# Patient Record
Sex: FEMALE | Race: WHITE | NOT HISPANIC OR LATINO | Employment: OTHER | URBAN - METROPOLITAN AREA
[De-identification: names, ages, dates, MRNs, and addresses within clinical notes are randomized per-mention and may not be internally consistent; named-entity substitution may affect disease eponyms.]

---

## 2017-07-11 ENCOUNTER — OFFICE VISIT (OUTPATIENT)
Dept: HEMATOLOGY/ONCOLOGY | Facility: CLINIC | Age: 67
End: 2017-07-11
Payer: MEDICARE

## 2017-07-11 VITALS
TEMPERATURE: 98 F | SYSTOLIC BLOOD PRESSURE: 126 MMHG | WEIGHT: 162.63 LBS | RESPIRATION RATE: 18 BRPM | HEIGHT: 59 IN | HEART RATE: 73 BPM | BODY MASS INDEX: 32.79 KG/M2 | DIASTOLIC BLOOD PRESSURE: 81 MMHG

## 2017-07-11 DIAGNOSIS — Z17.0 MALIGNANT NEOPLASM OF CENTRAL PORTION OF RIGHT BREAST IN FEMALE, ESTROGEN RECEPTOR POSITIVE: ICD-10-CM

## 2017-07-11 DIAGNOSIS — M81.8 OSTEOPOROSIS DUE TO AROMATASE INHIBITOR: ICD-10-CM

## 2017-07-11 DIAGNOSIS — C50.111 MALIGNANT NEOPLASM OF CENTRAL PORTION OF RIGHT BREAST IN FEMALE, ESTROGEN RECEPTOR POSITIVE: ICD-10-CM

## 2017-07-11 DIAGNOSIS — T38.6X5A OSTEOPOROSIS DUE TO AROMATASE INHIBITOR: ICD-10-CM

## 2017-07-11 PROCEDURE — 99214 OFFICE O/P EST MOD 30 MIN: CPT | Mod: ,,, | Performed by: INTERNAL MEDICINE

## 2017-07-11 PROCEDURE — 1126F AMNT PAIN NOTED NONE PRSNT: CPT | Mod: ,,, | Performed by: INTERNAL MEDICINE

## 2017-07-11 PROCEDURE — 1159F MED LIST DOCD IN RCRD: CPT | Mod: ,,, | Performed by: INTERNAL MEDICINE

## 2017-07-11 RX ORDER — HYDROXYZINE HYDROCHLORIDE 10 MG/1
TABLET, FILM COATED ORAL
COMMUNITY
End: 2018-06-05

## 2017-07-11 RX ORDER — ALENDRONATE SODIUM 70 MG/1
70 TABLET ORAL
Qty: 4 TABLET | Refills: 11 | Status: SHIPPED | OUTPATIENT
Start: 2017-07-11 | End: 2018-06-05

## 2017-07-11 RX ORDER — DOXYCYCLINE 100 MG/1
CAPSULE ORAL
Refills: 0 | COMMUNITY
Start: 2017-04-04 | End: 2018-06-05

## 2017-07-11 RX ORDER — LIDOCAINE HYDROCHLORIDE 20 MG/ML
SOLUTION ORAL; TOPICAL
Refills: 0 | COMMUNITY
Start: 2017-05-25 | End: 2018-06-05

## 2017-07-11 RX ORDER — AMOXICILLIN 500 MG/1
CAPSULE ORAL
Refills: 0 | COMMUNITY
Start: 2017-05-25 | End: 2018-06-05

## 2017-07-11 RX ORDER — HYDROCODONE POLISTIREX AND CHLORPHENIRAMINE POLISTIREX 10; 8 MG/5ML; MG/5ML
SUSPENSION, EXTENDED RELEASE ORAL
Refills: 0 | COMMUNITY
Start: 2017-04-04 | End: 2018-06-05

## 2017-07-11 RX ORDER — ASPIRIN 325 MG
TABLET, DELAYED RELEASE (ENTERIC COATED) ORAL
COMMUNITY
End: 2018-06-05

## 2017-07-11 RX ORDER — LETROZOLE 2.5 MG/1
TABLET, FILM COATED ORAL
COMMUNITY
Start: 2017-07-10 | End: 2018-02-20 | Stop reason: SDUPTHER

## 2017-07-11 NOTE — ASSESSMENT & PLAN NOTE
New problem. Discussed in detail with patient.  Will begin Fosamax weekly and continue with Oscal treatment.  Check labs with next visit.

## 2017-07-11 NOTE — ASSESSMENT & PLAN NOTE
Right mastectomy 5/24/2007  [id number], 2cm tumor  Lobular carcinoma  Completed AC 10/2007  Completed XRT for close margins  Began AI 9/2007    Patient is doing quite well, remains on letrozole and is tolerating it well but now with osteoporosis.  Will begin fosamax and continue letrozole for now at patients request.  Discussed fracture risk.  Will watch closely.

## 2017-07-11 NOTE — PROGRESS NOTES
PROGRESS NOTE    Subjective:       Patient ID: Camila Au is a 67 y.o. female.    Chief Complaint:  Follow-up (6 mth f/u) and Cancer  Right mastectomy 5/24/2007  [id number], 2cm tumor  Lobular carcinoma  Completed AC 10/2007  Completed XRT for close margins  Began AI 9/2007      History of Present Illness:   Camila Au is a 67 y.o. female who presents with a history of breast cancer here for follow up.  Patient is doing well. No new complaints.        Family and Social history reviewed and is unchanged from 11/25/2014      ROS:  Review of Systems   Constitutional: Negative for fever.   Respiratory: Negative for shortness of breath.    Cardiovascular: Negative for chest pain and leg swelling.   Gastrointestinal: Negative for abdominal pain and blood in stool.   Genitourinary: Negative for hematuria.   Skin: Negative for rash.          Current Outpatient Prescriptions:     anastrozole (ARIMIDEX) 1 mg tablet, Take 1 mg by mouth once daily. , Disp: , Rfl:     calcium-vitamin D (OSCAL) 250 (625)-125 mg-unit per tablet, Take 1 tablet by mouth 2 (two) times daily.  , Disp: , Rfl:     ibuprofen (ADVIL,MOTRIN) 200 MG tablet, Take 200 mg by mouth every 6 (six) hours as needed. 2 TABS , Disp: , Rfl:     letrozole (FEMARA) 2.5 mg Tab, , Disp: , Rfl:     LIDOCAINE VISCOUS 2 % solution, RINSE WITH 1 LEIGHTON FOR 50 SECONDS THEN SPIT OUT REPEAT Q 4 TO 6 H PRN P, Disp: , Rfl: 0    multivitamin (THERAGRAN) per tablet, Take 1 tablet by mouth once daily. 1 Tablet Oral Every day, Disp: , Rfl:     alendronate (FOSAMAX) 70 MG tablet, Take 1 tablet (70 mg total) by mouth every 7 days., Disp: 4 tablet, Rfl: 11    amoxicillin (AMOXIL) 500 MG capsule, TK ONE C PO TID TAT, Disp: , Rfl: 0    aspirin (ECOTRIN) 325 MG EC tablet, Take by mouth., Disp: , Rfl:     doxycycline (MONODOX) 100 MG capsule, TK 1 C PO Q 12 H FOR 10 DAYS, Disp: , Rfl: 0    hydrocodone-chlorpheniramine  "(TUSSIONEX) 10-8 mg/5 mL suspension, TAKE 5 ML PO Q 12 HOURS PRN FOR 5 DAYS, Disp: , Rfl: 0    hydrOXYzine HCl (ATARAX) 10 MG Tab, Take by mouth., Disp: , Rfl:     phenazopyridine (PYRIDIUM) 200 MG tablet, Take 1 tablet (200 mg total) by mouth every 4 to 6 hours as needed., Disp: 30 tablet, Rfl: 3        Objective:       Physical Examination:     /81   Pulse 73   Temp 98 °F (36.7 °C)   Resp 18   Ht 4' 11" (1.499 m)   Wt 73.8 kg (162 lb 9.6 oz)   BMI 32.84 kg/m²     Physical Exam   Constitutional: She appears well-developed and well-nourished.   HENT:   Head: Normocephalic and atraumatic.   Right Ear: External ear normal.   Left Ear: External ear normal.   Mouth/Throat: Oropharynx is clear and moist.   Eyes: Conjunctivae are normal. Pupils are equal, round, and reactive to light.   Neck: No tracheal deviation present. No thyromegaly present.   Cardiovascular: Normal rate, regular rhythm and normal heart sounds.    Pulmonary/Chest: Effort normal and breath sounds normal.       Abdominal: Soft. Bowel sounds are normal. She exhibits no distension and no mass. There is no tenderness.   Musculoskeletal: She exhibits no edema.   Neurological:   Neuro intact througout   Skin: No rash noted.   Psychiatric: She has a normal mood and affect. Her behavior is normal. Judgment and thought content normal.       Labs:   No results found for this or any previous visit (from the past 336 hour(s)).  CMP  Sodium   Date Value Ref Range Status   08/14/2012 136 136 - 145 mmol/L Final     Potassium   Date Value Ref Range Status   08/14/2012 4.1 3.5 - 5.1 mmol/L Final     Chloride   Date Value Ref Range Status   08/14/2012 100 95 - 110 mmol/L Final     CO2   Date Value Ref Range Status   08/14/2012 25 23.0 - 29.0 mmol/L Final     Glucose   Date Value Ref Range Status   08/14/2012 144 (H) 70 - 110 mg/dl Final     BUN, Bld   Date Value Ref Range Status   08/14/2012 12 8 - 23 mg/dl Final     Creatinine   Date Value Ref Range " Status   08/14/2012 0.6 0.5 - 1.4 mg/dl Final   08/08/2012 0.6 0.2 - 1.4 mg/dl Final     Calcium   Date Value Ref Range Status   08/14/2012 8.7 8.7 - 10.5 mg/dl Final   08/08/2012 9.5 8.6 - 10.2 mg/dl Final     Total Protein   Date Value Ref Range Status   08/14/2012 6.9 6.0 - 8.4 g/dL Final     Albumin   Date Value Ref Range Status   08/14/2012 3.8 3.5 - 5.2 g/dl Final     Total Bilirubin   Date Value Ref Range Status   08/14/2012 0.7 0.1 - 1.0 mg/dl Final     Comment:     For infants and newborns, interpretation of results should be based  on gestational age, weight and in agreement with clinical  observations.  .  Premature Infant recommended reference ranges:  Up to 24 hours.............<8.0 mg/dl  Up to 48 hours............<12.0 mg/dl  3-5 days..................<15.0 mg/dl  6-29 days.................<15.0 mg/dl     Alkaline Phosphatase   Date Value Ref Range Status   08/14/2012 105 55 - 135 U/L Final   08/08/2012 100 23 - 119 UNIT/L Final     AST   Date Value Ref Range Status   08/14/2012 55 (H) 10 - 40 U/L Final   08/08/2012 35 (H) 10 - 30 UNIT/L Final     ALT   Date Value Ref Range Status   08/14/2012 47 (H) 10 - 44 U/L Final     Anion Gap   Date Value Ref Range Status   08/14/2012 11 8 - 16 mmol/L Final   08/08/2012 9 5 - 15 meq/L Final     eGFR if    Date Value Ref Range Status   08/14/2012 >60 >60 mL/min Final     Comment:     Estimated glomerular filtration rate (eGFR) is normalized to an  average body surface area of 1.73 square meters.  The calculation  used to obtain the eGFR is the adjusted MDRD equation, which factors  patient sex, age, race, and creatinine result.  Since race is unknown  in our information system, the eGFR values for -American  and Non--American patients are given for each creatinine  result.     eGFR if non    Date Value Ref Range Status   08/14/2012 >60 >60 mL/min Final     No results found for: CEA  No results found for:  PSA        Assessment/Plan:     Problem List Items Addressed This Visit     Osteoporosis due to aromatase inhibitor     New problem. Discussed in detail with patient.  Will begin Fosamax weekly and continue with Oscal treatment.  Check labs with next visit.           Relevant Medications    alendronate (FOSAMAX) 70 MG tablet    Malignant neoplasm of central portion of right breast in female, estrogen receptor positive     Right mastectomy 5/24/2007  [id number], 2cm tumor  Lobular carcinoma  Completed AC 10/2007  Completed XRT for close margins  Began AI 9/2007    Patient is doing quite well, remains on letrozole and is tolerating it well but now with osteoporosis.  Will begin fosamax and continue letrozole for now at patients request.  Discussed fracture risk.  Will watch closely.              Other Visit Diagnoses    None.         Discussion:     No Follow-up on file.      Electronically signed by Jeffrey Ramos

## 2017-07-11 NOTE — LETTER
July 11, 2017      Kasie Arthur MD  [address]  [address]  [place] [address]           [facility]  [address]  [address]  [place] [address]  Phone: [phone]  Fax: [phone]          Patient: Camila Au   MR Number: [id number]   YOB: 1950   Date of Visit: 7/11/2017       Dear Dr. Kasie Arthur:    Thank you for referring Camila Au to me for evaluation. Attached you will find relevant portions of my assessment and plan of care.    If you have questions, please do not hesitate to call me. I look forward to following Camila Au along with you.    Sincerely,    Jeffrey Valentin MD    Enclosure  CC:  No Recipients    If you would like to receive this communication electronically, please contact [email or web address] or [phone] to request more information on [employer] Link access.    For providers and/or their staff who would like to refer a patient to [facility], please contact us through our one-stop-shop provider referral line, [facility], at [phone].    If you feel you have received this communication in error or would no longer like to receive these types of communications, please e-mail [email or web address]

## 2017-07-13 ENCOUNTER — TELEPHONE (OUTPATIENT)
Dept: HEMATOLOGY/ONCOLOGY | Facility: CLINIC | Age: 67
End: 2017-07-13

## 2018-01-08 ENCOUNTER — HISTORICAL (OUTPATIENT)
Dept: ADMINISTRATIVE | Facility: HOSPITAL | Age: 68
End: 2018-01-08

## 2018-01-08 LAB
ALBUMIN SERPL-MCNC: 4.2 G/DL (ref 3.1–4.7)
ALP SERPL-CCNC: 100 IU/L (ref 40–104)
ALT (SGPT): 84 IU/L (ref 3–33)
AST SERPL-CCNC: 132 IU/L (ref 10–40)
BASOPHILS NFR BLD: 0.1 K/UL (ref 0–0.2)
BASOPHILS NFR BLD: 1.2 %
BILIRUB SERPL-MCNC: 0.8 MG/DL (ref 0.3–1)
BUN SERPL-MCNC: 10 MG/DL (ref 8–20)
CALCIUM SERPL-MCNC: 9.2 MG/DL (ref 7.7–10.4)
CHLORIDE: 101 MMOL/L (ref 98–110)
CO2 SERPL-SCNC: 29.4 MMOL/L (ref 22.8–31.6)
CREATININE: 0.44 MG/DL (ref 0.6–1.4)
EOSINOPHIL NFR BLD: 0.1 K/UL (ref 0–0.7)
EOSINOPHIL NFR BLD: 1.6 %
ERYTHROCYTE [DISTWIDTH] IN BLOOD BY AUTOMATED COUNT: 12.5 % (ref 12.5–14.5)
GLUCOSE: 100 MG/DL (ref 70–99)
GRAN #: 4.2 K/UL (ref 1.4–6.5)
GRAN%: 54.9 %
HCT VFR BLD AUTO: 45.4 % (ref 36–48)
HGB BLD-MCNC: 15.9 G/DL (ref 12–15)
IMMATURE GRANS (ABS): 0 K/UL (ref 0–1)
IMMATURE GRANULOCYTES: 0.4 %
LYMPH #: 2.6 K/UL (ref 1.2–3.4)
LYMPH%: 33.2 %
MCH RBC QN AUTO: 32.9 PG (ref 25–35)
MCHC RBC AUTO-ENTMCNC: 35 G/DL (ref 31–36)
MCV RBC AUTO: 94 FL (ref 79–98)
MONO #: 0.7 K/UL (ref 0.1–0.6)
MONO%: 8.7 %
NUCLEATED RBCS: 0 %
NUCLEATED RED BLOOD CELLS: 0 /100 WBC
PERFORMED BY:: ABNORMAL
PLATELET # BLD AUTO: 243 K/UL (ref 140–440)
PMV BLD AUTO: 10.8 FL (ref 8.8–12.7)
POTASSIUM SERPL-SCNC: 3.2 MMOL/L (ref 3.5–5)
PROT SERPL-MCNC: 7.9 G/DL (ref 6–8.2)
RBC # BLD AUTO: 4.83 M/UL (ref 3.5–5.5)
SODIUM: 138 MMOL/L (ref 134–144)
WBC # BLD: 7.7 K/UL (ref 5–10)

## 2018-01-09 ENCOUNTER — OFFICE VISIT (OUTPATIENT)
Dept: HEMATOLOGY/ONCOLOGY | Facility: CLINIC | Age: 68
End: 2018-01-09
Payer: MEDICARE

## 2018-01-09 VITALS
DIASTOLIC BLOOD PRESSURE: 74 MMHG | WEIGHT: 162.69 LBS | TEMPERATURE: 98 F | HEART RATE: 98 BPM | BODY MASS INDEX: 34.15 KG/M2 | HEIGHT: 58 IN | RESPIRATION RATE: 18 BRPM | SYSTOLIC BLOOD PRESSURE: 117 MMHG

## 2018-01-09 DIAGNOSIS — C50.111 MALIGNANT NEOPLASM OF CENTRAL PORTION OF RIGHT BREAST IN FEMALE, ESTROGEN RECEPTOR POSITIVE: ICD-10-CM

## 2018-01-09 DIAGNOSIS — Z17.0 MALIGNANT NEOPLASM OF CENTRAL PORTION OF RIGHT BREAST IN FEMALE, ESTROGEN RECEPTOR POSITIVE: ICD-10-CM

## 2018-01-09 PROCEDURE — 99213 OFFICE O/P EST LOW 20 MIN: CPT | Mod: ,,, | Performed by: INTERNAL MEDICINE

## 2018-01-09 NOTE — ASSESSMENT & PLAN NOTE
Patient is doing well.  Exam is negative and patient is TARAS.  Will continue to follow every six months.

## 2018-01-09 NOTE — PROGRESS NOTES
PROGRESS NOTE    Subjective:       Patient ID: Camila Au is a 67 y.o. female.    Chief Complaint:  Follow-up and Results (labs in epic)  Right mastectomy 5/24/2007  [id number], 2cm tumor  Lobular carcinoma  Completed AC 10/2007  Completed XRT for close margins  Began AI 9/2007      History of Present Illness:   Camila Au is a 67 y.o. female who presents with a history of breast cancer here for follow up.  No new complaints this visit.        Family and Social history reviewed and is unchanged from 11/25/2014      ROS:  Review of Systems   Constitutional: Negative for fever.   Respiratory: Negative for shortness of breath.    Cardiovascular: Negative for chest pain and leg swelling.   Gastrointestinal: Negative for abdominal pain and blood in stool.   Genitourinary: Negative for hematuria.   Skin: Negative for rash.          Current Outpatient Prescriptions:     alendronate (FOSAMAX) 70 MG tablet, Take 1 tablet (70 mg total) by mouth every 7 days., Disp: 4 tablet, Rfl: 11    aspirin (ECOTRIN) 325 MG EC tablet, Take by mouth., Disp: , Rfl:     calcium-vitamin D (OSCAL) 250 (625)-125 mg-unit per tablet, Take 1 tablet by mouth 2 (two) times daily.  , Disp: , Rfl:     doxycycline (MONODOX) 100 MG capsule, TK 1 C PO Q 12 H FOR 10 DAYS, Disp: , Rfl: 0    hydrocodone-chlorpheniramine (TUSSIONEX) 10-8 mg/5 mL suspension, TAKE 5 ML PO Q 12 HOURS PRN FOR 5 DAYS, Disp: , Rfl: 0    hydrOXYzine HCl (ATARAX) 10 MG Tab, Take by mouth., Disp: , Rfl:     ibuprofen (ADVIL,MOTRIN) 200 MG tablet, Take 200 mg by mouth every 6 (six) hours as needed. 2 TABS , Disp: , Rfl:     letrozole (FEMARA) 2.5 mg Tab, , Disp: , Rfl:     LIDOCAINE VISCOUS 2 % solution, RINSE WITH 1 LEIGHTON FOR 50 SECONDS THEN SPIT OUT REPEAT Q 4 TO 6 H PRN P, Disp: , Rfl: 0    multivitamin (THERAGRAN) per tablet, Take 1 tablet by mouth once daily. 1 Tablet Oral Every day, Disp: , Rfl:      "phenazopyridine (PYRIDIUM) 200 MG tablet, Take 1 tablet (200 mg total) by mouth every 4 to 6 hours as needed., Disp: 30 tablet, Rfl: 3    amoxicillin (AMOXIL) 500 MG capsule, TK ONE C PO TID TAT, Disp: , Rfl: 0        Objective:       Physical Examination:     /74   Pulse 98   Temp 98.2 °F (36.8 °C)   Resp 18   Ht 4' 10" (1.473 m)   Wt 73.8 kg (162 lb 11.2 oz)   BMI 34.00 kg/m²     Physical Exam   Constitutional: She appears well-developed and well-nourished.   HENT:   Head: Normocephalic and atraumatic.   Right Ear: External ear normal.   Left Ear: External ear normal.   Mouth/Throat: Oropharynx is clear and moist.   Eyes: Conjunctivae are normal. Pupils are equal, round, and reactive to light.   Neck: No tracheal deviation present. No thyromegaly present.   Cardiovascular: Normal rate, regular rhythm and normal heart sounds.    Pulmonary/Chest: Effort normal and breath sounds normal.       Abdominal: Soft. Bowel sounds are normal. She exhibits no distension and no mass. There is no tenderness.   Musculoskeletal: She exhibits no edema.   Neurological:   Neuro intact througout   Skin: No rash noted.   Psychiatric: She has a normal mood and affect. Her behavior is normal. Judgment and thought content normal.       Labs:   Recent Results (from the past 336 hour(s))   CBC auto differential    Collection Time: 01/08/18 12:37 PM   Result Value Ref Range    WBC 7.7 5.0 - 10.0 K/ul    Hemoglobin 15.9 (H) 12.0 - 15.0 g/dl    Hematocrit 45.4 36.0 - 48.0 %    Platelets 243 140 - 440 K/ul     CMP  Sodium   Date Value Ref Range Status   01/08/2018 138 134 - 144 mmol/L      Potassium   Date Value Ref Range Status   01/08/2018 3.2 (L) 3.5 - 5.0 mmol/L      Chloride   Date Value Ref Range Status   01/08/2018 101 98 - 110 mmol/L      CO2   Date Value Ref Range Status   01/08/2018 29.4 22.8 - 31.6 mmol/L      Glucose   Date Value Ref Range Status   01/08/2018 100 (H) 70 - 99 mg/dL      BUN, Bld   Date Value Ref Range " Status   01/08/2018 10 8 - 20 mg/dL      Creatinine   Date Value Ref Range Status   01/08/2018 0.44 (L) 0.60 - 1.40 mg/dL    08/08/2012 0.6 0.2 - 1.4 mg/dl Final     Calcium   Date Value Ref Range Status   01/08/2018 9.2 7.7 - 10.4 mg/dL    08/08/2012 9.5 8.6 - 10.2 mg/dl Final     Total Protein   Date Value Ref Range Status   01/08/2018 7.9 6.0 - 8.2 g/dL      Albumin   Date Value Ref Range Status   01/08/2018 4.2 3.1 - 4.7 g/dL      Total Bilirubin   Date Value Ref Range Status   01/08/2018 0.8 0.3 - 1.0 mg/dL      Alkaline Phosphatase   Date Value Ref Range Status   01/08/2018 100 40 - 104 IU/L    08/08/2012 100 23 - 119 UNIT/L Final     AST   Date Value Ref Range Status   01/08/2018 132 (H) 10 - 40 IU/L    08/08/2012 35 (H) 10 - 30 UNIT/L Final     ALT   Date Value Ref Range Status   08/14/2012 47 (H) 10 - 44 U/L Final     Anion Gap   Date Value Ref Range Status   08/14/2012 11 8 - 16 mmol/L Final   08/08/2012 9 5 - 15 meq/L Final     eGFR if    Date Value Ref Range Status   08/14/2012 >60 >60 mL/min Final     Comment:     Estimated glomerular filtration rate (eGFR) is normalized to an  average body surface area of 1.73 square meters.  The calculation  used to obtain the eGFR is the adjusted MDRD equation, which factors  patient sex, age, race, and creatinine result.  Since race is unknown  in our information system, the eGFR values for -American  and Non--American patients are given for each creatinine  result.     eGFR if non    Date Value Ref Range Status   08/14/2012 >60 >60 mL/min Final     No results found for: CEA  No results found for: PSA        Assessment/Plan:     Problem List Items Addressed This Visit     Malignant neoplasm of central portion of right breast in female, estrogen receptor positive     Patient is doing well.  Exam is negative and patient is TARAS.  Will continue to follow every six months.           Relevant Orders    CBC auto differential     Comprehensive metabolic panel          Discussion:     Return in about 6 months (around 7/9/2018).      Electronically signed by Jeffrey Ramos

## 2018-01-09 NOTE — LETTER
January 9, 2018      Marlo Kennedy MD  [address]  [place] [address]           [facility]  [address]  [address]  [place] [address]  Phone: [phone]  Fax: [phone]          Patient: Camila Au   MR Number: [id number]   YOB: 1950   Date of Visit: 1/9/2018       Dear Dr. Marlo Kennedy:    Thank you for referring Camila Au to me for evaluation. Attached you will find relevant portions of my assessment and plan of care.    If you have questions, please do not hesitate to call me. I look forward to following Camila Au along with you.    Sincerely,    Jeffrey Valentin MD    Enclosure  CC:  No Recipients    If you would like to receive this communication electronically, please contact [email or web address] or [phone] to request more information on [employer] Link access.    For providers and/or their staff who would like to refer a patient to [facility], please contact us through our one-stop-shop provider referral line, [facility], at [phone].    If you feel you have received this communication in error or would no longer like to receive these types of communications, please e-mail [email or web address]

## 2018-02-20 DIAGNOSIS — Z85.3 HISTORY OF BREAST CANCER: Primary | ICD-10-CM

## 2018-02-20 RX ORDER — LETROZOLE 2.5 MG/1
TABLET, FILM COATED ORAL
Qty: 90 TABLET | Refills: 1 | Status: SHIPPED | OUTPATIENT
Start: 2018-02-20 | End: 2019-02-28 | Stop reason: CLARIF

## 2018-05-29 ENCOUNTER — TELEPHONE (OUTPATIENT)
Dept: ORTHOPEDICS | Facility: CLINIC | Age: 68
End: 2018-05-29

## 2018-05-29 NOTE — TELEPHONE ENCOUNTER
----- Message from Deepak Dominguez sent at 5/29/2018 12:21 PM CDT -----  Patient called stating that she's having pains in her back, please call patient [phone]

## 2018-05-29 NOTE — TELEPHONE ENCOUNTER
Spoke with pt, she is having back pains. She has seen Dr. Perkins in the past, she will make an appt with him

## 2018-06-05 ENCOUNTER — OFFICE VISIT (OUTPATIENT)
Dept: ORTHOPEDICS | Facility: CLINIC | Age: 68
End: 2018-06-05
Payer: MEDICARE

## 2018-06-05 VITALS
SYSTOLIC BLOOD PRESSURE: 120 MMHG | BODY MASS INDEX: 34 KG/M2 | DIASTOLIC BLOOD PRESSURE: 80 MMHG | HEIGHT: 58 IN | WEIGHT: 162 LBS | HEART RATE: 77 BPM

## 2018-06-05 DIAGNOSIS — M17.11 PRIMARY OSTEOARTHRITIS OF RIGHT KNEE: Primary | ICD-10-CM

## 2018-06-05 PROCEDURE — 20610 DRAIN/INJ JOINT/BURSA W/O US: CPT | Mod: RT,,, | Performed by: ORTHOPAEDIC SURGERY

## 2018-06-05 PROCEDURE — 99213 OFFICE O/P EST LOW 20 MIN: CPT | Mod: 25,,, | Performed by: ORTHOPAEDIC SURGERY

## 2018-06-05 PROCEDURE — 73562 X-RAY EXAM OF KNEE 3: CPT | Mod: RT,,, | Performed by: ORTHOPAEDIC SURGERY

## 2018-06-05 RX ORDER — METHYLPREDNISOLONE ACETATE 40 MG/ML
40 INJECTION, SUSPENSION INTRA-ARTICULAR; INTRALESIONAL; INTRAMUSCULAR; SOFT TISSUE
Status: DISCONTINUED | OUTPATIENT
Start: 2018-06-05 | End: 2018-06-05 | Stop reason: HOSPADM

## 2018-06-05 RX ORDER — MELOXICAM 7.5 MG/1
TABLET ORAL
Refills: 3 | COMMUNITY
Start: 2018-04-27 | End: 2018-07-30

## 2018-06-05 RX ADMIN — METHYLPREDNISOLONE ACETATE 40 MG: 40 INJECTION, SUSPENSION INTRA-ARTICULAR; INTRALESIONAL; INTRAMUSCULAR; SOFT TISSUE at 11:06

## 2018-06-05 NOTE — PROCEDURES
Large Joint Aspiration/Injection  Date/Time: 6/5/2018 11:58 AM  Performed by: SUSANNA CHERRY  Authorized by: SUSANNA CHERRY     Consent Done?:  Yes (Verbal)  Indications:  Pain  Procedure site marked: Yes    Timeout: Prior to procedure the correct patient, procedure, and site was verified      Location:  Knee  Site:  R knee  Prep: Patient was prepped and draped in usual sterile fashion    Needle size:  25 G  Medications:  40 mg methylPREDNISolone acetate 40 mg/mL; 40 mg methylPREDNISolone acetate 40 mg/mL  Patient tolerance:  Patient tolerated the procedure well with no immediate complications

## 2018-06-05 NOTE — PROGRESS NOTES
[facility]    Subjective:     Chief Complaint:   Chief Complaint   Patient presents with    Right Knee - Pain     Right knee pain x 2 months.        Past Medical History:   Diagnosis Date    Arthritis     Cancer     breast, remission    Wears glasses     WEARS CONTACS       Past Surgical History:   Procedure Laterality Date    CARPAL TUNNEL RELEASE       SECTION      FOOT SURGERY      KNEE ARTHROSCOPY W/ LASER      MASTECTOMY Right 2007    TUBAL LIGATION         Current Outpatient Prescriptions   Medication Sig    letrozole (FEMARA) 2.5 mg Tab TAKE 1 TABLET BY MOUTH ONE TIME DAILY.    meloxicam (MOBIC) 7.5 MG tablet TK 1 T PO QD     No current facility-administered medications for this visit.        Review of patient's allergies indicates:   Allergen Reactions    No known drug allergies        Family History   Problem Relation Age of Onset    Arthritis Mother     Hyperlipidemia Mother     Stroke Mother     Dementia Mother        Social History     Social History    Marital status:      Spouse name: N/A    Number of children: N/A    Years of education: N/A     Occupational History    Not on file.     Social History Main Topics    Smoking status: Former Smoker     Types: Cigarettes    Smokeless tobacco: Never Used      Comment: social smoker - on weekends only - quit 20 yrs ago    Alcohol use 0.0 oz/week      Comment: OCCASIONALLY    Drug use: No    Sexual activity: Yes     Partners: Male     Other Topics Concern    Not on file     Social History Narrative    No narrative on file       History of present illness: Patient comes in today with a chief complaint of right knee pain. She has severe osteoarthritis of the right knee. The knee is really acting up. She's having difficulty ambulating      Review of Systems:    Constitution: Negative for chills, fever, and sweats.  Negative for unexplained weight loss.    HENT:  Negative for headaches and blurry  vision.    Cardiovascular:Negative for chest pain or irregular heart beat. Negative for hypertension.    Respiratory:  Negative for cough and shortness of breath.    Gastrointestinal: Negative for abdominal pain, heartburn, melena, nausea, and vomitting.    Genitourinary:  Negative bladder incontinence and dysuria.    Musculoskeletal:  See HPI for details.     Neurological: Negative for numbness.    Psychiatric/Behavioral: Negative for depression.  The patient is not nervous/anxious.      Endocrine: Negative for polyuria    Hematologic/Lymphatic: Negative for bleeding problem.  Does not bruise/bleed easily.    Skin: Negative for poor would healing and rash    Objective:      Physical Examination:    Vital Signs:    Vitals:    06/05/18 1021   BP: 120/80   Pulse: 77       Body mass index is 33.86 kg/m².    This a well-developed, well nourished patient in no acute distress.  They are alert and oriented and cooperative to examination.        Patient has significant crepitus in the right side. She has a 1+ effusion. She has a varus deformity. She has crepitus in the left side. She is a varus deformity of the left knee. Straight leg raises are negative  Pertinent New Results:    XRAY Report / Interpretation:   AP lateral and sunrise views of the knees demonstrate bone-on-bone osteoarthrosis of the bilateral knees with complete loss of both medial compartments in 3 compartments spurring    Assessment/Plan:      Osteoarthritis right knee. I injected the right knee with Depo-Medrol and lidocaine. We spoke extensively about total knee arthroplasty. She will follow-up when necessary      This note was created using Dragon voice recognition software that occasionally misinterpreted phrases or words.

## 2018-06-18 ENCOUNTER — OFFICE VISIT (OUTPATIENT)
Dept: ORTHOPEDICS | Facility: CLINIC | Age: 68
End: 2018-06-18
Payer: MEDICARE

## 2018-06-18 VITALS
SYSTOLIC BLOOD PRESSURE: 122 MMHG | HEIGHT: 58 IN | WEIGHT: 162 LBS | DIASTOLIC BLOOD PRESSURE: 84 MMHG | BODY MASS INDEX: 34 KG/M2 | HEART RATE: 88 BPM

## 2018-06-18 DIAGNOSIS — M47.26 OTHER SPONDYLOSIS WITH RADICULOPATHY, LUMBAR REGION: Primary | ICD-10-CM

## 2018-06-18 DIAGNOSIS — M54.50 LOW BACK PAIN, NON-SPECIFIC: ICD-10-CM

## 2018-06-18 DIAGNOSIS — M51.36 DISC DEGENERATION, LUMBAR: ICD-10-CM

## 2018-06-18 PROCEDURE — 99213 OFFICE O/P EST LOW 20 MIN: CPT | Mod: ,,, | Performed by: ORTHOPAEDIC SURGERY

## 2018-06-18 PROCEDURE — 72170 X-RAY EXAM OF PELVIS: CPT | Mod: ,,, | Performed by: ORTHOPAEDIC SURGERY

## 2018-06-18 PROCEDURE — 72100 X-RAY EXAM L-S SPINE 2/3 VWS: CPT | Mod: ,,, | Performed by: ORTHOPAEDIC SURGERY

## 2018-06-18 RX ORDER — METHOCARBAMOL 500 MG/1
500 TABLET, FILM COATED ORAL 2 TIMES DAILY
Qty: 60 TABLET | Refills: 1 | Status: SHIPPED | OUTPATIENT
Start: 2018-06-18 | End: 2018-07-30

## 2018-06-18 NOTE — PROGRESS NOTES
Subjective:       Patient ID: Camila Au is a 68 y.o. female.    Chief Complaint: Pain of the Lumbar Spine (Lumbar pain x 30 years but not this bad. Pain radiates down left leg to foot with numbness. No bowel or bladder problems. Limited walking.A little better since she made this appointment.)      History of Present Illness  History of chronic back pain for 30 years of periodic exacerbations to S1 over the last month more severe than usual with pain and numbness in the left leg she is better head this point in time but is concerned    Current Medications  Current Outpatient Prescriptions   Medication Sig Dispense Refill    letrozole (FEMARA) 2.5 mg Tab TAKE 1 TABLET BY MOUTH ONE TIME DAILY. 90 tablet 1    meloxicam (MOBIC) 7.5 MG tablet TK 1 T PO QD  3     No current facility-administered medications for this visit.        Allergies  Review of patient's allergies indicates:   Allergen Reactions    No known drug allergies        Past Medical History  Past Medical History:   Diagnosis Date    Arthritis     Cancer     breast, remission    Wears glasses     WEARS CONTACS       Surgical History  Past Surgical History:   Procedure Laterality Date    CARPAL TUNNEL RELEASE       SECTION      FOOT SURGERY      KNEE ARTHROSCOPY W/ LASER      MASTECTOMY Right 2007    TUBAL LIGATION         Family History:   Family History   Problem Relation Age of Onset    Arthritis Mother     Hyperlipidemia Mother     Stroke Mother     Dementia Mother        Social History:   Social History     Social History    Marital status:      Spouse name: N/A    Number of children: N/A    Years of education: N/A     Occupational History    Not on file.     Social History Main Topics    Smoking status: Former Smoker     Types: Cigarettes    Smokeless tobacco: Never Used      Comment: social smoker - on weekends only - quit 20 yrs ago    Alcohol use 0.0 oz/week      Comment: OCCASIONALLY    Drug use: No     Sexual activity: Yes     Partners: Male     Other Topics Concern    Not on file     Social History Narrative    No narrative on file       Hospitalization/Major Diagnostic Procedure:     Review of Systems     General/Constitutional:  Chills denies. Fatigue denies. Fever denies. Weight gain denies. Weight loss denies.    Respiratory:  Shortness of breath denies.    Cardiovascular:  Chest pain denies.    Gastrointestinal:  Constipation denies. Diarrhea denies. Nausea denies. Vomiting denies.     Hematology:  Easy bruising denies. Prolonged bleeding denies.     Genitourinary:  Frequent urination denies. Pain in lower back denies. Painful urination denies.     Musculoskeletal:  See HPI for details    Skin:  Rash denies.    Neurologic:  Dizziness denies. Gait abnormalities denies. Seizures denies. Tingling/Numbess denies.    Psychiatric:  Anxiety denies. Depressed mood denies.     Objective:   Vital Signs:   Vitals:    06/18/18 1007   BP: 122/84   Pulse: 88        Physical Exam      General Examination:     Constitutional: The patient is alert and oriented to lace person and time. Mood is pleasant.     Head/Face: Normal facial features normal eyebrows    Eyes: Normal extraocular motion bilaterally    Lungs: Respirations are equal and unlabored    Gait is coordinated.    Cardiovascular: There are no swelling or varicosities present.    Lymphatic: Negative for adenopathy    Skin: Normal    Neurological: Level of consciousness normal. Oriented to place person and time and situation    Psychiatric: Oriented to time place person and situation    Physical examination lumbar spine shows well-healed no incisions moderate tenderness without spasm in the muscles as well as posterior iliac spine areas range of motion moderately restricted straight leg raising causes back pain bilaterally patella and Achilles reflexes symmetrical but hypoactive hip and knee range of motion normal with exception of back pain no edema or adenopathy  noted    XRAY Report/ Interpretation : AP lateral x-rays of the lumbar spinal performed today indications low back pain findings of lumbar x-rays show marked facet joint arthritis and sclerosis L4-5 L5-S1 levels moderate disc space narrowing at L4-5 and L5-S1 findings consistent with long-standing lumbar spondylosis  AP pelvis x-ray taken today. Indications low back pain. Findings AP pelvis x-rays show normal hip joints.      Assessment:       1. Other spondylosis with radiculopathy, lumbar region    2. Low back pain, non-specific    3. Disc degeneration, lumbar        Plan:       Camila was seen today for pain.    Diagnoses and all orders for this visit:    Other spondylosis with radiculopathy, lumbar region    Low back pain, non-specific  -     X-Ray Lumbar Spine Ap And Lateral  -     X-Ray Pelvis Routine AP    Disc degeneration, lumbar         No Follow-up on file.    Treatment options were discussed regards to the nature of the spinal condition conservative pain interventional and surgical options were discussed in detail and the probability of success of the separate treatment options was discussed in detail the patient expressed a clear understanding of the treatment options would regards to surgery the procedure risks benefits complications and outcomes were discussed.  No guarantees were given regards to surgical outcome.  Lumbar MRI is ordered for further evaluation of present complaints and chronic condition    This note was created using Dragon voice recognition software that occasionally misinterpreted phrases or words.

## 2018-07-17 ENCOUNTER — TELEPHONE (OUTPATIENT)
Dept: ORTHOPEDICS | Facility: CLINIC | Age: 68
End: 2018-07-17

## 2018-07-30 ENCOUNTER — OFFICE VISIT (OUTPATIENT)
Dept: ORTHOPEDICS | Facility: CLINIC | Age: 68
End: 2018-07-30
Payer: MEDICARE

## 2018-07-30 VITALS
WEIGHT: 159 LBS | HEIGHT: 58 IN | BODY MASS INDEX: 33.37 KG/M2 | SYSTOLIC BLOOD PRESSURE: 140 MMHG | DIASTOLIC BLOOD PRESSURE: 80 MMHG

## 2018-07-30 DIAGNOSIS — M48.061 FORAMINAL STENOSIS OF LUMBAR REGION: ICD-10-CM

## 2018-07-30 DIAGNOSIS — M47.26 OTHER SPONDYLOSIS WITH RADICULOPATHY, LUMBAR REGION: Primary | ICD-10-CM

## 2018-07-30 DIAGNOSIS — M51.36 DISC DEGENERATION, LUMBAR: ICD-10-CM

## 2018-07-30 PROCEDURE — 99213 OFFICE O/P EST LOW 20 MIN: CPT | Mod: ,,, | Performed by: ORTHOPAEDIC SURGERY

## 2018-07-30 NOTE — PROGRESS NOTES
Subjective:       Patient ID: Camila Au is a 68 y.o. female.    Chief Complaint: Pain of the Lumbar Spine (MRI results)      History of Present Illness  The patient feels much better her symptoms all but resolved she no longer has any leg complaints but some occasional back pain    Current Medications  Current Outpatient Prescriptions   Medication Sig Dispense Refill    letrozole (FEMARA) 2.5 mg Tab TAKE 1 TABLET BY MOUTH ONE TIME DAILY. 90 tablet 1     No current facility-administered medications for this visit.        Allergies  Review of patient's allergies indicates:   Allergen Reactions    No known drug allergies        Past Medical History  Past Medical History:   Diagnosis Date    Arthritis     Cancer     breast, remission    Wears glasses     WEARS CONTACS       Surgical History  Past Surgical History:   Procedure Laterality Date    CARPAL TUNNEL RELEASE       SECTION      FOOT SURGERY      KNEE ARTHROSCOPY W/ LASER      MASTECTOMY Right 2007    TUBAL LIGATION         Family History:   Family History   Problem Relation Age of Onset    Arthritis Mother     Hyperlipidemia Mother     Stroke Mother     Dementia Mother        Social History:   Social History     Social History    Marital status:      Spouse name: N/A    Number of children: N/A    Years of education: N/A     Occupational History    Not on file.     Social History Main Topics    Smoking status: Former Smoker     Types: Cigarettes    Smokeless tobacco: Never Used      Comment: social smoker - on weekends only - quit 20 yrs ago    Alcohol use 0.0 oz/week      Comment: OCCASIONALLY    Drug use: No    Sexual activity: Yes     Partners: Male     Other Topics Concern    Not on file     Social History Narrative    No narrative on file       Hospitalization/Major Diagnostic Procedure:     Review of Systems     General/Constitutional:  Chills denies. Fatigue denies. Fever denies. Weight gain denies. Weight loss  denies.    Respiratory:  Shortness of breath denies.    Cardiovascular:  Chest pain denies.    Gastrointestinal:  Constipation denies. Diarrhea denies. Nausea denies. Vomiting denies.     Hematology:  Easy bruising denies. Prolonged bleeding denies.     Genitourinary:  Frequent urination denies. Pain in lower back denies. Painful urination denies.     Musculoskeletal:  See HPI for details    Skin:  Rash denies.    Neurologic:  Dizziness denies. Gait abnormalities denies. Seizures denies. Tingling/Numbess denies.    Psychiatric:  Anxiety denies. Depressed mood denies.     Objective:   Vital Signs:   Vitals:    07/30/18 1059   BP: (!) 140/80        Physical Exam      General Examination:     Constitutional: The patient is alert and oriented to lace person and time. Mood is pleasant.     Head/Face: Normal facial features normal eyebrows    Eyes: Normal extraocular motion bilaterally    Lungs: Respirations are equal and unlabored    Gait is coordinated.    Cardiovascular: There are no swelling or varicosities present.    Lymphatic: Negative for adenopathy    Skin: Normal    Neurological: Level of consciousness normal. Oriented to place person and time and situation    Psychiatric: Oriented to time place person and situation    Straight leg raising is negative. Lumbar MRI was personally reviewed showing a 3 mm spondylolisthesis at L 44 and some disc bulges and mild foraminal stenosis L4-5 and L5-S1. There is some facet joint arthropathy at multiple levels    XRAY Report/ Interpretation:      Assessment:       1. Other spondylosis with radiculopathy, lumbar region    2. Disc degeneration, lumbar    3. Foraminal stenosis of lumbar region        Plan:       Camila was seen today for pain.    Diagnoses and all orders for this visit:    Other spondylosis with radiculopathy, lumbar region    Disc degeneration, lumbar    Foraminal stenosis of lumbar region         No Follow-up on file.    Treatment options was discussed the  nature of the condition was discussed she does not feel symptoms are severe enough to be referred to pain management injections at this time. Patient advised to contact me if there is any worsening of symptoms   This note was created using Dragon voice recognition software that occasionally misinterpreted phrases or words.

## 2018-09-25 ENCOUNTER — OFFICE VISIT (OUTPATIENT)
Dept: ORTHOPEDICS | Facility: CLINIC | Age: 68
End: 2018-09-25
Payer: MEDICARE

## 2018-09-25 VITALS
DIASTOLIC BLOOD PRESSURE: 77 MMHG | BODY MASS INDEX: 34.3 KG/M2 | HEART RATE: 79 BPM | HEIGHT: 57 IN | SYSTOLIC BLOOD PRESSURE: 123 MMHG | WEIGHT: 159 LBS

## 2018-09-25 DIAGNOSIS — M19.042 PRIMARY OSTEOARTHRITIS OF LEFT HAND: ICD-10-CM

## 2018-09-25 DIAGNOSIS — M79.662 PAIN AND SWELLING OF LEFT LOWER LEG: ICD-10-CM

## 2018-09-25 DIAGNOSIS — M79.89 PAIN AND SWELLING OF LEFT LOWER LEG: ICD-10-CM

## 2018-09-25 DIAGNOSIS — M75.41 IMPINGEMENT SYNDROME OF RIGHT SHOULDER: Primary | ICD-10-CM

## 2018-09-25 PROCEDURE — 99214 OFFICE O/P EST MOD 30 MIN: CPT | Mod: 25,,, | Performed by: ORTHOPAEDIC SURGERY

## 2018-09-25 PROCEDURE — 20610 DRAIN/INJ JOINT/BURSA W/O US: CPT | Mod: RT,,, | Performed by: ORTHOPAEDIC SURGERY

## 2018-09-25 PROCEDURE — 73590 X-RAY EXAM OF LOWER LEG: CPT | Mod: LT,,, | Performed by: ORTHOPAEDIC SURGERY

## 2018-09-25 PROCEDURE — 73030 X-RAY EXAM OF SHOULDER: CPT | Mod: RT,,, | Performed by: ORTHOPAEDIC SURGERY

## 2018-09-25 PROCEDURE — 73140 X-RAY EXAM OF FINGER(S): CPT | Mod: LT,,, | Performed by: ORTHOPAEDIC SURGERY

## 2018-09-25 RX ORDER — METHYLPREDNISOLONE ACETATE 40 MG/ML
40 INJECTION, SUSPENSION INTRA-ARTICULAR; INTRALESIONAL; INTRAMUSCULAR; SOFT TISSUE
Status: DISCONTINUED | OUTPATIENT
Start: 2018-09-25 | End: 2018-09-25 | Stop reason: HOSPADM

## 2018-09-25 RX ADMIN — METHYLPREDNISOLONE ACETATE 40 MG: 40 INJECTION, SUSPENSION INTRA-ARTICULAR; INTRALESIONAL; INTRAMUSCULAR; SOFT TISSUE at 12:09

## 2018-09-25 NOTE — PROCEDURES
Large Joint Aspiration/Injection: R subacromial bursa  Date/Time: 9/25/2018 12:14 PM  Performed by: Jony Marmolejo MD  Authorized by: Jony Marmolejo MD     Consent Done?:  Yes (Verbal)  Procedure site marked: Yes    Timeout: Prior to procedure the correct patient, procedure, and site was verified      Location:  Shoulder  Site:  R subacromial bursa  Prep: Patient was prepped and draped in usual sterile fashion    Needle size:  25 G  Medications:  40 mg methylPREDNISolone acetate 40 mg/mL; 40 mg methylPREDNISolone acetate 40 mg/mL  Patient tolerance:  Patient tolerated the procedure well with no immediate complications

## 2018-09-25 NOTE — PROGRESS NOTES
[facility]    Subjective:     Chief Complaint:   Chief Complaint   Patient presents with    Right Shoulder - Pain     Right shoulder pain x couple months and states that it is getting worse. States that it is hard for her to lift her hand above her head.     Left Knee - Pain     Left knee pain x 3 weeks. State that the pain in her knee started out of no where and is off and on. States that walking up stairs and walking in general bothers her the most.     Left Hand - Pain     Left thumb pain x 2-3 months. States that the pain is getting worse and that its hard to  things with the thumb.        Past Medical History:   Diagnosis Date    Arthritis     Breast cancer 2007    right    Cancer     breast, remission    Wears glasses     WEARS CONTACS       Past Surgical History:   Procedure Laterality Date    BREAST RECONSTRUCTION      CARPAL TUNNEL RELEASE       SECTION      CYSTOSCOPY WITH HYDRODISTENSION N/A 2012    [facility]    FOOT SURGERY      KNEE ARTHROSCOPY W/ LASER      MASTECTOMY Right 2007    TOTAL REDUCTION MAMMOPLASTY      TUBAL LIGATION         Current Outpatient Medications   Medication Sig    calcium carbonate (CALCIUM 600 ORAL) Take by mouth.    letrozole (FEMARA) 2.5 mg Tab TAKE 1 TABLET BY MOUTH ONE TIME DAILY.     No current facility-administered medications for this visit.        Review of patient's allergies indicates:   Allergen Reactions    No known drug allergies        Family History   Problem Relation Age of Onset    Arthritis Mother     Hyperlipidemia Mother     Stroke Mother     Dementia Mother        Social History     Socioeconomic History    Marital status:      Spouse name: Not on file    Number of children: Not on file    Years of education: Not on file    Highest education level: Not on file   Social Needs    Financial resource strain: Not on file    Food insecurity - worry: Not on file     Food insecurity - inability: Not on file    Transportation needs - medical: Not on file    Transportation needs - non-medical: Not on file   Occupational History    Not on file   Tobacco Use    Smoking status: Former Smoker     Types: Cigarettes    Smokeless tobacco: Never Used    Tobacco comment: social smoker - on weekends only - quit 20 yrs ago   Substance and Sexual Activity    Alcohol use: Yes     Alcohol/week: 0.0 oz     Comment: OCCASIONALLY    Drug use: No    Sexual activity: Yes     Partners: Male   Other Topics Concern    Not on file   Social History Narrative    Not on file       History of present illness: Patient comes in today with multiple issues. Her right shoulders bothering her. It's difficult for her to sleep at night. She's having difficulty with overhead activity. This is been long-standing. She also has a contusion of the left tibia which she sustained on vacation several months ago. That is still bothering her and she wants that looked at. Additionally she's having a lot of pain in the left thumb over the interphalangeal joint. She has had an injection for that. She has continued discomfort      Review of Systems:    Constitution: Negative for chills, fever, and sweats.  Negative for unexplained weight loss.    HENT:  Negative for headaches and blurry vision.    Cardiovascular:Negative for chest pain or irregular heart beat. Negative for hypertension.    Respiratory:  Negative for cough and shortness of breath.    Gastrointestinal: Negative for abdominal pain, heartburn, melena, nausea, and vomitting.    Genitourinary:  Negative bladder incontinence and dysuria.    Musculoskeletal:  See HPI for details.     Neurological: Negative for numbness.    Psychiatric/Behavioral: Negative for depression.  The patient is not nervous/anxious.      Endocrine: Negative for polyuria    Hematologic/Lymphatic: Negative for bleeding problem.  Does not bruise/bleed easily.    Skin: Negative for poor  would healing and rash    Objective:      Physical Examination:    Vital Signs:    Vitals:    09/25/18 1048   BP: 123/77   Pulse: 79       Body mass index is 34.41 kg/m².    This a well-developed, well nourished patient in no acute distress.  They are alert and oriented and cooperative to examination.        Patient has good range of motion of the left thumb. She's very tender with motion of the left interphalangeal joint. She has no pain with CMC grind's. Negative Tinel's. No triggering. Full range of motion of the right shoulder. Positive impingement. Positive crossover. Her rotator cuff is weaker than the left side to resisted testing. Spurling sign is negative. She has a small bump over the anterior tibia on the left side. She has no skin bilateral shin at this time. She does have significant amount of spider veins in the region.  Pertinent New Results:    XRAY Report / Interpretation:   AP and lateral of the left tibia demonstrates no fractures or subluxations.  AP and lateral of the right shoulder demonstrates a type II acromion. No fractures or subluxations. 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 She's noted to be osteoporotic. No fractures or subluxations  Electronically Signed By Jony Marmolejo MD    Assessment/Plan:      Contusion hematoma to the left tibia. No intervention. Anticipate this will resolve over several months.  Right shoulder subacromial impingement and probable rotator cuff tear. I injected the right shoulder with Depo-Medrol and lidocaine. She had significant relief of her pain but remained profoundly weak. I've ordered an MRI scan.  Left thumb interphalangeal joints arthrosis. I have referred her on to hand surgery for a possible interphalangeal fusion. She will follow-up when necessary      This note was created using Dragon voice recognition software that occasionally misinterpreted phrases or  words.

## 2018-10-09 ENCOUNTER — TELEPHONE (OUTPATIENT)
Dept: ORTHOPEDICS | Facility: CLINIC | Age: 68
End: 2018-10-09

## 2018-10-09 ENCOUNTER — OFFICE VISIT (OUTPATIENT)
Dept: ORTHOPEDICS | Facility: CLINIC | Age: 68
End: 2018-10-09
Payer: MEDICARE

## 2018-10-09 VITALS
HEIGHT: 57 IN | BODY MASS INDEX: 34.3 KG/M2 | DIASTOLIC BLOOD PRESSURE: 80 MMHG | SYSTOLIC BLOOD PRESSURE: 128 MMHG | WEIGHT: 159 LBS | HEART RATE: 78 BPM

## 2018-10-09 DIAGNOSIS — M19.011 ARTHROPATHY OF RIGHT SHOULDER: Primary | ICD-10-CM

## 2018-10-09 PROCEDURE — 99213 OFFICE O/P EST LOW 20 MIN: CPT | Mod: ,,, | Performed by: ORTHOPAEDIC SURGERY

## 2018-10-09 NOTE — PROGRESS NOTES
[facility]    Subjective:     Chief Complaint:   Chief Complaint   Patient presents with    Right Shoulder - Pain     Right shoulder pain/ MRI results 10/4/18. States that her shoulder pain depends on the day as to how bad it is. States that today is about a 3-4         Past Medical History:   Diagnosis Date    Arthritis     Breast cancer 2007    right    Cancer     breast, remission    Wears glasses     WEARS CONTACS       Past Surgical History:   Procedure Laterality Date    BREAST RECONSTRUCTION      CARPAL TUNNEL RELEASE       SECTION      CYSTOSCOPY WITH HYDRODISTENSION N/A 2012    Performed by [facility] at [facility] OR    FOOT SURGERY      KNEE ARTHROSCOPY W/ LASER      MASTECTOMY Right 2007    TOTAL REDUCTION MAMMOPLASTY      TUBAL LIGATION         Current Outpatient Medications   Medication Sig    calcium carbonate (CALCIUM 600 ORAL) Take by mouth.    letrozole (FEMARA) 2.5 mg Tab TAKE 1 TABLET BY MOUTH ONE TIME DAILY.     No current facility-administered medications for this visit.        Review of patient's allergies indicates:   Allergen Reactions    No known drug allergies        Family History   Problem Relation Age of Onset    Arthritis Mother     Hyperlipidemia Mother     Stroke Mother     Dementia Mother        Social History     Socioeconomic History    Marital status:      Spouse name: Not on file    Number of children: Not on file    Years of education: Not on file    Highest education level: Not on file   Social Needs    Financial resource strain: Not on file    Food insecurity - worry: Not on file    Food insecurity - inability: Not on file    Transportation needs - medical: Not on file    Transportation needs - non-medical: Not on file   Occupational History    Not on file   Tobacco Use    Smoking status: Former Smoker     Types: Cigarettes    Smokeless tobacco: Never Used    Tobacco comment: social smoker - on weekends  only - quit 20 yrs ago   Substance and Sexual Activity    Alcohol use: Yes     Alcohol/week: 0.0 oz     Comment: OCCASIONALLY    Drug use: No    Sexual activity: Yes     Partners: Male   Other Topics Concern    Not on file   Social History Narrative    Not on file       History of present illness: Patient comes in today for the right shoulder. She continues complain of right shoulder pain. She is slightly better on the Depo-Medrol injection. Still complaining of weakness.      Review of Systems:    Constitution: Negative for chills, fever, and sweats.  Negative for unexplained weight loss.    HENT:  Negative for headaches and blurry vision.    Cardiovascular:Negative for chest pain or irregular heart beat. Negative for hypertension.    Respiratory:  Negative for cough and shortness of breath.    Gastrointestinal: Negative for abdominal pain, heartburn, melena, nausea, and vomitting.    Genitourinary:  Negative bladder incontinence and dysuria.    Musculoskeletal:  See HPI for details.     Neurological: Negative for numbness.    Psychiatric/Behavioral: Negative for depression.  The patient is not nervous/anxious.      Endocrine: Negative for polyuria    Hematologic/Lymphatic: Negative for bleeding problem.  Does not bruise/bleed easily.    Skin: Negative for poor would healing and rash    Objective:      Physical Examination:    Vital Signs:    Vitals:    10/09/18 0947   BP: 128/80   Pulse: 78       Body mass index is 34.41 kg/m².    This a well-developed, well nourished patient in no acute distress.  They are alert and oriented and cooperative to examination.        Patient has full range of motion of the right shoulder. Her rotator cuff is profoundly weak but she can get her hand up over her head today. Spurling sign is negative    Pertinent New Results:  MRI of the right shoulder demonstrates a significant rupture of the supraspinatus and infraspinatus tendons with mild fatty atrophy. Additionally the notes  to be severe degenerative changes of the glenohumeral joint    XRAY Report / Interpretation:   No new XRAYS Today.    Assessment/Plan:      Shoulder arthropathy. We will treat this symptomatically for now. Ultimately she'll need a reverse total shoulder. She will follow-up when necessary      This note was created using Dragon voice recognition software that occasionally misinterpreted phrases or words.

## 2018-10-09 NOTE — TELEPHONE ENCOUNTER
----- Message from Julita Austin sent at 10/9/2018 12:24 PM CDT -----  Patient was here today.......  She has a bad tear on her shoulder..... Should she stop P.T.  Please call her at [phone]

## 2018-10-16 ENCOUNTER — TELEPHONE (OUTPATIENT)
Dept: HEMATOLOGY/ONCOLOGY | Facility: CLINIC | Age: 68
End: 2018-10-16

## 2018-10-16 NOTE — TELEPHONE ENCOUNTER
[facility] called me today stating that the patient just called them requesting a refill on femera 2.5mg. They have not filled this for patient since. Feb. 2018. I gave her permission to refill one month and then she is telling the patient that she needs to call here to make a follow up appt. Camila has cancelled her appts. Since last visit in Feb. And at this time she does not have a follow up scheduled.

## 2018-10-17 RX ORDER — SILVER SULFADIAZINE 10 G/1000G
CREAM TOPICAL DAILY
Qty: 400 G | Refills: 1 | Status: SHIPPED | OUTPATIENT
Start: 2018-10-17 | End: 2018-12-12 | Stop reason: CLARIF
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
## 2018-10-24 ENCOUNTER — HOSPITAL ENCOUNTER (OUTPATIENT)
Dept: RADIOLOGY | Facility: CLINIC | Age: 68
Discharge: HOME | End: 2018-10-24
Attending: PHYSICAL MEDICINE & REHABILITATION
Payer: MEDICARE

## 2018-10-24 ENCOUNTER — INITIAL CONSULT (OUTPATIENT)
Dept: PHYSICAL MEDICINE AND REHAB | Facility: CLINIC | Age: 68
End: 2018-10-24
Payer: MEDICARE

## 2018-10-24 VITALS
WEIGHT: 159 LBS | HEIGHT: 57 IN | BODY MASS INDEX: 34.3 KG/M2 | HEART RATE: 68 BPM | DIASTOLIC BLOOD PRESSURE: 74 MMHG | SYSTOLIC BLOOD PRESSURE: 127 MMHG

## 2018-10-24 DIAGNOSIS — M54.2 CERVICALGIA: ICD-10-CM

## 2018-10-24 DIAGNOSIS — M54.2 CERVICALGIA: Primary | ICD-10-CM

## 2018-10-24 PROCEDURE — 20553 NJX 1/MLT TRIGGER POINTS 3/>: CPT | Mod: PBBFAC,PN | Performed by: PHYSICAL MEDICINE & REHABILITATION

## 2018-10-24 PROCEDURE — 99204 OFFICE O/P NEW MOD 45 MIN: CPT | Mod: 25,S$PBB,, | Performed by: PHYSICAL MEDICINE & REHABILITATION

## 2018-10-24 PROCEDURE — 72050 X-RAY EXAM NECK SPINE 4/5VWS: CPT | Mod: 26,,, | Performed by: RADIOLOGY

## 2018-10-24 PROCEDURE — 20553 NJX 1/MLT TRIGGER POINTS 3/>: CPT | Mod: S$PBB,,, | Performed by: PHYSICAL MEDICINE & REHABILITATION

## 2018-10-24 PROCEDURE — 72050 X-RAY EXAM NECK SPINE 4/5VWS: CPT | Mod: TC,FY,PO

## 2018-10-24 PROCEDURE — 99999 PR PBB SHADOW E&M-EST. PATIENT-LVL III: CPT | Mod: PBBFAC,,, | Performed by: PHYSICAL MEDICINE & REHABILITATION

## 2018-10-24 PROCEDURE — 99213 OFFICE O/P EST LOW 20 MIN: CPT | Mod: PBBFAC,PN | Performed by: PHYSICAL MEDICINE & REHABILITATION

## 2018-10-24 RX ORDER — LIDOCAINE HYDROCHLORIDE 10 MG/ML
3 INJECTION INFILTRATION; PERINEURAL ONCE
Status: COMPLETED | OUTPATIENT
Start: 2018-10-24 | End: 2018-10-24

## 2018-10-24 RX ADMIN — LIDOCAINE HYDROCHLORIDE 3 ML: 10 INJECTION INFILTRATION; PERINEURAL at 10:10

## 2018-10-24 NOTE — PROGRESS NOTES
HPI:  Patient is a 68 y.o. year old female w. Right shoulder and neck pain. She is s/p rtc repair of her right shoulder 5 yrs ago. Her orthopedic surgeon states she needs surgery for full tear of rtc again. She is requesting a second opinion. She is also having a hard time sleeping at night b/c of neck pain. She has difficulty turning her head.    Imaging  FINDINGS:  Full thickness supraspinatus tear demonstrates medial retraction of torn free  tendon edge 3 cm from the footprint. Full-thickness tear extends into anterior  infraspinatus tendon, also with medial retraction. Posterior most infraspinatus  tendon fibers do remain intact. The teres minor and subscapularis tendons are  intact, with mild subscapularis tendinosis.    Minor supraspinatus and infraspinatus muscle fatty atrophy is evident.    Mild tendinosis affects intra-articular long head of biceps tendon. Biceps  labral complex is intact. No displaced labral tear. Advanced full-thickness  cartilage loss appears diffuse right humeral head with mild diffuse cartilage  thinning on glenoid. Tiny amount of fluid in right glenohumeral joint freely  communicates with tiny amount of fluid in the subacromial subdeltoid bursa.  Degenerative subcortical bone marrow signal alteration affects posterior  humeral head.    Mild right AC joint osteoarthrosis demonstrates slight mass effect on the  supraspinatus tendon. Type II acromion demonstrates mild lateral downsloping.  Inferior glenohumeral ligaments are unremarkable.    IMPRESSION:    1. Full-thickness medially retracted right supraspinatus tendon tear.  2. Full-thickness tears of medial retraction affecting anterior infraspinatus  tendon.  3. Mild tendinosis of subscapularis and long head of biceps tendons.  4. Mild right AC joint osteoarthrosis    Labs  Elevated lfts 84/132    Past Medical History:   Diagnosis Date    Arthritis     Breast cancer 2007    right    Cancer     breast, remission    Wears glasses      WEARS CONTACS     Past Surgical History:   Procedure Laterality Date    BREAST RECONSTRUCTION      CARPAL TUNNEL RELEASE       SECTION      CYSTOSCOPY WITH HYDRODISTENSION N/A 2012    [facility]    FOOT SURGERY      KNEE ARTHROSCOPY W/ LASER      MASTECTOMY Right 2007    TOTAL REDUCTION MAMMOPLASTY      TUBAL LIGATION       Family History   Problem Relation Age of Onset    Arthritis Mother     Hyperlipidemia Mother     Stroke Mother     Dementia Mother      Social History     Socioeconomic History    Marital status:      Spouse name: Not on file    Number of children: Not on file    Years of education: Not on file    Highest education level: Not on file   Social Needs    Financial resource strain: Not on file    Food insecurity - worry: Not on file    Food insecurity - inability: Not on file    Transportation needs - medical: Not on file    Transportation needs - non-medical: Not on file   Occupational History    Not on file   Tobacco Use    Smoking status: Former Smoker     Types: Cigarettes    Smokeless tobacco: Never Used    Tobacco comment: social smoker - on weekends only - quit 20 yrs ago   Substance and Sexual Activity    Alcohol use: Yes     Alcohol/week: 0.0 oz     Comment: OCCASIONALLY    Drug use: No    Sexual activity: Yes     Partners: Male   Other Topics Concern    Not on file   Social History Narrative    Not on file       Review of patient's allergies indicates:   Allergen Reactions    No known drug allergies        Current Outpatient Medications:     calcium carbonate (CALCIUM 600 ORAL), Take by mouth., Disp: , Rfl:     letrozole (FEMARA) 2.5 mg Tab, TAKE 1 TABLET BY MOUTH ONE TIME DAILY., Disp: 90 tablet, Rfl: 1    silver sulfADIAZINE 1% (SILVADENE) 1 % cream, Apply topically once daily., Disp: 400 g, Rfl: 1        Review of Systems  No nausea, vomiting, fevers, chills , contipation, diarrhea or sweats,no  "weight change, occ neck stiffness, no chest pain, no sob, no change of bowel or bladder habits,no coordination issues      Physical Exam:      Vitals:    10/24/18 1020   BP: 127/74   Pulse: 68     alert and oriented ×4 follows commands answers all questions appropriately,affect wnl  Manual muscle test 5 out of 5 sensation to light touch grossly intact  Dec rom left shoulder all directions  +impingement 90deg in abduction and fwd flexion  +hawkin's +neers  Nl gait  No C/C/E  -spurling's  + tenderness cervical paraspinals   Nl gait  -spurling's  -katie's  Full cervical ROM  babinsky down  No clonus  DTR's symmetric 2+  No C/C/E      Assessment:  rtc tear  Cervical myofascial pain w. Likely underlying spondylosis  elev lft's  Plan:  tpi's to cervical paraspinals today  Avoid muscle relaxer d/t elev lft's    PROCEDURE NOTE:Risk and benefit of trigger point injections given to pt. Injections performed w. A" 25G needle after sterile prep w. chlorohexedine, verbal consent obtained . NO complications. b/l trapezius, splenius cap and lev scapulae were injected with a total of 3ML of lidocaine 1% to each side      "

## 2018-11-15 ENCOUNTER — OFFICE VISIT (OUTPATIENT)
Dept: HEMATOLOGY/ONCOLOGY | Facility: CLINIC | Age: 68
End: 2018-11-15
Payer: MEDICARE

## 2018-11-15 VITALS
HEART RATE: 107 BPM | TEMPERATURE: 99 F | BODY MASS INDEX: 35.23 KG/M2 | SYSTOLIC BLOOD PRESSURE: 104 MMHG | RESPIRATION RATE: 20 BRPM | WEIGHT: 162.81 LBS | DIASTOLIC BLOOD PRESSURE: 70 MMHG

## 2018-11-15 DIAGNOSIS — C50.111 MALIGNANT NEOPLASM OF CENTRAL PORTION OF RIGHT BREAST IN FEMALE, ESTROGEN RECEPTOR POSITIVE: ICD-10-CM

## 2018-11-15 DIAGNOSIS — Z17.0 MALIGNANT NEOPLASM OF CENTRAL PORTION OF RIGHT BREAST IN FEMALE, ESTROGEN RECEPTOR POSITIVE: ICD-10-CM

## 2018-11-15 PROCEDURE — 99213 OFFICE O/P EST LOW 20 MIN: CPT | Mod: ,,, | Performed by: INTERNAL MEDICINE

## 2018-11-15 NOTE — PROGRESS NOTES
PROGRESS NOTE    Subjective:       Patient ID: Camila Au is a 68 y.o. female.    Chief Complaint:  Follow-up and Results  Right mastectomy 5/24/2007  [id number], 2cm tumor  Lobular carcinoma  Completed AC 10/2007  Completed XRT for close margins  Began AI 9/2007-11/2018      History of Present Illness:   Camila Au is a 68 y.o. female who presents with a history of breast cancer here for follow up.  No new complaints this visit. She is feeling well.         Family and Social history reviewed and is unchanged from 11/25/2014      ROS:  Review of Systems   Constitutional: Negative for fever.   Respiratory: Negative for shortness of breath.    Cardiovascular: Negative for chest pain and leg swelling.   Gastrointestinal: Negative for abdominal pain and blood in stool.   Genitourinary: Negative for hematuria.   Skin: Negative for rash.          Current Outpatient Medications:     calcium carbonate (CALCIUM 600 ORAL), Take by mouth., Disp: , Rfl:     letrozole (FEMARA) 2.5 mg Tab, TAKE 1 TABLET BY MOUTH ONE TIME DAILY., Disp: 90 tablet, Rfl: 1    multivitamin capsule, Take 1 capsule by mouth once daily., Disp: , Rfl:     silver sulfADIAZINE 1% (SILVADENE) 1 % cream, Apply topically once daily., Disp: 400 g, Rfl: 1        Objective:       Physical Examination:     /70   Pulse 107   Temp 99 °F (37.2 °C)   Resp 20   Wt 73.8 kg (162 lb 12.8 oz)   BMI 35.23 kg/m²     Physical Exam   Constitutional: She appears well-developed and well-nourished.   HENT:   Head: Normocephalic and atraumatic.   Right Ear: External ear normal.   Left Ear: External ear normal.   Mouth/Throat: Oropharynx is clear and moist.   Eyes: Conjunctivae are normal. Pupils are equal, round, and reactive to light.   Neck: No tracheal deviation present. No thyromegaly present.   Cardiovascular: Normal rate, regular rhythm and normal heart sounds.   Pulmonary/Chest: Effort normal  and breath sounds normal.       Abdominal: Soft. Bowel sounds are normal. She exhibits no distension and no mass. There is no tenderness.   Musculoskeletal: She exhibits no edema.   Neurological:   Neuro intact througout   Skin: No rash noted.   Psychiatric: She has a normal mood and affect. Her behavior is normal. Judgment and thought content normal.       Labs:   No results found for this or any previous visit (from the past 336 hour(s)).  CMP  Sodium   Date Value Ref Range Status   01/08/2018 138 134 - 144 mmol/L      Potassium   Date Value Ref Range Status   01/08/2018 3.2 (L) 3.5 - 5.0 mmol/L      Chloride   Date Value Ref Range Status   01/08/2018 101 98 - 110 mmol/L      CO2   Date Value Ref Range Status   01/08/2018 29.4 22.8 - 31.6 mmol/L      Glucose   Date Value Ref Range Status   01/08/2018 100 (H) 70 - 99 mg/dL      BUN, Bld   Date Value Ref Range Status   01/08/2018 10 8 - 20 mg/dL      Creatinine   Date Value Ref Range Status   01/08/2018 0.44 (L) 0.60 - 1.40 mg/dL    08/08/2012 0.6 0.2 - 1.4 mg/dl Final     Calcium   Date Value Ref Range Status   01/08/2018 9.2 7.7 - 10.4 mg/dL    08/08/2012 9.5 8.6 - 10.2 mg/dl Final     Total Protein   Date Value Ref Range Status   01/08/2018 7.9 6.0 - 8.2 g/dL      Albumin   Date Value Ref Range Status   01/08/2018 4.2 3.1 - 4.7 g/dL      Total Bilirubin   Date Value Ref Range Status   01/08/2018 0.8 0.3 - 1.0 mg/dL      Alkaline Phosphatase   Date Value Ref Range Status   01/08/2018 100 40 - 104 IU/L    08/08/2012 100 23 - 119 UNIT/L Final     AST   Date Value Ref Range Status   01/08/2018 132 (H) 10 - 40 IU/L    08/08/2012 35 (H) 10 - 30 UNIT/L Final     ALT   Date Value Ref Range Status   08/14/2012 47 (H) 10 - 44 U/L Final     Anion Gap   Date Value Ref Range Status   08/14/2012 11 8 - 16 mmol/L Final   08/08/2012 9 5 - 15 meq/L Final     eGFR if    Date Value Ref Range Status   08/14/2012 >60 >60 mL/min Final     Comment:     Estimated  glomerular filtration rate (eGFR) is normalized to an  average body surface area of 1.73 square meters.  The calculation  used to obtain the eGFR is the adjusted MDRD equation, which factors  patient sex, age, race, and creatinine result.  Since race is unknown  in our information system, the eGFR values for -American  and Non--American patients are given for each creatinine  result.     eGFR if non    Date Value Ref Range Status   08/14/2012 >60 >60 mL/min Final     No results found for: CEA  No results found for: PSA        Assessment/Plan:     Problem List Items Addressed This Visit     Malignant neoplasm of central portion of right breast in female, estrogen receptor positive     Patient is doing well and is on yearly follow up visits.  Her mammogram was negative in July of 2018 and exam is negative currently.  She has been on letrozole 10 years and can now discontinue.   Will continue on yearly follow and discussed this today.                 Discussion:     Follow-up in about 1 year (around 11/15/2019).      Electronically signed by Jeffrey Ramos

## 2018-11-15 NOTE — ASSESSMENT & PLAN NOTE
Patient is doing well and is on yearly follow up visits.  Her mammogram was negative in July of 2018 and exam is negative currently.  She has been on letrozole 10 years and can now discontinue.   Will continue on yearly follow and discussed this today.

## 2018-12-12 ENCOUNTER — HOSPITAL ENCOUNTER (OUTPATIENT)
Dept: PREADMISSION TESTING | Facility: OTHER | Age: 68
Discharge: HOME | End: 2018-12-12
Attending: ORTHOPAEDIC SURGERY
Payer: MEDICARE

## 2018-12-12 ENCOUNTER — ANESTHESIA EVENT (OUTPATIENT)
Dept: SURGERY | Facility: OTHER | Age: 68
End: 2018-12-12
Payer: MEDICARE

## 2018-12-12 VITALS
BODY MASS INDEX: 33.37 KG/M2 | HEIGHT: 58 IN | WEIGHT: 159 LBS | TEMPERATURE: 99 F | HEART RATE: 77 BPM | SYSTOLIC BLOOD PRESSURE: 123 MMHG | OXYGEN SATURATION: 96 % | DIASTOLIC BLOOD PRESSURE: 66 MMHG

## 2018-12-12 RX ORDER — SODIUM CHLORIDE, SODIUM LACTATE, POTASSIUM CHLORIDE, CALCIUM CHLORIDE 600; 310; 30; 20 MG/100ML; MG/100ML; MG/100ML; MG/100ML
INJECTION, SOLUTION INTRAVENOUS CONTINUOUS
Status: CANCELLED | OUTPATIENT
Start: 2018-12-12

## 2018-12-12 NOTE — ANESTHESIA PREPROCEDURE EVALUATION
12/12/2018  Camila Au is a 68 y.o., female.    Anesthesia Evaluation    I have reviewed the Patient Summary Reports.    I have reviewed the Nursing Notes.   I have reviewed the Medications.     Review of Systems  Anesthesia Hx:  No problems with previous Anesthesia  Denies Family Hx of Anesthesia complications.   Denies Personal Hx of Anesthesia complications.   Social:  Non-Smoker    Hematology/Oncology:  Hematology Normal       -- Cancer in past history:    EENT/Dental:EENT/Dental Normal   Cardiovascular:  Cardiovascular Normal Exercise tolerance: good     Pulmonary:   Denies Asthma.    Renal/:  Renal/ Normal     Musculoskeletal:  Musculoskeletal Normal    Neurological:  Neurology Normal    Endocrine:  Endocrine Normal    Dermatological:  Skin Normal    Psych:  Psychiatric Normal           Physical Exam  General:  Well nourished    Airway/Jaw/Neck:  Airway Findings: Mouth Opening: Normal Tongue: Normal  General Airway Assessment: Adult  Mallampati: I  TM Distance: Normal, at least 6 cm  Jaw/Neck Findings:  Neck ROM: Normal ROM      Dental:  Dental Findings: In tact             Anesthesia Plan  Type of Anesthesia, risks & benefits discussed:  Anesthesia Type:  general  Patient's Preference:   Intra-op Monitoring Plan:   Intra-op Monitoring Plan Comments:   Post Op Pain Control Plan:   Post Op Pain Control Plan Comments:   Induction:   IV  Beta Blocker:         Informed Consent: Patient understands risks and agrees with Anesthesia plan.  Questions answered. Anesthesia consent signed with patient.  ASA Score: 2     Day of Surgery Review of History & Physical:    H&P update referred to the surgeon.         Ready For Surgery From Anesthesia Perspective.

## 2018-12-12 NOTE — DISCHARGE INSTRUCTIONS
PRE-ADMIT TESTING -  [phone]    [address]  [place] [facility]          Your surgery has been scheduled at [facility]. We are pleased to have the opportunity to serve you. For Further Information please call [phone].    On the day of surgery please report to the Information Desk on the 1st floor.    · CONTACT YOUR PHYSICIAN'S OFFICE THE DAY PRIOR TO YOUR SURGERY TO OBTAIN YOUR ARRIVAL TIME.     · The evening before surgery do not eat anything after 9 p.m. ( this includes hard candy, chewing gum and mints).  You may only have GATORADE, POWERADE AND WATER  from 9 p.m. until you leave your home.   DO NOT DRINK ANY LIQUIDS ON THE WAY TO THE HOSPITAL.      SPECIAL MEDICATION INSTRUCTIONS: TAKE medications checked off by the Anesthesiologist on your Medication List.    Angiogram Patients: Take medications as instructed by your physician, including aspirin.     Surgery Patients:    If you take ASPIRIN - Your PHYSICIAN/SURGEON will need to inform you IF/OR when you need to stop taking aspirin prior to your surgery.     Do Not take any medications containing IBUPROFEN.  Do Not Wear any make-up or dark nail polish   (especially eye make-up) to surgery. If you come to surgery with makeup on you will be required to remove the makeup or nail polish.    Do not shave your surgical area at least 5 days prior to your surgery. The surgical prep will be performed at the hospital according to Infection Control regulations.    Leave all valuables at home.   Do Not wear any jewelry or watches, including any metal in body piercings.  Contact Lens must be removed before surgery. Either do not wear the contact lens or bring a case and solution for storage.  Please bring a container for eyeglasses or dentures as required.  Bring any paperwork your physician has provided, such as consent forms,  history and physicals, doctor's orders, etc.   Bring comfortable clothes that are loose fitting to wear upon  discharge. Take into consideration the type of surgery being performed.  Maintain your diet as advised per your physician the day prior to surgery.      Adequate rest the night before surgery is advised.   Park in the Parking lot behind the hospital or in the [facility] across the street from the parking lot. Parking is complimentary.  If you will be discharged the same day as your procedure, please arrange for a responsible adult to drive you home or to accompany you if traveling by taxi.   YOU WILL NOT BE PERMITTED TO DRIVE OR TO LEAVE THE HOSPITAL ALONE AFTER SURGERY.   It is strongly recommended that you arrange for someone to remain with you for the first 24 hrs following your surgery.       Thank you for your cooperation.  The Staff of [facility].        Bathing Instructions                                                                 Please shower the evening before and morning of your procedure with    ANTIBACTERIAL SOAP. ( DIAL, etc )  Concentrate on the surgical area   for at least 3 minutes and rinse completely. Dry off as usual.   Do not use any deodorant, powder, body lotions, perfume, after shave or    cologne.

## 2018-12-19 ENCOUNTER — ANESTHESIA (OUTPATIENT)
Dept: SURGERY | Facility: OTHER | Age: 68
End: 2018-12-19
Payer: MEDICARE

## 2018-12-19 ENCOUNTER — HOSPITAL ENCOUNTER (OUTPATIENT)
Facility: OTHER | Age: 68
Discharge: HOME | End: 2018-12-19
Attending: ORTHOPAEDIC SURGERY | Admitting: ORTHOPAEDIC SURGERY
Payer: MEDICARE

## 2018-12-19 VITALS
HEIGHT: 58 IN | TEMPERATURE: 98 F | OXYGEN SATURATION: 100 % | WEIGHT: 159 LBS | RESPIRATION RATE: 16 BRPM | HEART RATE: 100 BPM | BODY MASS INDEX: 33.37 KG/M2 | DIASTOLIC BLOOD PRESSURE: 62 MMHG | SYSTOLIC BLOOD PRESSURE: 133 MMHG

## 2018-12-19 DIAGNOSIS — M19.042: Primary | ICD-10-CM

## 2018-12-19 PROCEDURE — 27201423 OPTIME MED/SURG SUP & DEVICES STERILE SUPPLY: Performed by: ORTHOPAEDIC SURGERY

## 2018-12-19 PROCEDURE — 63600175 PHARM REV CODE 636 W HCPCS: Performed by: ORTHOPAEDIC SURGERY

## 2018-12-19 PROCEDURE — 36000708 HC OR TIME LEV III 1ST 15 MIN: Performed by: ORTHOPAEDIC SURGERY

## 2018-12-19 PROCEDURE — 63600175 PHARM REV CODE 636 W HCPCS: Performed by: ANESTHESIOLOGY

## 2018-12-19 PROCEDURE — 63600175 PHARM REV CODE 636 W HCPCS: Performed by: NURSE ANESTHETIST, CERTIFIED REGISTERED

## 2018-12-19 PROCEDURE — 71000039 HC RECOVERY, EACH ADD'L HOUR: Performed by: ORTHOPAEDIC SURGERY

## 2018-12-19 PROCEDURE — 25000003 PHARM REV CODE 250: Performed by: ANESTHESIOLOGY

## 2018-12-19 PROCEDURE — C1713 ANCHOR/SCREW BN/BN,TIS/BN: HCPCS | Performed by: ORTHOPAEDIC SURGERY

## 2018-12-19 PROCEDURE — 25000003 PHARM REV CODE 250: Performed by: NURSE ANESTHETIST, CERTIFIED REGISTERED

## 2018-12-19 PROCEDURE — 71000016 HC POSTOP RECOV ADDL HR: Performed by: ORTHOPAEDIC SURGERY

## 2018-12-19 PROCEDURE — 25000003 PHARM REV CODE 250: Performed by: ORTHOPAEDIC SURGERY

## 2018-12-19 PROCEDURE — C1769 GUIDE WIRE: HCPCS | Performed by: ORTHOPAEDIC SURGERY

## 2018-12-19 PROCEDURE — 37000009 HC ANESTHESIA EA ADD 15 MINS: Performed by: ORTHOPAEDIC SURGERY

## 2018-12-19 PROCEDURE — 90471 IMMUNIZATION ADMIN: CPT | Performed by: ORTHOPAEDIC SURGERY

## 2018-12-19 PROCEDURE — 71000033 HC RECOVERY, INTIAL HOUR: Performed by: ORTHOPAEDIC SURGERY

## 2018-12-19 PROCEDURE — 71000015 HC POSTOP RECOV 1ST HR: Performed by: ORTHOPAEDIC SURGERY

## 2018-12-19 PROCEDURE — 36000709 HC OR TIME LEV III EA ADD 15 MIN: Performed by: ORTHOPAEDIC SURGERY

## 2018-12-19 PROCEDURE — G0008 ADMIN INFLUENZA VIRUS VAC: HCPCS | Performed by: ORTHOPAEDIC SURGERY

## 2018-12-19 PROCEDURE — 37000008 HC ANESTHESIA 1ST 15 MINUTES: Performed by: ORTHOPAEDIC SURGERY

## 2018-12-19 PROCEDURE — 90662 IIV NO PRSV INCREASED AG IM: CPT | Performed by: ORTHOPAEDIC SURGERY

## 2018-12-19 DEVICE — GUIDEWIRE STAINLES .059 X 5: Type: IMPLANTABLE DEVICE | Site: THUMB | Status: FUNCTIONAL

## 2018-12-19 DEVICE — IMPLANTABLE DEVICE: Type: IMPLANTABLE DEVICE | Site: THUMB | Status: FUNCTIONAL

## 2018-12-19 RX ORDER — ONDANSETRON 8 MG/1
8 TABLET, ORALLY DISINTEGRATING ORAL ONCE
Status: COMPLETED | OUTPATIENT
Start: 2018-12-19 | End: 2018-12-19

## 2018-12-19 RX ORDER — SODIUM CHLORIDE 0.9 % (FLUSH) 0.9 %
3 SYRINGE (ML) INJECTION
Status: DISCONTINUED | OUTPATIENT
Start: 2018-12-19 | End: 2018-12-19 | Stop reason: HOSPADM

## 2018-12-19 RX ORDER — FENTANYL CITRATE 50 UG/ML
INJECTION, SOLUTION INTRAMUSCULAR; INTRAVENOUS
Status: DISCONTINUED | OUTPATIENT
Start: 2018-12-19 | End: 2018-12-19

## 2018-12-19 RX ORDER — CEFAZOLIN SODIUM 1 G/3ML
2 INJECTION, POWDER, FOR SOLUTION INTRAMUSCULAR; INTRAVENOUS
Status: COMPLETED | OUTPATIENT
Start: 2018-12-19 | End: 2018-12-19

## 2018-12-19 RX ORDER — ONDANSETRON 2 MG/ML
4 INJECTION INTRAMUSCULAR; INTRAVENOUS DAILY PRN
Status: DISCONTINUED | OUTPATIENT
Start: 2018-12-19 | End: 2018-12-19 | Stop reason: HOSPADM

## 2018-12-19 RX ORDER — LIDOCAINE HCL/PF 100 MG/5ML
SYRINGE (ML) INTRAVENOUS
Status: DISCONTINUED | OUTPATIENT
Start: 2018-12-19 | End: 2018-12-19

## 2018-12-19 RX ORDER — HYDROCODONE BITARTRATE AND ACETAMINOPHEN 5; 325 MG/1; MG/1
1 TABLET ORAL EVERY 4 HOURS PRN
Qty: 30 TABLET | Refills: 0 | Status: SHIPPED | OUTPATIENT
Start: 2018-12-19 | End: 2019-04-03

## 2018-12-19 RX ORDER — ACETAMINOPHEN 10 MG/ML
INJECTION, SOLUTION INTRAVENOUS
Status: DISCONTINUED | OUTPATIENT
Start: 2018-12-19 | End: 2018-12-19

## 2018-12-19 RX ORDER — OXYCODONE HYDROCHLORIDE 5 MG/1
5 TABLET ORAL
Status: DISCONTINUED | OUTPATIENT
Start: 2018-12-19 | End: 2018-12-19 | Stop reason: HOSPADM

## 2018-12-19 RX ORDER — PROPOFOL 10 MG/ML
VIAL (ML) INTRAVENOUS
Status: DISCONTINUED | OUTPATIENT
Start: 2018-12-19 | End: 2018-12-19

## 2018-12-19 RX ORDER — SODIUM CHLORIDE, SODIUM LACTATE, POTASSIUM CHLORIDE, CALCIUM CHLORIDE 600; 310; 30; 20 MG/100ML; MG/100ML; MG/100ML; MG/100ML
INJECTION, SOLUTION INTRAVENOUS CONTINUOUS
Status: DISCONTINUED | OUTPATIENT
Start: 2018-12-19 | End: 2018-12-19 | Stop reason: HOSPADM

## 2018-12-19 RX ORDER — MEPERIDINE HYDROCHLORIDE 50 MG/ML
12.5 INJECTION INTRAMUSCULAR; INTRAVENOUS; SUBCUTANEOUS ONCE AS NEEDED
Status: DISCONTINUED | OUTPATIENT
Start: 2018-12-19 | End: 2018-12-19 | Stop reason: HOSPADM

## 2018-12-19 RX ORDER — BUPIVACAINE HYDROCHLORIDE 2.5 MG/ML
INJECTION, SOLUTION EPIDURAL; INFILTRATION; INTRACAUDAL
Status: DISCONTINUED | OUTPATIENT
Start: 2018-12-19 | End: 2018-12-19 | Stop reason: HOSPADM

## 2018-12-19 RX ORDER — ONDANSETRON 2 MG/ML
INJECTION INTRAMUSCULAR; INTRAVENOUS
Status: DISCONTINUED | OUTPATIENT
Start: 2018-12-19 | End: 2018-12-19

## 2018-12-19 RX ORDER — HYDROMORPHONE HYDROCHLORIDE 2 MG/ML
0.4 INJECTION, SOLUTION INTRAMUSCULAR; INTRAVENOUS; SUBCUTANEOUS EVERY 5 MIN PRN
Status: DISCONTINUED | OUTPATIENT
Start: 2018-12-19 | End: 2018-12-19 | Stop reason: HOSPADM

## 2018-12-19 RX ORDER — HYDROCODONE BITARTRATE AND ACETAMINOPHEN 5; 325 MG/1; MG/1
1 TABLET ORAL EVERY 4 HOURS PRN
Status: DISCONTINUED | OUTPATIENT
Start: 2018-12-19 | End: 2018-12-19 | Stop reason: HOSPADM

## 2018-12-19 RX ORDER — SODIUM CHLORIDE, SODIUM LACTATE, POTASSIUM CHLORIDE, CALCIUM CHLORIDE 600; 310; 30; 20 MG/100ML; MG/100ML; MG/100ML; MG/100ML
INJECTION, SOLUTION INTRAVENOUS CONTINUOUS PRN
Status: DISCONTINUED | OUTPATIENT
Start: 2018-12-19 | End: 2018-12-19

## 2018-12-19 RX ORDER — FENTANYL CITRATE 50 UG/ML
25 INJECTION, SOLUTION INTRAMUSCULAR; INTRAVENOUS EVERY 5 MIN PRN
Status: DISCONTINUED | OUTPATIENT
Start: 2018-12-19 | End: 2018-12-19 | Stop reason: HOSPADM

## 2018-12-19 RX ADMIN — FENTANYL CITRATE 50 MCG: 50 INJECTION, SOLUTION INTRAMUSCULAR; INTRAVENOUS at 03:12

## 2018-12-19 RX ADMIN — HYDROMORPHONE HYDROCHLORIDE 0.4 MG: 2 INJECTION INTRAMUSCULAR; INTRAVENOUS; SUBCUTANEOUS at 04:12

## 2018-12-19 RX ADMIN — FENTANYL CITRATE 50 MCG: 50 INJECTION, SOLUTION INTRAMUSCULAR; INTRAVENOUS at 02:12

## 2018-12-19 RX ADMIN — PROPOFOL 150 MG: 10 INJECTION, EMULSION INTRAVENOUS at 02:12

## 2018-12-19 RX ADMIN — OXYCODONE HYDROCHLORIDE 5 MG: 5 TABLET ORAL at 04:12

## 2018-12-19 RX ADMIN — ACETAMINOPHEN 1000 MG: 10 INJECTION, SOLUTION INTRAVENOUS at 03:12

## 2018-12-19 RX ADMIN — SODIUM CHLORIDE, SODIUM LACTATE, POTASSIUM CHLORIDE, AND CALCIUM CHLORIDE: 600; 310; 30; 20 INJECTION, SOLUTION INTRAVENOUS at 02:12

## 2018-12-19 RX ADMIN — FENTANYL CITRATE 100 MCG: 50 INJECTION, SOLUTION INTRAMUSCULAR; INTRAVENOUS at 02:12

## 2018-12-19 RX ADMIN — ONDANSETRON 4 MG: 2 INJECTION INTRAMUSCULAR; INTRAVENOUS at 03:12

## 2018-12-19 RX ADMIN — INFLUENZA A VIRUS A/MICHIGAN/45/2015 X-275 (H1N1) ANTIGEN (FORMALDEHYDE INACTIVATED), INFLUENZA A VIRUS A/SINGAPORE/INFIMH-16-0019/2016 IVR-186 (H3N2) ANTIGEN (FORMALDEHYDE INACTIVATED), AND INFLUENZA B VIRUS B/MARYLAND/15/2016 BX-69A (A B/COLORADO/6/2017-LIKE VIRUS) ANTIGEN (FORMALDEHYDE INACTIVATED) 0.5 ML: 60; 60; 60 INJECTION, SUSPENSION INTRAMUSCULAR at 06:12

## 2018-12-19 RX ADMIN — ONDANSETRON 8 MG: 8 TABLET, ORALLY DISINTEGRATING ORAL at 07:12

## 2018-12-19 RX ADMIN — LIDOCAINE HYDROCHLORIDE 50 MG: 20 INJECTION, SOLUTION INTRAVENOUS at 02:12

## 2018-12-19 RX ADMIN — CEFAZOLIN 2 G: 330 INJECTION, POWDER, FOR SOLUTION INTRAMUSCULAR; INTRAVENOUS at 02:12

## 2018-12-19 NOTE — DISCHARGE INSTRUCTIONS
Anesthesia: After Your Surgery  Youve just had surgery. During surgery, you received medication called anesthesia to keep you comfortable and pain-free. After surgery, you may experience some pain or nausea. This is common. Here are some tips for feeling better and recovering after surgery.    Going home  Your doctor or nurse will show you how to take care of yourself when you go home. He or she will also answer your questions. Have an adult family member or friend drive you home. For the first 24 hours after your surgery:  · Do not drive or use heavy equipment.  · Do not make important decisions or sign legal documents.  · Avoid alcohol.  · Have someone stay with you, if needed. He or she can watch for problems and help keep you safe.  Be sure to keep all follow-up appointments with your doctor. And rest after your procedure for as long as your doctor tells you to.    Coping with pain  If you have pain after surgery, pain medication will help you feel better. Take it as directed, before pain becomes severe. Also, ask your doctor or pharmacist about other ways to control pain, such as with heat, ice, and relaxation. And follow any other instructions your surgeon or nurse gives you.    URINARY RETENTION  Should you experience a decrease in your urine output or are unable to urinate following surgery, this can be due to the medications given during surgery.  We recommend you going to the nearest Emergency Department.    Tips for taking pain medication  To get the best relief possible, remember these points:  · Pain medications can upset your stomach. Taking them with a little food may help.  · Most pain relievers taken by mouth need at least 20 to 30 minutes to take effect.  · Taking medication on a schedule can help you remember to take it. Try to time your medication so that you can take it before beginning an activity, such as dressing, walking, or sitting down for dinner.  · Constipation is a common side effect  of pain medications. Contact your doctor before taking any medications like laxatives or stool softeners to help relieve constipation. Also ask about any dietary restrictions, because drinking lots of fluids and eating foods like fruits and vegetables that are high in fiber can also help. Remember, dont take laxatives unless your surgeon has prescribed them.  · Mixing alcohol and pain medication can cause dizziness and slow your breathing. It can even be fatal. Dont drink alcohol while taking pain medication.  · Pain medication can slow your reflexes. Dont drive or operate machinery while taking pain medication.  If your health care provider tells you to take acetaminophen to help relieve your pain, ask him or her how much you are supposed to take each day. (Acetaminophen is the generic name for Tylenol and other brand-name pain relievers.) Acetaminophen or other pain relievers may interact with your prescription medicines or other over-the-counter (OTC) drugs. Some prescription medications contain acetaminophen along with other active ingredients. Using both prescription and OTC acetaminophen for pain can cause you to overdose. The FDA recommends that you read the labels on your OTC medications carefully. This will help you to clearly understand the list of active ingredients, dosing instructions, and any warnings. It may also help you avoid taking too much acetaminophen. If you have questions or don't understand the information, ask your pharmacist or health care provider to explain it to you before you take the OTC medication.    Managing nausea  Some people have an upset stomach after surgery. This is often due to anesthesia, pain, pain medications, or the stress of surgery. The following tips will help you manage nausea and get good nutrition as you recover. If you were on a special diet before surgery, ask your doctor if you should follow it during recovery. These tips may help:  · Dont push yourself to  eat. Your body will tell you when to eat and how much.  · Start off with clear liquids and soup. They are easier to digest.  · Progress to semi-solid foods (mashed potatoes, applesauce, and gelatin) as you feel ready.  · Slowly move to solid foods. Dont eat fatty, rich, or spicy foods at first.  · Dont force yourself to have three large meals a day. Instead, eat smaller amounts more often.  · Take pain medications with a small amount of solid food, such as crackers or toast to avoid nausea.      Call your surgeon if    · You feel too sleepy, dizzy, or groggy (medication may be too strong).  · You have side effects like nausea, vomiting, or skin changes (rash, itching, or hives).   © 2658-0941 [employer]. [address] [place] [address], [place] [address]. All rights reserved. This information is not intended as a substitute for professional medical care. Always follow your healthcare professional's instructions.

## 2018-12-19 NOTE — TRANSFER OF CARE
"Anesthesia Transfer of Care Note    Patient: Camila Au    Procedure(s) Performed: Procedure(s) (LRB):  FUSION, JOINT, THUMB (Left)    Patient location: PACU    Anesthesia Type: general    Transport from OR: Transported from OR on 2-3 L/min O2 by NC with adequate spontaneous ventilation    Post pain: adequate analgesia    Post assessment: no apparent anesthetic complications    Post vital signs: stable    Level of consciousness: awake, alert and oriented    Nausea/Vomiting: no nausea/vomiting    Complications: none          Last vitals:   Visit Vitals  /73 (BP Location: Left arm, Patient Position: Lying)   Pulse 73   Temp 37.1 °C (98.7 °F) (Oral)   Resp 18   Ht 4' 10" (1.473 m)   Wt 72.1 kg (159 lb)   SpO2 95%   Breastfeeding? No   BMI 33.23 kg/m²     "

## 2018-12-19 NOTE — INTERVAL H&P NOTE
The patient has been examined and the H&P has been reviewed:    I concur with the findings and no changes have occurred since H&P was written.    Anesthesia/Surgery risks, benefits and alternative options discussed and understood by patient/family.          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

## 2018-12-19 NOTE — ANESTHESIA POSTPROCEDURE EVALUATION
"Anesthesia Post Evaluation    Patient: Camila Au    Procedure(s) Performed: Procedure(s) (LRB):  FUSION, JOINT, THUMB (Left)    Final Anesthesia Type: general  Patient location during evaluation: PACU  Patient participation: Yes- Able to Participate  Level of consciousness: awake and alert  Post-procedure vital signs: reviewed and stable  Pain management: adequate  Airway patency: patent  PONV status at discharge: No PONV  Anesthetic complications: no      Cardiovascular status: blood pressure returned to baseline  Respiratory status: unassisted, spontaneous ventilation and room air  Hydration status: euvolemic  Follow-up not needed.        Visit Vitals  /60   Pulse 104   Temp 36.7 °C (98.1 °F)   Resp 16   Ht 4' 10" (1.473 m)   Wt 72.1 kg (159 lb)   SpO2 (!) 92%   Breastfeeding? No   BMI 33.23 kg/m²       Pain/Savana Score: Pain Rating Prior to Med Admin: 10 (12/19/2018  4:25 PM)  Pain Rating Post Med Admin: 4 (12/19/2018  5:05 PM)  Savana Score: 9 (12/19/2018  5:05 PM)        "

## 2018-12-19 NOTE — BRIEF OP NOTE
[facility]  Brief Operative Note     SUMMARY     Surgery Date: 12/19/2018     Surgeon(s) and Role:     * Claude S. Williams IV, MD - Primary    Assisting Surgeon: None    Pre-op Diagnosis:  Osteoarthritis, hand, primary localized, left [M19.042]    Post-op Diagnosis:  Post-Op Diagnosis Codes:     * Osteoarthritis, hand, primary localized, left [M19.042]    Procedure(s) (LRB):  FUSION, JOINT, THUMB (Left)    Anesthesia: Local MAC    Description of the findings of the procedure: Left thumb Interphalangeal joint osteoarthritis    Findings/Key Components: as above    Estimated Blood Loss: * No values recorded between 12/19/2018  3:09 PM and 12/19/2018  3:59 PM *         Specimens:   Specimen (12h ago, onward)    None          Discharge Note    SUMMARY     Admit Date: 12/19/2018    Discharge Date and Time:  12/19/2018 4:08 PM    Hospital Course (synopsis of major diagnoses, care, treatment, and services provided during the course of the hospital stay): uenventful     Final Diagnosis: Post-Op Diagnosis Codes:     * Osteoarthritis, hand, primary localized, left [M19.042]    Disposition: Home or Self Care    Follow Up/Patient Instructions:     Medications:  Reconciled Home Medications:      Medication List      START taking these medications    HYDROcodone-acetaminophen 5-325 mg per tablet  Commonly known as:  NORCO  Take 1 tablet by mouth every 4 (four) hours as needed for Pain.        CONTINUE taking these medications    CALCIUM 600 ORAL  Take by mouth.     letrozole 2.5 mg Tab  Commonly known as:  FEMARA  TAKE 1 TABLET BY MOUTH ONE TIME DAILY.     multivitamin capsule  Take 1 capsule by mouth once daily.          Discharge Procedure Orders   Diet general     Call MD for:  temperature >100.4     Call MD for:  persistent nausea and vomiting     Call MD for:  severe uncontrolled pain     Call MD for:  difficulty breathing, headache or visual disturbances     Call MD for:  redness, tenderness, or signs of  infection (pain, swelling, redness, odor or green/yellow discharge around incision site)     Call MD for:  hives     Call MD for:  persistent dizziness or light-headedness     Call MD for:  extreme fatigue     Leave dressing on - Keep it clean, dry, and intact until clinic visit     Keep surgical extremity elevated     Lifting restrictions     No driving, operating heavy equipment or signing legal documents while taking pain medication     Follow-up Information     Claude S. Williams Iv, MD In 1 week.    Specialty:  Orthopedic Surgery  Why:  For wound re-check  Contact information:  [address]  [facility] [id number] [phone]

## 2018-12-20 NOTE — OP NOTE
DATE OF PROCEDURE:  12/19/2018.    PREOPERATIVE DIAGNOSIS:  Left thumb interphalangeal joint osteoarthritis.    POSTOPERATIVE DIAGNOSIS:  Left thumb interphalangeal joint osteoarthritis.    PROCEDURE PERFORMED:  Left thumb interphalangeal joint arthrodesis.    INDICATIONS:  Ms. Au is a 68-year-old woman who has had left thumb pain   and deformity, which has been progressive and unrelieved with conservative   measures.  She has crepitation and significant pain with any active or passive   motion of the interphalangeal joint of the thumb as well as deviation.    Radiographs demonstrate cartilage space narrowing and osteophyte formation   consistent with osteoarthritis.  After the potential benefits as well as risks   and complications of options were reviewed, the patient has elected to undergo   the above procedure.    PROCEDURE IN DETAIL:  After proper informed consent was obtained, the patient   was transported to the Operating Suite, placed supine on the operating table.    General endotracheal anesthesia was administered and the left hand was   thoroughly prepped with alcohol, ChloraPrep and draped in the usual sterile   fashion.  Routine preoperative antibiotics were administered, routine   preoperative timeout was taken and the operative site was positively identified   by the operative team.  After Esmarch exsanguination, a well-padded upper arm   tourniquet was elevated to 250 mmHg.  An incision was then made over the dorsal   aspect of the thumb interphalangeal joint and careful dissection was carried   down through the subdermal tissue using bipolar cautery for hemostasis.  The   extensor was divided at the level of the proximal phalanx and retracted   distally.  The joint was exposed and found to have significant synovitis and   osteophyte formation, which was excised.  The IP joint was flexed to expose the   articular surfaces, which were completely eburnated.  A cup and cone reamer was   then used  from the Acutrak set with a centering guide pin position confirmed   using the image intensifier.  Once the proximal and distal services were well   reamed and decorticated, the IP joint was aligned in appropriate position and a   retrograde K-wire was placed from the distal aspect of the distal phalanx across   the interphalangeal joint.  Position of this was then confirmed again in the AP   and lateral planes with the image intensifier.  This was measured in a size 30   Acutrak headless compression screw was selected and after retrograde hand   drilling was placed retrograde with excellent purchase and compression at the   interphalangeal joint.  This screw was very tight and the IP joint appeared to   be well aligned and very stable.  The tourniquet was then deflated and   hemostasis was confirmed.  The remaining osteophytes were confirmed to be   removed about the joint.  The extensor tendon was repaired with figure-of-eight   interrupted suture.  The skin was then approximated with 4-0 nylon interrupted   suture and a sterile soft dressing was applied as well as a thumb spica splint.    There was excellent vascular inflow to the thumb after deflation of the   tourniquet.  Lap, instrument and needle counts were correct at the end of   procedure.    BLOOD LOSS:  Minimal.    COMPLICATIONS:  None.      GUILLE  dd: 12/20/2018 08:27:44 (CST)  td: 12/20/2018 10:06:33 (CST)  Doc ID   #[id number]  Job ID #[id number]    CC:

## 2018-12-20 NOTE — PLAN OF CARE
Camila Au has met all discharge criteria from Phase II. Vital Signs are stable, ambulating  without difficulty.Pain is now under control and tolerable for the pt. Pain score 1 at this time.  Discharge instructions given, patient verbalized understanding. Discharged from facility via wheelchair in stable condition.     Patient states nausea subsided.

## 2018-12-27 ENCOUNTER — OFFICE VISIT (OUTPATIENT)
Dept: ORTHOPEDICS | Facility: CLINIC | Age: 68
End: 2018-12-27
Payer: MEDICARE

## 2018-12-27 VITALS
HEART RATE: 78 BPM | SYSTOLIC BLOOD PRESSURE: 130 MMHG | BODY MASS INDEX: 32.05 KG/M2 | DIASTOLIC BLOOD PRESSURE: 90 MMHG | HEIGHT: 59 IN | WEIGHT: 159 LBS

## 2018-12-27 DIAGNOSIS — M17.11 PRIMARY OSTEOARTHRITIS OF RIGHT KNEE: ICD-10-CM

## 2018-12-27 DIAGNOSIS — M19.012 PRIMARY OSTEOARTHRITIS OF LEFT SHOULDER: Primary | ICD-10-CM

## 2018-12-27 PROCEDURE — 73562 X-RAY EXAM OF KNEE 3: CPT | Mod: RT,,, | Performed by: ORTHOPAEDIC SURGERY

## 2018-12-27 PROCEDURE — 73030 X-RAY EXAM OF SHOULDER: CPT | Mod: LT,,, | Performed by: ORTHOPAEDIC SURGERY

## 2018-12-27 PROCEDURE — 20610 DRAIN/INJ JOINT/BURSA W/O US: CPT | Mod: RT,,, | Performed by: ORTHOPAEDIC SURGERY

## 2018-12-27 PROCEDURE — 99214 OFFICE O/P EST MOD 30 MIN: CPT | Mod: 25,,, | Performed by: ORTHOPAEDIC SURGERY

## 2018-12-27 PROCEDURE — 20610 DRAIN/INJ JOINT/BURSA W/O US: CPT | Mod: 51,LT,, | Performed by: ORTHOPAEDIC SURGERY

## 2018-12-27 RX ORDER — METHYLPREDNISOLONE ACETATE 40 MG/ML
40 INJECTION, SUSPENSION INTRA-ARTICULAR; INTRALESIONAL; INTRAMUSCULAR; SOFT TISSUE
Status: DISCONTINUED | OUTPATIENT
Start: 2018-12-27 | End: 2018-12-27 | Stop reason: HOSPADM

## 2018-12-27 RX ADMIN — METHYLPREDNISOLONE ACETATE 40 MG: 40 INJECTION, SUSPENSION INTRA-ARTICULAR; INTRALESIONAL; INTRAMUSCULAR; SOFT TISSUE at 02:12

## 2018-12-27 NOTE — PROGRESS NOTES
[facility]    Subjective:     Chief Complaint:   Chief Complaint   Patient presents with    Left Shoulder - Pain     Left shoulder pain x 2 months. No injury    Right Knee - Pain     Right knee pain x 2 months. No injury       Past Medical History:   Diagnosis Date    Arthritis     Breast cancer     right    Cancer     breast, remission    Wears glasses     WEARS CONTACS       Past Surgical History:   Procedure Laterality Date    BREAST RECONSTRUCTION      CARPAL TUNNEL RELEASE       SECTION      CYSTOSCOPY WITH HYDRODISTENSION N/A 2012    Performed by [facility] OR    FOOT SURGERY      FUSION, JOINT, THUMB Left 2018    Performed by Claude S. [facility] IV, [facility] at [facility] OR    KNEE ARTHROSCOPY W/ LASER      MASTECTOMY Right 2007    TOTAL REDUCTION MAMMOPLASTY      TUBAL LIGATION         Current Outpatient Medications   Medication Sig    calcium carbonate (CALCIUM 600 ORAL) Take by mouth.    HYDROcodone-acetaminophen (NORCO) 5-325 mg per tablet Take 1 tablet by mouth every 4 (four) hours as needed for Pain.    letrozole (FEMARA) 2.5 mg Tab TAKE 1 TABLET BY MOUTH ONE TIME DAILY.    multivitamin capsule Take 1 capsule by mouth once daily.     No current facility-administered medications for this visit.        Review of patient's allergies indicates:   Allergen Reactions    No known drug allergies        Family History   Problem Relation Age of Onset    Arthritis Mother     Hyperlipidemia Mother     Stroke Mother     Dementia Mother        Social History     Socioeconomic History    Marital status:      Spouse name: Not on file    Number of children: Not on file    Years of education: Not on file    Highest education level: Not on file   Social Needs    Financial resource strain: Not on file    Food insecurity - worry: Not on file    Food insecurity - inability: Not on file    Transportation needs - medical: Not on file     Transportation needs - non-medical: Not on file   Occupational History    Not on file   Tobacco Use    Smoking status: Former Smoker     Types: Cigarettes    Smokeless tobacco: Never Used    Tobacco comment: social smoker - on weekends only - quit 20 yrs ago   Substance and Sexual Activity    Alcohol use: Yes     Alcohol/week: 0.0 oz     Comment: OCCASIONALLY    Drug use: No    Sexual activity: Yes     Partners: Male   Other Topics Concern    Not on file   Social History Narrative    Not on file       History of present illness: Patient comes in today for her left shoulder and right knee. Both are bothering her. She's had long-standing problems with both but recently they've been worsening typical.      Review of Systems:    Constitution: Negative for chills, fever, and sweats.  Negative for unexplained weight loss.    HENT:  Negative for headaches and blurry vision.    Cardiovascular:Negative for chest pain or irregular heart beat. Negative for hypertension.    Respiratory:  Negative for cough and shortness of breath.    Gastrointestinal: Negative for abdominal pain, heartburn, melena, nausea, and vomitting.    Genitourinary:  Negative bladder incontinence and dysuria.    Musculoskeletal:  See HPI for details.     Neurological: Negative for numbness.    Psychiatric/Behavioral: Negative for depression.  The patient is not nervous/anxious.      Endocrine: Negative for polyuria    Hematologic/Lymphatic: Negative for bleeding problem.  Does not bruise/bleed easily.    Skin: Negative for poor would healing and rash    Objective:      Physical Examination:    Vital Signs:    Vitals:    12/27/18 1346   BP: (!) 130/90   Pulse: 78       Body mass index is 32.11 kg/m².    This a well-developed, well nourished patient in no acute distress.  They are alert and oriented and cooperative to examination.        In terms of the left shoulder she has flexion to 95°. External rotation is 45°. Internal rotation is to the  buttock. She has crepitus. Full range of motion of the right shoulder. She has bilateral range of motion of her knee 0-120 degrees. She has varus deformities bilaterally.  Pertinent New Results:    XRAY Report / Interpretation:   AP lateral sunrise views of her right knee demonstrate bone-on-bone osteophyte is of the right knee with complete loss of the medial compartment.    AP and lateral of the left shoulder demonstrates retained prior hardware. She has severe glenohumeral arthrosis.    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 I injected both the left shoulder and right knee with Depo-Medrol. She will follow-up when necessary      This note was created using Dragon voice recognition software that occasionally misinterpreted phrases or words.

## 2018-12-27 NOTE — PROCEDURES
Large Joint Aspiration/Injection: R knee  Date/Time: 12/27/2018 2:21 PM  Performed by: Jony Marmolejo MD  Authorized by: Jony Marmolejo MD     Consent Done?:  Yes (Verbal)  Indications:  Pain  Procedure site marked: Yes    Timeout: Prior to procedure the correct patient, procedure, and site was verified      Location:  Knee  Site:  R knee  Prep: Patient was prepped and draped in usual sterile fashion    Needle size:  25 G  Medications:  40 mg methylPREDNISolone acetate 40 mg/mL; 40 mg methylPREDNISolone acetate 40 mg/mL  Patient tolerance:  Patient tolerated the procedure well with no immediate complications  Large Joint Aspiration/Injection: L subacromial bursa  Date/Time: 12/27/2018 2:22 PM  Performed by: Jony Marmolejo MD  Authorized by: Jony Marmolejo MD     Consent Done?:  Yes (Verbal)  Indications:  Pain  Procedure site marked: Yes    Timeout: Prior to procedure the correct patient, procedure, and site was verified      Location:  Shoulder  Site:  L subacromial bursa  Prep: Patient was prepped and draped in usual sterile fashion    Needle size:  25 G  Medications:  40 mg methylPREDNISolone acetate 40 mg/mL; 40 mg methylPREDNISolone acetate 40 mg/mL  Patient tolerance:  Patient tolerated the procedure well with no immediate complications

## 2019-01-03 DIAGNOSIS — M25.561 CHRONIC PAIN OF BOTH KNEES: Primary | ICD-10-CM

## 2019-01-03 DIAGNOSIS — G89.29 CHRONIC PAIN OF BOTH KNEES: Primary | ICD-10-CM

## 2019-01-03 DIAGNOSIS — M25.562 CHRONIC PAIN OF BOTH KNEES: Primary | ICD-10-CM

## 2019-01-03 RX ORDER — HYDROCODONE BITARTRATE AND ACETAMINOPHEN 5; 325 MG/1; MG/1
1 TABLET ORAL EVERY 6 HOURS PRN
Qty: 28 TABLET | Refills: 0 | Status: SHIPPED | OUTPATIENT
Start: 2019-01-03 | End: 2019-01-10

## 2019-02-28 ENCOUNTER — ANESTHESIA EVENT (OUTPATIENT)
Dept: SURGERY | Facility: OTHER | Age: 69
End: 2019-02-28
Payer: MEDICARE

## 2019-03-01 ENCOUNTER — HOSPITAL ENCOUNTER (OUTPATIENT)
Facility: OTHER | Age: 69
Discharge: HOME | End: 2019-03-01
Attending: ORTHOPAEDIC SURGERY | Admitting: ORTHOPAEDIC SURGERY
Payer: MEDICARE

## 2019-03-01 ENCOUNTER — ANESTHESIA (OUTPATIENT)
Dept: SURGERY | Facility: OTHER | Age: 69
End: 2019-03-01
Payer: MEDICARE

## 2019-03-01 VITALS
DIASTOLIC BLOOD PRESSURE: 77 MMHG | HEART RATE: 98 BPM | OXYGEN SATURATION: 96 % | HEIGHT: 58 IN | RESPIRATION RATE: 16 BRPM | SYSTOLIC BLOOD PRESSURE: 118 MMHG | TEMPERATURE: 98 F | BODY MASS INDEX: 33.36 KG/M2 | WEIGHT: 158.94 LBS

## 2019-03-01 DIAGNOSIS — M19.042 ARTHRITIS OF LEFT HAND: Primary | ICD-10-CM

## 2019-03-01 PROCEDURE — 37000009 HC ANESTHESIA EA ADD 15 MINS: Performed by: ORTHOPAEDIC SURGERY

## 2019-03-01 PROCEDURE — C1769 GUIDE WIRE: HCPCS | Performed by: ORTHOPAEDIC SURGERY

## 2019-03-01 PROCEDURE — 71000033 HC RECOVERY, INTIAL HOUR: Performed by: ORTHOPAEDIC SURGERY

## 2019-03-01 PROCEDURE — 63600175 PHARM REV CODE 636 W HCPCS: Performed by: NURSE ANESTHETIST, CERTIFIED REGISTERED

## 2019-03-01 PROCEDURE — 25000003 PHARM REV CODE 250: Performed by: ANESTHESIOLOGY

## 2019-03-01 PROCEDURE — 87102 FUNGUS ISOLATION CULTURE: CPT

## 2019-03-01 PROCEDURE — 25000003 PHARM REV CODE 250: Performed by: NURSE ANESTHETIST, CERTIFIED REGISTERED

## 2019-03-01 PROCEDURE — 25000003 PHARM REV CODE 250: Performed by: SPECIALIST

## 2019-03-01 PROCEDURE — 63600175 PHARM REV CODE 636 W HCPCS: Performed by: SPECIALIST

## 2019-03-01 PROCEDURE — 36000709 HC OR TIME LEV III EA ADD 15 MIN: Performed by: ORTHOPAEDIC SURGERY

## 2019-03-01 PROCEDURE — 71000016 HC POSTOP RECOV ADDL HR: Performed by: ORTHOPAEDIC SURGERY

## 2019-03-01 PROCEDURE — 25000003 PHARM REV CODE 250: Performed by: ORTHOPAEDIC SURGERY

## 2019-03-01 PROCEDURE — 71000015 HC POSTOP RECOV 1ST HR: Performed by: ORTHOPAEDIC SURGERY

## 2019-03-01 PROCEDURE — 37000008 HC ANESTHESIA 1ST 15 MINUTES: Performed by: ORTHOPAEDIC SURGERY

## 2019-03-01 PROCEDURE — 87070 CULTURE OTHR SPECIMN AEROBIC: CPT

## 2019-03-01 PROCEDURE — 63600175 PHARM REV CODE 636 W HCPCS: Performed by: ORTHOPAEDIC SURGERY

## 2019-03-01 PROCEDURE — 87015 SPECIMEN INFECT AGNT CONCNTJ: CPT

## 2019-03-01 PROCEDURE — C1713 ANCHOR/SCREW BN/BN,TIS/BN: HCPCS | Performed by: ORTHOPAEDIC SURGERY

## 2019-03-01 PROCEDURE — 71000039 HC RECOVERY, EACH ADD'L HOUR: Performed by: ORTHOPAEDIC SURGERY

## 2019-03-01 PROCEDURE — 87075 CULTR BACTERIA EXCEPT BLOOD: CPT

## 2019-03-01 PROCEDURE — 87116 MYCOBACTERIA CULTURE: CPT

## 2019-03-01 PROCEDURE — 36000708 HC OR TIME LEV III 1ST 15 MIN: Performed by: ORTHOPAEDIC SURGERY

## 2019-03-01 PROCEDURE — 87206 SMEAR FLUORESCENT/ACID STAI: CPT

## 2019-03-01 DEVICE — IMPLANTABLE DEVICE: Type: IMPLANTABLE DEVICE | Site: HIP | Status: FUNCTIONAL

## 2019-03-01 RX ORDER — ACETAMINOPHEN 10 MG/ML
1000 INJECTION, SOLUTION INTRAVENOUS ONCE
Status: COMPLETED | OUTPATIENT
Start: 2019-03-01 | End: 2019-03-01

## 2019-03-01 RX ORDER — FLUMAZENIL 0.1 MG/ML
INJECTION INTRAVENOUS
Status: DISCONTINUED | OUTPATIENT
Start: 2019-03-01 | End: 2019-03-01

## 2019-03-01 RX ORDER — HYDROMORPHONE HYDROCHLORIDE 2 MG/ML
0.4 INJECTION, SOLUTION INTRAMUSCULAR; INTRAVENOUS; SUBCUTANEOUS EVERY 5 MIN PRN
Status: DISCONTINUED | OUTPATIENT
Start: 2019-03-01 | End: 2019-03-01 | Stop reason: HOSPADM

## 2019-03-01 RX ORDER — PROPOFOL 10 MG/ML
VIAL (ML) INTRAVENOUS
Status: DISCONTINUED | OUTPATIENT
Start: 2019-03-01 | End: 2019-03-01

## 2019-03-01 RX ORDER — ONDANSETRON 2 MG/ML
4 INJECTION INTRAMUSCULAR; INTRAVENOUS DAILY PRN
Status: DISCONTINUED | OUTPATIENT
Start: 2019-03-01 | End: 2019-03-01 | Stop reason: HOSPADM

## 2019-03-01 RX ORDER — HYDROCODONE BITARTRATE AND ACETAMINOPHEN 5; 325 MG/1; MG/1
1 TABLET ORAL EVERY 4 HOURS PRN
Qty: 30 TABLET | Refills: 0 | Status: SHIPPED | OUTPATIENT
Start: 2019-03-01 | End: 2019-03-20 | Stop reason: SDUPTHER

## 2019-03-01 RX ORDER — SODIUM CHLORIDE, SODIUM LACTATE, POTASSIUM CHLORIDE, CALCIUM CHLORIDE 600; 310; 30; 20 MG/100ML; MG/100ML; MG/100ML; MG/100ML
INJECTION, SOLUTION INTRAVENOUS CONTINUOUS
Status: DISCONTINUED | OUTPATIENT
Start: 2019-03-01 | End: 2019-03-01 | Stop reason: HOSPADM

## 2019-03-01 RX ORDER — CEFAZOLIN SODIUM 1 G/3ML
2 INJECTION, POWDER, FOR SOLUTION INTRAMUSCULAR; INTRAVENOUS
Status: COMPLETED | OUTPATIENT
Start: 2019-03-01 | End: 2019-03-01

## 2019-03-01 RX ORDER — LIDOCAINE HYDROCHLORIDE 10 MG/ML
0.5 INJECTION, SOLUTION EPIDURAL; INFILTRATION; INTRACAUDAL; PERINEURAL ONCE
Status: DISCONTINUED | OUTPATIENT
Start: 2019-03-01 | End: 2019-03-01 | Stop reason: HOSPADM

## 2019-03-01 RX ORDER — MEPERIDINE HYDROCHLORIDE 25 MG/ML
12.5 INJECTION INTRAMUSCULAR; INTRAVENOUS; SUBCUTANEOUS ONCE AS NEEDED
Status: DISCONTINUED | OUTPATIENT
Start: 2019-03-01 | End: 2019-03-01 | Stop reason: HOSPADM

## 2019-03-01 RX ORDER — LIDOCAINE HCL/PF 100 MG/5ML
SYRINGE (ML) INTRAVENOUS
Status: DISCONTINUED | OUTPATIENT
Start: 2019-03-01 | End: 2019-03-01

## 2019-03-01 RX ORDER — HYDROCODONE BITARTRATE AND ACETAMINOPHEN 5; 325 MG/1; MG/1
1 TABLET ORAL EVERY 4 HOURS PRN
Status: DISCONTINUED | OUTPATIENT
Start: 2019-03-01 | End: 2019-03-01 | Stop reason: HOSPADM

## 2019-03-01 RX ORDER — MIDAZOLAM HYDROCHLORIDE 1 MG/ML
INJECTION INTRAMUSCULAR; INTRAVENOUS
Status: DISCONTINUED | OUTPATIENT
Start: 2019-03-01 | End: 2019-03-01

## 2019-03-01 RX ORDER — OXYCODONE HYDROCHLORIDE 5 MG/1
5 TABLET ORAL ONCE
Status: COMPLETED | OUTPATIENT
Start: 2019-03-01 | End: 2019-03-01

## 2019-03-01 RX ORDER — PHENYLEPHRINE HYDROCHLORIDE 10 MG/ML
INJECTION INTRAVENOUS
Status: DISCONTINUED | OUTPATIENT
Start: 2019-03-01 | End: 2019-03-01

## 2019-03-01 RX ORDER — DIPHENHYDRAMINE HYDROCHLORIDE 50 MG/ML
25 INJECTION INTRAMUSCULAR; INTRAVENOUS EVERY 6 HOURS PRN
Status: DISCONTINUED | OUTPATIENT
Start: 2019-03-01 | End: 2019-03-01 | Stop reason: HOSPADM

## 2019-03-01 RX ORDER — OXYCODONE HYDROCHLORIDE 5 MG/1
5 TABLET ORAL
Status: DISCONTINUED | OUTPATIENT
Start: 2019-03-01 | End: 2019-03-01 | Stop reason: HOSPADM

## 2019-03-01 RX ORDER — FENTANYL CITRATE 50 UG/ML
25 INJECTION, SOLUTION INTRAMUSCULAR; INTRAVENOUS EVERY 5 MIN PRN
Status: COMPLETED | OUTPATIENT
Start: 2019-03-01 | End: 2019-03-01

## 2019-03-01 RX ORDER — FENTANYL CITRATE 50 UG/ML
INJECTION, SOLUTION INTRAMUSCULAR; INTRAVENOUS
Status: DISCONTINUED | OUTPATIENT
Start: 2019-03-01 | End: 2019-03-01

## 2019-03-01 RX ORDER — BUPIVACAINE HYDROCHLORIDE 2.5 MG/ML
INJECTION, SOLUTION EPIDURAL; INFILTRATION; INTRACAUDAL
Status: DISCONTINUED | OUTPATIENT
Start: 2019-03-01 | End: 2019-03-01 | Stop reason: HOSPADM

## 2019-03-01 RX ORDER — SODIUM CHLORIDE 0.9 % (FLUSH) 0.9 %
3 SYRINGE (ML) INJECTION
Status: DISCONTINUED | OUTPATIENT
Start: 2019-03-01 | End: 2019-03-01 | Stop reason: HOSPADM

## 2019-03-01 RX ORDER — BUPIVACAINE HYDROCHLORIDE AND EPINEPHRINE 5; 5 MG/ML; UG/ML
INJECTION, SOLUTION EPIDURAL; INTRACAUDAL; PERINEURAL
Status: DISCONTINUED | OUTPATIENT
Start: 2019-03-01 | End: 2019-03-01 | Stop reason: HOSPADM

## 2019-03-01 RX ORDER — CEPHALEXIN 500 MG/1
500 CAPSULE ORAL 4 TIMES DAILY
Qty: 20 CAPSULE | Refills: 0 | Status: SHIPPED | OUTPATIENT
Start: 2019-03-01 | End: 2019-03-06

## 2019-03-01 RX ADMIN — HYDROMORPHONE HYDROCHLORIDE 0.4 MG: 2 INJECTION INTRAMUSCULAR; INTRAVENOUS; SUBCUTANEOUS at 02:03

## 2019-03-01 RX ADMIN — LIDOCAINE HYDROCHLORIDE 25 MG: 20 INJECTION, SOLUTION INTRAVENOUS at 01:03

## 2019-03-01 RX ADMIN — ACETAMINOPHEN 1000 MG: 10 INJECTION, SOLUTION INTRAVENOUS at 02:03

## 2019-03-01 RX ADMIN — SODIUM CHLORIDE, SODIUM LACTATE, POTASSIUM CHLORIDE, AND CALCIUM CHLORIDE: 600; 310; 30; 20 INJECTION, SOLUTION INTRAVENOUS at 01:03

## 2019-03-01 RX ADMIN — FENTANYL CITRATE 25 MCG: 50 INJECTION, SOLUTION INTRAMUSCULAR; INTRAVENOUS at 01:03

## 2019-03-01 RX ADMIN — MIDAZOLAM HYDROCHLORIDE 2 MG: 1 INJECTION, SOLUTION INTRAMUSCULAR; INTRAVENOUS at 11:03

## 2019-03-01 RX ADMIN — FENTANYL CITRATE 50 MCG: 50 INJECTION, SOLUTION INTRAMUSCULAR; INTRAVENOUS at 01:03

## 2019-03-01 RX ADMIN — OXYCODONE HYDROCHLORIDE 5 MG: 5 TABLET ORAL at 01:03

## 2019-03-01 RX ADMIN — FLUMAZENIL 0.2 MG: 0.1 INJECTION, SOLUTION INTRAVENOUS at 01:03

## 2019-03-01 RX ADMIN — PHENYLEPHRINE HYDROCHLORIDE 200 MCG: 10 INJECTION INTRAVENOUS at 11:03

## 2019-03-01 RX ADMIN — FENTANYL CITRATE 100 MCG: 50 INJECTION, SOLUTION INTRAMUSCULAR; INTRAVENOUS at 11:03

## 2019-03-01 RX ADMIN — PROPOFOL 200 MG: 10 INJECTION, EMULSION INTRAVENOUS at 11:03

## 2019-03-01 RX ADMIN — OXYCODONE HYDROCHLORIDE 5 MG: 5 TABLET ORAL at 02:03

## 2019-03-01 RX ADMIN — FENTANYL CITRATE 50 MCG: 50 INJECTION, SOLUTION INTRAMUSCULAR; INTRAVENOUS at 12:03

## 2019-03-01 RX ADMIN — CARBOXYMETHYLCELLULOSE SODIUM 2 DROP: 2.5 SOLUTION/ DROPS OPHTHALMIC at 11:03

## 2019-03-01 RX ADMIN — SODIUM CHLORIDE, SODIUM LACTATE, POTASSIUM CHLORIDE, AND CALCIUM CHLORIDE: 600; 310; 30; 20 INJECTION, SOLUTION INTRAVENOUS at 09:03

## 2019-03-01 RX ADMIN — CEFAZOLIN 2 G: 330 INJECTION, POWDER, FOR SOLUTION INTRAMUSCULAR; INTRAVENOUS at 11:03

## 2019-03-01 RX ADMIN — LIDOCAINE HYDROCHLORIDE 75 MG: 20 INJECTION, SOLUTION INTRAVENOUS at 11:03

## 2019-03-01 RX ADMIN — ONDANSETRON 4 MG: 2 INJECTION INTRAMUSCULAR; INTRAVENOUS at 04:03

## 2019-03-01 NOTE — OP NOTE
[facility]   Operative Note     SUMMARY     Surgery Date: 3/1/2019     Surgeon(s) and Role:     * Claude S. Williams IV, MD - Primary    Assisting Surgeon: None    Pre-op Diagnosis:  Arthritis of left hand [M19.042]    Post-op Diagnosis:  Post-Op Diagnosis Codes:     * Arthritis of left hand [M19.042]    Procedure(s) (LRB):  FUSION, JOINT, FINGER - REVISION OF LEFT THUMB IP JOINT FUSION WITH REMOVAL OF PREVIOUS SCREW AND ( ICBG) ILIAC CREST BONE GRAFT (Left)    Anesthesia: General    Description of the findings of the procedure:   Indications:  Ms. Au is a 68-year-old woman who has undergone arthrodesis of the left thumb interphalangeal joint. She has had evidence of pseudoarthrosis and loosening of the screw radiographically with some continued pain.  After the potential benefits as well as risks and complications of options were reviewed she has elected undergo the above procedure.  Procedure in detail:  After proper informed consent was obtained the patient was transported to the operative suite and placed supine on the operative table general endotracheal anesthesia was administered per the anesthesiologist without difficulty.  The left hand was sterilely prepped with alcohol ChloraPrep and draped in the usual sterile fashion as well as left hip iliac crest area.  Routine preoperative time-out was taken and the operative site was positively identified by the operative team.  Preoperative antibiotics were administered. An incision was then made over the left iliac crest and careful dissection was carried down through the subdermal tissue using Bovie cautery for hemostasis. Periosteal elevation of the iliac crest allowed access with the Arthrex bone harvesting Reamer size 6 mm where a small amount of cancellous bone was removed.  The wound was irrigated, Callous graft was used to back fill the defect and the wound was closed in layers with Vicryl and sterile skin staples.  A sterile soft  dressing was applied.  Attention was then turned to the left thumb.  After Esmarch exsanguination a well-padded upper arm tourniquet was elevated to 250 mm of mercury.  An incision was made over the dorsal aspect of the left thumb in line with the previous surgical scar and careful dissection was carried down through the subdermal tissue using bipolar cautery for hemostasis.  The extensor tendon was divided and reflected to allow visualization of the joint. There was some fibrous tissue within the joint.  There was definite micro motion and loosening of the screw in the proximal phalanx.  The screw was cut at this level and the proximal portion was removed without any difficulty.  The distal portion was removed without any difficulty backing out the threads.  The fibrous tissue was curetted from the joint as well as the proximal and distal phalanges were curetted.  The proximal distal phalanx was then packed with cancellous bone graft as well as the interphalangeal joint. Definitive fixation was then carried out with a tension band construct advancing 2.045 mm K-wires in parallel from the dorsal cortex of the proximal phalanx across the interphalangeal joint and into the distal phalanx.  A 22 mm wire in a figure-eight was then passed transversely across the base of the distal phalanx and around the K-wires proximally. This was tensioned providing a very stable construct.  The proximal portion of the K-wires were cut and bent.  The wound was irrigated and the image intensifier confirmed appropriate position and alignment of the joint and hardware.  The tourniquet was deflated and hemostasis was confirmed.  The EPL tendon was approximated with 3 0 Vicryl interrupted suture.  The skin was then approximated with 4 nylon interrupted suture. A sterile soft dressing was applied.  There was excellent circulation throughout the thumb after deflation of the tourniquet and application of the splint.  A thumb spica well-padded  splint was applied the patient was awakened in the operative suite and taken to recovery in stable condition she tolerated the procedure very well after inserting a needle counts were correct .  Blood loss less than 50 cc.  Complications none.  Findings/Key Components:  As above    Estimated Blood Loss: * 50 cc *         Specimens:   Specimen (12h ago, onward)    None          Discharge Note    SUMMARY     Admit Date: 3/1/2019    Discharge Date and Time:  03/01/2019 1:17 PM    Hospital Course (synopsis of major diagnoses, care, treatment, and services provided during the course of the hospital stay):  Uneventful     Final Diagnosis: Post-Op Diagnosis Codes:     * Arthritis of left hand [M19.042]    Disposition: Home or Self Care    Follow Up/Patient Instructions:     Medications:  Reconciled Home Medications:      Medication List      START taking these medications    cephALEXin 500 MG capsule  Commonly known as:  KEFLEX  Take 1 capsule (500 mg total) by mouth 4 (four) times daily. for 5 days        CHANGE how you take these medications    * HYDROcodone-acetaminophen 5-325 mg per tablet  Commonly known as:  NORCO  Take 1 tablet by mouth every 4 (four) hours as needed for Pain.  What changed:  Another medication with the same name was added. Make sure you understand how and when to take each.     * HYDROcodone-acetaminophen 5-325 mg per tablet  Commonly known as:  NORCO  Take 1 tablet by mouth every 4 (four) hours as needed for Pain.  What changed:  You were already taking a medication with the same name, and this prescription was added. Make sure you understand how and when to take each.         * This list has 2 medication(s) that are the same as other medications prescribed for you. Read the directions carefully, and ask your doctor or other care provider to review them with you.            CONTINUE taking these medications    CALCIUM 600 ORAL  Take by mouth 2 (two) times daily.     multivitamin capsule  Take 1  capsule by mouth once daily.          Discharge Procedure Orders   Diet general     Call MD for:  temperature >100.4     Call MD for:  persistent nausea and vomiting     Call MD for:  severe uncontrolled pain     Call MD for:  difficulty breathing, headache or visual disturbances     Call MD for:  redness, tenderness, or signs of infection (pain, swelling, redness, odor or green/yellow discharge around incision site)     Call MD for:  hives     Call MD for:  persistent dizziness or light-headedness     Call MD for:  extreme fatigue     Leave dressing on - Keep it clean, dry, and intact until clinic visit     Keep surgical extremity elevated     Lifting restrictions     No driving, operating heavy equipment or signing legal documents while taking pain medication     Follow-up Information     Claude S. Williams Iv, MD In 1 week.    Specialty:  Orthopedic Surgery  Why:  For wound re-check  Contact information:  [address]  [facility] [id number] [phone]

## 2019-03-01 NOTE — TRANSFER OF CARE
"Anesthesia Transfer of Care Note    Patient: Camila Au    Procedure(s) Performed: Procedure(s) (LRB):  FUSION, JOINT, FINGER - REVISION OF LEFT THUMB IP JOINT FUSION WITH REMOVAL OF PREVIOUS SCREW AND ( ICBG) ILIAC CREST BONE GRAFT (Left)    Patient location: PACU    Anesthesia Type: general    Transport from OR: Transported from OR on 2-3 L/min O2 by NC with adequate spontaneous ventilation    Post pain: adequate analgesia    Post assessment: no apparent anesthetic complications    Post vital signs: stable    Level of consciousness: awake and alert    Nausea/Vomiting: no nausea/vomiting    Complications: none    Transfer of care protocol was followed      Last vitals:   Visit Vitals  BP (!) 145/86 (BP Location: Right arm, Patient Position: Lying)   Pulse 93   Temp 36.4 °C (97.5 °F) (Oral)   Resp 16   Ht 4' 10" (1.473 m)   Wt 72.1 kg (158 lb 15.2 oz)   SpO2 96%   Breastfeeding? No   BMI 33.22 kg/m²     "

## 2019-03-01 NOTE — INTERVAL H&P NOTE
The patient has been examined and the H&P has been reviewed:    I concur with the findings and no changes have occurred since H&P was written.    Anesthesia/Surgery risks, benefits and alternative options discussed and understood by patient/family.          Active Hospital Problems    Diagnosis  POA    Arthritis of left hand [M19.042]  Yes      Resolved Hospital Problems   No resolved problems to display.

## 2019-03-01 NOTE — ANESTHESIA PREPROCEDURE EVALUATION
03/01/2019  Camila Au is a 68 y.o., female.    Pre-op Assessment    I have reviewed the Patient Summary Reports.     I have reviewed the Nursing Notes.   I have reviewed the Medications.     Review of Systems  Anesthesia Hx:  No problems with previous Anesthesia  Denies Family Hx of Anesthesia complications.   Denies Personal Hx of Anesthesia complications.   Social:  Non-Smoker    Hematology/Oncology:  Hematology Normal       -- Cancer in past history:  Breast right no axillary node dissection no lymphedema surgery    EENT/Dental:EENT/Dental Normal   Cardiovascular:  Cardiovascular Normal Exercise tolerance: good     Pulmonary:   Denies Asthma.    Renal/:  Renal/ Normal     Musculoskeletal:   Arthritis     Neurological:  Neurology Normal    Endocrine:  Endocrine Normal    Dermatological:  Skin Normal    Psych:  Psychiatric Normal           Physical Exam  General:  Well nourished, Obesity    Airway/Jaw/Neck:  Airway Findings: Mouth Opening: Normal Tongue: Normal  General Airway Assessment: Adult  Mallampati: I  TM Distance: Normal, at least 6 cm  Jaw/Neck Findings:  Neck ROM: Normal ROM      Dental:  Dental Findings: In tact             Anesthesia Plan  Type of Anesthesia, risks & benefits discussed:  Anesthesia Type:  general  Patient's Preference:   Intra-op Monitoring Plan: standard ASA monitors  Intra-op Monitoring Plan Comments:   Post Op Pain Control Plan: multimodal analgesia  Post Op Pain Control Plan Comments:   Induction:   IV  Beta Blocker:         Informed Consent: Patient understands risks and agrees with Anesthesia plan.  Questions answered. Anesthesia consent signed with patient.  ASA Score: 2     Day of Surgery Review of History & Physical:    H&P update referred to the surgeon.         Ready For Surgery From Anesthesia Perspective.

## 2019-03-01 NOTE — PLAN OF CARE
Camila Au has met all discharge criteria from Phase II. Vital Signs are stable, ambulating  without difficulty. Nausea has now subsided. Discharge instructions given, patient verbalized understanding. Discharged from facility via wheelchair in stable condition.

## 2019-03-01 NOTE — OR NURSING
While transporting pt to car, pt was eating a packet of saltines.  Once we got to the car, she began vomiting up the crackers.  I offered to bring pt back up stair, she declined.  Pt instructed on how to proceed should she continue to have issues at home.

## 2019-03-01 NOTE — DISCHARGE INSTRUCTIONS
Anesthesia: After Your Surgery  Youve just had surgery. During surgery, you received medication called anesthesia to keep you comfortable and pain-free. After surgery, you may experience some pain or nausea. This is common. Here are some tips for feeling better and recovering after surgery.    Going home  Your doctor or nurse will show you how to take care of yourself when you go home. He or she will also answer your questions. Have an adult family member or friend drive you home. For the first 24 hours after your surgery:  · Do not drive or use heavy equipment.  · Do not make important decisions or sign legal documents.  · Avoid alcohol.  · Have someone stay with you, if needed. He or she can watch for problems and help keep you safe.  Be sure to keep all follow-up appointments with your doctor. And rest after your procedure for as long as your doctor tells you to.    Coping with pain  If you have pain after surgery, pain medication will help you feel better. Take it as directed, before pain becomes severe. Also, ask your doctor or pharmacist about other ways to control pain, such as with heat, ice, and relaxation. And follow any other instructions your surgeon or nurse gives you.    URINARY RETENTION  Should you experience a decrease in your urine output or are unable to urinate following surgery, this can be due to the medications given during surgery.  We recommend you going to the nearest Emergency Department.    Tips for taking pain medication  To get the best relief possible, remember these points:  · Pain medications can upset your stomach. Taking them with a little food may help.  · Most pain relievers taken by mouth need at least 20 to 30 minutes to take effect.  · Taking medication on a schedule can help you remember to take it. Try to time your medication so that you can take it before beginning an activity, such as dressing, walking, or sitting down for dinner.  · Constipation is a common side effect  of pain medications. Contact your doctor before taking any medications like laxatives or stool softeners to help relieve constipation. Also ask about any dietary restrictions, because drinking lots of fluids and eating foods like fruits and vegetables that are high in fiber can also help. Remember, dont take laxatives unless your surgeon has prescribed them.  · Mixing alcohol and pain medication can cause dizziness and slow your breathing. It can even be fatal. Dont drink alcohol while taking pain medication.  · Pain medication can slow your reflexes. Dont drive or operate machinery while taking pain medication.  If your health care provider tells you to take acetaminophen to help relieve your pain, ask him or her how much you are supposed to take each day. (Acetaminophen is the generic name for Tylenol and other brand-name pain relievers.) Acetaminophen or other pain relievers may interact with your prescription medicines or other over-the-counter (OTC) drugs. Some prescription medications contain acetaminophen along with other active ingredients. Using both prescription and OTC acetaminophen for pain can cause you to overdose. The FDA recommends that you read the labels on your OTC medications carefully. This will help you to clearly understand the list of active ingredients, dosing instructions, and any warnings. It may also help you avoid taking too much acetaminophen. If you have questions or don't understand the information, ask your pharmacist or health care provider to explain it to you before you take the OTC medication.    Managing nausea  Some people have an upset stomach after surgery. This is often due to anesthesia, pain, pain medications, or the stress of surgery. The following tips will help you manage nausea and get good nutrition as you recover. If you were on a special diet before surgery, ask your doctor if you should follow it during recovery. These tips may help:  · Dont push yourself to  eat. Your body will tell you when to eat and how much.  · Start off with clear liquids and soup. They are easier to digest.  · Progress to semi-solid foods (mashed potatoes, applesauce, and gelatin) as you feel ready.  · Slowly move to solid foods. Dont eat fatty, rich, or spicy foods at first.  · Dont force yourself to have three large meals a day. Instead, eat smaller amounts more often.  · Take pain medications with a small amount of solid food, such as crackers or toast to avoid nausea.      Call your surgeon if    · You feel too sleepy, dizzy, or groggy (medication may be too strong).  · You have side effects like nausea, vomiting, or skin changes (rash, itching, or hives).   © 6994-2620 [employer]. [address] [place] [address]. All rights reserved. This information is not intended as a substitute for professional medical care. Always follow your healthcare professional's instructions.            After Hand Surgery  After surgery, the better you take care of yourself--especially your hand--the sooner it will heal. Follow your surgeons instructions. Try not to bump your hand, and dont move or lift anything while youre still wearing bandages, a splint, or a cast.  Care for your hand    · Keep your hand elevated above heart level as much as possible for the first several days after surgery. This helps reduce swelling and pain.  · To help prevent infection and speed healing, take care not to get your cast or bandages wet.  Relieve pain as directed  Your surgeon may prescribe pain medicine or suggest you take an anti-inflammatory medicine. You might also be instructed to apply ice (or another cold source) to your hand. If you use ice cubes, put them in a plastic bag and rest it on top of your bandages. Leave the cold source on your hand for as long as its comfortable. Do this several times a day for the first few days after surgery. It may take several minutes before you can feel  the cold through the cast or bandages.  Follow up with your surgeon  During a follow-up visit after surgery, your surgeon will check your progress. The stitches, bandages, splint, or cast may be removed. A new cast or splint may be placed. If your hand has healed enough, your surgeon may prescribe exercises.  Do prescribed hand exercises  Your surgeon may recommend that you do exercises. These may be done under the guidance of a physical or occupational therapist. The exercises strengthen your hand, help you regain flexibility, and restore proper function. Do the exercises as advised.  Call your surgeon if you have...  · A fever higher than 100.4°F (38.0°C) taken by mouth  · Side effects from your medicine, such as prolonged nausea  · A wet or loose dressing, or a dressing that is too tight  · Excessive bleeding  · Increased, ongoing pain or numbness  · Signs of infection (such as drainage, warmth, or redness) at the incision site   Date Last Reviewed: 11/11/2015  © 5784-8805 [employer]. [address] [place] [address], [place] [address]. All rights reserved. This information is not intended as a substitute for professional medical care. Always follow your healthcare professional's instructions.

## 2019-03-01 NOTE — ANESTHESIA POSTPROCEDURE EVALUATION
"Anesthesia Post Evaluation    Patient: Camila Au    Procedure(s) Performed: Procedure(s) (LRB):  FUSION, JOINT, FINGER - REVISION OF LEFT THUMB IP JOINT FUSION WITH REMOVAL OF PREVIOUS SCREW AND ( ICBG) ILIAC CREST BONE GRAFT (Left)    Final Anesthesia Type: general  Patient location during evaluation: PACU  Patient participation: Yes- Able to Participate  Level of consciousness: awake and alert and oriented  Post-procedure vital signs: reviewed and stable  Pain management: adequate  Airway patency: patent  PONV status at discharge: No PONV  Anesthetic complications: no      Cardiovascular status: blood pressure returned to baseline and hemodynamically stable  Respiratory status: unassisted, spontaneous ventilation and room air  Hydration status: euvolemic  Follow-up not needed.        Visit Vitals  /63   Pulse 94   Temp 36.4 °C (97.5 °F) (Oral)   Resp 18   Ht 4' 10" (1.473 m)   Wt 72.1 kg (158 lb 15.2 oz)   SpO2 95%   Breastfeeding? No   BMI 33.22 kg/m²       Pain/Savana Score: Pain Rating Prior to Med Admin: 7 (3/1/2019  2:30 PM)  Savana Score: 9 (3/1/2019  1:55 PM)        "

## 2019-03-04 LAB — BACTERIA SPEC AEROBE CULT: NO GROWTH

## 2019-03-08 LAB — BACTERIA SPEC ANAEROBE CULT: NORMAL

## 2019-03-19 RX ORDER — WITCH HAZEL 50 %
2000 PADS, MEDICATED (EA) TOPICAL DAILY
COMMUNITY
End: 2019-03-20

## 2019-03-19 RX ORDER — LETROZOLE 2.5 MG/1
2.5 TABLET, FILM COATED ORAL DAILY
COMMUNITY
End: 2019-03-20

## 2019-03-20 ENCOUNTER — OFFICE VISIT (OUTPATIENT)
Dept: SURGERY | Facility: CLINIC | Age: 69
End: 2019-03-20
Payer: MEDICARE

## 2019-03-20 VITALS
WEIGHT: 159 LBS | BODY MASS INDEX: 32.05 KG/M2 | DIASTOLIC BLOOD PRESSURE: 80 MMHG | TEMPERATURE: 98 F | HEIGHT: 59 IN | SYSTOLIC BLOOD PRESSURE: 139 MMHG

## 2019-03-20 DIAGNOSIS — K81.1 CHRONIC CHOLECYSTITIS: Primary | ICD-10-CM

## 2019-03-20 PROCEDURE — 99203 PR OFFICE/OUTPT VISIT, NEW, LEVL III, 30-44 MIN: ICD-10-PCS | Mod: ,,, | Performed by: SURGERY

## 2019-03-20 PROCEDURE — 99203 OFFICE O/P NEW LOW 30 MIN: CPT | Mod: ,,, | Performed by: SURGERY

## 2019-03-20 NOTE — LETTER
March 20, 2019      Kasie Arthur MD  [address]  [place] [address]           [facility] - General Surgery  [address]  [place] [address]  Phone: [phone]  Fax: [phone]          Patient: Camila Au   MR Number: [id number]   YOB: 1950   Date of Visit: 3/20/2019       Dear Dr. Kasie Arthur:    Thank you for referring Camila Au to me for evaluation. Attached you will find relevant portions of my assessment and plan of care.    If you have questions, please do not hesitate to call me. I look forward to following Camila Au along with you.    Sincerely,    Amando Diaz III, MD    Enclosure  CC:  No Recipients    If you would like to receive this communication electronically, please contact [email or web address] or [phone] to request more information on [employer] access.    For providers and/or their staff who would like to refer a patient to [facility], please contact us through our one-stop-shop provider referral line, [facility], at [phone].    If you feel you have received this communication in error or would no longer like to receive these types of communications, please e-mail [email or web address]

## 2019-03-20 NOTE — PROGRESS NOTES
Subjective:       Patient ID: Camila Au is a 68 y.o. female.    Chief Complaint: Other (Referred by [facility] ER to eval gallbladder)      HPI:  Patient is 68-year-old female referred to the office in consultation with gallbladder disease. Patient had episode of severe right upper quadrant abdominal pain after eating fatty meal several days ago. She presented to an urgent care. Ultrasound was performed and revealed a contracted gallbladder diffuse concentric wall thickening. No stones were appreciated. There is no pericholecystic edema. It also revealed evidence of fatty liver. She was discharged from urgent care and referred here to the office. She states that since her visit she has had daily right upper quadrant discomfort worse after meals. She has no fevers or chills. Pain as a crampy pain. She has history of  ×3.    Past Medical History:   Diagnosis Date    Arthritis     Breast cancer     right    Wears glasses     WEARS CONTACS     Past Surgical History:   Procedure Laterality Date    BREAST RECONSTRUCTION      CARPAL TUNNEL RELEASE       SECTION  1970, ,     COLONOSCOPY  2017    CYSTOSCOPY WITH HYDRODISTENSION N/A 2012    Performed by [facility] Fields [facility] at [facility] OR    FUSION, JOINT, FINGER - REVISION OF LEFT THUMB IP JOINT FUSION WITH REMOVAL OF PREVIOUS SCREW AND ( ICBG) ILIAC CREST BONE GRAFT Left 3/1/2019    Per[facility] by [facility], [facility] at [facility] OR    FUSION, JOINT, THUMB Left 2018    [facility] at [facility] OR    KNEE ARTHROSCOPY Bilateral 2008    MASTECTOMY Right 2007    PORTACATH PLACEMENT  2007    PORTACATH REMOVAL      SURGICAL REMOVAL OF BONE SPUR Bilateral     TUBAL LIGATION       Review of patient's allergies indicates:  No Known Allergies  Medication List with Changes/Refills   Current Medications    CALCIUM CARBONATE (CALCIUM 600 ORAL)    Take by mouth 2 (two) times daily.      HYDROCODONE-ACETAMINOPHEN (NORCO) 5-325 MG PER TABLET    Take 1 tablet by mouth every 4 (four) hours as needed for Pain.    MULTIVITAMIN CAPSULE    Take 1 capsule by mouth once daily.   Discontinued Medications    CYANOCOBALAMIN 2000 MCG TABLET    Take 2,000 mcg by mouth once daily.    HYDROCODONE-ACETAMINOPHEN (NORCO) 5-325 MG PER TABLET    Take 1 tablet by mouth every 4 (four) hours as needed for Pain.    LETROZOLE (FEMARA) 2.5 MG TAB    Take 2.5 mg by mouth once daily.     Family History   Problem Relation Age of Onset    Arthritis Mother     Hyperlipidemia Mother     Stroke Mother     Dementia Mother      Social History     Socioeconomic History    Marital status:      Spouse name: None    Number of children: None    Years of education: None    Highest education level: None   Social Needs    Financial resource strain: None    Food insecurity - worry: None    Food insecurity - inability: None    Transportation needs - medical: None    Transportation needs - non-medical: None   Occupational History    None   Tobacco Use    Smoking status: Former Smoker     Types: Cigarettes    Smokeless tobacco: Never Used    Tobacco comment: social smoker - on weekends only - quit 20 yrs ago   Substance and Sexual Activity    Alcohol use: No     Alcohol/week: 0.0 oz     Frequency: Never    Drug use: No    Sexual activity: Yes     Partners: Male   Other Topics Concern    None   Social History Narrative    None         Review of Systems   Constitutional: Negative for appetite change, chills, fever and unexpected weight change.   HENT: Negative for hearing loss, rhinorrhea, sore throat and voice change.    Eyes: Negative for photophobia and visual disturbance.   Respiratory: Negative for cough, choking and shortness of breath.    Cardiovascular: Negative for chest pain, palpitations and leg swelling.   Gastrointestinal: Positive for abdominal pain and nausea. Negative for blood in stool, constipation,  diarrhea and vomiting.   Endocrine: Negative for cold intolerance, heat intolerance, polydipsia and polyuria.   Musculoskeletal: Negative for arthralgias, back pain, joint swelling and neck stiffness.   Skin: Negative for color change, pallor and rash.   Neurological: Negative for dizziness, seizures, syncope and headaches.   Hematological: Negative for adenopathy. Does not bruise/bleed easily.   Psychiatric/Behavioral: Negative for agitation, behavioral problems and confusion.       Objective:      Physical Exam   Constitutional: She is oriented to person, place, and time. She appears well-developed and well-nourished.  Non-toxic appearance. No distress.   HENT:   Head: Normocephalic and atraumatic. Head is without abrasion and without laceration.   Right Ear: External ear normal.   Left Ear: External ear normal.   Nose: Nose normal.   Mouth/Throat: Oropharynx is clear and moist.   Eyes: EOM are normal. Pupils are equal, round, and reactive to light.   Neck: Trachea normal. Neck supple. No tracheal deviation and normal range of motion present.   Cardiovascular: Normal rate and regular rhythm.   Pulmonary/Chest: Effort normal. No accessory muscle usage. No tachypnea. No respiratory distress.   Abdominal: Soft. Normal appearance and bowel sounds are normal. She exhibits no distension and no mass. There is no hepatosplenomegaly. There is no tenderness. There is no rigidity, no guarding and negative Auguste's sign. No hernia.   Lymphadenopathy:        Right: No inguinal adenopathy present.        Left: No inguinal adenopathy present.   Neurological: She is alert and oriented to person, place, and time. Coordination and gait normal.   Skin: Skin is warm and intact.   Psychiatric: She has a normal mood and affect. Her speech is normal and behavior is normal.       Assessment/Plan:   Camila was seen today for other.    Diagnoses and all orders for this visit:    Chronic cholecystitis  -     Ambulatory Referral to External  Surgery  -     CBC auto differential; Future  -     Comprehensive metabolic panel; Future  -     X-Ray Chest PA And Lateral; Future  -     SCHEDULED EKG 12-LEAD (to Muse); Future  -     CBC auto differential  -     Comprehensive metabolic panel      She'll be scheduled for cholecystectomy April 17. All risk and benefits of the surgery were discussed with the patient. Also discussed low-fat diet until that time to try to have some symptom relief.     Planned procedure: Laparoscopic cholecystectomy    Mefoxin 2 gm IV on call to OR    NPO past midnight    Abdulaziz cloth scrub per protocol    SCDs Bilateral Lower Extremities    I discussed the proposed procedures the the patient including risks, benefits, indications, alternatives and special concerns.  The patient appears to understand and agrees to go ahead with surgery.  I have made no promises, warranties or verbal agreements beyond what was discussed above.

## 2019-03-26 LAB
ALBUMIN SERPL-MCNC: 4 G/DL (ref 3.1–4.7)
ALP SERPL-CCNC: 111 IU/L (ref 40–104)
ALT (SGPT): 58 IU/L (ref 3–33)
AST SERPL-CCNC: 86 IU/L (ref 10–40)
BASOPHILS NFR BLD: 0.1 K/UL (ref 0–0.2)
BASOPHILS NFR BLD: 1.1 %
BILIRUB SERPL-MCNC: 0.9 MG/DL (ref 0.3–1)
BUN SERPL-MCNC: 11 MG/DL (ref 8–20)
CALCIUM SERPL-MCNC: 9.3 MG/DL (ref 7.7–10.4)
CHLORIDE: 101 MMOL/L (ref 98–110)
CO2 SERPL-SCNC: 27.4 MMOL/L (ref 22.8–31.6)
CREATININE: 0.5 MG/DL (ref 0.6–1.4)
EOSINOPHIL NFR BLD: 0.2 K/UL (ref 0–0.7)
EOSINOPHIL NFR BLD: 2.7 %
ERYTHROCYTE [DISTWIDTH] IN BLOOD BY AUTOMATED COUNT: 12.9 % (ref 11.7–14.9)
GLUCOSE: 160 MG/DL (ref 70–99)
GRAN #: 3.7 K/UL (ref 1.4–6.5)
GRAN%: 45.3 %
HCT VFR BLD AUTO: 44.5 % (ref 36–48)
HGB BLD-MCNC: 15.3 G/DL (ref 12–15)
IMMATURE GRANS (ABS): 0 K/UL (ref 0–1)
IMMATURE GRANULOCYTES: 0.4 %
LYMPH #: 3.4 K/UL (ref 1.2–3.4)
LYMPH%: 42.2 %
MCH RBC QN AUTO: 32.5 PG (ref 25–35)
MCHC RBC AUTO-ENTMCNC: 34.4 G/DL (ref 31–36)
MCV RBC AUTO: 94.5 FL (ref 79–98)
MONO #: 0.7 K/UL (ref 0.1–0.6)
MONO%: 8.3 %
NUCLEATED RBCS: 0 %
PLATELET # BLD AUTO: 230 K/UL (ref 140–440)
PMV BLD AUTO: 11.5 FL (ref 8.8–12.7)
POTASSIUM SERPL-SCNC: 3.6 MMOL/L (ref 3.5–5)
PROT SERPL-MCNC: 7.6 G/DL (ref 6–8.2)
RBC # BLD AUTO: 4.71 M/UL (ref 3.5–5.5)
SODIUM: 137 MMOL/L (ref 134–144)
WBC # BLD AUTO: 8.2 K/UL (ref 5–10)

## 2019-04-02 LAB — FUNGUS SPEC CULT: NORMAL

## 2019-04-03 ENCOUNTER — OFFICE VISIT (OUTPATIENT)
Dept: FAMILY MEDICINE | Facility: CLINIC | Age: 69
End: 2019-04-03
Payer: MEDICARE

## 2019-04-03 VITALS
SYSTOLIC BLOOD PRESSURE: 120 MMHG | HEIGHT: 59 IN | TEMPERATURE: 98 F | WEIGHT: 163 LBS | HEART RATE: 74 BPM | RESPIRATION RATE: 12 BRPM | DIASTOLIC BLOOD PRESSURE: 78 MMHG | OXYGEN SATURATION: 96 % | BODY MASS INDEX: 32.86 KG/M2

## 2019-04-03 DIAGNOSIS — Z12.39 BREAST CANCER SCREENING: ICD-10-CM

## 2019-04-03 DIAGNOSIS — R73.01 IFG (IMPAIRED FASTING GLUCOSE): ICD-10-CM

## 2019-04-03 DIAGNOSIS — E78.00 PURE HYPERCHOLESTEROLEMIA: ICD-10-CM

## 2019-04-03 DIAGNOSIS — R10.2 VAGINAL PAIN: Primary | ICD-10-CM

## 2019-04-03 DIAGNOSIS — M89.9 BONE DISEASE: ICD-10-CM

## 2019-04-03 DIAGNOSIS — R74.01 ELEVATED ALT MEASUREMENT: ICD-10-CM

## 2019-04-03 PROBLEM — I48.0 PAROXYSMAL ATRIAL FIBRILLATION: Status: ACTIVE | Noted: 2019-04-03

## 2019-04-03 LAB — HBA1C MFR BLD: 5.6 %

## 2019-04-03 PROCEDURE — 99214 PR OFFICE/OUTPT VISIT, EST, LEVL IV, 30-39 MIN: ICD-10-PCS | Mod: ,,, | Performed by: INTERNAL MEDICINE

## 2019-04-03 PROCEDURE — 83036 POCT HEMOGLOBIN A1C: ICD-10-PCS | Mod: QW,,, | Performed by: INTERNAL MEDICINE

## 2019-04-03 PROCEDURE — 83036 HEMOGLOBIN GLYCOSYLATED A1C: CPT | Mod: QW,,, | Performed by: INTERNAL MEDICINE

## 2019-04-03 PROCEDURE — 99214 OFFICE O/P EST MOD 30 MIN: CPT | Mod: ,,, | Performed by: INTERNAL MEDICINE

## 2019-04-03 NOTE — PROGRESS NOTES
SUBJECTIVE:    Patient ID: Camila Au is a 69 y.o. female.    Chief Complaint: Results and Follow-up    HPI     Patient returns for recheck. She has not been seen by me in quite some time. She had been followed by Dr. Jeffrey Ruiz until last year when she was finally discharged for yearly follow-up following diagnosis of lobular carcinoma in the central aspect of the right breast, status post mastectomy radiation and chemotherapy. She has had some recent laboratory study was hemoglobin 15 3 hematocrit 44.5 WBC of 8200 glucose of 160 BUN 11 creatinine 0.5 SGPT of 58 and AST of 86 with alkaline phosphatase of 111-today's A1C is 5.6    Pt has had two thumb surgeries since January    Pt is having pressure inside vagina that is very short-lived-bladder is ? Full when this occurs-she has not noticed-    Pt states she went to Urgent Care for itching of the feet and hands-intense burning and itching-? Etiology-chronic GB disease found on CT-she will have it out on the 17 of this month-she started having pain in the RUQ...    Office Visit on 04/03/2019   Component Date Value Ref Range Status    Hemoglobin A1C 04/03/2019 5.6  % Final   Office Visit on 03/20/2019   Component Date Value Ref Range Status    WBC 03/26/2019 8.2  5.0 - 10.0 K/uL Final    RBC 03/26/2019 4.71  3.50 - 5.50 M/uL Final    Hemoglobin 03/26/2019 15.3* 12.0 - 15.0 g/dL Final    Hematocrit 03/26/2019 44.5  36.0 - 48.0 % Final    MCV 03/26/2019 94.5  79.0 - 98.0 fL Final    MCH 03/26/2019 32.5  25.0 - 35.0 pg Final    MCHC 03/26/2019 34.4  31.0 - 36.0 g/dL Final    RDW 03/26/2019 12.9  11.7 - 14.9 % Final    Platelets 03/26/2019 230  140 - 440 K/uL Final    MPV 03/26/2019 11.5  8.8 - 12.7 fL Final    Gran% 03/26/2019 45.3  % Final    Lymph% 03/26/2019 42.2  % Final    Mono% 03/26/2019 8.3  % Final    Eosinophil% 03/26/2019 2.7  % Final    Basophil% 03/26/2019 1.1  % Final    Gran # (ANC) 03/26/2019 3.7  1.4 - 6.5 K/uL  Final    Lymph # 2019 3.4  1.2 - 3.4 K/uL Final    Mono # 2019 0.7* 0.1 - 0.6 K/uL Final    Eos # 2019 0.2  0.0 - 0.7 K/uL Final    Baso # 2019 0.1  0.0 - 0.2 K/uL Final    Immature Grans (Abs) 2019 0.0  0.0 - 1.0 K/uL Final    Immature Granulocytes 2019 0.4  % Final    nRBC% 2019 0  % Final    Glucose 2019 160* 70 - 99 mg/dL Final    BUN, Bld 2019 11  8 - 20 mg/dL Final    Creatinine 2019 0.50* 0.60 - 1.40 mg/dL Final    Calcium 2019 9.3  7.7 - 10.4 mg/dL Final    Sodium 2019 137  134 - 144 mmol/L Final    Potassium 2019 3.6  3.5 - 5.0 mmol/L Final    Chloride 2019 101  98 - 110 mmol/L Final    CO2 2019 27.4  22.8 - 31.6 mmol/L Final    Albumin 2019 4.0  3.1 - 4.7 g/dL Final    Total Bilirubin 2019 0.9  0.3 - 1.0 mg/dL Final    Alkaline Phosphatase 2019 111* 40 - 104 IU/L Final    Total Protein 2019 7.6  6.0 - 8.2 g/dL Final    ALT (SGPT) 2019 58* 3 - 33 IU/L Final    AST 2019 86* 10 - 40 IU/L Final   Admission on 2019, Discharged on 2019   Component Date Value Ref Range Status    Aerobic Bacterial Culture 2019 No growth   Final    Anaerobic Culture 2019 No anaerobes isolated   Final    AFB Culture & Smear 2019 Culture in progress   Preliminary    AFB CULTURE STAIN 2019 No acid fast bacilli seen.   Final    Fungus (Mycology) Culture 2019 No fungus isolated after 4 weeks   Final       Past Medical History:   Diagnosis Date    Arthritis     Breast cancer     right    Wears glasses     WEARS CONTACS     Past Surgical History:   Procedure Laterality Date    BREAST RECONSTRUCTION      CARPAL TUNNEL RELEASE       SECTION  , ,     COLONOSCOPY  2017    CYSTOSCOPY WITH HYDRODISTENSION N/A 2012    Performed by [facility] at [facility] OR    FUSION, JOINT, FINGER - REVISION OF LEFT THUMB  IP JOINT FUSION WITH REMOVAL OF PREVIOUS SCREW AND ( ICBG) ILIAC CREST BONE GRAFT Left 3/1/2019    Per[facility] by [facility] S. [facility], [facility] at [facility] OR    FUSION, JOINT, THUMB Left 12/19/2018    Performed by [facility] at [facility] OR    KNEE ARTHROSCOPY Bilateral 2008    MASTECTOMY Right 2007    PORTACATH PLACEMENT  2007    PORTACATH REMOVAL      SURGICAL REMOVAL OF BONE SPUR Bilateral 2002    TUBAL LIGATION  1976     Family History   Problem Relation Age of Onset    Arthritis Mother     Hyperlipidemia Mother     Stroke Mother     Dementia Mother        Marital Status:   Alcohol History:  reports that she does not drink alcohol.  Tobacco History:  reports that she has quit smoking. Her smoking use included cigarettes. She has never used smokeless tobacco.  Drug History:  reports that she does not use drugs.    Review of patient's allergies indicates:  No Known Allergies    Current Outpatient Medications:     calcium carbonate (CALCIUM 600 ORAL), Take by mouth 2 (two) times daily. , Disp: , Rfl:     multivitamin capsule, Take 1 capsule by mouth once daily., Disp: , Rfl:     Review of Systems   Constitutional: Negative for chills, fatigue (chronic), fever and unexpected weight change.   HENT: Negative for congestion, ear pain, hearing loss, sinus pain and sore throat.    Eyes: Negative for pain and visual disturbance.   Respiratory: Negative for cough, shortness of breath (walking up steps) and wheezing.    Cardiovascular: Negative for chest pain, palpitations and leg swelling.   Gastrointestinal: Negative for abdominal pain, blood in stool, constipation, diarrhea, nausea and vomiting.        HPI   Endocrine: Negative for cold intolerance and heat intolerance.   Genitourinary: Positive for vaginal pain (HPI). Negative for difficulty urinating, dysuria (at times), frequency, pelvic pain and urgency.        HPI   Musculoskeletal: Positive for arthralgias (knees-had surgery left shoulder).  "Negative for back pain, joint swelling and neck pain.   Skin: Negative for pallor and rash.   Neurological: Negative for dizziness, tremors, weakness, numbness and headaches.   Hematological: Does not bruise/bleed easily.   Psychiatric/Behavioral: Negative for agitation, sleep disturbance (poor-awakens at night and hard to get to sleep) and suicidal ideas.          Objective:      Vitals:    04/03/19 0947   BP: 120/78   Pulse: 74   Resp: 12   Temp: 97.7 °F (36.5 °C)   SpO2: 96%   Weight: 73.9 kg (163 lb)   Height: 4' 11" (1.499 m)     Physical Exam   Constitutional: She is oriented to person, place, and time. She appears well-developed and well-nourished. She is cooperative. No distress.   Increased BMI   HENT:   Head: Normocephalic and atraumatic.   Right Ear: Tympanic membrane normal.   Left Ear: Tympanic membrane normal.   Mouth/Throat: Uvula is midline and mucous membranes are normal.   Eyes: Pupils are equal, round, and reactive to light. Conjunctivae and EOM are normal. Right eye exhibits no discharge. Left eye exhibits no discharge. No scleral icterus. Right pupil is round and reactive. Left pupil is round and reactive.   Neck: Trachea normal and normal range of motion. Neck supple. No JVD present. Carotid bruit is not present. No thyromegaly present.   Cardiovascular: Normal rate, regular rhythm and intact distal pulses. Exam reveals no gallop and no friction rub.   No murmur heard.  Pulmonary/Chest: Effort normal and breath sounds normal. No respiratory distress. She has no wheezes. She has no rales.   Abdominal: Soft. Bowel sounds are normal. She exhibits no distension and no mass. There is no tenderness. There is no guarding. No hernia.   Musculoskeletal: She exhibits no edema.   Neurological: She is alert and oriented to person, place, and time. She has normal strength.   Skin: Skin is warm and dry. Capillary refill takes less than 2 seconds. No lesion and no rash noted. No cyanosis. Nails show no " clubbing.   Psychiatric: She has a normal mood and affect. Her speech is normal and behavior is normal. Judgment and thought content normal.   Nursing note and vitals reviewed.        Assessment:       1. Vaginal pain    2. Elevated ALT measurement    3. IFG (impaired fasting glucose)    4. Pure hypercholesterolemia    5. Bone disease    6. Breast cancer screening    7. BMI 32.0-32.9,adult         Plan:       Vaginal pain  -     Ambulatory referral to Obstetrics / Gynecology    Elevated ALT measurement  -     Hepatic function panel; Future; Expected date: 05/02/2019    IFG (impaired fasting glucose)  -     Hemoglobin A1C, POCT    Pure hypercholesterolemia  -     Lipid panel; Future; Expected date: 04/03/2019    Bone disease  -     DXA Bone Density Spine And Hip    Breast cancer screening  -     Mammo Digital Screening Bilat without CA; Future; Expected date: 08/02/2019    BMI 32.0-32.9,adult      No follow-ups on file.        4/3/2019 Kasie Arthur M.D.

## 2019-04-10 ENCOUNTER — TELEPHONE (OUTPATIENT)
Dept: FAMILY MEDICINE | Facility: CLINIC | Age: 69
End: 2019-04-10

## 2019-04-12 ENCOUNTER — TELEPHONE (OUTPATIENT)
Dept: FAMILY MEDICINE | Facility: CLINIC | Age: 69
End: 2019-04-12

## 2019-04-22 ENCOUNTER — TELEPHONE (OUTPATIENT)
Dept: SURGERY | Facility: CLINIC | Age: 69
End: 2019-04-22

## 2019-05-01 ENCOUNTER — OFFICE VISIT (OUTPATIENT)
Dept: SURGERY | Facility: CLINIC | Age: 69
End: 2019-05-01
Payer: MEDICARE

## 2019-05-01 VITALS
HEART RATE: 83 BPM | HEIGHT: 59 IN | BODY MASS INDEX: 32.86 KG/M2 | WEIGHT: 163 LBS | DIASTOLIC BLOOD PRESSURE: 83 MMHG | TEMPERATURE: 99 F | SYSTOLIC BLOOD PRESSURE: 126 MMHG

## 2019-05-01 DIAGNOSIS — K81.1 CHRONIC CHOLECYSTITIS: Primary | ICD-10-CM

## 2019-05-01 PROCEDURE — 99024 POSTOP FOLLOW-UP VISIT: CPT | Mod: POP,,, | Performed by: SURGERY

## 2019-05-01 PROCEDURE — 99024 PR POST-OP FOLLOW-UP VISIT: ICD-10-PCS | Mod: POP,,, | Performed by: SURGERY

## 2019-05-01 NOTE — LETTER
May 6, 2019      Kasie Arthur MD  [address]  [place] [address]           [facility] - General Surgery  [address]  [place] [address]  Phone: [phone]  Fax: [phone]          Patient: Camila Au   MR Number: [id number]   YOB: 1950   Date of Visit: 5/1/2019       Dear Dr. Kasie Arthur:    Thank you for referring Camila Au to me for evaluation. Attached you will find relevant portions of my assessment and plan of care.    If you have questions, please do not hesitate to call me. I look forward to following Camila Au along with you.    Sincerely,    Amando Diaz III, MD    Enclosure  CC:  No Recipients    If you would like to receive this communication electronically, please contact [email or web address] or [phone] to request more information on [employer] access.    For providers and/or their staff who would like to refer a patient to [facility], please contact us through our one-stop-shop provider referral line, [facility], at [phone].    If you feel you have received this communication in error or would no longer like to receive these types of communications, please e-mail [email or web address]

## 2019-05-03 LAB
ACID FAST MOD KINY STN SPEC: NORMAL
MYCOBACTERIUM SPEC QL CULT: NORMAL

## 2019-05-06 NOTE — PROGRESS NOTES
Subjective:       Patient ID: Camila Au is a 69 y.o. female.    Chief Complaint: Post-op Evaluation (FU DOS 4/17/19 Lap Arturo )      HPI:  S/p lap arturo.  Feeling well.  Symptoms improved.  Path is benign     Review of Systems    Objective:      Physical Exam   Constitutional: She is oriented to person, place, and time. She is cooperative. No distress.   Pulmonary/Chest: Effort normal.   Abdominal: Soft. She exhibits no distension. There is no tenderness. There is no rebound and no guarding. No hernia.   Neurological: She is alert and oriented to person, place, and time.   Skin:   Incisions are clean, dry and intact  There is no evidence of infection, hematoma or seroma        Assessment/Plan:   Chronic cholecystitis      S/p lap arturo.  Feeling well.  RTC PRN   Follow up if symptoms worsen or fail to improve.

## 2019-05-08 NOTE — TELEPHONE ENCOUNTER
Spoke with the patient.     ----- Message from Rhona Wylie LPN sent at 7/17/2018  1:11 PM CDT -----      ----- Message -----  From: Josephine Manning  Sent: 7/17/2018  12:53 PM  To: Rhona Wylie LPN    Please call pt about where to go for her MRI. [phone]     Cryotherapy Text: The wound bed was treated with cryotherapy after the biopsy was performed.

## 2019-05-09 ENCOUNTER — OFFICE VISIT (OUTPATIENT)
Dept: ORTHOPEDICS | Facility: CLINIC | Age: 69
End: 2019-05-09
Payer: MEDICARE

## 2019-05-09 VITALS
HEIGHT: 59 IN | SYSTOLIC BLOOD PRESSURE: 110 MMHG | BODY MASS INDEX: 32.66 KG/M2 | DIASTOLIC BLOOD PRESSURE: 70 MMHG | HEART RATE: 82 BPM | WEIGHT: 162 LBS

## 2019-05-09 DIAGNOSIS — M17.11 PRIMARY OSTEOARTHRITIS OF RIGHT KNEE: ICD-10-CM

## 2019-05-09 DIAGNOSIS — M17.12 PRIMARY OSTEOARTHRITIS OF LEFT KNEE: Primary | ICD-10-CM

## 2019-05-09 PROCEDURE — 99213 OFFICE O/P EST LOW 20 MIN: CPT | Mod: 25,,, | Performed by: ORTHOPAEDIC SURGERY

## 2019-05-09 PROCEDURE — 20610 LARGE JOINT ASPIRATION/INJECTION: R KNEE: ICD-10-PCS | Mod: RT,,, | Performed by: ORTHOPAEDIC SURGERY

## 2019-05-09 PROCEDURE — 73562 PR  X-RAY KNEE 3 VIEW: ICD-10-PCS | Mod: 50,,, | Performed by: ORTHOPAEDIC SURGERY

## 2019-05-09 PROCEDURE — 20610 DRAIN/INJ JOINT/BURSA W/O US: CPT | Mod: RT,,, | Performed by: ORTHOPAEDIC SURGERY

## 2019-05-09 PROCEDURE — 73562 X-RAY EXAM OF KNEE 3: CPT | Mod: 50,,, | Performed by: ORTHOPAEDIC SURGERY

## 2019-05-09 PROCEDURE — 99213 PR OFFICE/OUTPT VISIT, EST, LEVL III, 20-29 MIN: ICD-10-PCS | Mod: 25,,, | Performed by: ORTHOPAEDIC SURGERY

## 2019-05-09 RX ORDER — METHYLPREDNISOLONE ACETATE 40 MG/ML
40 INJECTION, SUSPENSION INTRA-ARTICULAR; INTRALESIONAL; INTRAMUSCULAR; SOFT TISSUE
Status: DISCONTINUED | OUTPATIENT
Start: 2019-05-09 | End: 2019-05-09 | Stop reason: HOSPADM

## 2019-05-09 RX ADMIN — METHYLPREDNISOLONE ACETATE 40 MG: 40 INJECTION, SUSPENSION INTRA-ARTICULAR; INTRALESIONAL; INTRAMUSCULAR; SOFT TISSUE at 12:05

## 2019-05-09 NOTE — PROGRESS NOTES
[facility]    Subjective:     Chief Complaint:   Chief Complaint   Patient presents with    Left Knee - Pain     Bilateral knee pain x 3 months. No injury    Right Knee - Pain       Past Medical History:   Diagnosis Date    Arthritis     Breast cancer     right    Wears glasses     WEARS CONTACS       Past Surgical History:   Procedure Laterality Date    BREAST RECONSTRUCTION      CARPAL TUNNEL RELEASE       SECTION  1970, ,     CHOLECYSTECTOMY  2019        COLONOSCOPY  2017    CYSTOSCOPY WITH HYDRODISTENSION N/A 2012    Per[facility] by [facility] OR    FUSION, JOINT, FINGER - REVISION OF LEFT THUMB IP JOINT FUSION WITH REMOVAL OF PREVIOUS SCREW AND ( ICBG) ILIAC CREST BONE GRAFT Left 3/1/2019    Performed by Claude S. Williams IV, MD at [facility] [place] OR    FUSION, JOINT, THUMB Left 2018    Performed by Claude S. Williams IV, MD at [facility] [place] OR    KNEE ARTHROSCOPY Bilateral 2008    MASTECTOMY Right 2007    PORTACATH PLACEMENT  2007    PORTACATH REMOVAL      SURGICAL REMOVAL OF BONE SPUR Bilateral     TUBAL LIGATION         No current outpatient medications on file.     No current facility-administered medications for this visit.        Review of patient's allergies indicates:  No Known Allergies    Family History   Problem Relation Age of Onset    Arthritis Mother     Hyperlipidemia Mother     Stroke Mother     Dementia Mother        Social History     Socioeconomic History    Marital status:      Spouse name: Not on file    Number of children: Not on file    Years of education: Not on file    Highest education level: Not on file   Occupational History    Not on file   Social Needs    Financial resource strain: Not on file    Food insecurity:     Worry: Not on file     Inability: Not on file    Transportation needs:     Medical: Not on file     Non-medical: Not on file   Tobacco Use    Smoking status:  Former Smoker     Types: Cigarettes    Smokeless tobacco: Never Used    Tobacco comment: social smoker - on weekends only - quit 20 yrs ago   Substance and Sexual Activity    Alcohol use: No     Alcohol/week: 0.0 oz     Frequency: Never    Drug use: No    Sexual activity: Yes     Partners: Male   Lifestyle    Physical activity:     Days per week: Not on file     Minutes per session: Not on file    Stress: Not on file   Relationships    Social connections:     Talks on phone: Not on file     Gets together: Not on file     Attends [employer] service: Not on file     Active member of club or organization: Not on file     Attends meetings of clubs or organizations: Not on file     Relationship status: Not on file   Other Topics Concern    Not on file   Social History Narrative    Not on file       History of present illness: Patient comes in today for her bilateral knees. Her right is worse than the left. We've been treating this for many years. She now wants to have her knee replaced. She wants the right done first as it is the worse.      Review of Systems:    Constitution: Negative for chills, fever, and sweats.  Negative for unexplained weight loss.    HENT:  Negative for headaches and blurry vision.    Cardiovascular:Negative for chest pain or irregular heart beat. Negative for hypertension.    Respiratory:  Negative for cough and shortness of breath.    Gastrointestinal: Negative for abdominal pain, heartburn, melena, nausea, and vomitting.    Genitourinary:  Negative bladder incontinence and dysuria.    Musculoskeletal:  See HPI for details.     Neurological: Negative for numbness.    Psychiatric/Behavioral: Negative for depression.  The patient is not nervous/anxious.      Endocrine: Negative for polyuria    Hematologic/Lymphatic: Negative for bleeding problem.  Does not bruise/bleed easily.    Skin: Negative for poor would healing and rash    Objective:      Physical Examination:    Vital Signs:     Vitals:    05/09/19 1149   BP: 110/70   Pulse: 82       Body mass index is 32.72 kg/m².    This a well-developed, well nourished patient in no acute distress.  They are alert and oriented and cooperative to examination.        Patient has significant varus deformities bilaterally. She has 1+ effusion of the left. None on the right.  Pertinent New Results:    XRAY Report / Interpretation:   AP lateral and sunrise views of the bilateral knees demonstrates bone-on-bone arthritis of both knees with loss of medial compartments in 3 compartments spurring.    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 I've offered her a right total knee. This and benefits of discussed with her in great detail. In terms of the left knee I injected the left knee with Depo-Medrol and lidocaine today. She will follow-up for her total      This note was created using Dragon voice recognition software that occasionally misinterpreted phrases or words.

## 2019-05-09 NOTE — PROCEDURES
Large Joint Aspiration/Injection: R knee  Date/Time: 5/9/2019 12:30 PM  Performed by: Jony Marmolejo MD  Authorized by: Jony Marmolejo MD     Consent Done?:  Yes (Verbal)  Indications:  Pain  Procedure site marked: Yes    Timeout: Prior to procedure the correct patient, procedure, and site was verified      Location:  Knee  Site:  R knee  Prep: Patient was prepped and draped in usual sterile fashion    Needle size:  25 G  Medications:  40 mg methylPREDNISolone acetate 40 mg/mL; 40 mg methylPREDNISolone acetate 40 mg/mL  Patient tolerance:  Patient tolerated the procedure well with no immediate complications

## 2019-05-13 ENCOUNTER — TELEPHONE (OUTPATIENT)
Dept: FAMILY MEDICINE | Facility: CLINIC | Age: 69
End: 2019-05-13

## 2019-05-13 DIAGNOSIS — M17.11 PRIMARY OSTEOARTHRITIS OF RIGHT KNEE: Primary | ICD-10-CM

## 2019-05-13 DIAGNOSIS — M17.11 DEGENERATIVE ARTHRITIS OF RIGHT KNEE: ICD-10-CM

## 2019-05-13 DIAGNOSIS — R10.2 VAGINAL PAIN: Primary | ICD-10-CM

## 2019-05-13 RX ORDER — SODIUM CHLORIDE 9 MG/ML
INJECTION, SOLUTION INTRAVENOUS CONTINUOUS
Status: CANCELLED | OUTPATIENT
Start: 2019-05-13

## 2019-05-13 RX ORDER — MUPIROCIN 20 MG/G
OINTMENT TOPICAL
Status: CANCELLED | OUTPATIENT
Start: 2019-05-13

## 2019-05-13 NOTE — TELEPHONE ENCOUNTER
Pt states [facility] does not take her insurance. She needs new OB referral. Order pended, do you want to try [facility]?

## 2019-05-14 ENCOUNTER — TELEPHONE (OUTPATIENT)
Dept: FAMILY MEDICINE | Facility: CLINIC | Age: 69
End: 2019-05-14

## 2019-05-14 DIAGNOSIS — M85.89 OSTEOPENIA OF MULTIPLE SITES: Primary | ICD-10-CM

## 2019-05-14 NOTE — TELEPHONE ENCOUNTER
COMPARISON: 12/19/2016, 12/16/2014    LUMBAR SPINE:  Bone mineral density in the lumbar spine from L1 through L4 is 0.790 gm/cm2.    The T-score is -2.3  (standard deviations of Young Adult mean).  The Z-score is -0.4  (standard deviations of Age Matched mean).    The WHO classification is osteopenia. There has been a 3.3% increase in bone  mineral density since the prior exam.    LEFT HIP:  Bone mineral density in the femoral neck is 0.666 gm/cm2.    The T-score is -1.7  (standard deviations of Young Adult mean).  The Z-score is 0  (standard deviations of Age Matched mean).    The WHO classification is osteopenia. There has been a 3.2% decrease in bone  mineral density since the prior exam.

## 2019-05-15 RX ORDER — ALENDRONATE SODIUM 70 MG/1
70 TABLET ORAL
Qty: 12 TABLET | Refills: 1 | Status: SHIPPED | OUTPATIENT
Start: 2019-05-15 | End: 2019-10-03 | Stop reason: SDUPTHER

## 2019-05-15 NOTE — TELEPHONE ENCOUNTER
Severe osteopenia bordering on osteoporosis at Lumbar spine and also osteopenia at Hip-favor checking a vitamin D level and starting fosamax 70 mg weekly -give rx info and correct way of taking this medication-

## 2019-05-20 ENCOUNTER — TELEPHONE (OUTPATIENT)
Dept: ORTHOPEDICS | Facility: CLINIC | Age: 69
End: 2019-05-20

## 2019-05-20 NOTE — TELEPHONE ENCOUNTER
----- Message from Julita Austin sent at 5/20/2019 12:21 PM CDT -----  Wants to change her Pre-Op date from May 31st to June 3rd.  [phone]

## 2019-05-20 NOTE — TELEPHONE ENCOUNTER
Spoke with pt about changing her pre-op. Since her surgery is 6/10, she is risking the change that if she has a UTI we will not have enough time to treat it and we would possibly have to move her surgery. She stated to leave it as it is.

## 2019-05-30 ENCOUNTER — OCCUPATIONAL HEALTH (OUTPATIENT)
Dept: URGENT CARE | Facility: CLINIC | Age: 69
End: 2019-05-30

## 2019-05-30 ENCOUNTER — HOSPITAL ENCOUNTER (OUTPATIENT)
Dept: RADIOLOGY | Facility: HOSPITAL | Age: 69
Discharge: HOME | End: 2019-05-30
Attending: ORTHOPAEDIC SURGERY
Payer: MEDICARE

## 2019-05-30 ENCOUNTER — HOSPITAL ENCOUNTER (OUTPATIENT)
Dept: PREADMISSION TESTING | Facility: HOSPITAL | Age: 69
Discharge: HOME | End: 2019-05-30
Attending: ORTHOPAEDIC SURGERY
Payer: MEDICARE

## 2019-05-30 VITALS — BODY MASS INDEX: 34 KG/M2 | HEIGHT: 58 IN | WEIGHT: 162 LBS

## 2019-05-30 DIAGNOSIS — M17.11 PRIMARY OSTEOARTHRITIS OF RIGHT KNEE: Primary | ICD-10-CM

## 2019-05-30 LAB
ABO + RH BLD: NORMAL
ALBUMIN SERPL BCP-MCNC: 3.9 G/DL (ref 3.5–5.2)
ALP SERPL-CCNC: 131 U/L (ref 55–135)
ALT SERPL W/O P-5'-P-CCNC: 63 U/L (ref 10–44)
ANION GAP SERPL CALC-SCNC: 10 MMOL/L (ref 8–16)
AST SERPL-CCNC: 80 U/L (ref 10–40)
BASOPHILS # BLD AUTO: ABNORMAL K/UL (ref 0–0.2)
BASOPHILS NFR BLD: 0 % (ref 0–1.9)
BILIRUB SERPL-MCNC: 0.8 MG/DL (ref 0.1–1)
BILIRUB UR QL STRIP: NEGATIVE
BLD GP AB SCN CELLS X3 SERPL QL: NORMAL
BUN SERPL-MCNC: 12 MG/DL (ref 8–23)
CALCIUM SERPL-MCNC: 9.7 MG/DL (ref 8.7–10.5)
CHLORIDE SERPL-SCNC: 102 MMOL/L (ref 95–110)
CLARITY UR: CLEAR
CO2 SERPL-SCNC: 28 MMOL/L (ref 23–29)
COLOR UR: YELLOW
CREAT SERPL-MCNC: 0.7 MG/DL (ref 0.5–1.4)
DIFFERENTIAL METHOD: ABNORMAL
EOSINOPHIL # BLD AUTO: ABNORMAL K/UL (ref 0–0.5)
EOSINOPHIL NFR BLD: 2 % (ref 0–8)
ERYTHROCYTE [DISTWIDTH] IN BLOOD BY AUTOMATED COUNT: 13.3 % (ref 11.5–14.5)
EST. GFR  (AFRICAN AMERICAN): >60 ML/MIN/1.73 M^2
EST. GFR  (NON AFRICAN AMERICAN): >60 ML/MIN/1.73 M^2
GLUCOSE SERPL-MCNC: 96 MG/DL (ref 70–110)
GLUCOSE UR QL STRIP: NEGATIVE
HCT VFR BLD AUTO: 43.8 % (ref 37–48.5)
HGB BLD-MCNC: 15 G/DL (ref 12–16)
HGB UR QL STRIP: NEGATIVE
KETONES UR QL STRIP: NEGATIVE
LEUKOCYTE ESTERASE UR QL STRIP: NEGATIVE
LYMPHOCYTES # BLD AUTO: ABNORMAL K/UL (ref 1–4.8)
LYMPHOCYTES NFR BLD: 33 % (ref 18–48)
MCH RBC QN AUTO: 32.4 PG (ref 27–31)
MCHC RBC AUTO-ENTMCNC: 34.2 G/DL (ref 32–36)
MCV RBC AUTO: 95 FL (ref 82–98)
MONOCYTES # BLD AUTO: ABNORMAL K/UL (ref 0.3–1)
MONOCYTES NFR BLD: 12 % (ref 4–15)
NEUTROPHILS NFR BLD: 53 % (ref 38–73)
NITRITE UR QL STRIP: NEGATIVE
PH UR STRIP: 6 [PH] (ref 5–8)
PLATELET # BLD AUTO: 186 K/UL (ref 150–350)
PLATELET BLD QL SMEAR: ABNORMAL
PMV BLD AUTO: 10.1 FL (ref 9.2–12.9)
POTASSIUM SERPL-SCNC: 3.7 MMOL/L (ref 3.5–5.1)
PROT SERPL-MCNC: 7.5 G/DL (ref 6–8.4)
PROT UR QL STRIP: NEGATIVE
RBC # BLD AUTO: 4.63 M/UL (ref 4–5.4)
SODIUM SERPL-SCNC: 140 MMOL/L (ref 136–145)
SP GR UR STRIP: 1.01 (ref 1–1.03)
URN SPEC COLLECT METH UR: NORMAL
UROBILINOGEN UR STRIP-ACNC: NEGATIVE EU/DL
WBC # BLD AUTO: 7.8 K/UL (ref 3.9–12.7)

## 2019-05-30 PROCEDURE — 85007 BL SMEAR W/DIFF WBC COUNT: CPT

## 2019-05-30 PROCEDURE — 71046 XR CHEST PA AND LATERAL: ICD-10-PCS | Mod: 26,,, | Performed by: RADIOLOGY

## 2019-05-30 PROCEDURE — 93010 EKG 12-LEAD: ICD-10-PCS | Mod: ,,, | Performed by: INTERNAL MEDICINE

## 2019-05-30 PROCEDURE — 36415 COLL VENOUS BLD VENIPUNCTURE: CPT

## 2019-05-30 PROCEDURE — 87081 CULTURE SCREEN ONLY: CPT

## 2019-05-30 PROCEDURE — 71046 X-RAY EXAM CHEST 2 VIEWS: CPT | Mod: 26,,, | Performed by: RADIOLOGY

## 2019-05-30 PROCEDURE — 99900104 DSU ONLY-NO CHARGE-EA ADD'L HR (STAT)

## 2019-05-30 PROCEDURE — 99499 UNLISTED E&M SERVICE: CPT | Mod: S$GLB,,, | Performed by: EMERGENCY MEDICINE

## 2019-05-30 PROCEDURE — 85027 COMPLETE CBC AUTOMATED: CPT

## 2019-05-30 PROCEDURE — 99499 PR PHYSICAL - DOT/CDL: ICD-10-PCS | Mod: S$GLB,,, | Performed by: EMERGENCY MEDICINE

## 2019-05-30 PROCEDURE — 81003 URINALYSIS AUTO W/O SCOPE: CPT

## 2019-05-30 PROCEDURE — 71046 X-RAY EXAM CHEST 2 VIEWS: CPT | Mod: TC,FY

## 2019-05-30 PROCEDURE — 93005 ELECTROCARDIOGRAM TRACING: CPT

## 2019-05-30 PROCEDURE — 86850 RBC ANTIBODY SCREEN: CPT

## 2019-05-30 PROCEDURE — 80053 COMPREHEN METABOLIC PANEL: CPT

## 2019-05-30 PROCEDURE — 27201204 HC TRAY CATH URET (IN & OUT)

## 2019-05-30 PROCEDURE — 99900103 DSU ONLY-NO CHARGE-INITIAL HR (STAT)

## 2019-05-30 PROCEDURE — 93010 ELECTROCARDIOGRAM REPORT: CPT | Mod: ,,, | Performed by: INTERNAL MEDICINE

## 2019-05-30 NOTE — DISCHARGE INSTRUCTIONS
To confirm, Your doctor has instructed you that surgery is scheduled for:     Please report to [facility], Registration the morning of surgery. You must check-in and receive a wristband before going to your procedure.    Pre-Op will call the afternoon prior to surgery between 1:00 and 6:00 PM with the final arrival time.  Phone number: [phone]    PLEASE NOTE:  The surgery schedule has many variables which may affect the time of your surgery case.  Family members should be available if your surgery time changes.  Plan to be here the day of your procedure between 4-6 hours.    MEDICATIONS:  TAKE ONLY THESE MEDICATIONS WITH A SMALL SIP OF WATER THE MORNING OF YOUR PROCEDURE: NONE      DO NOT TAKE THESE MEDICATIONS 5-7 DAYS PRIOR to your procedure or per your surgeon's request: ASPIRIN, ALEVE, ADVIL, IBUPROFEN, FISH OIL VITAMIN E, HERBALS  (May take Tylenol)    ONLY if you are prescribed any types of blood thinners such as:  Aspirin, Coumadin, Plavix, Pradaxa, Xarelto, Aggrenox, Effient, Eliquis, Savasya, Brilinta, or any other, ask your surgeon whether you should stop taking them and how long before surgery you should stop.  You may also need to verify with the prescribing physician if it is ok to stop your medication.      INSTRUCTIONS IMPORTANT!!  · Do not eat or drink anything between midnight and the time of your procedure- this includes gum, mints, and candy.  · Do not smoke or drink alcoholic beverages 24 hours prior to your procedure.  · Shower the night before AND the morning of your procedure with a Chlorhexidine wash such as Hibiclens or Dial antibacterial soap from the neck down.  Do not get it on your face or in your eyes.  You may use your own shampoo and face wash. This helps your skin to be as bacteria free as possible.    · If you wear contact lenses, dentures, hearing aids or glasses, bring a container to put them in during surgery and give to a family member for safe  keeping.  Please leave all jewelry, piercing's and valuables at home.   · DO NOT remove hair from the surgery site.  Do not shave the incision site unless you are given specific instructions to do so.    · ONLY if you have been diagnosed with sleep apnea please bring your C-PAP machine.  · ONLY if you wear home oxygen please bring your portable oxygen tank the day of your procedure.  · ONLY if you have a history of OPEN HEART SURGERY you will need a clearance from your Cardiologist per Anesthesia.      · ONLY for patients requiring bowel prep, written instructions will be given by your doctor's office.  · ONLY if you have a neuro stimulator, please bring the controller with you the morning of surgery  · ONLY if a type and screen test is needed before surgery, please return:  · If your doctor has scheduled you for an overnight stay, bring a small overnight bag with any personal items you need.  · Make arrangements in advance for transportation home by a responsible adult.  It is not safe to drive a vehicle during the 24 hours after anesthesia.      · Visiting hours are 10:00AM to 8:30PM.  For the safety of all patients, visitors under the age of 12 are not allowed above the first floor of the hospital.    · All [facility] facilities and properties are tobacco free.  Smoking is NOT allowed.       If you have any questions about these instructions, call Pre-Op Admit  Nursing at [phone] or the Pre-Op Day Surgery Unit at [phone].

## 2019-05-30 NOTE — PRE ADMISSION SCREENING
JOINT CAMP ASSESSMENT    Name Camila Au   MRN [id number]    Age/Sex 69 y.o. female    Surgeon Dr. Jony Marmolejo   Joint Camp Date 2019   Surgery Date 6/10/2019   Procedure Right Knee Arthroplasty   Insurance Payor: MEDICARE / Plan: MEDICARE PART A & B / Product Type: Government /    Care Team Patient Care Team:  Kasie Arthur MD as PCP - General (Internal Medicine)  Jony Marmolejo MD as Consulting Physician (Orthopedic Surgery)    Pharmacy   [facility] [address] [place] [address] AT [facility] & [address] [address]  Phone: [phone] Fax: [phone]     AM-PAC Score   22   Risk Assessment Score 12     Past Medical History:   Diagnosis Date    Arthritis     Breast cancer     right    Wears glasses     WEARS CONTACS       Past Surgical History:   Procedure Laterality Date    BREAST RECONSTRUCTION      CARPAL TUNNEL RELEASE       SECTION  1970, ,     CHOLECYSTECTOMY  2019        COLONOSCOPY  2017    CYSTOSCOPY WITH HYDRODISTENSION N/A 2012    Performed by [facility] Fields [facility] at [facility] OR    FUSION, JOINT, FINGER - REVISION OF LEFT THUMB IP JOINT FUSION WITH REMOVAL OF PREVIOUS SCREW AND ( ICBG) ILIAC CREST BONE GRAFT Left 3/1/2019    Performed by [facility] S. Williams IV, [facility] at [facility] OR    FUSION, JOINT, THUMB Left 2018    Performed by [facility]. [facility], [facility] at [facility] OR    KNEE ARTHROSCOPY Bilateral 2008    MASTECTOMY Right 2007    PORTACATH PLACEMENT  2007    PORTACATH REMOVAL      SURGICAL REMOVAL OF BONE SPUR Bilateral     TUBAL LIGATION           Home Enviroment     Living Arrangement: Lives alone  Home Environment: 2-story house, number of outside stairs: 6, number of inside stairs: 14, can live on one level, bedroom on 2nd floor, bathroom on 2nd floor, tub-shower  Home Safety Concerns: Pets in the home: dogs (1).    DISCHARGE CAREGIVER/SUPPORT SYSTEM     Identified  post-op caregiver: Patient has children / family / friends to help, 2 different friends to help.  Patient's caregiver(s) will be able to provide physical assistance. Patient will have someone to assist overnight.      Caregiver present at pre-op interview:  No      PRE-OPERATIVE FUNCTIONAL STATUS     Employment: Retired    Pre-op Functional Status: Patient is independent with mobility/ambulation, transfers, ADL's, IADL's.    Use of assistive device for ambulation: none  ADL: self care  ADL Limitations: difficulty with walking  Medical Restrictions: Decreased range of motions in extremities    POTENTIAL BARRIERS TO DISCHARGE/POTENTIAL POST-OP COMPLICATIONS     Patient with decreased caregiver support. Patient states that she is going to talk with 2 friends about either staying with them or them staying at her residence. Patient is supposed to call me back with final plan prior to surgery. Update 06/10: Patient to stay with friend, Patti Goodrich @ [address] [place] [address] for 1st week after surgery. Patient will then stay with another friend, Alix De Paz @ [address] [place] [address] for second week post op.    DISCHARGE PLAN     Expected LOS of 2 days or less for joint replacement discussed with patient. We also discussed a discharge path of HH for approximately two weeks with a transition to outpatient PT on the third week given no post-op complications.      Patient in agreement with discharge plan: Yes    Discharge to: Discharge home with home health (PT/OT) x2 weeks with transition to outpatient PT     HH:  [facility]     OP PT: [facility]     Home DME: rolling walker and tub transfer bench    Needed DME at D/C: bedside commode     Rx: Per Dr. Marmolejo at discharge     Meds to Beds: N/A  Patient expected to discharge on Aspirin 325mg by mouth twice daily for DVT prophylaxis.

## 2019-05-30 NOTE — PRE-PROCEDURE INSTRUCTIONS
CATH U/A obtained using 15 F cath and sterile technique. Tolerated procedure well. Clear yellow urine to lab

## 2019-05-30 NOTE — PRE ADMISSION SCREENING
Patient Name: Camila Au  YOB: 1950   MRN: [id number]     [facility]   Basic Mobility Inpatient Short Form 6 Clicks         How much difficulty does the patient currently have  Unable  A Lot  A Little  None      1. Turning over in bed (including adjusting bedclothes, sheets and blankets)?     1 []    2 []    3 [x]    4 []        2. Sitting down on and standing up from a chair with arms (e.g., wheelchair, bedside commode, etc.)     1 []  2 []  3 []     4 [x]      3. Moving from lying on back to sitting on the side of the bed?     1 []  2 []  3 [x]    4 []    How much help from another person does the patient currently need  Total  A Lot  A Little  None      4. Moving to and from a bed to a chair (including a wheelchair)?    1 []  2 []  3 []    4 [x]      5. Need to walk in hospital room?    1 []  2 []  3 []    4 [x]      6. Climbing 3-5 steps with a railing?    1 []  2 []  3 []    4 [x]       Raw Score:     22            CMS 0-100% Score:    20.91        %   Standardized Score:    53.28           CMS Modifier:      CJ                                    [facility]   Basic Mobility Inpatient Short Form 6 Clicks Score Conversion Table*         *Use this form to convert -PAC Basic Mobility Inpatient Raw Scores.   -[facility] Inpatient Basic Mobility Short Form Scoring Example   1. Add the number values associated with the response to each item. For example, items totals yield a Raw Score of 21.   2. Match the raw score to the t-Scale scores (t-Scale score = 50.25, SE = 4.69).   3. Find the associated CMS % (CMS % = 28.97%).   4. Locate the correct CMS Functional Modifier Code, or G Code (G code = CJ)     NOTE: Each -PAC Short Form has a separate conversion table. Make sure that you use the correct conversion table.       Instruction Manual - page 45 contains conversion table

## 2019-05-30 NOTE — PRE ADMISSION SCREENING
"               [facility] Risk Assessment Scale    Patient Name: Camila Au  YOB: 1950  MRN: [id number]            RIsk Factor Measure Recommendation Patient Data Scale/Score   BMI >40 Reconsider surgery, weight loss   Estimated body mass index is 33.86 kg/m² as calculated from the following:    Height as of this encounter: 4' 10" (1.473 m).    Weight as of this encounter: 73.5 kg (162 lb).   [] 0 = 1 - 24.9  [] 1 = 25-29.9  [x] 2 = 30-34.9  [] 3 = 35-39.9  [] 4 = 40-44.9  [] 5 = 45-99.9   Hemoglobin AIC (if applicable) >9 Delay surgery until DM under control  Refer for:  · Nutrition Therapy  · Exercise   · Medication    Lab Results   Component Value Date    HGBA1C 5.6 04/03/2019       Lab Results   Component Value Date    GLU 96 05/30/2019      [x] 0 = 4.0-5.6  [] 1 = 5.7-6.4  [] 2 = 6.5-6.9  [] 3 = 7.0-7.9  [] 4 = 8.0-8.9  [] 5 = 9.0-12   Hemoglobin (Anemia) <9 Delay surgery   Correct anemia Lab Results   Component Value Date    HGB 15.0 05/30/2019    [] 20 - <9.0                    Albumin <3 Delay surgery &Workup Lab Results   Component Value Date    ALBUMIN 3.9 05/30/2019    [] 20 - <3.0   Smoking Cessation >4 Weeks Delay Surgery  Refer to OP Cessation Class    Former Smoker [] 20 - current smoker                                _____ PPD                    Hx of MI, PE, Arrhythmia, CVA, DVT <30 Days Delay Surgery    N/A [] 20      Infection Variable Delay surgery and re-evaluate   N/A [] 20 - recent/current infection     Depression (PHQ) >10 out of 27 Delay Surgery and re-evaluate  Medication  Counseling              [x] 0     []1     []2     []3      []4      [] 5                    (1-4)      (5-9)  (10-14)  (15-19)   (20-27)     Memory Impairment & Memory loss (Mini-Cog Screening Tool) Advanced dementia and/or Parkinson's Reconsider surgery     [x] 0     []1     []2     []3     []4     [] 5     Physical Conditioning (Modified AM-PAC Per Physical Therapy at [facility]) Unable to ambulate on " day of surgery Delay surgery and re-evaluate  Pre-Rehabilitation   (PT evaluation)       []  0   [x]4       []8     []12        []16     []20       (<20%)   (<40%)   (<60%)   (<80% )    (>80%)     Home Environment/Caregiver support  (Per /Navigator Interview)    Availability of basic services and/or approprate assistance during post-operative period Delay surgery and re-evaluate  Safe home environment  Health   1 week post-surgery  Transportation  availability  Ability to obtain DME/Medications post-op    [x] 0     []1     []2     []3     []4     [] 5  [] 0     []1     []2     [x]3     []4     [] 5  [] 0     []1     []2     [x]3     []4     [] 5  [x] 0     []1     []2     []3     []4     [] 5         MD Contact: Dr. Marmolejo Comments:  Total Score:  12

## 2019-06-01 LAB — MRSA SPEC QL CULT: NORMAL

## 2019-06-07 ENCOUNTER — ANESTHESIA EVENT (OUTPATIENT)
Dept: SURGERY | Facility: HOSPITAL | Age: 69
End: 2019-06-07
Payer: MEDICARE

## 2019-06-10 ENCOUNTER — HOSPITAL ENCOUNTER (OUTPATIENT)
Facility: HOSPITAL | Age: 69
Discharge: HOME HEALTH | End: 2019-06-11
Attending: ORTHOPAEDIC SURGERY | Admitting: ORTHOPAEDIC SURGERY
Payer: MEDICARE

## 2019-06-10 ENCOUNTER — ANESTHESIA (OUTPATIENT)
Dept: SURGERY | Facility: HOSPITAL | Age: 69
End: 2019-06-10
Payer: MEDICARE

## 2019-06-10 DIAGNOSIS — M17.11 DEGENERATIVE ARTHRITIS OF RIGHT KNEE: ICD-10-CM

## 2019-06-10 DIAGNOSIS — I48.0 PAROXYSMAL ATRIAL FIBRILLATION: Primary | ICD-10-CM

## 2019-06-10 DIAGNOSIS — M17.11 PRIMARY OSTEOARTHRITIS OF RIGHT KNEE: ICD-10-CM

## 2019-06-10 DIAGNOSIS — Z96.651 S/P TOTAL KNEE ARTHROPLASTY, RIGHT: ICD-10-CM

## 2019-06-10 PROCEDURE — 63600175 PHARM REV CODE 636 W HCPCS

## 2019-06-10 PROCEDURE — 25000003 PHARM REV CODE 250: Performed by: ANESTHESIOLOGY

## 2019-06-10 PROCEDURE — S0020 INJECTION, BUPIVICAINE HYDRO: HCPCS | Performed by: ANESTHESIOLOGY

## 2019-06-10 PROCEDURE — 76942 ECHO GUIDE FOR BIOPSY: CPT | Mod: 26,,, | Performed by: ANESTHESIOLOGY

## 2019-06-10 PROCEDURE — 63600175 PHARM REV CODE 636 W HCPCS: Performed by: ORTHOPAEDIC SURGERY

## 2019-06-10 PROCEDURE — C1776 JOINT DEVICE (IMPLANTABLE): HCPCS | Performed by: ORTHOPAEDIC SURGERY

## 2019-06-10 PROCEDURE — S5010 5% DEXTROSE AND 0.45% SALINE: HCPCS | Performed by: ORTHOPAEDIC SURGERY

## 2019-06-10 PROCEDURE — 71000033 HC RECOVERY, INTIAL HOUR: Performed by: ORTHOPAEDIC SURGERY

## 2019-06-10 PROCEDURE — 99900104 DSU ONLY-NO CHARGE-EA ADD'L HR (STAT): Performed by: ORTHOPAEDIC SURGERY

## 2019-06-10 PROCEDURE — 37000008 HC ANESTHESIA 1ST 15 MINUTES: Performed by: ORTHOPAEDIC SURGERY

## 2019-06-10 PROCEDURE — G8979 MOBILITY GOAL STATUS: HCPCS | Mod: CI | Performed by: PHYSICAL THERAPIST

## 2019-06-10 PROCEDURE — 99900103 DSU ONLY-NO CHARGE-INITIAL HR (STAT): Performed by: ORTHOPAEDIC SURGERY

## 2019-06-10 PROCEDURE — 25000003 PHARM REV CODE 250: Performed by: ORTHOPAEDIC SURGERY

## 2019-06-10 PROCEDURE — 97116 GAIT TRAINING THERAPY: CPT | Performed by: PHYSICAL THERAPIST

## 2019-06-10 PROCEDURE — 99900035 HC TECH TIME PER 15 MIN (STAT)

## 2019-06-10 PROCEDURE — C1713 ANCHOR/SCREW BN/BN,TIS/BN: HCPCS | Performed by: ORTHOPAEDIC SURGERY

## 2019-06-10 PROCEDURE — D9220A PRA ANESTHESIA: Mod: ANES,,, | Performed by: ANESTHESIOLOGY

## 2019-06-10 PROCEDURE — D9220A PRA ANESTHESIA: Mod: CRNA,,, | Performed by: NURSE ANESTHETIST, CERTIFIED REGISTERED

## 2019-06-10 PROCEDURE — 37000009 HC ANESTHESIA EA ADD 15 MINS: Performed by: ORTHOPAEDIC SURGERY

## 2019-06-10 PROCEDURE — 27201423 OPTIME MED/SURG SUP & DEVICES STERILE SUPPLY: Performed by: ORTHOPAEDIC SURGERY

## 2019-06-10 PROCEDURE — 27200750 HC INSULATED NEEDLE/ STIMUPLEX: Performed by: ANESTHESIOLOGY

## 2019-06-10 PROCEDURE — 25000003 PHARM REV CODE 250: Performed by: NURSE ANESTHETIST, CERTIFIED REGISTERED

## 2019-06-10 PROCEDURE — 71000039 HC RECOVERY, EACH ADD'L HOUR: Performed by: ORTHOPAEDIC SURGERY

## 2019-06-10 PROCEDURE — 97161 PT EVAL LOW COMPLEX 20 MIN: CPT | Performed by: PHYSICAL THERAPIST

## 2019-06-10 PROCEDURE — 63600175 PHARM REV CODE 636 W HCPCS: Performed by: ANESTHESIOLOGY

## 2019-06-10 PROCEDURE — 63600175 PHARM REV CODE 636 W HCPCS: Performed by: NURSE ANESTHETIST, CERTIFIED REGISTERED

## 2019-06-10 PROCEDURE — D9220A PRA ANESTHESIA: ICD-10-PCS | Mod: ANES,,, | Performed by: ANESTHESIOLOGY

## 2019-06-10 PROCEDURE — 64447 NJX AA&/STRD FEMORAL NRV IMG: CPT | Performed by: ANESTHESIOLOGY

## 2019-06-10 PROCEDURE — 76942: ICD-10-PCS | Mod: 26,,, | Performed by: ANESTHESIOLOGY

## 2019-06-10 PROCEDURE — 64447: ICD-10-PCS | Mod: 59,RT,, | Performed by: ANESTHESIOLOGY

## 2019-06-10 PROCEDURE — 94761 N-INVAS EAR/PLS OXIMETRY MLT: CPT

## 2019-06-10 PROCEDURE — D9220A PRA ANESTHESIA: ICD-10-PCS | Mod: CRNA,,, | Performed by: NURSE ANESTHETIST, CERTIFIED REGISTERED

## 2019-06-10 PROCEDURE — 94799 UNLISTED PULMONARY SVC/PX: CPT

## 2019-06-10 PROCEDURE — G8978 MOBILITY CURRENT STATUS: HCPCS | Mod: CK | Performed by: PHYSICAL THERAPIST

## 2019-06-10 PROCEDURE — 36000711: Performed by: ORTHOPAEDIC SURGERY

## 2019-06-10 PROCEDURE — 36000710: Performed by: ORTHOPAEDIC SURGERY

## 2019-06-10 DEVICE — COMPONENT FEM SZ 3 CS/CR PRI R: Type: IMPLANTABLE DEVICE | Site: KNEE | Status: FUNCTIONAL

## 2019-06-10 DEVICE — PATELLA ONLAY 29MM TRI PEG: Type: IMPLANTABLE DEVICE | Site: KNEE | Status: FUNCTIONAL

## 2019-06-10 DEVICE — CEMENT BONE ORTHOSET 1 40 GRAM: Type: IMPLANTABLE DEVICE | Site: KNEE | Status: FUNCTIONAL

## 2019-06-10 DEVICE — BASEPLATE TIB CEM EVOLT SZ 3 R: Type: IMPLANTABLE DEVICE | Site: KNEE | Status: FUNCTIONAL

## 2019-06-10 RX ORDER — SODIUM CHLORIDE 0.9 % (FLUSH) 0.9 %
3 SYRINGE (ML) INJECTION
Status: DISCONTINUED | OUTPATIENT
Start: 2019-06-10 | End: 2019-06-10 | Stop reason: HOSPADM

## 2019-06-10 RX ORDER — KETOROLAC TROMETHAMINE 30 MG/ML
INJECTION, SOLUTION INTRAMUSCULAR; INTRAVENOUS
Status: DISCONTINUED | OUTPATIENT
Start: 2019-06-10 | End: 2019-06-10 | Stop reason: HOSPADM

## 2019-06-10 RX ORDER — DIPHENHYDRAMINE HYDROCHLORIDE 50 MG/ML
25 INJECTION INTRAMUSCULAR; INTRAVENOUS EVERY 6 HOURS PRN
Status: DISCONTINUED | OUTPATIENT
Start: 2019-06-10 | End: 2019-06-10 | Stop reason: HOSPADM

## 2019-06-10 RX ORDER — FENTANYL CITRATE 50 UG/ML
25 INJECTION, SOLUTION INTRAMUSCULAR; INTRAVENOUS EVERY 5 MIN PRN
Status: DISCONTINUED | OUTPATIENT
Start: 2019-06-10 | End: 2019-06-10 | Stop reason: HOSPADM

## 2019-06-10 RX ORDER — SODIUM CHLORIDE, SODIUM LACTATE, POTASSIUM CHLORIDE, CALCIUM CHLORIDE 600; 310; 30; 20 MG/100ML; MG/100ML; MG/100ML; MG/100ML
INJECTION, SOLUTION INTRAVENOUS CONTINUOUS
Status: DISCONTINUED | OUTPATIENT
Start: 2019-06-10 | End: 2019-06-10

## 2019-06-10 RX ORDER — MORPHINE SULFATE 2 MG/ML
2 INJECTION, SOLUTION INTRAMUSCULAR; INTRAVENOUS EVERY 4 HOURS PRN
Status: DISCONTINUED | OUTPATIENT
Start: 2019-06-10 | End: 2019-06-11 | Stop reason: HOSPADM

## 2019-06-10 RX ORDER — MEPERIDINE HYDROCHLORIDE 50 MG/ML
12.5 INJECTION INTRAMUSCULAR; INTRAVENOUS; SUBCUTANEOUS ONCE AS NEEDED
Status: DISCONTINUED | OUTPATIENT
Start: 2019-06-10 | End: 2019-06-10 | Stop reason: HOSPADM

## 2019-06-10 RX ORDER — ROPIVACAINE HYDROCHLORIDE 5 MG/ML
INJECTION, SOLUTION EPIDURAL; INFILTRATION; PERINEURAL
Status: DISCONTINUED | OUTPATIENT
Start: 2019-06-10 | End: 2019-06-10 | Stop reason: HOSPADM

## 2019-06-10 RX ORDER — OXYCODONE HYDROCHLORIDE 5 MG/1
5 TABLET ORAL
Status: DISCONTINUED | OUTPATIENT
Start: 2019-06-10 | End: 2019-06-11 | Stop reason: HOSPADM

## 2019-06-10 RX ORDER — POLYETHYLENE GLYCOL 3350 17 G/17G
17 POWDER, FOR SOLUTION ORAL DAILY
Status: DISCONTINUED | OUTPATIENT
Start: 2019-06-10 | End: 2019-06-11 | Stop reason: HOSPADM

## 2019-06-10 RX ORDER — OXYCODONE HYDROCHLORIDE 5 MG/1
5 TABLET ORAL
Status: DISCONTINUED | OUTPATIENT
Start: 2019-06-10 | End: 2019-06-10 | Stop reason: HOSPADM

## 2019-06-10 RX ORDER — LIDOCAINE HYDROCHLORIDE 10 MG/ML
1 INJECTION, SOLUTION EPIDURAL; INFILTRATION; INTRACAUDAL; PERINEURAL ONCE
Status: COMPLETED | OUTPATIENT
Start: 2019-06-10 | End: 2019-06-10

## 2019-06-10 RX ORDER — OXYCODONE HYDROCHLORIDE 10 MG/1
10 TABLET ORAL
Status: DISCONTINUED | OUTPATIENT
Start: 2019-06-10 | End: 2019-06-11 | Stop reason: HOSPADM

## 2019-06-10 RX ORDER — SODIUM CHLORIDE 0.9 % (FLUSH) 0.9 %
5 SYRINGE (ML) INJECTION
Status: DISCONTINUED | OUTPATIENT
Start: 2019-06-10 | End: 2019-06-11 | Stop reason: HOSPADM

## 2019-06-10 RX ORDER — BISACODYL 10 MG
10 SUPPOSITORY, RECTAL RECTAL DAILY
Status: DISCONTINUED | OUTPATIENT
Start: 2019-06-10 | End: 2019-06-11 | Stop reason: HOSPADM

## 2019-06-10 RX ORDER — HYDROMORPHONE HYDROCHLORIDE 2 MG/ML
0.2 INJECTION, SOLUTION INTRAMUSCULAR; INTRAVENOUS; SUBCUTANEOUS EVERY 5 MIN PRN
Status: DISCONTINUED | OUTPATIENT
Start: 2019-06-10 | End: 2019-06-10 | Stop reason: HOSPADM

## 2019-06-10 RX ORDER — ONDANSETRON 2 MG/ML
4 INJECTION INTRAMUSCULAR; INTRAVENOUS ONCE
Status: DISCONTINUED | OUTPATIENT
Start: 2019-06-10 | End: 2019-06-10 | Stop reason: HOSPADM

## 2019-06-10 RX ORDER — OXYCODONE HCL 10 MG/1
10 TABLET, FILM COATED, EXTENDED RELEASE ORAL ONCE
Status: COMPLETED | OUTPATIENT
Start: 2019-06-10 | End: 2019-06-10

## 2019-06-10 RX ORDER — CEFAZOLIN SODIUM 2 G/50ML
2 SOLUTION INTRAVENOUS
Status: COMPLETED | OUTPATIENT
Start: 2019-06-10 | End: 2019-06-11

## 2019-06-10 RX ORDER — SODIUM CHLORIDE 0.9 % (FLUSH) 0.9 %
3 SYRINGE (ML) INJECTION EVERY 8 HOURS
Status: DISCONTINUED | OUTPATIENT
Start: 2019-06-10 | End: 2019-06-10 | Stop reason: HOSPADM

## 2019-06-10 RX ORDER — CEFAZOLIN SODIUM 2 G/50ML
2 SOLUTION INTRAVENOUS
Status: COMPLETED | OUTPATIENT
Start: 2019-06-10 | End: 2019-06-10

## 2019-06-10 RX ORDER — ONDANSETRON 2 MG/ML
4 INJECTION INTRAMUSCULAR; INTRAVENOUS EVERY 12 HOURS PRN
Status: DISCONTINUED | OUTPATIENT
Start: 2019-06-10 | End: 2019-06-11 | Stop reason: HOSPADM

## 2019-06-10 RX ORDER — ACETAMINOPHEN 10 MG/ML
1000 INJECTION, SOLUTION INTRAVENOUS ONCE
Status: DISCONTINUED | OUTPATIENT
Start: 2019-06-10 | End: 2019-06-10 | Stop reason: SDUPTHER

## 2019-06-10 RX ORDER — MIDAZOLAM HYDROCHLORIDE 1 MG/ML
INJECTION, SOLUTION INTRAMUSCULAR; INTRAVENOUS
Status: DISCONTINUED | OUTPATIENT
Start: 2019-06-10 | End: 2019-06-10

## 2019-06-10 RX ORDER — OXYCODONE HCL 10 MG/1
10 TABLET, FILM COATED, EXTENDED RELEASE ORAL EVERY 12 HOURS
Status: DISCONTINUED | OUTPATIENT
Start: 2019-06-10 | End: 2019-06-11 | Stop reason: HOSPADM

## 2019-06-10 RX ORDER — TRANEXAMIC ACID 100 MG/ML
INJECTION, SOLUTION INTRAVENOUS
Status: DISCONTINUED | OUTPATIENT
Start: 2019-06-10 | End: 2019-06-10 | Stop reason: HOSPADM

## 2019-06-10 RX ORDER — ZOLPIDEM TARTRATE 5 MG/1
5 TABLET ORAL NIGHTLY PRN
Status: DISCONTINUED | OUTPATIENT
Start: 2019-06-10 | End: 2019-06-10 | Stop reason: SDUPTHER

## 2019-06-10 RX ORDER — BUPIVACAINE HYDROCHLORIDE 7.5 MG/ML
INJECTION, SOLUTION EPIDURAL; RETROBULBAR
Status: COMPLETED | OUTPATIENT
Start: 2019-06-10 | End: 2019-06-10

## 2019-06-10 RX ORDER — ACETAMINOPHEN 500 MG
1000 TABLET ORAL EVERY 6 HOURS
Status: DISCONTINUED | OUTPATIENT
Start: 2019-06-10 | End: 2019-06-10 | Stop reason: SDUPTHER

## 2019-06-10 RX ORDER — ONDANSETRON 2 MG/ML
4 INJECTION INTRAMUSCULAR; INTRAVENOUS DAILY PRN
Status: DISCONTINUED | OUTPATIENT
Start: 2019-06-10 | End: 2019-06-10 | Stop reason: HOSPADM

## 2019-06-10 RX ORDER — PREGABALIN 75 MG/1
75 CAPSULE ORAL ONCE
Status: COMPLETED | OUTPATIENT
Start: 2019-06-10 | End: 2019-06-10

## 2019-06-10 RX ORDER — DIPHENHYDRAMINE HYDROCHLORIDE 50 MG/ML
12.5 INJECTION INTRAMUSCULAR; INTRAVENOUS
Status: DISCONTINUED | OUTPATIENT
Start: 2019-06-10 | End: 2019-06-11 | Stop reason: HOSPADM

## 2019-06-10 RX ORDER — MUPIROCIN 20 MG/G
OINTMENT TOPICAL
Status: DISCONTINUED | OUTPATIENT
Start: 2019-06-10 | End: 2019-06-10 | Stop reason: HOSPADM

## 2019-06-10 RX ORDER — CELECOXIB 100 MG/1
200 CAPSULE ORAL ONCE
Status: COMPLETED | OUTPATIENT
Start: 2019-06-10 | End: 2019-06-10

## 2019-06-10 RX ORDER — CELECOXIB 100 MG/1
100 CAPSULE ORAL DAILY
Status: DISCONTINUED | OUTPATIENT
Start: 2019-06-11 | End: 2019-06-11 | Stop reason: HOSPADM

## 2019-06-10 RX ORDER — PROPOFOL 10 MG/ML
VIAL (ML) INTRAVENOUS CONTINUOUS PRN
Status: DISCONTINUED | OUTPATIENT
Start: 2019-06-10 | End: 2019-06-10

## 2019-06-10 RX ORDER — ASPIRIN 325 MG
325 TABLET ORAL 2 TIMES DAILY
Status: DISCONTINUED | OUTPATIENT
Start: 2019-06-10 | End: 2019-06-11 | Stop reason: HOSPADM

## 2019-06-10 RX ORDER — CELECOXIB 100 MG/1
200 CAPSULE ORAL ONCE
Status: DISCONTINUED | OUTPATIENT
Start: 2019-06-10 | End: 2019-06-11 | Stop reason: HOSPADM

## 2019-06-10 RX ORDER — SODIUM CHLORIDE, SODIUM LACTATE, POTASSIUM CHLORIDE, CALCIUM CHLORIDE 600; 310; 30; 20 MG/100ML; MG/100ML; MG/100ML; MG/100ML
75 INJECTION, SOLUTION INTRAVENOUS CONTINUOUS
Status: DISCONTINUED | OUTPATIENT
Start: 2019-06-10 | End: 2019-06-10

## 2019-06-10 RX ORDER — TRANEXAMIC ACID 100 MG/ML
INJECTION, SOLUTION INTRAVENOUS
Status: DISCONTINUED | OUTPATIENT
Start: 2019-06-10 | End: 2019-06-10

## 2019-06-10 RX ORDER — PREGABALIN 75 MG/1
75 CAPSULE ORAL 2 TIMES DAILY
Status: DISCONTINUED | OUTPATIENT
Start: 2019-06-10 | End: 2019-06-11 | Stop reason: HOSPADM

## 2019-06-10 RX ORDER — ONDANSETRON 2 MG/ML
INJECTION INTRAMUSCULAR; INTRAVENOUS
Status: DISCONTINUED | OUTPATIENT
Start: 2019-06-10 | End: 2019-06-10

## 2019-06-10 RX ORDER — ONDANSETRON 8 MG/1
8 TABLET, ORALLY DISINTEGRATING ORAL EVERY 8 HOURS PRN
Status: DISCONTINUED | OUTPATIENT
Start: 2019-06-10 | End: 2019-06-11 | Stop reason: HOSPADM

## 2019-06-10 RX ORDER — EPINEPHRINE 0.1 MG/ML
INJECTION INTRAVENOUS
Status: DISCONTINUED | OUTPATIENT
Start: 2019-06-10 | End: 2019-06-10 | Stop reason: HOSPADM

## 2019-06-10 RX ORDER — DEXTROSE MONOHYDRATE AND SODIUM CHLORIDE 5; .45 G/100ML; G/100ML
INJECTION, SOLUTION INTRAVENOUS CONTINUOUS
Status: DISCONTINUED | OUTPATIENT
Start: 2019-06-10 | End: 2019-06-10

## 2019-06-10 RX ORDER — FAMOTIDINE 20 MG/1
20 TABLET, FILM COATED ORAL 2 TIMES DAILY
Status: DISCONTINUED | OUTPATIENT
Start: 2019-06-10 | End: 2019-06-11 | Stop reason: HOSPADM

## 2019-06-10 RX ORDER — SODIUM CHLORIDE, SODIUM LACTATE, POTASSIUM CHLORIDE, CALCIUM CHLORIDE 600; 310; 30; 20 MG/100ML; MG/100ML; MG/100ML; MG/100ML
1000 INJECTION, SOLUTION INTRAVENOUS ONCE
Status: DISCONTINUED | OUTPATIENT
Start: 2019-06-10 | End: 2019-06-10 | Stop reason: HOSPADM

## 2019-06-10 RX ORDER — ZOLPIDEM TARTRATE 5 MG/1
5 TABLET ORAL NIGHTLY PRN
Status: DISCONTINUED | OUTPATIENT
Start: 2019-06-10 | End: 2019-06-11 | Stop reason: HOSPADM

## 2019-06-10 RX ORDER — FENTANYL CITRATE 50 UG/ML
INJECTION, SOLUTION INTRAMUSCULAR; INTRAVENOUS
Status: DISCONTINUED | OUTPATIENT
Start: 2019-06-10 | End: 2019-06-10

## 2019-06-10 RX ORDER — ACETAMINOPHEN 10 MG/ML
1000 INJECTION, SOLUTION INTRAVENOUS ONCE
Status: COMPLETED | OUTPATIENT
Start: 2019-06-10 | End: 2019-06-10

## 2019-06-10 RX ADMIN — ASPIRIN 325 MG ORAL TABLET 325 MG: 325 PILL ORAL at 08:06

## 2019-06-10 RX ADMIN — CELECOXIB 200 MG: 100 CAPSULE ORAL at 07:06

## 2019-06-10 RX ADMIN — PREGABALIN 75 MG: 75 CAPSULE ORAL at 08:06

## 2019-06-10 RX ADMIN — BUPIVACAINE HYDROCHLORIDE 3 ML: 7.5 INJECTION, SOLUTION EPIDURAL; RETROBULBAR at 10:06

## 2019-06-10 RX ADMIN — FAMOTIDINE 20 MG: 20 TABLET, FILM COATED ORAL at 01:06

## 2019-06-10 RX ADMIN — ASPIRIN 325 MG ORAL TABLET 325 MG: 325 PILL ORAL at 01:06

## 2019-06-10 RX ADMIN — CEFAZOLIN SODIUM 2 G: 2 SOLUTION INTRAVENOUS at 10:06

## 2019-06-10 RX ADMIN — FENTANYL CITRATE 50 MCG: 50 INJECTION, SOLUTION INTRAMUSCULAR; INTRAVENOUS at 08:06

## 2019-06-10 RX ADMIN — FAMOTIDINE 20 MG: 20 TABLET, FILM COATED ORAL at 08:06

## 2019-06-10 RX ADMIN — ONDANSETRON 4 MG: 2 INJECTION, SOLUTION INTRAMUSCULAR; INTRAVENOUS at 10:06

## 2019-06-10 RX ADMIN — DEXTROSE AND SODIUM CHLORIDE: 5; .45 INJECTION, SOLUTION INTRAVENOUS at 12:06

## 2019-06-10 RX ADMIN — ACETAMINOPHEN 1000 MG: 10 INJECTION, SOLUTION INTRAVENOUS at 12:06

## 2019-06-10 RX ADMIN — MIDAZOLAM 2 MG: 1 INJECTION INTRAMUSCULAR; INTRAVENOUS at 08:06

## 2019-06-10 RX ADMIN — SODIUM CHLORIDE, SODIUM LACTATE, POTASSIUM CHLORIDE, AND CALCIUM CHLORIDE: .6; .31; .03; .02 INJECTION, SOLUTION INTRAVENOUS at 07:06

## 2019-06-10 RX ADMIN — ONDANSETRON 4 MG: 2 INJECTION INTRAMUSCULAR; INTRAVENOUS at 03:06

## 2019-06-10 RX ADMIN — FENTANYL CITRATE 50 MCG: 50 INJECTION, SOLUTION INTRAMUSCULAR; INTRAVENOUS at 09:06

## 2019-06-10 RX ADMIN — LIDOCAINE HYDROCHLORIDE 10 MG: 10 INJECTION, SOLUTION EPIDURAL; INFILTRATION; INTRACAUDAL; PERINEURAL at 07:06

## 2019-06-10 RX ADMIN — CEFAZOLIN 2 G: 10 INJECTION, POWDER, FOR SOLUTION INTRAVENOUS; PARENTERAL at 06:06

## 2019-06-10 RX ADMIN — MUPIROCIN: 20 OINTMENT TOPICAL at 07:06

## 2019-06-10 RX ADMIN — PREGABALIN 75 MG: 75 CAPSULE ORAL at 07:06

## 2019-06-10 RX ADMIN — OXYCODONE HYDROCHLORIDE 10 MG: 10 TABLET, FILM COATED, EXTENDED RELEASE ORAL at 07:06

## 2019-06-10 RX ADMIN — PROPOFOL 75 MCG/KG/MIN: 10 INJECTION, EMULSION INTRAVENOUS at 09:06

## 2019-06-10 RX ADMIN — TRANEXAMIC ACID 700 MG: 100 INJECTION, SOLUTION INTRAVENOUS at 10:06

## 2019-06-10 RX ADMIN — SODIUM CHLORIDE, SODIUM LACTATE, POTASSIUM CHLORIDE, AND CALCIUM CHLORIDE: .6; .31; .03; .02 INJECTION, SOLUTION INTRAVENOUS at 10:06

## 2019-06-10 RX ADMIN — OXYCODONE HYDROCHLORIDE 10 MG: 10 TABLET, FILM COATED, EXTENDED RELEASE ORAL at 08:06

## 2019-06-10 NOTE — OP NOTE
DATE OF PROCEDURE:  06/10/2019.    ATTENDING PHYSICIAN:  Jony Marmolejo M.D.    FIRST ASSISTANT:  Tim Lopez.    PREOPERATIVE DIAGNOSIS:  Bone-on-bone osteoarthritis, right knee.    POSTOPERATIVE DIAGNOSIS:  Bone-on-bone osteoarthritis, right knee.    PROCEDURE PERFORMED:  Right total knee arthroplasty.    COMPONENTS UTILIZED:  MicroPort total knee arthroplasty system, size 3 femur,   size 3 tibia, size 12 mm spacer, size 29 mm tri-peg patella button.    ESTIMATED BLOOD LOSS:  Minimal.    IV FLUID:  Crystalloid.    ANESTHESIA:  Spinal anesthesia.    PROCEDURE IN DETAIL:  The patient was placed on the operating table in the   supine position.  The knee was prepped and draped in the usual sterile manner   for surgery.  An anterior approach was undertaken to the knee and carried down   sharply through the skin.  The medial parapatellar tissues were released.  The   medial tibial plateau was taken down subperiosteally.  The knee was flexed to 90   degrees.  A drill was used to gain access to the femur and an intramedullary   vane was inserted up the femoral canal.  A cutting jig was pinned into position   such that it would make a 5 degree valgus cut and take 9 mm of distal bone.  It   was checked secondarily and the cuts were made.  We now sized the femur, sized   to a 3.  A size 3 cutting jig was pinned into position and the cuts were made.    A trial was placed.  It fit perfectly.  It was extracted.  The tibia was   subluxed anteriorly and a cutting jig was pinned into position such that it   would make a neutral cut and take 2 mm of distal bone.  It was checked   secondarily and the cut was made.  We placed a femoral trial and a tibial trial.    The construct had full extension, full flexion, symmetric flexion and   extension gaps.  We pulse and irrigated.  We broached the tibia.  The patella   was calipered and cut and a button was placed.  The construct trialed   beautifully with no liftoff.  We pulse and irrigated  and dried the bony   surfaces.  We mixed our bone cement and cemented first the tibia, next the femur   and finally the patella.  All excess cement was removed at the time of   cementation.  The construct was held in full extension until all the cement   totally hardened.  We copiously irrigated.  The actual spacer was tapped into   position.  We closed the extensor mechanism with a combination of #2 FiberWire   and a running Quill stitch.  We irrigated again and closed the subcutaneous with   2-0 Vicryl and the skin with 4-0 Monocryl.  Sterile dressings were applied and   the patient was noted to be in stable condition.      ANDRES/KITTY  dd: 06/10/2019 10:46:13 (CDT)  td: 06/10/2019 11:15:32 (CDT)  Doc ID   #[id number]  Job ID #[id number]    CC:

## 2019-06-10 NOTE — TRANSFER OF CARE
"Anesthesia Transfer of Care Note    Patient: Camila Au    Procedure(s) Performed: Procedure(s) (LRB):  ARTHROPLASTY, KNEE (Right)    Patient location: PACU    Anesthesia Type: spinal and MAC    Transport from OR: Transported from OR on room air with adequate spontaneous ventilation    Post pain: adequate analgesia    Post assessment: tolerated procedure well and no apparent anesthetic complications    Post vital signs: stable    Level of consciousness: awake, alert and oriented    Nausea/Vomiting: no nausea/vomiting    Complications: none    Transfer of care protocol was followed      Last vitals:   Visit Vitals  /63 (BP Location: Left arm, Patient Position: Lying)   Pulse 74   Temp 36.8 °C (98.2 °F) (Oral)   Resp 16   Ht 4' 10" (1.473 m)   Wt 72.1 kg (159 lb)   SpO2 (!) 94%   Breastfeeding? No   BMI 33.23 kg/m²     "

## 2019-06-10 NOTE — PLAN OF CARE
Problem: Physical Therapy Goal  Goal: Physical Therapy Goal  Goals to be met by: 2019     Patient will increase functional independence with mobility by performin. Supine to sit with Modified [place]  2. Sit to supine with Modified [place]  3. Sit to stand transfer with Modified [place]  4. Bed to chair transfer with Contact Guard Assistance using Rolling Walker  5. Gait  x 250 feet with Contact Guard Assistance using Rolling Walker.   6. Lower extremity exercise program x10-20 reps per handout, with independence    Outcome: Ongoing (interventions implemented as appropriate)  Pt ambulated 30ft with RW and Min A with intermittent R knee buckling.

## 2019-06-10 NOTE — PROGRESS NOTES
06/10/19 1612   Patient Assessment/Suction   Level of Consciousness (AVPU) alert   All Lung Fields Breath Sounds clear   PRE-TX-O2   O2 Device (Oxygen Therapy) room air   SpO2 (!) 94 %   Pulse 67   Resp 16   Incentive Spirometer   $ Incentive Spirometer Charges postop instruction;done with encouragement   Incentive Spirometer Predicted Level (mL) 2250   Administration (IS) instruction provided, initial   Number of Repetitions (IS) 10   Level Incentive Spirometer (mL) 1000   Patient Tolerance (IS) good

## 2019-06-10 NOTE — PT/OT/SLP EVAL
Physical Therapy Evaluation    Patient Name:  Camila Au   MRN:  [id number]    Recommendations:     Discharge Recommendations:  home health PT   Discharge Equipment Recommendations: commode   Barriers to discharge: None    Assessment:     Camila Au is a 69 y.o. female admitted with a medical diagnosis of S/P total knee arthroplasty, right.  She presents with the following impairments/functional limitations:  weakness, impaired self care skills, impaired balance, decreased safety awareness, decreased ROM, impaired endurance, impaired functional mobilty, decreased upper extremity function, pain, gait instability, decreased lower extremity function, orthopedic precautions.  During PT evaluation, pt demonstrated supine<>sit with supervision, sit<>stand with RW and Mod A.  Pt was able to ambulate 30ft with RW and Min A with intermittent R knee buckling.      Rehab Prognosis: Good; patient would benefit from acute skilled PT services to address these deficits and reach maximum level of function.    Recent Surgery: Procedure(s) (LRB):  ARTHROPLASTY, KNEE (Right) Day of Surgery    Plan:     During this hospitalization, patient to be seen BID to address the identified rehab impairments via gait training, therapeutic activities, therapeutic exercises and progress toward the following goals:    · Plan of Care Expires:  06/24/19    Subjective     Chief Complaint: none  Patient/Family Comments/goals: pt wants to do everything she needs to get better  Pain/Comfort:  · Pain Rating 1: 0/10    Patients cultural, spiritual, Confucianist conflicts given the current situation: no    Living Environment:  Pt normally lives alone, however she will be going to live with her friend for a little while.  Her friend has a 1 level home with 0 steps to enter.   Prior to admission, patients level of function was independent.  Equipment used at home: walker, rolling.  DME owned (not currently used): rolling walker.  Upon  discharge, patient will have assistance from friend.    Objective:     Communicated with nurse Sotelo prior to session.  Patient found HOB elevated with peripheral IV, telemetry  upon PT entry to room.    General Precautions: Standard, fall   Orthopedic Precautions:RLE weight bearing as tolerated   Braces: N/A     Exams:  · Cognitive Exam:  Patient is oriented to Person, Place, Time and Situation  · Postural Exam:  Patient presented with the following abnormalities:    · -       Rounded shoulders  · -       Forward head  · RLE ROM: Deficits: knee extension is -5* to flexion of 80*  · RLE Strength: Deficits: grossly 3/5  · LLE ROM: WFL  · LLE Strength: WFL    Functional Mobility:  · Bed Mobility:     · Supine to Sit: supervision  · Sit to Supine: supervision  · Transfers:     · Sit to Stand:  moderate assistance with rolling walker  · Gait: 30ft with RW and Min A due to intermittent R knee buckling. Pt holds toes in extension during gait - try donning shoes for gait next tx.   · Balance: fair      Therapeutic Activities and Exercises:   Supine AP's, QS, GS, R SAQ, R Heel slide, and R SLR all x 10 reps with good technique.     AM-PAC 6 CLICK MOBILITY  Total Score:18     Patient left HOB elevated with all lines intact, call button in reach, nurse Sotelo notified and friend present.    GOALS:   Multidisciplinary Problems     Physical Therapy Goals        Problem: Physical Therapy Goal    Goal Priority Disciplines Outcome Goal Variances Interventions   Physical Therapy Goal     PT, PT/OT Ongoing (interventions implemented as appropriate)     Description:  Goals to be met by: 2019     Patient will increase functional independence with mobility by performin. Supine to sit with Modified [place]  2. Sit to supine with Modified [place]  3. Sit to stand transfer with Modified [place]  4. Bed to chair transfer with Contact Guard Assistance using Rolling Walker  5. Gait  x 250 feet with Contact Guard  Assistance using Rolling Walker.   6. Lower extremity exercise program x10-20 reps per handout, with independence                      History:     Past Medical History:   Diagnosis Date    Arthritis     Breast cancer 2007    right    Wears glasses     WEARS CONTACS       Past Surgical History:   Procedure Laterality Date    BREAST RECONSTRUCTION      CARPAL TUNNEL RELEASE       SECTION  1970, ,     CHOLECYSTECTOMY  2019        COLONOSCOPY  2017    CYSTOSCOPY WITH HYDRODISTENSION N/A 2012    [facility] OR    FUSION, JOINT, FINGER - REVISION OF LEFT THUMB IP JOINT FUSION WITH REMOVAL OF PREVIOUS SCREW AND ( ICBG) ILIAC CREST BONE GRAFT Left 3/1/2019    Per[facility] by [facility]. [facility], [facility] at [facility] OR    FUSION, JOINT, THUMB Left 2018    [facility] at [facility] OR    KNEE ARTHROSCOPY Bilateral 2008    MASTECTOMY Right 2007    PORTACATH PLACEMENT  2007    PORTACATH REMOVAL      SURGICAL REMOVAL OF BONE SPUR Bilateral     TUBAL LIGATION         Time Tracking:     PT Received On: 06/10/19  PT Start Time: 1430     PT Stop Time: 1505  PT Total Time (min): 35 min     Billable Minutes: Evaluation 15 and Gait Training 20      Josephine Rivero, PT  06/10/2019

## 2019-06-10 NOTE — H&P
CC/Indication for Procedure: 69 y.o. female with Primary osteoarthritis of right knee [M17.11].    Patient scheduled for [place] TOTAL KNEE ARTHROPLASTY [[id number]] (ARTHROPLASTY, KNEE).    Past Medical History:   Diagnosis Date    Arthritis     Breast cancer     right    Wears glasses     WEARS CONTACS     Past Surgical History:   Procedure Laterality Date    BREAST RECONSTRUCTION      CARPAL TUNNEL RELEASE       SECTION  , ,     CHOLECYSTECTOMY  2019        COLONOSCOPY  2017    CYSTOSCOPY WITH HYDRODISTENSION N/A 2012    [facility] OR    FUSION, JOINT, FINGER - REVISION OF LEFT THUMB IP JOINT FUSION WITH REMOVAL OF PREVIOUS SCREW AND ( ICBG) ILIAC CREST BONE GRAFT Left 3/1/2019    [facility] OR    FUSION, JOINT, THUMB Left 2018    [facility] at [facility] OR    KNEE ARTHROSCOPY Bilateral 2008    MASTECTOMY Right 2007    PORTACATH PLACEMENT  2007    PORTACATH REMOVAL      SURGICAL REMOVAL OF BONE SPUR Bilateral     TUBAL LIGATION       Family History   Problem Relation Age of Onset    Arthritis Mother     Hyperlipidemia Mother     Stroke Mother     Dementia Mother      Social History     Socioeconomic History    Marital status:      Spouse name: Not on file    Number of children: Not on file    Years of education: Not on file    Highest education level: Not on file   Occupational History    Not on file   Social Needs    Financial resource strain: Not on file    Food insecurity:     Worry: Not on file     Inability: Not on file    Transportation needs:     Medical: Not on file     Non-medical: Not on file   Tobacco Use    Smoking status: Former Smoker     Types: Cigarettes    Smokeless tobacco: Never Used    Tobacco comment: social smoker - on weekends only - quit 20 yrs ago   Substance and Sexual Activity    Alcohol use: Yes      Alcohol/week: 0.0 oz     Frequency: Never     Comment: occ    Drug use: No    Sexual activity: Yes     Partners: Male   Lifestyle    Physical activity:     Days per week: Not on file     Minutes per session: Not on file    Stress: Not on file   Relationships    Social connections:     Talks on phone: Not on file     Gets together: Not on file     Attends Cheondoism service: Not on file     Active member of club or organization: Not on file     Attends meetings of clubs or organizations: Not on file     Relationship status: Not on file   Other Topics Concern    Not on file   Social History Narrative    Not on file       Review of patient's allergies indicates:  No Known Allergies    No current facility-administered medications for this encounter.     ROS:    Denies chest pain or palpitations  Denies shortness of breath  Denies fevers or chills  Denies chest pain  Denies abdominal pain    PE:    General Appearance: Well nourished  Orientation: Oriented to time, place, person  Mental Status: Alert  Heart: RRR  Lungs: CTA  Abdomen: Soft and non-tender    Anesthesia/Surgery risks, benefits and alternative options discussed and understood by patient/family.    This note was created using Dragon voice recognition software that occasionally misinterpreted phrases or words.

## 2019-06-10 NOTE — ANESTHESIA PROCEDURE NOTES
Right Knee Adductor Canal Nerve Block    Patient location during procedure: pre-op   Block not for primary anesthetic.  Reason for block: at surgeon's request and post-op pain management   Post-op Pain Location: Right Knee Pain  Start time: 6/10/2019 8:20 AM  Timeout: 6/10/2019 8:20 AM   End time: 6/10/2019 8:25 AM  Staffing  Anesthesiologist: Shar Munroe MD  Performed: anesthesiologist   Preanesthetic Checklist  Completed: patient identified, site marked, surgical consent, pre-op evaluation, timeout performed, IV checked, risks and benefits discussed and monitors and equipment checked  Peripheral Block  Patient position: supine  Prep: ChloraPrep  Patient monitoring: heart rate, cardiac monitor, continuous pulse ox, continuous capnometry and frequent blood pressure checks  Block type: adductor canal  Laterality: right  Injection technique: single shot  Needle  Needle type: Stimuplex   Needle gauge: 21 G  Needle length: 4 in  Needle localization: anatomical landmarks and ultrasound guidance  Catheter at skin depth: 4 cm   -ultrasound image captured on disc.  Assessment  Injection assessment: negative aspiration, negative parasthesia and local visualized surrounding nerve  Paresthesia pain: none  Heart rate change: no  Slow fractionated injection: yes  Additional Notes  VSS.  [facility] RN monitoring vitals throughout procedure.  Patient tolerated procedure well.     Exparel 20ml + Marcaine 0.5% 10ml dosed under US guidance

## 2019-06-10 NOTE — PLAN OF CARE
Problem: Adult Inpatient Plan of Care  Goal: Plan of Care Review  Outcome: Revised  Awake, alert, and oriented. Vaughn catheter patent, draining clear, yellow urine. PIV clean dry and intact. VS stable. Telemetry monitored; first degree block. Remains afebrile throughout shift. Dx clean dry and intact. Intermittent nausea w/o vomiting controlled by PRN medication. Comfort level established. No complaints of pain at this time. SCDs in place. Cryotherapy in use. Bed in low position, brakes locked, side rails up x 2, call light w/in reach. Verbalized understanding of POC. Open communication facilitated. Will continue to monitor.

## 2019-06-10 NOTE — ANESTHESIA PREPROCEDURE EVALUATION
06/10/2019  Camila Au is a 69 y.o., female.    Anesthesia Evaluation    I have reviewed the Patient Summary Reports.    I have reviewed the Nursing Notes.   I have reviewed the Medications.     Review of Systems  Anesthesia Hx:  No problems with previous Anesthesia    Social:  Former Smoker    Hematology/Oncology:         -- Cancer in past history: Breast   Cardiovascular:   Dysrhythmias atrial fibrillation    Pulmonary:   Asthma asymptomatic and mild    Renal/:  Renal/ Normal     Musculoskeletal:   Arthritis     Neurological:  Neurology Normal    Endocrine:  Endocrine Normal        Physical Exam  General:  Well nourished, Obesity    Airway/Jaw/Neck:  Airway Findings: Mouth Opening: Normal Tongue: Normal  General Airway Assessment: Adult  Oropharynx Findings:  Mallampati: II  Jaw/Neck Findings:  Neck ROM: Normal ROM     Eyes/Ears/Nose:  Eyes/Ears/Nose Findings:    Dental:  Dental Findings:   Chest/Lungs:  Chest/Lungs Findings: Normal Respiratory Rate     Heart/Vascular:  Heart Findings: Rate: Normal  Rhythm: Regular Rhythm        Mental Status:  Mental Status Findings:  Cooperative, Alert and Oriented         Anesthesia Plan  Type of Anesthesia, risks & benefits discussed:  Anesthesia Type:  general  Patient's Preference:   Intra-op Monitoring Plan: standard ASA monitors  Intra-op Monitoring Plan Comments:   Post Op Pain Control Plan: multimodal analgesia  Post Op Pain Control Plan Comments:   Induction:   IV  Beta Blocker:  Patient is not currently on a Beta-Blocker (No further documentation required).       Informed Consent: Patient understands risks and agrees with Anesthesia plan.  Questions answered. Anesthesia consent signed with patient.  ASA Score: 2     Day of Surgery Review of History & Physical:  There are no significant changes.   H&P completed by Anesthesiologist.       Ready  For Surgery From Anesthesia Perspective.

## 2019-06-10 NOTE — ANESTHESIA POSTPROCEDURE EVALUATION
Anesthesia Post Evaluation    Patient: Camila Au    Procedure(s) Performed: Procedure(s) (LRB):  ARTHROPLASTY, KNEE (Right)    Final Anesthesia Type: general  Patient location during evaluation: PACU  Patient participation: Yes- Able to Participate  Level of consciousness: awake and alert and oriented  Post-procedure vital signs: reviewed and stable  Pain management: adequate  Airway patency: patent  PONV status at discharge: No PONV  Anesthetic complications: no      Cardiovascular status: blood pressure returned to baseline  Respiratory status: unassisted, spontaneous ventilation and room air  Hydration status: euvolemic  Follow-up not needed.          Vitals Value Taken Time   /60 6/10/2019 12:22 PM   Temp 36.1 °C (97 °F) 6/10/2019 11:15 AM   Pulse 86 6/10/2019 12:28 PM   Resp 17 6/10/2019 12:26 PM   SpO2 95 % 6/10/2019 12:28 PM   Vitals shown include unvalidated device data.      No case tracking events are documented in the log.      Pain/Savana Score: Pain Rating Prior to Med Admin: 0 (6/10/2019 12:08 PM)  Savana Score: 9 (6/10/2019 12:00 PM)

## 2019-06-10 NOTE — BRIEF OP NOTE
[facility]  Brief Operative Note    SUMMARY     Surgery Date: 6/10/2019     Surgeon(s) and Role:     * Jony Marmolejo MD - Primary    Assisting Surgeon: None    Pre-op Diagnosis:  Primary osteoarthritis of right knee [M17.11]    Post-op Diagnosis:  Post-Op Diagnosis Codes:     * Primary osteoarthritis of right knee [M17.11]    Procedure(s) (LRB):  ARTHROPLASTY, KNEE (Right)    Anesthesia: General    Description of Procedure: R TKA    Description of the findings of the procedure: Dictated    Estimated Blood Loss:0       Specimens:   Specimen (12h ago, onward)    None

## 2019-06-10 NOTE — H&P
PCP: Kasie Arthur MD    History & Physical    Chief Complaint: s/p right total knee arthroplasty    History of Present Illness:  Patient is a 69 y.o. female admitted to Hospitalist Service from Operation Room s/p right total knee arthroplasty performed by Dr. Marmolejo. Patient reportedly has past medical history significant for osteoarthritis and history of breast cancer. Post-operatively, patient denied chest pain, shortness of breath, abdominal pain, nausea, vomiting, headache, vision changes, focal neuro-deficits, cough or fever.    Past Medical History:   Diagnosis Date    Arthritis     Breast cancer     right    Wears glasses     WEARS CONTACS     Past Surgical History:   Procedure Laterality Date    BREAST RECONSTRUCTION      CARPAL TUNNEL RELEASE       SECTION  , ,     CHOLECYSTECTOMY  2019        COLONOSCOPY  2017    CYSTOSCOPY WITH HYDRODISTENSION N/A 2012    Performed by Kaylan [facility] at [facility] OR    FUSION, JOINT, FINGER - REVISION OF LEFT THUMB IP JOINT FUSION WITH REMOVAL OF PREVIOUS SCREW AND ( [facility]) ILIAC CREST BONE GRAFT Left 3/1/2019    Performed by Claude S. Williams IV, MD at [facility] [place] OR    FUSION, JOINT, THUMB Left 2018    Performed by Claude S. Williams IV, MD at [facility] [place]    KNEE ARTHROSCOPY Bilateral 2008    MASTECTOMY Right 2007    PORTACATH PLACEMENT  2007    PORTACATH REMOVAL      SURGICAL REMOVAL OF BONE SPUR Bilateral     TUBAL LIGATION       Family History   Problem Relation Age of Onset    Arthritis Mother     Hyperlipidemia Mother     Stroke Mother     Dementia Mother      Social History     Tobacco Use    Smoking status: Former Smoker     Types: Cigarettes    Smokeless tobacco: Never Used    Tobacco comment: social smoker - on weekends only - quit 20 yrs ago   Substance Use Topics    Alcohol use: Yes     Alcohol/week: 0.0 oz     Frequency: Never     Comment: occ    Drug use: No      Review of  patient's allergies indicates:  No Known Allergies  PTA Medications   Medication Sig    alendronate (FOSAMAX) 70 MG tablet Take 1 tablet (70 mg total) by mouth every 7 days.     Review of Systems:  Constitutional: no fever or chills  Eyes: no visual changes  Ears, nose, mouth, throat, and face: no nasal congestion or sore throat  Respiratory: no cough or shorness of breath  Cardiovascular: no chest pain or palpitations  Gastrointestinal: no nausea or vomiting, no abdominal pain or change in bowel habits  Genitourinary: no hematuria or dysuria  Integument/breast: no rash or pruritis  Hematologic/lymphatic: no easy bruising or lymphadenopathy  Musculoskeletal:  See history of present  Neurological: no seizures or tremors.  Behavioral/Psych: no auditory or visual hallucinations  Endocrine: no heat or cold intolerance     OBJECTIVE:     Vital Signs (Most Recent)  Temp: 98.2 °F (36.8 °C) (06/10/19 0724)  Pulse: 74 (06/10/19 0724)  Resp: 16 (06/10/19 0724)  BP: 128/63 (06/10/19 0724)  SpO2: (!) 94 % (06/10/19 0724)    Physical Exam:  General appearance: well developed, appears stated age  Head: normocephalic, atraumatic  Eyes:  conjunctivae/corneas clear. PERRL.  Nose: Nares normal. Septum midline.  Throat: lips, mucosa, and tongue normal; teeth and gums normal, no throat erythema.  Neck: supple, symmetrical, trachea midline, no JVD and thyroid not enlarged, symmetric, no tenderness/mass/nodules  Lungs:  clear to auscultation bilaterally and normal respiratory effort  Chest wall: no tenderness  Heart: regular rate and rhythm, S1, S2 normal, no murmur, click, rub or gallop  Abdomen: soft, non-tender non-distented; bowel sounds normal; no masses,  no organomegaly  Extremities: no cyanosis, clubbing or edema. Right knee dressing clean dry and intact.  Pulses: 2+ and symmetric  Skin: Skin color, texture, turgor normal. No rashes or lesions.  Lymph nodes: Cervical, supraclavicular, and axillary nodes normal.  Neurologic:  Normal strength and tone. No focal numbness or weakness. CNII-XII intact.      Laboratory:   CBC: No results for input(s): WBC, RBC, HGB, HCT, PLT, MCV, MCH, MCHC in the last 168 hours.  CMP: No results for input(s): GLU, CALCIUM, ALBUMIN, PROT, NA, K, CO2, CL, BUN, CREATININE, ALKPHOS, ALT, AST, BILITOT in the last 168 hours.    Hemoglobin A1C   Date Value Ref Range Status   04/03/2019 5.6 % Final       Diagnostic Results: None    Assessment/Plan:     Active Hospital Problems    Diagnosis  POA    *S/P total knee arthroplasty, right [Z96.651]  Not Applicable    Degenerative arthritis of right knee [M17.11]  Continue to follow Orthopedic recommendations.  Needs aggressive incentive spirometry.  Follow hemoglobin and hematocrit closely.  Pain control with IV narcotics and antiemetics as needed.  Physical therapy as per Orthopedics protocol with fall precautions.    Yes    Paroxysmal atrial fibrillation [I48.0]  Telemonitoring.  Patient is not on any long-term anticoagulation therapy.  Presently normal sinus rhythm.  Patient is not on any anti arrhythmic agent or AV kenyetta blocker agent.    Yes    Breast cancer [C50.919]  Yes     right  Noted.     DVT prophylaxis: Use SCD and TEDs.  On aspirin 325 mg twice daily as per Dr. Marmolejo.     Brigid Alvarez MD  Department of Hospital Medicine   [facility]

## 2019-06-10 NOTE — ANESTHESIA PROCEDURE NOTES
Spinal    Diagnosis: RIGHT KNEE DJD, RIGHT KNEE TKA  Patient location during procedure: OR  Start time: 6/10/2019 9:55 AM  Timeout: 6/10/2019 9:55 AM  End time: 6/10/2019 10:00 AM  Staffing  Anesthesiologist: Kevin Mckenzie MD  Performed: anesthesiologist   Preanesthetic Checklist  Completed: patient identified, site marked, surgical consent, pre-op evaluation, timeout performed, IV checked, risks and benefits discussed and monitors and equipment checked  Spinal Block  Patient position: sitting  Prep: ChloraPrep  Patient monitoring: cardiac monitor, heart rate, continuous pulse ox, continuous capnometry and frequent blood pressure checks  Approach: midline  Location: L3-4  Injection technique: single shot  CSF Fluid: clear free-flowing CSF  Needle  Needle type: pencil-tip   Needle gauge: 25 G  Needle length: 3.5 in  Additional Documentation: incremental injection, negative aspiration for heme and no paresthesia on injection  Needle localization: anatomical landmarks  Assessment  Sensory level: T10   Dermatomal levels determined by alcohol wipe  Ease of block: moderate  Patient's tolerance of the procedure: comfortable throughout block

## 2019-06-10 NOTE — PLAN OF CARE
PCP is Dr Arthur.  Verified insurance as medicare and [employer].  Pharmacy is [facility] on [facility].  Lives alone, however she will be staying with her friend Patti for one week post discharge at [address] [address].  Informed patient to inform the HH of when she is leaving Patti's home to go to her friend Alix's home; verbalized understanding.  Patient is in agreement with HH, and signed disclosure form ([facility] ).  Patient has a walker, and tub bench; patient declined the bedside commode.  Discharge plan is home with HH.       06/10/19 0571   Discharge Assessment   Assessment Type Discharge Planning Assessment   Confirmed/corrected address and phone number on facesheet? Yes   Assessment information obtained from? Patient   Prior to hospitilization cognitive status: Alert/Oriented   Prior to hospitalization functional status: Independent   Current cognitive status: Alert/Oriented   Current Functional Status: Independent;Assistive Equipment   Lives With alone   Able to Return to Prior Arrangements yes   Is patient able to care for self after discharge? Yes   Patient's perception of discharge disposition home health   Readmission Within the Last 30 Days no previous admission in last 30 days   Patient currently being followed by outpatient case management? No   Patient currently receives any other outside agency services? No   Equipment Currently Used at Home bath bench;walker, rolling   Do you have any problems affording any of your prescribed medications? No   Is the patient taking medications as prescribed? yes   Does the patient have transportation home? Yes   Transportation Anticipated family or friend will provide   Dialysis Name and Scheduled days none   Does the patient receive services at the Coumadin Clinic? No   Discharge Plan A Home Health   DME Needed Upon Discharge  none   Patient/Family in Agreement with Plan yes

## 2019-06-11 VITALS
BODY MASS INDEX: 33.37 KG/M2 | SYSTOLIC BLOOD PRESSURE: 108 MMHG | RESPIRATION RATE: 18 BRPM | HEIGHT: 58 IN | HEART RATE: 78 BPM | DIASTOLIC BLOOD PRESSURE: 59 MMHG | OXYGEN SATURATION: 92 % | TEMPERATURE: 100 F | WEIGHT: 159 LBS

## 2019-06-11 LAB
ANION GAP SERPL CALC-SCNC: 9 MMOL/L (ref 8–16)
BASOPHILS # BLD AUTO: 0 K/UL (ref 0–0.2)
BASOPHILS NFR BLD: 0.5 % (ref 0–1.9)
BUN SERPL-MCNC: 12 MG/DL (ref 8–23)
CALCIUM SERPL-MCNC: 8.9 MG/DL (ref 8.7–10.5)
CHLORIDE SERPL-SCNC: 104 MMOL/L (ref 95–110)
CO2 SERPL-SCNC: 25 MMOL/L (ref 23–29)
CREAT SERPL-MCNC: 0.6 MG/DL (ref 0.5–1.4)
DIFFERENTIAL METHOD: ABNORMAL
EOSINOPHIL # BLD AUTO: 0.2 K/UL (ref 0–0.5)
EOSINOPHIL NFR BLD: 1.9 % (ref 0–8)
ERYTHROCYTE [DISTWIDTH] IN BLOOD BY AUTOMATED COUNT: 13.3 % (ref 11.5–14.5)
EST. GFR  (AFRICAN AMERICAN): >60 ML/MIN/1.73 M^2
EST. GFR  (NON AFRICAN AMERICAN): >60 ML/MIN/1.73 M^2
GLUCOSE SERPL-MCNC: 114 MG/DL (ref 70–110)
HCT VFR BLD AUTO: 37.8 % (ref 37–48.5)
HGB BLD-MCNC: 12.8 G/DL (ref 12–16)
LYMPHOCYTES # BLD AUTO: 1.4 K/UL (ref 1–4.8)
LYMPHOCYTES NFR BLD: 17.2 % (ref 18–48)
MCH RBC QN AUTO: 32.6 PG (ref 27–31)
MCHC RBC AUTO-ENTMCNC: 33.9 G/DL (ref 32–36)
MCV RBC AUTO: 96 FL (ref 82–98)
MONOCYTES # BLD AUTO: 0.8 K/UL (ref 0.3–1)
MONOCYTES NFR BLD: 9.3 % (ref 4–15)
NEUTROPHILS # BLD AUTO: 6 K/UL (ref 1.8–7.7)
NEUTROPHILS NFR BLD: 71.1 % (ref 38–73)
PLATELET # BLD AUTO: 173 K/UL (ref 150–350)
PMV BLD AUTO: 10.1 FL (ref 9.2–12.9)
POTASSIUM SERPL-SCNC: 4.2 MMOL/L (ref 3.5–5.1)
RBC # BLD AUTO: 3.93 M/UL (ref 4–5.4)
SODIUM SERPL-SCNC: 138 MMOL/L (ref 136–145)
WBC # BLD AUTO: 8.4 K/UL (ref 3.9–12.7)

## 2019-06-11 PROCEDURE — 97116 GAIT TRAINING THERAPY: CPT | Mod: 59 | Performed by: PHYSICAL THERAPIST

## 2019-06-11 PROCEDURE — 97535 SELF CARE MNGMENT TRAINING: CPT

## 2019-06-11 PROCEDURE — G8978 MOBILITY CURRENT STATUS: HCPCS | Mod: CI | Performed by: PHYSICAL THERAPIST

## 2019-06-11 PROCEDURE — 36415 COLL VENOUS BLD VENIPUNCTURE: CPT

## 2019-06-11 PROCEDURE — G8987 SELF CARE CURRENT STATUS: HCPCS | Mod: CK

## 2019-06-11 PROCEDURE — 97165 OT EVAL LOW COMPLEX 30 MIN: CPT

## 2019-06-11 PROCEDURE — G8988 SELF CARE GOAL STATUS: HCPCS | Mod: CK

## 2019-06-11 PROCEDURE — 94799 UNLISTED PULMONARY SVC/PX: CPT

## 2019-06-11 PROCEDURE — 80048 BASIC METABOLIC PNL TOTAL CA: CPT

## 2019-06-11 PROCEDURE — 63600175 PHARM REV CODE 636 W HCPCS: Performed by: ORTHOPAEDIC SURGERY

## 2019-06-11 PROCEDURE — G8979 MOBILITY GOAL STATUS: HCPCS | Mod: CI | Performed by: PHYSICAL THERAPIST

## 2019-06-11 PROCEDURE — 25000003 PHARM REV CODE 250: Performed by: ORTHOPAEDIC SURGERY

## 2019-06-11 PROCEDURE — 97530 THERAPEUTIC ACTIVITIES: CPT | Performed by: PHYSICAL THERAPIST

## 2019-06-11 PROCEDURE — 85025 COMPLETE CBC W/AUTO DIFF WBC: CPT

## 2019-06-11 PROCEDURE — 94761 N-INVAS EAR/PLS OXIMETRY MLT: CPT

## 2019-06-11 PROCEDURE — 25000003 PHARM REV CODE 250: Performed by: ANESTHESIOLOGY

## 2019-06-11 PROCEDURE — G8989 SELF CARE D/C STATUS: HCPCS | Mod: CK

## 2019-06-11 RX ORDER — ASPIRIN 325 MG
325 TABLET ORAL 2 TIMES DAILY
Refills: 0
Start: 2019-06-11 | End: 2019-10-03 | Stop reason: SDUPTHER

## 2019-06-11 RX ORDER — HYDROCODONE BITARTRATE AND ACETAMINOPHEN 10; 325 MG/1; MG/1
1 TABLET ORAL EVERY 6 HOURS PRN
Refills: 0
Start: 2019-06-11 | End: 2019-06-26

## 2019-06-11 RX ADMIN — CEFAZOLIN 2 G: 10 INJECTION, POWDER, FOR SOLUTION INTRAVENOUS; PARENTERAL at 01:06

## 2019-06-11 RX ADMIN — OXYCODONE HYDROCHLORIDE 10 MG: 10 TABLET ORAL at 07:06

## 2019-06-11 RX ADMIN — CELECOXIB 100 MG: 100 CAPSULE ORAL at 09:06

## 2019-06-11 RX ADMIN — MORPHINE SULFATE 2 MG: 2 INJECTION, SOLUTION INTRAMUSCULAR; INTRAVENOUS at 09:06

## 2019-06-11 RX ADMIN — ASPIRIN 325 MG ORAL TABLET 325 MG: 325 PILL ORAL at 09:06

## 2019-06-11 RX ADMIN — PREGABALIN 75 MG: 75 CAPSULE ORAL at 09:06

## 2019-06-11 RX ADMIN — POLYETHYLENE GLYCOL 3350 17 G: 17 POWDER, FOR SOLUTION ORAL at 09:06

## 2019-06-11 RX ADMIN — FAMOTIDINE 20 MG: 20 TABLET, FILM COATED ORAL at 09:06

## 2019-06-11 RX ADMIN — OXYCODONE HYDROCHLORIDE 10 MG: 10 TABLET, FILM COATED, EXTENDED RELEASE ORAL at 10:06

## 2019-06-11 NOTE — PLAN OF CARE
06/11/19 0844   PRE-TX-O2   O2 Device (Oxygen Therapy) room air   SpO2 (!) 93 %   Pulse Oximetry Type Intermittent   $ Pulse Oximetry - Multiple Charge Pulse Oximetry - Multiple   Incentive Spirometer   $ Incentive Spirometer Charges done with encouragement   Administration (IS) proper technique demonstrated   Number of Repetitions (IS) 10   Level Incentive Spirometer (mL) 1250   Patient Tolerance (IS) good

## 2019-06-11 NOTE — PROGRESS NOTES
"[facility]  Orthopedics  Progress Note    Patient Name: Camila Au  MRN: [id number]  Admission Date: 6/10/2019  Hospital Length of Stay: 0 days  Attending Provider: Jony Marmolejo MD  Primary Care Provider: Kasie Arthur MD  Follow-up For: Procedure(s) (LRB):  ARTHROPLASTY, KNEE (Right)    Post-Operative Day: 1 Day Post-Op  Subjective:     Principal Problem:S/P total knee arthroplasty, right    Principal Orthopedic Problem: S/P R TKA    Interval History: none    Review of patient's allergies indicates:  No Known Allergies    Current Facility-Administered Medications   Medication    aspirin tablet 325 mg    bisacodyl suppository 10 mg    celecoxib capsule 200 mg    And    celecoxib capsule 100 mg    diphenhydrAMINE injection 12.5 mg    famotidine tablet 20 mg    morphine injection 2 mg    ondansetron disintegrating tablet 8 mg    ondansetron injection 4 mg    oxyCODONE 12 hr tablet 10 mg    oxyCODONE immediate release tablet 15 mg    oxyCODONE immediate release tablet 5 mg    oxyCODONE immediate release tablet Tab 10 mg    polyethylene glycol packet 17 g    pregabalin capsule 75 mg    promethazine (PHENERGAN) 6.25 mg in dextrose 5 % 50 mL IVPB    sodium chloride 0.9% flush 5 mL    zolpidem tablet 5 mg     Objective:     Vital Signs (Most Recent):  Temp: 99.7 °F (37.6 °C) (06/11/19 0707)  Pulse: 78 (06/11/19 0800)  Resp: 18 (06/11/19 0707)  BP: (!) 108/59 (06/11/19 0707)  SpO2: 95 % (06/11/19 0707) Vital Signs (24h Range):  Temp:  [97 °F (36.1 °C)-99.7 °F (37.6 °C)] 99.7 °F (37.6 °C)  Pulse:  [67-90] 78  Resp:  [14-20] 18  SpO2:  [92 %-97 %] 95 %  BP: ()/(54-65) 108/59     Weight: 72.1 kg (159 lb)  Height: 4' 10" (147.3 cm)  Body mass index is 33.23 kg/m².      Intake/Output Summary (Last 24 hours) at 6/11/2019 0921  Last data filed at 6/11/2019 0600  Gross per 24 hour   Intake 1441.25 ml   Output 1300 ml   Net 141.25 ml       General    Nursing note and vitals " reviewed.  Constitutional: She is oriented to person, place, and time. She appears well-developed and well-nourished.   Pulmonary/Chest: Effort normal.   Neurological: She is alert and oriented to person, place, and time.   Psychiatric: She has a normal mood and affect. Her behavior is normal.           Right Knee Exam     Comments:  RLE DNVI. Dressing C/D/I      Significant Labs:   CBC:   Recent Labs   Lab 06/11/19  0359   WBC 8.40   HGB 12.8   HCT 37.8        CMP:   Recent Labs   Lab 06/11/19  0359      K 4.2      CO2 25   *   BUN 12   CREATININE 0.6   CALCIUM 8.9   ANIONGAP 9   EGFRNONAA >60     All pertinent labs within the past 24 hours have been reviewed.    Significant Imaging: X-Ray: I have reviewed all pertinent results/findings and my personal findings are:  There has been a cemented tricompartmental right knee arthroplasty.  All hardware components are well seated.  There is no fracture.  Expected postoperative soft tissue gas is noted.    Assessment/Plan:     * S/P total knee arthroplasty, right  OOB with PT and nursing  Change dressing today @ 1500  Plan for DC home with  this afternoon          DALTON RENDON  Orthopedics  [facility]

## 2019-06-11 NOTE — PT/OT/SLP PROGRESS
Physical Therapy Treatment    Patient Name:  Camila Au   MRN:  [id number]    Recommendations:     Discharge Recommendations:  home health PT   Discharge Equipment Recommendations: commode   Barriers to discharge: None    Assessment:     Camila Au is a 69 y.o. female admitted with a medical diagnosis of S/P total knee arthroplasty, right.  She presents with the following impairments/functional limitations:  weakness, gait instability, decreased ROM, impaired endurance, impaired balance, decreased lower extremity function, decreased safety awareness, impaired self care skills, pain, orthopedic precautions, impaired functional mobilty.  During PT tx, she demonstrated supine<>sit at Mod (I), and sit<>Stand with CGA.  She was able to ambulate 400ft with RW and CGA/Mod (I).  She also negotiated 1 step with RW and CGA.  She has met all acute PT goals and is cleared for d/c home with friend and HHPT.     Rehab Prognosis: Good; patient would benefit from acute skilled PT services to address these deficits and reach maximum level of function.    Recent Surgery: Procedure(s) (LRB):  ARTHROPLASTY, KNEE (Right) 1 Day Post-Op    Plan:     During this hospitalization, patient to be seen BID to address the identified rehab impairments via gait training, therapeutic activities, therapeutic exercises and progress toward the following goals:    · Plan of Care Expires:  06/24/19    Subjective     Chief Complaint: pain - nurse Sotelo in to give pt pain medication at end of tx.   Patient/Family Comments/goals: none stated  Pain/Comfort:  · Pain Rating 1: 8/10  · Location - Side 1: Right  · Location 1: knee  · Pain Addressed 1: Reposition, Distraction, Nurse notified(nurse medicated at end of tx session)  · Pain Rating Post-Intervention 1: 10/10      Objective:     Communicated with nurse Sotelo prior to session.  Patient found HOB elevated with peripheral IV upon PT entry to room.     General Precautions:  "Standard, fall   Orthopedic Precautions:RLE weight bearing as tolerated   Braces: N/A     Functional Mobility:  · Bed Mobility:     · Supine to Sit: modified independence  · Transfers:     · Sit to Stand:  contact guard assistance with rolling walker  · Gait: 250ft with RW and CGA/Mod (I).  Needs cues for continued step through pattern.  Slow christopher with increased WB through UE's. 1 seated rest break prior to stair negotiation.  · Balance: fair  · Stairs:  Pt ascended/descended 4" curb step with Rolling Walker with no handrails with Contact Guard Assistance.       AM-PAC 6 CLICK MOBILITY  Turning over in bed (including adjusting bedclothes, sheets and blankets)?: 4  Sitting down on and standing up from a chair with arms (e.g., wheelchair, bedside commode, etc.): 4  Moving from lying on back to sitting on the side of the bed?: 4  Moving to and from a bed to a chair (including a wheelchair)?: 4  Need to walk in hospital room?: 3  Climbing 3-5 steps with a railing?: 3  Basic Mobility Total Score: 22       Therapeutic Activities and Exercises:   Pt was assisted into the bathroom at end of tx session.  She needs cues to reach back for toilet/chair and to push up from what she is sitting on each time she goes to sit or stand.      Patient left up in chair with all lines intact, call button in reach and nurse Deepak and pt's dad present..    GOALS:   Multidisciplinary Problems     Physical Therapy Goals     Not on file          Multidisciplinary Problems (Resolved)        Problem: Physical Therapy Goal    Goal Priority Disciplines Outcome Goal Variances Interventions   Physical Therapy Goal   (Resolved)     PT, PT/OT Outcome(s) achieved     Description:  Goals to be met by: 2019     Patient will increase functional independence with mobility by performin. Supine to sit with Modified [place]  2. Sit to supine with Modified [place]  3. Sit to stand transfer with Modified [place]  4. Bed to chair " transfer with Contact Guard Assistance using Rolling Walker  5. Gait  x 250 feet with Contact Guard Assistance using Rolling Walker.   6. Lower extremity exercise program x10-20 reps per handout, with independence                      Time Tracking:     PT Received On: 06/11/19  PT Start Time: 0903     PT Stop Time: 0943  PT Total Time (min): 40 min     Billable Minutes: Gait Training 30 and Therapeutic Activity 10    Treatment Type: Treatment  PT/PTA: PT           Josephine Rivero, PT  06/11/2019

## 2019-06-11 NOTE — PT/OT/SLP EVAL
Occupational Therapy   Evaluation and Discharge Note    Name: Camila Au  MRN: [id number]  Admitting Diagnosis:  S/P total knee arthroplasty, right 1 Day Post-Op    Recommendations:     Discharge Recommendations: home health PT  Discharge Equipment Recommendations:     Barriers to discharge:  None    Assessment:     Camila Au is a 69 y.o. female with a medical diagnosis of S/P total knee arthroplasty, right. OT education completed on LB ADLs with use of adaptive equipment and on safety with all ADL tasks using assistive devices with pt able to provide return demonstration. No further OT needs at this time.      Plan:     During this hospitalization, patient does not require further acute OT services.  Please re-consult if situation changes.    · Plan of Care Reviewed with: patient    Subjective     Chief Complaint: R knee pain  Patient/Family Comments/goals: To walk pain free.    Occupational Profile:  Living Environment: Pt to stay with a friend in her SSH with 0 TANYA and a tub/shower.  Previous level of function: Patient was Independent with all I/ADL's at home and in the community and driving.  Roles and Routines:   Equipment Used at home:  bath bench, walker, rolling  Assistance upon Discharge: Friends to help patient.    Pain/Comfort:  · Pain Rating 1: 6/10  · Location - Side 1: Right  · Location 1: knee  · Pain Addressed 1: Pre-medicate for activity, Distraction  · Pain Rating Post-Intervention 1: 3/10    Patients cultural, spiritual, Latter day conflicts given the current situation:      Objective:     Communicated with: nurse Deepak prior to session.  Patient found up in chair with cryotherapy, telemetry upon OT entry to room.    General Precautions: Standard, fall   Orthopedic Precautions:RLE weight bearing as tolerated   Braces: N/A     Occupational Performance:    Functional Mobility/Transfers:  · Patient completed Sit <> Stand Transfer with contact guard  assistance and cues for hand & foot placement  with  rolling walker   · Patient completed Bed <> Chair Transfer using Stand Pivot technique with contact guard assistance with rolling walker  · Functional Mobility: Pt walked from bedside chair to sink & back to bedside using walker with CGA & no LOB noted.    Activities of Daily Living:  · Grooming: stand by assistance standing at sink to brush teeth, wash face and hands  · Lower Body Dressing: stand by assistance and cues for technique with reacher and sock aid to don/doff socks seated      Cognitive/Visual Perceptual:  Cognitive/Psychosocial Skills:     -       Safety awareness/insight to disability: intact   -       Mood/Affect/Coping skills/emotional control: Appropriate to situation, Cooperative and Pleasant    Physical Exam:  Postural examination/scapula alignment:    -       Rounded shoulders  -       Forward head  Skin integrity: Visible skin intact  Edema:  None noted  Upper Extremity Range of Motion:     -       Right Upper Extremity: WFL  -       Left Upper Extremity: WFL  Upper Extremity Strength:    -       Right Upper Extremity: WFL  -       Left Upper Extremity: WFL   Strength:    -       Right Upper Extremity: WFL  -       Left Upper Extremity: WFL  Fine Motor Coordination:    -       Intact  Gross motor coordination:   WFL    AMPAC 6 Click ADL:  AMPAC Total Score: 19    Treatment & Education:  OT ed pt on OT role & discharge recommendations.  OT ed pt on use of adaptive equipment for LB dressing, bathing & safe item retrieval with reacher with demonstration provided.  OT ed patient on safety with walker use for functional mobility with cues for hand placement & sequencing.   OT ed pt on fall risk and strongly advised pt to call for help for all OOB mobility.  Education:    Patient left up in chair with all lines intact, call button in reach and Deepak nurse present    GOALS:   Multidisciplinary Problems     Occupational Therapy Goals     Not on  file                History:     Past Medical History:   Diagnosis Date    Arthritis     Breast cancer 2007    right    Wears glasses     WEARS CONTACS       Past Surgical History:   Procedure Laterality Date    ARTHROPLASTY, KNEE Right 6/10/2019    [facility] OR    BREAST RECONSTRUCTION      CARPAL TUNNEL RELEASE       SECTION  1970, ,     CHOLECYSTECTOMY  2019        COLONOSCOPY  2017    CYSTOSCOPY WITH HYDRODISTENSION N/A 2012    Performed by Kaylan Fields [facility] at [facility] OR    FUSION, JOINT, FINGER - REVISION OF LEFT THUMB IP JOINT FUSION WITH REMOVAL OF PREVIOUS SCREW AND ( [facility]) ILIAC CREST BONE GRAFT Left 3/1/2019    Per[facility] by [facility] S[facility], [facility] OR    FUSION, JOINT, THUMB Left 2018    Performed by [facility], [facility] at [facility] OR    KNEE ARTHROSCOPY Bilateral 2008    MASTECTOMY Right 2007    PORTACATH PLACEMENT  2007    PORTACATH REMOVAL      SURGICAL REMOVAL OF BONE SPUR Bilateral     TUBAL LIGATION         Time Tracking:     OT Date of Treatment: 19  OT Start Time: 1022  OT Stop Time: 1049  OT Total Time (min): 27 min    Billable Minutes:Evaluation 8  Self Care/Home Management     MOY Aponte  2019

## 2019-06-11 NOTE — SUBJECTIVE & OBJECTIVE
"Principal Problem:S/P total knee arthroplasty, right    Principal Orthopedic Problem: S/P R TKA    Interval History: none    Review of patient's allergies indicates:  No Known Allergies    Current Facility-Administered Medications   Medication    aspirin tablet 325 mg    bisacodyl suppository 10 mg    celecoxib capsule 200 mg    And    celecoxib capsule 100 mg    diphenhydrAMINE injection 12.5 mg    famotidine tablet 20 mg    morphine injection 2 mg    ondansetron disintegrating tablet 8 mg    ondansetron injection 4 mg    oxyCODONE 12 hr tablet 10 mg    oxyCODONE immediate release tablet 15 mg    oxyCODONE immediate release tablet 5 mg    oxyCODONE immediate release tablet Tab 10 mg    polyethylene glycol packet 17 g    pregabalin capsule 75 mg    promethazine (PHENERGAN) 6.25 mg in dextrose 5 % 50 mL IVPB    sodium chloride 0.9% flush 5 mL    zolpidem tablet 5 mg     Objective:     Vital Signs (Most Recent):  Temp: 99.7 °F (37.6 °C) (06/11/19 0707)  Pulse: 78 (06/11/19 0800)  Resp: 18 (06/11/19 0707)  BP: (!) 108/59 (06/11/19 0707)  SpO2: 95 % (06/11/19 0707) Vital Signs (24h Range):  Temp:  [97 °F (36.1 °C)-99.7 °F (37.6 °C)] 99.7 °F (37.6 °C)  Pulse:  [67-90] 78  Resp:  [14-20] 18  SpO2:  [92 %-97 %] 95 %  BP: ()/(54-65) 108/59     Weight: 72.1 kg (159 lb)  Height: 4' 10" (147.3 cm)  Body mass index is 33.23 kg/m².      Intake/Output Summary (Last 24 hours) at 6/11/2019 0921  Last data filed at 6/11/2019 0600  Gross per 24 hour   Intake 1441.25 ml   Output 1300 ml   Net 141.25 ml       General    Nursing note and vitals reviewed.  Constitutional: She is oriented to person, place, and time. She appears well-developed and well-nourished.   Pulmonary/Chest: Effort normal.   Neurological: She is alert and oriented to person, place, and time.   Psychiatric: She has a normal mood and affect. Her behavior is normal.           Right Knee Exam     Comments:  FELIPE FELDMAN. Dressing " C/D/I      Significant Labs:   CBC:   Recent Labs   Lab 06/11/19  0359   WBC 8.40   HGB 12.8   HCT 37.8        CMP:   Recent Labs   Lab 06/11/19  0359      K 4.2      CO2 25   *   BUN 12   CREATININE 0.6   CALCIUM 8.9   ANIONGAP 9   EGFRNONAA >60     All pertinent labs within the past 24 hours have been reviewed.    Significant Imaging: X-Ray: I have reviewed all pertinent results/findings and my personal findings are:  There has been a cemented tricompartmental right knee arthroplasty.  All hardware components are well seated.  There is no fracture.  Expected postoperative soft tissue gas is noted.

## 2019-06-11 NOTE — DISCHARGE SUMMARY
Discharge Summary  Hospital Medicine    Admit Date: 6/10/2019    Date and Time: 6/11/20199:00 AM    Discharge Attending Physician: Brigid Alvarez MD    Primary Care Physician: Kasie Arthur MD    Diagnoses:  Active Hospital Problems    Diagnosis  POA    *S/P total knee arthroplasty, right [Z96.651]  Not Applicable    Degenerative arthritis of right knee [M17.11]  Yes    Paroxysmal atrial fibrillation [I48.0]  Yes    Breast cancer [C50.919]  Yes     right        Resolved Hospital Problems   No resolved problems to display.     Discharged Condition: Good    Hospital Course:   Patient is a 69 y.o. female admitted to Hospitalist Service from Operation Room s/p right total knee arthroplasty performed by Dr. Marmolejo. Patient reportedly has past medical history significant for osteoarthritis and history of breast cancer. Post-operatively, patient denied chest pain, shortness of breath, abdominal pain, nausea, vomiting, headache, vision changes, focal neuro-deficits, cough or fever. Patient was admitted to Hospitalist medicine service. Patient was followed by orthopedics. Post-operative, patient did well. Pain adequately controlled. Patient participated with physical therapy. Home health and home physical therapy has been arranged. Fall precautions discussed with the patient. Patient to follow up with primary care physician next week and orthopedic doctor in 2 weeks. Post-operative anti-coagulation as per orthopedics recommendations advised. In case of chest pain, shortness of breath, stroke or stroke like symptoms, high grade fever or any signs or symptoms of surgical site wound infection symptoms, patient to return to nearest emergency room as soon as possible. Patient was discharged home in stable condition with following discharge plan of care.     Consults: Dr. Marmolejo.    Significant Diagnostic Studies:   Right knee x-ray: Status post right knee arthroplasty without complication.    Microbiology Results (last 7  days)     ** No results found for the last 168 hours. **        Special Treatments/Procedures: as above  Disposition: Home or Self Care    Medications:  Reconciled Home Medications:   Current Discharge Medication List      START taking these medications    Details   aspirin 325 MG tablet Take 1 tablet (325 mg total) by mouth 2 (two) times daily.  Refills: 0      HYDROcodone-acetaminophen (NORCO)  mg per tablet Take 1 tablet by mouth every 6 (six) hours as needed for Pain.  Refills: 0         CONTINUE these medications which have NOT CHANGED    Details   alendronate (FOSAMAX) 70 MG tablet Take 1 tablet (70 mg total) by mouth every 7 days.  Qty: 12 tablet, Refills: 1    Associated Diagnoses: Osteopenia of multiple sites           Discharge Procedure Orders   Referral to Home health   Referral Priority: Routine Referral Type: Home Health   Referral Reason: Specialty Services Required   Requested Specialty: Home Health Services   Number of Visits Requested: 1     Diet Cardiac     Other restrictions (specify):   Order Comments: PLEASE OBSERVE FALL PRECAUTIONS     Call MD for:   Order Comments: For worsening symptoms, chest pain, shortness of breath, increased abdominal pain, high grade fever, stroke or stroke like symptoms, immediately go to the nearest Emergency Room or call 911 as soon as possible.     Follow-up Information     [facility].    Specialty:  Home Health Services  Why:  Home Health  Contact information:  [address] [facility] [address]  [phone]           Kasie Arthur MD In 1 week.    Specialty:  Internal Medicine  Contact information:  [address] [place] [address]  [phone]             Jony Marmolejo MD In 2 weeks.    Specialties:  Orthopedic Surgery, Surgery, Sports Medicine  Contact information:  [address]  [address]  [place] [address]  [phone]                 Time spent on the discharge of patient: 32 minutes  Patient was seen and examined on the  date of discharge and determined to be suitable for discharge.

## 2019-06-11 NOTE — PLAN OF CARE
06/11/19 1043   Final Note   Assessment Type Final Discharge Note   Anticipated Discharge Disposition Home-Health   Hospital Follow Up  Appt(s) scheduled? Yes

## 2019-06-11 NOTE — NURSING
Discharge instructions given to patient. Patient verbalized understanding. Telemetry monitor removed. PIV removed; pressure and bandage applied. Dressing changed; steri strips intact; island border applied. Awaiting patient's ride.     1530 Patient transported of unit via .

## 2019-06-11 NOTE — PLAN OF CARE
06/10/19 2038   Patient Assessment/Suction   Level of Consciousness (AVPU) alert   Respiratory Effort Normal;Unlabored   Expansion/Accessory Muscles/Retractions expansion symmetric   Rhythm/Pattern, Respiratory pattern regular   PRE-TX-O2   O2 Device (Oxygen Therapy) room air   SpO2 (!) 94 %   Pulse Oximetry Type Intermittent   $ Pulse Oximetry - Multiple Charge Pulse Oximetry - Multiple   Incentive Spirometer   $ Incentive Spirometer Charges done with encouragement   Administration (IS) instruction provided, follow-up   Number of Repetitions (IS) 10   Level Incentive Spirometer (mL) 1000   Patient Tolerance (IS) good

## 2019-06-11 NOTE — PLAN OF CARE
[facility] will see patient tomorrow.       06/11/19 1043   Post-Acute Status   Post-Acute Authorization Home Health/Hospice   Home Health/Hospice Status Set-up Complete

## 2019-06-11 NOTE — PLAN OF CARE
Faxed  referral to [facility]        06/11/19 4041   Post-Acute Status   Post-Acute Authorization Home Health/Hospice   Home Health/Hospice Status Referrals Sent

## 2019-06-11 NOTE — PLAN OF CARE
Problem: Adult Inpatient Plan of Care  Goal: Plan of Care Review  Outcome: Ongoing (interventions implemented as appropriate)  Plan of care reviewed with patient. Patient verbalized understanding. AAO x 4. VSS/NADN. IV antibiotics infusing per orders. Scheduled pain given with relief noted. Vaughn catheter intact draining clear yellow urine. Tele monitor in place. Surgical dsg CDI, cryo therapy in place. SR up x 2, bed in lowest position, call light in reach. Will continue to monitor.

## 2019-06-17 DIAGNOSIS — Z96.651 STATUS POST TOTAL KNEE REPLACEMENT, RIGHT: Primary | ICD-10-CM

## 2019-06-17 RX ORDER — OXYCODONE AND ACETAMINOPHEN 7.5; 325 MG/1; MG/1
1 TABLET ORAL EVERY 6 HOURS PRN
Qty: 28 TABLET | Refills: 0 | Status: SHIPPED | OUTPATIENT
Start: 2019-06-17 | End: 2019-06-24

## 2019-06-17 NOTE — TELEPHONE ENCOUNTER
----- Message from Lolita Gusman sent at 6/17/2019 10:53 AM CDT -----  Contact: patient  Needs refill on [facility] [phone]

## 2019-06-24 DIAGNOSIS — M17.11 PRIMARY OSTEOARTHRITIS OF RIGHT KNEE: Primary | ICD-10-CM

## 2019-06-24 RX ORDER — OXYCODONE AND ACETAMINOPHEN 7.5; 325 MG/1; MG/1
1 TABLET ORAL EVERY 6 HOURS PRN
Qty: 28 TABLET | Refills: 0 | Status: SHIPPED | OUTPATIENT
Start: 2019-06-24 | End: 2019-07-01

## 2019-06-24 NOTE — TELEPHONE ENCOUNTER
----- Message from Josephine Manning sent at 6/24/2019  2:07 PM CDT -----  Contact: Patient [phone]  Requesting refill on her pain medication.

## 2019-06-26 ENCOUNTER — OFFICE VISIT (OUTPATIENT)
Dept: ORTHOPEDICS | Facility: CLINIC | Age: 69
End: 2019-06-26
Payer: MEDICARE

## 2019-06-26 VITALS
HEIGHT: 58 IN | HEART RATE: 80 BPM | DIASTOLIC BLOOD PRESSURE: 70 MMHG | WEIGHT: 159 LBS | BODY MASS INDEX: 33.37 KG/M2 | SYSTOLIC BLOOD PRESSURE: 110 MMHG

## 2019-06-26 DIAGNOSIS — Z96.651 STATUS POST TOTAL KNEE REPLACEMENT, RIGHT: Primary | ICD-10-CM

## 2019-06-26 PROCEDURE — 99024 POSTOP FOLLOW-UP VISIT: CPT | Mod: POP,,, | Performed by: ORTHOPAEDIC SURGERY

## 2019-06-26 PROCEDURE — 99024 PR POST-OP FOLLOW-UP VISIT: ICD-10-PCS | Mod: POP,,, | Performed by: ORTHOPAEDIC SURGERY

## 2019-06-26 NOTE — PROGRESS NOTES
Pt came in today for suture removal of R-knee. Incision looks good, no redness or drainage, Steri strips applied. Return appointment made. PT orders sent    Electronically Signed By: Jony Marmolejo M.D.

## 2019-06-27 DIAGNOSIS — Z96.651 STATUS POST TOTAL KNEE REPLACEMENT, RIGHT: Primary | ICD-10-CM

## 2019-07-02 DIAGNOSIS — Z96.651 STATUS POST TOTAL KNEE REPLACEMENT, RIGHT: Primary | ICD-10-CM

## 2019-07-02 RX ORDER — OXYCODONE AND ACETAMINOPHEN 7.5; 325 MG/1; MG/1
1 TABLET ORAL EVERY 6 HOURS PRN
Qty: 28 TABLET | Refills: 0 | Status: SHIPPED | OUTPATIENT
Start: 2019-07-02 | End: 2019-07-10

## 2019-07-02 NOTE — TELEPHONE ENCOUNTER
----- Message from Josephine Manning sent at 7/1/2019  4:15 PM CDT -----  Contact: Patient   Requesting refill on her pain medication.

## 2019-07-10 ENCOUNTER — OFFICE VISIT (OUTPATIENT)
Dept: FAMILY MEDICINE | Facility: CLINIC | Age: 69
End: 2019-07-10
Payer: MEDICARE

## 2019-07-10 VITALS
TEMPERATURE: 98 F | WEIGHT: 156.69 LBS | DIASTOLIC BLOOD PRESSURE: 84 MMHG | OXYGEN SATURATION: 98 % | SYSTOLIC BLOOD PRESSURE: 130 MMHG | RESPIRATION RATE: 17 BRPM | HEART RATE: 72 BPM | BODY MASS INDEX: 32.89 KG/M2 | HEIGHT: 58 IN

## 2019-07-10 DIAGNOSIS — R42 VERTIGO: Primary | ICD-10-CM

## 2019-07-10 DIAGNOSIS — H65.02 ACUTE SEROUS OTITIS MEDIA OF LEFT EAR, RECURRENCE NOT SPECIFIED: ICD-10-CM

## 2019-07-10 DIAGNOSIS — M85.89 OSTEOPENIA OF MULTIPLE SITES: ICD-10-CM

## 2019-07-10 PROCEDURE — 99214 PR OFFICE/OUTPT VISIT, EST, LEVL IV, 30-39 MIN: ICD-10-PCS | Mod: ,,, | Performed by: NURSE PRACTITIONER

## 2019-07-10 PROCEDURE — 99214 OFFICE O/P EST MOD 30 MIN: CPT | Mod: ,,, | Performed by: NURSE PRACTITIONER

## 2019-07-10 RX ORDER — MECLIZINE HYDROCHLORIDE 25 MG/1
25 TABLET ORAL 3 TIMES DAILY PRN
Qty: 30 TABLET | Refills: 0 | Status: SHIPPED | OUTPATIENT
Start: 2019-07-10 | End: 2019-07-25

## 2019-07-10 RX ORDER — FLUTICASONE PROPIONATE 50 MCG
1 SPRAY, SUSPENSION (ML) NASAL DAILY
Qty: 1 BOTTLE | Refills: 0 | Status: SHIPPED | OUTPATIENT
Start: 2019-07-10 | End: 2019-07-25

## 2019-07-10 RX ORDER — LORATADINE 10 MG/1
10 TABLET ORAL DAILY
Qty: 30 TABLET | Refills: 0 | COMMUNITY
Start: 2019-07-10 | End: 2019-07-25

## 2019-07-10 NOTE — PATIENT INSTRUCTIONS
Managing Dizziness (Vertigo) with Medicines    Although medicines can't cure your problem, they can help control symptoms. Your doctor may prescribe medicines for a few weeks and then taper them off. Always take your medicine as prescribed. Never share your medicine with others.  Contact your healthcare provider right away if you have side effects from your medicines.   How medicines can help  · Treat infection or inflammation. If you have an infection caused by bacteria, your doctor can prescribe antibiotics.  · Limit conflicting balance signals. These medicines are often in pill form.  · Ease nausea. Suppositories, pills, or shots can reduce vomiting.  · Reduce pressure in the canals. Diuretics can be used to treat Meniere's disease. These medicines help your body get rid of extra fluid.  · Ease other symptoms. Other medicines can help ease depression and anxiety caused by living with dizziness or fainting.  Date Last Reviewed: 11/1/2016  © 3764-7885 [employer]. [address] [place] [address]. All rights reserved. This information is not intended as a substitute for professional medical care. Always follow your healthcare professional's instructions.        Anatomy of the Ear    The ear is a complex and delicate organ. It collects sound waves so you can hear the world around you. The ear also has a second function--it helps you keep your balance. Your ear can be divided into 3 parts. The outer ear and middle ear help collect and amplify sound. The inner ear converts sound waves to messages that are sent to the brain. The inner ear also senses the movement and position of your head and body so you can maintain your balance and see clearly, even when you change positions.  The mastoid bone surrounds the middle ear. The external ear collects sound waves. The ear canal carries sound waves to the eardrum. The eardrum vibrates from sound waves, setting the middle ear bones in motion. The  middle ear bones (ossicles) vibrate, transmitting sound waves to the inner ear. When the ear is healthy, air pressure remains balanced in the middle ear. The eustachian tube helps control air pressure in the middle ear. The semicircular canals help maintain balance. The vestibular nerve carries balance signals to the brain. The auditory nerve carries sound signals to the brain. The cochlea picks up sound waves and makes nerve signals.     Date Last Reviewed: 10/1/2016  © 3106-0355 [employer]. [address] [place] [address]. All rights reserved. This information is not intended as a substitute for professional medical care. Always follow your healthcare professional's instructions.

## 2019-07-10 NOTE — PROGRESS NOTES
SUBJECTIVE:      Patient ID: Camila Au is a 69 y.o. female.    Chief Complaint: Dizziness    Established patient of Dr. Arthur here for c/o dizziness since yesterday.     Dizziness:   Chronicity:  New  Onset:  Yesterday  Progression since onset:  Waxing and waning  Dizziness characteristics: spinning feeling    Associated symptoms: aural fullness (right) and light-headedness.no hearing loss, no ear congestion, no ear pain, no fever, no headaches, no tinnitus, no nausea, no vomiting, no weakness, no visual disturbances, no syncope, no palpitations, no panic, no facial weakness, no slurred speech, no numbness in extremities and no chest pain.  Aggravated by:  Position changes and lying down  Treatments tried: sitting up/still.  Improvements on treatment:  Significant      Family History   Problem Relation Age of Onset    Arthritis Mother     Hyperlipidemia Mother     Stroke Mother     Dementia Mother       Social History     Socioeconomic History    Marital status:      Spouse name: Not on file    Number of children: Not on file    Years of education: Not on file    Highest education level: Not on file   Occupational History    Not on file   Social Needs    Financial resource strain: Not on file    Food insecurity:     Worry: Not on file     Inability: Not on file    Transportation needs:     Medical: Not on file     Non-medical: Not on file   Tobacco Use    Smoking status: Former Smoker     Types: Cigarettes    Smokeless tobacco: Never Used    Tobacco comment: social smoker - on weekends only - quit 20 yrs ago   Substance and Sexual Activity    Alcohol use: Yes     Alcohol/week: 0.0 oz     Frequency: Never     Comment: occ    Drug use: No    Sexual activity: Yes     Partners: Male   Lifestyle    Physical activity:     Days per week: Not on file     Minutes per session: Not on file    Stress: Not at all   Relationships    Social connections:     Talks on phone: Not on  file     Gets together: Not on file     Attends Druze service: Not on file     Active member of club or organization: Not on file     Attends meetings of clubs or organizations: Not on file     Relationship status: Not on file   Other Topics Concern    Not on file   Social History Narrative    Not on file     Current Outpatient Medications   Medication Sig Dispense Refill    alendronate (FOSAMAX) 70 MG tablet Take 1 tablet (70 mg total) by mouth every 7 days. 12 tablet 1    aspirin 325 MG tablet Take 1 tablet (325 mg total) by mouth 2 (two) times daily.  0    oxyCODONE-acetaminophen (PERCOCET) 7.5-325 mg per tablet Take 1 tablet by mouth every 6 (six) hours as needed for Pain. 28 tablet 0    fluticasone propionate (FLONASE) 50 mcg/actuation nasal spray 1 spray (50 mcg total) by Each Nare route once daily. 1 Bottle 0    loratadine (CLARITIN) 10 mg tablet Take 1 tablet (10 mg total) by mouth once daily. 30 tablet 0    meclizine (ANTIVERT) 25 mg tablet Take 1 tablet (25 mg total) by mouth 3 (three) times daily as needed. 30 tablet 0     No current facility-administered medications for this visit.      Review of patient's allergies indicates:  No Known Allergies   Past Medical History:   Diagnosis Date    Arthritis     Breast cancer     right    Wears glasses     WEARS CONTACS     Past Surgical History:   Procedure Laterality Date    ARTHROPLASTY, KNEE Right 6/10/2019    Performed by [facility]jo [facility] at [facility] OR    BREAST RECONSTRUCTION      CARPAL TUNNEL RELEASE       SECTION  1970, ,     CHOLECYSTECTOMY  2019        COLONOSCOPY  2017    CYSTOSCOPY WITH HYDRODISTENSION N/A 2012    Performed by Kaylan Fields MD at [facility] OR    FUSION, JOINT, FINGER - REVISION OF LEFT THUMB IP JOINT FUSION WITH REMOVAL OF PREVIOUS SCREW AND ( [facility]) ILIAC CREST BONE GRAFT Left 3/1/2019    Performed by Claude S. Williams [facility], [facility] at [facility] OR    FUSION, JOINT, THUMB Left  "12/19/2018    Per[facility] at [facility] OR    KNEE ARTHROSCOPY Bilateral 2008    MASTECTOMY Right 2007    PORTACATH PLACEMENT  2007    PORTACATH REMOVAL      SURGICAL REMOVAL OF BONE SPUR Bilateral 2002    TUBAL LIGATION  1976       Review of Systems   Constitutional: Negative for appetite change, chills and fever.   HENT: Negative for congestion, ear pain, hearing loss, rhinorrhea, sinus pressure, sinus pain, sneezing, sore throat and tinnitus.    Eyes: Negative for pain and visual disturbance.   Respiratory: Negative for cough and shortness of breath.    Cardiovascular: Negative for chest pain, palpitations and syncope.   Gastrointestinal: Positive for diarrhea (daily since her cholecystectomy ). Negative for abdominal pain, blood in stool, constipation, nausea and vomiting.   Genitourinary: Negative for dysuria and frequency.   Musculoskeletal: Negative for arthralgias and myalgias.   Skin: Negative for rash.   Neurological: Positive for dizziness and light-headedness. Negative for weakness and headaches.   Hematological: Negative for adenopathy.      OBJECTIVE:      Vitals:    07/10/19 1354   BP: 130/84   Pulse: 72   Resp: 17   Temp: 97.7 °F (36.5 °C)   TempSrc: Oral   SpO2: 98%   Weight: 71.1 kg (156 lb 11.2 oz)   Height: 4' 10" (1.473 m)     Physical Exam   Constitutional: She appears well-developed and well-nourished.   Obese    HENT:   Head: Normocephalic.   Right Ear: No drainage or tenderness. No mastoid tenderness. Tympanic membrane is not erythematous and not bulging. A middle ear effusion (serous, dull light reflex ) is present.   Left Ear: Tympanic membrane and ear canal normal.   Nose: Nose normal. No mucosal edema or rhinorrhea. Right sinus exhibits no maxillary sinus tenderness and no frontal sinus tenderness. Left sinus exhibits no maxillary sinus tenderness and no frontal sinus tenderness.   Mouth/Throat: Oropharynx is clear and moist and mucous membranes are normal. "   Eyes: Pupils are equal, round, and reactive to light. Conjunctivae are normal.   Neck: Neck supple.   Cardiovascular: Normal rate, regular rhythm and normal heart sounds.   No murmur heard.  Pulmonary/Chest: Effort normal and breath sounds normal. She has no wheezes. She has no rales.   Abdominal: Soft. Bowel sounds are normal. She exhibits no distension. There is no tenderness. There is no guarding.   Neurological: She is alert. She exhibits normal muscle tone. Coordination normal.   +dizziness when changes from lying to sitting positions   Skin: Skin is warm and dry.   Psychiatric: She has a normal mood and affect. Her behavior is normal.   Nursing note and vitals reviewed.     Assessment:       1. Vertigo    2. Acute serous otitis media of left ear, recurrence not specified    3. Osteopenia of multiple sites    4. BMI 32.0-32.9,adult        Plan:       Vertigo  -     meclizine (ANTIVERT) 25 mg tablet; Take 1 tablet (25 mg total) by mouth 3 (three) times daily as needed.  Dispense: 30 tablet; Refill: 0    Acute serous otitis media of left ear, recurrence not specified  -     fluticasone propionate (FLONASE) 50 mcg/actuation nasal spray; 1 spray (50 mcg total) by Each Nare route once daily.  Dispense: 1 Bottle; Refill: 0  -     loratadine (CLARITIN) 10 mg tablet; Take 1 tablet (10 mg total) by mouth once daily.  Dispense: 30 tablet; Refill: 0    Osteopenia of multiple sites   *will check vit D, never checked from last appt     BMI 32.0-32.9,adult  -     Vitamin D; Future; Expected date: 07/10/2019      Follow up if symptoms worsen or fail to improve.      7/10/2019 CARLOS Parker, FNP

## 2019-07-17 DIAGNOSIS — Z96.651 STATUS POST TOTAL KNEE REPLACEMENT, RIGHT: Primary | ICD-10-CM

## 2019-07-17 NOTE — TELEPHONE ENCOUNTER
----- Message from Josephine Manning sent at 7/17/2019  2:29 PM CDT -----  Contact: Patient  Requesting refill on Hydrocodone 7.5 mg. Last refill 7/1/19?

## 2019-07-17 NOTE — TELEPHONE ENCOUNTER
Spoke with pt and informed a prescription will be ready for her tomorrow, She will pick it up when she goes to PT

## 2019-07-18 RX ORDER — OXYCODONE AND ACETAMINOPHEN 5; 325 MG/1; MG/1
1 TABLET ORAL EVERY 6 HOURS PRN
Qty: 28 TABLET | Refills: 0 | Status: SHIPPED | OUTPATIENT
Start: 2019-07-18 | End: 2019-07-25

## 2019-07-25 ENCOUNTER — OFFICE VISIT (OUTPATIENT)
Dept: ORTHOPEDICS | Facility: CLINIC | Age: 69
End: 2019-07-25
Payer: MEDICARE

## 2019-07-25 VITALS
BODY MASS INDEX: 32.54 KG/M2 | WEIGHT: 155 LBS | SYSTOLIC BLOOD PRESSURE: 118 MMHG | DIASTOLIC BLOOD PRESSURE: 80 MMHG | HEIGHT: 58 IN | HEART RATE: 71 BPM

## 2019-07-25 DIAGNOSIS — Z96.651 S/P TOTAL KNEE ARTHROPLASTY, RIGHT: Primary | ICD-10-CM

## 2019-07-25 PROCEDURE — 99024 POSTOP FOLLOW-UP VISIT: CPT | Mod: POP,,, | Performed by: ORTHOPAEDIC SURGERY

## 2019-07-25 PROCEDURE — 73560 X-RAY EXAM OF KNEE 1 OR 2: CPT | Mod: RT,,, | Performed by: ORTHOPAEDIC SURGERY

## 2019-07-25 PROCEDURE — 99024 PR POST-OP FOLLOW-UP VISIT: ICD-10-PCS | Mod: POP,,, | Performed by: ORTHOPAEDIC SURGERY

## 2019-07-25 PROCEDURE — 73560 PR  X-RAY KNEE 1 OR 2 VIEW: ICD-10-PCS | Mod: RT,,, | Performed by: ORTHOPAEDIC SURGERY

## 2019-07-25 NOTE — LETTER
July 25, 2019      [facility]  [address]  [place] [address]  Phone: [phone]  Fax: [phone]       Patient: Camila Au   YOB: 1950  Date of Visit: 07/25/2019    To Whom It May Concern:    Darrin Au  was at our office on 07/25/2019.  She may return to work with no restrictions. If you have any questions or concerns, or if I can be of further assistance, please do not hesitate to contact me.    Sincerely,    Jony Marmolejo MD

## 2019-07-25 NOTE — PROGRESS NOTES
[facility] POST-OP NOTE    Subjective:           Chief Complaint:   Chief Complaint   Patient presents with    Right Knee - Post-op Evaluation      R-TKA 6/10/19. She is having some pain. She is in P.T is helping       Past Medical History:   Diagnosis Date    Arthritis     Breast cancer     right    Wears glasses     WEARS CONTACS       Past Surgical History:   Procedure Laterality Date    ARTHROPLASTY, KNEE Right 6/10/2019    Performed [facility] OR    BREAST RECONSTRUCTION      CARPAL TUNNEL RELEASE       SECTION  1970, ,     CHOLECYSTECTOMY  2019        COLONOSCOPY  2017    CYSTOSCOPY WITH HYDRODISTENSION N/A 2012    Performed by Kaylan Fields [facility] at [facility] OR    FUSION, JOINT, FINGER - REVISION OF LEFT THUMB IP JOINT FUSION WITH REMOVAL OF PREVIOUS SCREW AND ( [facility]) ILIAC CREST BONE GRAFT Left 3/1/2019    Performed by Claude S. Williams IV, MD at [facility] [place] [place]    FUSION, JOINT, THUMB Left 2018    Performed by Claude S. Williams IV, MD at [facility] [place] [place]    KNEE ARTHROSCOPY Bilateral 2008    MASTECTOMY Right 2007    PORTACATH PLACEMENT  2007    PORTACATH REMOVAL      SURGICAL REMOVAL OF BONE SPUR Bilateral     TUBAL LIGATION         Current Outpatient Medications   Medication Sig    alendronate (FOSAMAX) 70 MG tablet Take 1 tablet (70 mg total) by mouth every 7 days.    aspirin 325 MG tablet Take 1 tablet (325 mg total) by mouth 2 (two) times daily.     No current facility-administered medications for this visit.        Review of patient's allergies indicates:  No Known Allergies    Family History   Problem Relation Age of Onset    Arthritis Mother     Hyperlipidemia Mother     Stroke Mother     Dementia Mother        Social History     Socioeconomic History    Marital status:      Spouse name: Not on file    Number of children: Not on file    Years of education: Not on file    Highest education  level: Not on file   Occupational History    Not on file   Social Needs    Financial resource strain: Not on file    Food insecurity:     Worry: Not on file     Inability: Not on file    Transportation needs:     Medical: Not on file     Non-medical: Not on file   Tobacco Use    Smoking status: Former Smoker     Types: Cigarettes    Smokeless tobacco: Never Used    Tobacco comment: social smoker - on weekends only - quit 20 yrs ago   Substance and Sexual Activity    Alcohol use: Yes     Alcohol/week: 0.0 oz     Frequency: Never     Comment: occ    Drug use: No    Sexual activity: Yes     Partners: Male   Lifestyle    Physical activity:     Days per week: Not on file     Minutes per session: Not on file    Stress: Not at all   Relationships    Social connections:     Talks on phone: Not on file     Gets together: Not on file     Attends Am[employer] service: Not on file     Active member of club or organization: Not on file     Attends meetings of clubs or organizations: Not on file     Relationship status: Not on file   Other Topics Concern    Not on file   Social History Narrative    Not on file       History of present illness: Patient comes in today for the right knee. She is doing well. She continues to improve. Her motion is improving. Her pain is decreasing significantly. She is no longer taking any pain medication      Review of Systems:    Musculoskeletal:  See HPI      Objective:        Physical Examination:    Vital Signs:    Vitals:    07/25/19 1109   BP: 118/80   Pulse: 71       Body mass index is 32.4 kg/m².    This a well-developed, well nourished patient in no acute distress.  They are alert and oriented and cooperative to examination.        Range of motion is 0-95 degrees. Her wounds are clean dry and intact. Her calf is soft.  Pertinent New Results:    XRAY Report / Interpretation:   AP lateral sunrise views demonstrate a right total knee to be in ideal position    Assessment/Plan:       Will following total knee arthroplasty. Continue therapy now 2 days a week. Follow-up in 3 months    This note was created using Dragon voice recognition software that occasionally misinterpreted phrases or words.

## 2019-07-31 ENCOUNTER — TELEPHONE (OUTPATIENT)
Dept: ORTHOPEDICS | Facility: CLINIC | Age: 69
End: 2019-07-31

## 2019-07-31 NOTE — TELEPHONE ENCOUNTER
Spoke with pt, having some knee pain on & off at night. Will take Motrin before going to bed, if still having pain in a couple of days or gets worse or consistent, she will call

## 2019-07-31 NOTE — TELEPHONE ENCOUNTER
----- Message from Mi Cifuentes sent at 7/31/2019 10:06 AM CDT -----  Contact: patient   Pt called and asked if you could call her when ever you get a minute.    PTS # [phone]

## 2019-08-19 ENCOUNTER — TELEPHONE (OUTPATIENT)
Dept: ORTHOPEDICS | Facility: CLINIC | Age: 69
End: 2019-08-19

## 2019-08-19 NOTE — TELEPHONE ENCOUNTER
Spoke with pt, she fell Friday and hit her knee and arm. She states her knee is sore but feels ok,her arm hurts but is a little better. She is going to wait an couple of days and if still bothering her she will call  back and make an leonard't

## 2019-08-19 NOTE — TELEPHONE ENCOUNTER
----- Message from Betina Wilson sent at 8/19/2019 12:06 PM CDT -----  Contact: Patient  Patient had surgery on her right knee and Friday she fell, now her right knee/ and left arm is in pain. Patient needs you to call her back. [phone]

## 2019-09-05 ENCOUNTER — TELEPHONE (OUTPATIENT)
Dept: ORTHOPEDICS | Facility: CLINIC | Age: 69
End: 2019-09-05

## 2019-09-05 NOTE — TELEPHONE ENCOUNTER
----- Message from Betina Wilson sent at 9/5/2019  2:35 PM CDT -----  Contact: Patient  Patient needs a prescription of ibuprofen . [phone]

## 2019-09-26 ENCOUNTER — OFFICE VISIT (OUTPATIENT)
Dept: ORTHOPEDICS | Facility: CLINIC | Age: 69
End: 2019-09-26
Payer: MEDICARE

## 2019-09-26 VITALS
BODY MASS INDEX: 32.75 KG/M2 | DIASTOLIC BLOOD PRESSURE: 70 MMHG | HEIGHT: 58 IN | SYSTOLIC BLOOD PRESSURE: 120 MMHG | HEART RATE: 71 BPM | WEIGHT: 156 LBS

## 2019-09-26 DIAGNOSIS — M25.562 ACUTE PAIN OF LEFT KNEE: ICD-10-CM

## 2019-09-26 DIAGNOSIS — M17.12 PRIMARY OSTEOARTHRITIS OF LEFT KNEE: Primary | ICD-10-CM

## 2019-09-26 PROCEDURE — 20610 DRAIN/INJ JOINT/BURSA W/O US: CPT | Mod: LT,S$GLB,, | Performed by: ORTHOPAEDIC SURGERY

## 2019-09-26 PROCEDURE — 99213 OFFICE O/P EST LOW 20 MIN: CPT | Mod: 25,S$GLB,, | Performed by: ORTHOPAEDIC SURGERY

## 2019-09-26 PROCEDURE — 99213 PR OFFICE/OUTPT VISIT, EST, LEVL III, 20-29 MIN: ICD-10-PCS | Mod: 25,S$GLB,, | Performed by: ORTHOPAEDIC SURGERY

## 2019-09-26 PROCEDURE — 20610 LARGE JOINT ASPIRATION/INJECTION: L KNEE: ICD-10-PCS | Mod: LT,S$GLB,, | Performed by: ORTHOPAEDIC SURGERY

## 2019-09-26 RX ORDER — METHYLPREDNISOLONE ACETATE 40 MG/ML
40 INJECTION, SUSPENSION INTRA-ARTICULAR; INTRALESIONAL; INTRAMUSCULAR; SOFT TISSUE
Status: DISCONTINUED | OUTPATIENT
Start: 2019-09-26 | End: 2019-09-26 | Stop reason: HOSPADM

## 2019-09-26 RX ADMIN — METHYLPREDNISOLONE ACETATE 40 MG: 40 INJECTION, SUSPENSION INTRA-ARTICULAR; INTRALESIONAL; INTRAMUSCULAR; SOFT TISSUE at 09:09

## 2019-09-26 NOTE — PROGRESS NOTES
[facility]    Subjective:     Chief Complaint:   Chief Complaint   Patient presents with    Left Knee - Pain     Left knee pain x few months. Pain is worse with activity. Does bother her to walk and go up and down stairs.       Past Medical History:   Diagnosis Date    Arthritis     Breast cancer     right    Wears glasses     WEARS CONTACS       Past Surgical History:   Procedure Laterality Date    BREAST RECONSTRUCTION      CARPAL TUNNEL RELEASE       SECTION  1970, ,     CHOLECYSTECTOMY  2019        COLONOSCOPY  2017    KNEE ARTHROPLASTY Right 6/10/2019    Procedure: ARTHROPLASTY, KNEE;  Surgeon: Jony Marmolejo MD;  [facility];  Service: Orthopedics;  Laterality: Right;    KNEE ARTHROSCOPY Bilateral 2008    MASTECTOMY Right 2007    PORTACATH PLACEMENT  2007    PORTACATH REMOVAL      SURGICAL REMOVAL OF BONE SPUR Bilateral     TUBAL LIGATION         Current Outpatient Medications   Medication Sig    alendronate (FOSAMAX) 70 MG tablet Take 1 tablet (70 mg total) by mouth every 7 days.    aspirin 325 MG tablet Take 1 tablet (325 mg total) by mouth 2 (two) times daily.     No current facility-administered medications for this visit.        Review of patient's allergies indicates:  No Known Allergies    Family History   Problem Relation Age of Onset    Arthritis Mother     Hyperlipidemia Mother     Stroke Mother     Dementia Mother        Social History     Socioeconomic History    Marital status:      Spouse name: Not on file    Number of children: Not on file    Years of education: Not on file    Highest education level: Not on file   Occupational History    Not on file   Social Needs    Financial resource strain: Not on file    Food insecurity:     Worry: Not on file     Inability: Not on file    Transportation needs:     Medical: Not on file     Non-medical: Not on file   Tobacco Use    Smoking status: Former  Smoker     Types: Cigarettes    Smokeless tobacco: Never Used    Tobacco comment: social smoker - on weekends only - quit 20 yrs ago   Substance and Sexual Activity    Alcohol use: Yes     Alcohol/week: 0.0 standard drinks     Frequency: Never     Comment: occ    Drug use: No    Sexual activity: Yes     Partners: Male   Lifestyle    Physical activity:     Days per week: Not on file     Minutes per session: Not on file    Stress: Not at all   Relationships    Social connections:     Talks on phone: Not on file     Gets together: Not on file     Attends Christian service: Not on file     Active member of club or organization: Not on file     Attends meetings of clubs or organizations: Not on file     Relationship status: Not on file   Other Topics Concern    Not on file   Social History Narrative    Not on file       History of present illness: Patient comes in today for the left knee. She's having a lot of achy left knee pain      Review of Systems:    Constitution: Negative for chills, fever, and sweats.  Negative for unexplained weight loss.    HENT:  Negative for headaches and blurry vision.    Cardiovascular:Negative for chest pain or irregular heart beat. Negative for hypertension.    Respiratory:  Negative for cough and shortness of breath.    Gastrointestinal: Negative for abdominal pain, heartburn, melena, nausea, and vomitting.    Genitourinary:  Negative bladder incontinence and dysuria.    Musculoskeletal:  See HPI for details.     Neurological: Negative for numbness.    Psychiatric/Behavioral: Negative for depression.  The patient is not nervous/anxious.      Endocrine: Negative for polyuria    Hematologic/Lymphatic: Negative for bleeding problem.  Does not bruise/bleed easily.    Skin: Negative for poor would healing and rash    Objective:      Physical Examination:    Vital Signs:    Vitals:    09/26/19 0915   BP: 120/70   Pulse: 71       Body mass index is 32.6 kg/m².    This a  well-developed, well nourished patient in no acute distress.  They are alert and oriented and cooperative to examination.        Patient has full range of motion of the left knee. Significant anterior crepitus. 1+ effusion. Her knee stable to varus valgus stresses.  Pertinent New Results:    XRAY Report / Interpretation:       Assessment/Plan:      Advanced arthritis left knee. I injected the left knee with Depo-Medrol and lidocaine. Follow-up when necessary      This note was created using Dragon voice recognition software that occasionally misinterpreted phrases or words.

## 2019-09-26 NOTE — PROCEDURES
Large Joint Aspiration/Injection: L knee  Date/Time: 9/26/2019 9:15 AM  Performed by: Jony Marmolejo MD  Authorized by: Jony Marmolejo MD     Consent Done?:  Yes (Verbal)  Indications:  Pain  Procedure site marked: Yes    Timeout: Prior to procedure the correct patient, procedure, and site was verified    Anesthesia  Local anesthesia used  Anesthetic: lidocaine 1% without epinephrine    Location:  Knee  Site:  L knee  Prep: Patient was prepped and draped in usual sterile fashion    Needle size:  25 G  Medications:  40 mg methylPREDNISolone acetate 40 mg/mL; 40 mg methylPREDNISolone acetate 40 mg/mL  Patient tolerance:  Patient tolerated the procedure well with no immediate complications

## 2019-10-02 NOTE — PROGRESS NOTES
SUBJECTIVE:    Patient ID: Camila Au is a 69 y.o. female.    Chief Complaint: Hypocalcaemia and Follow-up    HPI     Patient has had multiple surgeries     Post Cholecystectomy she has had severe diarrhea post prandial, sometimes associated with cramps.    She is doing well post knee replacement but she is experiencing bone pain along the surgical site going down the front of the right leg-      Admission on 06/10/2019, Discharged on 06/11/2019   Component Date Value Ref Range Status    WBC 06/11/2019 8.40  3.90 - 12.70 K/uL Final    RBC 06/11/2019 3.93* 4.00 - 5.40 M/uL Final    Hemoglobin 06/11/2019 12.8  12.0 - 16.0 g/dL Final    Hematocrit 06/11/2019 37.8  37.0 - 48.5 % Final    Mean Corpuscular Volume 06/11/2019 96  82 - 98 fL Final    Mean Corpuscular Hemoglobin 06/11/2019 32.6* 27.0 - 31.0 pg Final    Mean Corpuscular Hemoglobin Conc 06/11/2019 33.9  32.0 - 36.0 g/dL Final    RDW 06/11/2019 13.3  11.5 - 14.5 % Final    Platelets 06/11/2019 173  150 - 350 K/uL Final    MPV 06/11/2019 10.1  9.2 - 12.9 fL Final    Gran # (ANC) 06/11/2019 6.0  1.8 - 7.7 K/uL Final    Lymph # 06/11/2019 1.4  1.0 - 4.8 K/uL Final    Mono # 06/11/2019 0.8  0.3 - 1.0 K/uL Final    Eos # 06/11/2019 0.2  0.0 - 0.5 K/uL Final    Baso # 06/11/2019 0.00  0.00 - 0.20 K/uL Final    Gran% 06/11/2019 71.1  38.0 - 73.0 % Final    Lymph% 06/11/2019 17.2* 18.0 - 48.0 % Final    Mono% 06/11/2019 9.3  4.0 - 15.0 % Final    Eosinophil% 06/11/2019 1.9  0.0 - 8.0 % Final    Basophil% 06/11/2019 0.5  0.0 - 1.9 % Final    Differential Method 06/11/2019 Automated   Final    Sodium 06/11/2019 138  136 - 145 mmol/L Final    Potassium 06/11/2019 4.2  3.5 - 5.1 mmol/L Final    Chloride 06/11/2019 104  95 - 110 mmol/L Final    CO2 06/11/2019 25  23 - 29 mmol/L Final    Glucose 06/11/2019 114* 70 - 110 mg/dL Final    BUN, Bld 06/11/2019 12  8 - 23 mg/dL Final    Creatinine 06/11/2019 0.6  0.5 - 1.4 mg/dL Final     Calcium 06/11/2019 8.9  8.7 - 10.5 mg/dL Final    Anion Gap 06/11/2019 9  8 - 16 mmol/L Final    eGFR if African American 06/11/2019 >60  >60 mL/min/1.73 m^2 Final    eGFR if non African American 06/11/2019 >60  >60 mL/min/1.73 m^2 Final   Hospital Outpatient Visit on 05/30/2019   Component Date Value Ref Range Status    WBC 05/30/2019 7.80  3.90 - 12.70 K/uL Final    RBC 05/30/2019 4.63  4.00 - 5.40 M/uL Final    Hemoglobin 05/30/2019 15.0  12.0 - 16.0 g/dL Final    Hematocrit 05/30/2019 43.8  37.0 - 48.5 % Final    Mean Corpuscular Volume 05/30/2019 95  82 - 98 fL Final    Mean Corpuscular Hemoglobin 05/30/2019 32.4* 27.0 - 31.0 pg Final    Mean Corpuscular Hemoglobin Conc 05/30/2019 34.2  32.0 - 36.0 g/dL Final    RDW 05/30/2019 13.3  11.5 - 14.5 % Final    Platelets 05/30/2019 186  150 - 350 K/uL Final    MPV 05/30/2019 10.1  9.2 - 12.9 fL Final    Lymph # 05/30/2019 CANCELED  1.0 - 4.8 K/uL Final    Mono # 05/30/2019 CANCELED  0.3 - 1.0 K/uL Final    Eos # 05/30/2019 CANCELED  0.0 - 0.5 K/uL Final    Baso # 05/30/2019 CANCELED  0.00 - 0.20 K/uL Final    Gran% 05/30/2019 53.0  38.0 - 73.0 % Final    Lymph% 05/30/2019 33.0  18.0 - 48.0 % Final    Mono% 05/30/2019 12.0  4.0 - 15.0 % Final    Eosinophil% 05/30/2019 2.0  0.0 - 8.0 % Final    Basophil% 05/30/2019 0.0  0.0 - 1.9 % Final    Platelet Estimate 05/30/2019 Appears normal   Final    Differential Method 05/30/2019 Manual   Final    Sodium 05/30/2019 140  136 - 145 mmol/L Final    Potassium 05/30/2019 3.7  3.5 - 5.1 mmol/L Final    Chloride 05/30/2019 102  95 - 110 mmol/L Final    CO2 05/30/2019 28  23 - 29 mmol/L Final    Glucose 05/30/2019 96  70 - 110 mg/dL Final    BUN, Bld 05/30/2019 12  8 - 23 mg/dL Final    Creatinine 05/30/2019 0.7  0.5 - 1.4 mg/dL Final    Calcium 05/30/2019 9.7  8.7 - 10.5 mg/dL Final    Total Protein 05/30/2019 7.5  6.0 - 8.4 g/dL Final    Albumin 05/30/2019 3.9  3.5 - 5.2 g/dL Final    Total  Bilirubin 2019 0.8  0.1 - 1.0 mg/dL Final    Alkaline Phosphatase 2019 131  55 - 135 U/L Final    AST 2019 80* 10 - 40 U/L Final    ALT 2019 63* 10 - 44 U/L Final    Anion Gap 2019 10  8 - 16 mmol/L Final    eGFR if African American 2019 >60  >60 mL/min/1.73 m^2 Final    eGFR if non African American 2019 >60  >60 mL/min/1.73 m^2 Final    Specimen UA 2019 Urine, Catheterized   Final    Color, UA 2019 Yellow  Yellow, Straw, Mayra Final    Appearance, UA 2019 Clear  Clear Final    pH, UA 2019 6.0  5.0 - 8.0 Final    Specific Gravity, UA 2019 1.015  1.005 - 1.030 Final    Protein, UA 2019 Negative  Negative Final    Glucose, UA 2019 Negative  Negative Final    Ketones, UA 2019 Negative  Negative Final    Bilirubin (UA) 2019 Negative  Negative Final    Occult Blood UA 2019 Negative  Negative Final    Nitrite, UA 2019 Negative  Negative Final    Urobilinogen, UA 2019 Negative  <2.0 EU/dL Final    Leukocytes, UA 2019 Negative  Negative Final    Group & Rh 2019 O POS   Final    Indirect Arthur 2019 NEG   Final    MRSA Surveillance Screen 2019 No MRSA isolated   Final       Past Medical History:   Diagnosis Date    Arthritis     Breast cancer     right    Wears glasses     WEARS CONTACS     Past Surgical History:   Procedure Laterality Date    BREAST RECONSTRUCTION      CARPAL TUNNEL RELEASE       SECTION  , ,     CHOLECYSTECTOMY  2019        COLONOSCOPY  2017    KNEE ARTHROPLASTY Right 6/10/2019    Procedure: ARTHROPLASTY, KNEE;  Surgeon: Jony Marmolejo MD;  [facility]: [facility];  Service: Orthopedics;  Laterality: Right;    KNEE ARTHROSCOPY Bilateral 2008    MASTECTOMY Right 2007    PORTACATH PLACEMENT  2007    PORTACATH REMOVAL      SURGICAL REMOVAL OF BONE SPUR Bilateral     TUBAL LIGATION    "    Family History   Problem Relation Age of Onset    Arthritis Mother     Hyperlipidemia Mother     Stroke Mother     Dementia Mother        Marital Status:   Alcohol History:  reports that she drinks alcohol.  Tobacco History:  reports that she has quit smoking. Her smoking use included cigarettes. She has never used smokeless tobacco.  Drug History:  reports that she does not use drugs.    Review of patient's allergies indicates:  No Known Allergies    Current Outpatient Medications:     alendronate (FOSAMAX) 70 MG tablet, Take 1 tablet (70 mg total) by mouth every 7 days., Disp: 12 tablet, Rfl: 1    Review of Systems   Constitutional: Negative for chills, fatigue, fever and unexpected weight change.   HENT: Negative for congestion, ear pain, hearing loss, sinus pain and sore throat.    Eyes: Negative for pain and visual disturbance.   Respiratory: Negative for cough, shortness of breath and wheezing.    Cardiovascular: Negative for chest pain, palpitations and leg swelling.   Gastrointestinal: Negative for abdominal pain, blood in stool, constipation, diarrhea (HPI), nausea and vomiting.   Endocrine: Negative for cold intolerance and heat intolerance.   Genitourinary: Negative for difficulty urinating, dysuria (intermittent burning and poor control), frequency, pelvic pain and urgency.   Musculoskeletal: Positive for arthralgias (HPI). Negative for back pain, joint swelling and neck pain.   Skin: Negative for pallor and rash.   Neurological: Negative for dizziness, tremors, weakness, numbness and headaches.   Hematological: Does not bruise/bleed easily.   Psychiatric/Behavioral: Negative for agitation, sleep disturbance (wakes up for last three months) and suicidal ideas.          Objective:      Vitals:    10/03/19 0946   BP: 126/70   Pulse: 84   Resp: 16   Temp: 98.5 °F (36.9 °C)   SpO2: 96%   Weight: 70.3 kg (155 lb)   Height: 4' 10" (1.473 m)     Physical Exam   Constitutional: She is oriented " to person, place, and time. She appears well-developed and well-nourished. She is cooperative. No distress.   HENT:   Head: Normocephalic and atraumatic.   Right Ear: Tympanic membrane normal.   Left Ear: Tympanic membrane normal.   Nose: Nose normal.   Mouth/Throat: Uvula is midline and mucous membranes are normal.   Eyes: Pupils are equal, round, and reactive to light. Conjunctivae and EOM are normal. Right eye exhibits no discharge. Left eye exhibits no discharge. No scleral icterus. Right pupil is round and reactive. Left pupil is round and reactive.   Neck: Trachea normal and normal range of motion. Neck supple. No JVD present. Carotid bruit is not present. No thyromegaly present.   Cardiovascular: Normal rate, regular rhythm and intact distal pulses. Exam reveals no gallop and no friction rub.   No murmur heard.  Pulmonary/Chest: Effort normal and breath sounds normal. No respiratory distress. She has no wheezes. She has no rales.   Abdominal: Soft. Bowel sounds are normal. She exhibits no distension and no mass. There is no tenderness. There is no guarding. No hernia.   Musculoskeletal: Normal range of motion. She exhibits no edema.   Neurological: She is alert and oriented to person, place, and time. She has normal strength.   Skin: Skin is warm and dry. Capillary refill takes less than 2 seconds. No lesion and no rash noted. No cyanosis. Nails show no clubbing.   Psychiatric: She has a normal mood and affect. Her speech is normal and behavior is normal. Judgment and thought content normal.   Nursing note and vitals reviewed.        Assessment:       1. Diarrhea following gastrointestinal surgery    2. Pure hypercholesterolemia    3. Dysuria    4. Osteopenia of multiple sites    5. Primary insomnia    6. Need for prophylactic vaccination and inoculation against influenza    7. Routine general medical examination at a health care facility    8. BMI 32.0-32.9,adult         Plan:          Diarrhea following  gastrointestinal surgery         -     Colestid packets 5 grams two times daily     Pure hypercholesterolemia         -     Check lipids             Dysuria        -      Consider  eval for cystoscopy    Osteopenia of multiple sites        -     Fosamax 70 mg weekly no 12 with 1 refill    Need for prophylactic vaccination and inoculation against influenza        -     High dose flu vaccine    Routine general medical examination at a health care facility    BMI 32.0-32.9,adult      Follow up in about 3 months (around 1/3/2020) for FOLLOW UP LABS, FOLLOW-UP STATUS.        10/6/2019 Kasie Arthur M.D.

## 2019-10-03 ENCOUNTER — OFFICE VISIT (OUTPATIENT)
Dept: FAMILY MEDICINE | Facility: CLINIC | Age: 69
End: 2019-10-03
Payer: MEDICARE

## 2019-10-03 VITALS
TEMPERATURE: 99 F | OXYGEN SATURATION: 96 % | RESPIRATION RATE: 16 BRPM | HEART RATE: 84 BPM | HEIGHT: 58 IN | BODY MASS INDEX: 32.54 KG/M2 | WEIGHT: 155 LBS | SYSTOLIC BLOOD PRESSURE: 126 MMHG | DIASTOLIC BLOOD PRESSURE: 70 MMHG

## 2019-10-03 DIAGNOSIS — F51.01 PRIMARY INSOMNIA: ICD-10-CM

## 2019-10-03 DIAGNOSIS — Z98.890 DIARRHEA FOLLOWING GASTROINTESTINAL SURGERY: Primary | ICD-10-CM

## 2019-10-03 DIAGNOSIS — R30.0 DYSURIA: ICD-10-CM

## 2019-10-03 DIAGNOSIS — R19.7 DIARRHEA FOLLOWING GASTROINTESTINAL SURGERY: Primary | ICD-10-CM

## 2019-10-03 DIAGNOSIS — E78.00 PURE HYPERCHOLESTEROLEMIA: ICD-10-CM

## 2019-10-03 DIAGNOSIS — Z00.00 ROUTINE GENERAL MEDICAL EXAMINATION AT A HEALTH CARE FACILITY: ICD-10-CM

## 2019-10-03 DIAGNOSIS — M85.89 OSTEOPENIA OF MULTIPLE SITES: ICD-10-CM

## 2019-10-03 DIAGNOSIS — Z23 NEED FOR PROPHYLACTIC VACCINATION AND INOCULATION AGAINST INFLUENZA: ICD-10-CM

## 2019-10-03 PROCEDURE — 90662 IIV NO PRSV INCREASED AG IM: CPT | Mod: PBBFAC | Performed by: INTERNAL MEDICINE

## 2019-10-03 PROCEDURE — 99214 OFFICE O/P EST MOD 30 MIN: CPT | Mod: 25 | Performed by: INTERNAL MEDICINE

## 2019-10-03 PROCEDURE — 99214 PR OFFICE/OUTPT VISIT, EST, LEVL IV, 30-39 MIN: ICD-10-PCS | Mod: S$PBB,,, | Performed by: INTERNAL MEDICINE

## 2019-10-03 PROCEDURE — 99214 OFFICE O/P EST MOD 30 MIN: CPT | Mod: S$PBB,,, | Performed by: INTERNAL MEDICINE

## 2019-10-03 NOTE — PATIENT INSTRUCTIONS
[facility]atalie fusion [facility]    The patient is asked to make an attempt to improve diet and exercise patterns to aid in medical management of this problem.  Low-Cholesterol Diet  Your body needs cholesterol to build new cells and create certain hormones. There are 2 kinds of cholesterol in your blood:     · HDL (good) cholesterol. This prevents fat deposits (plaque) from building up in your arteries. In this way it protects against heart disease and stroke.  · LDL (bad) cholesterol. This stays in your body and sticks to artery walls. Over time it may block blood flow to the heart and brain. This can cause a heart attack or stroke.  The cholesterol in your blood comes from 2 sources: cholesterol in food that you eat and cholesterol that your liver makes. You should limit the amount of cholesterol in your diet. But the cholesterol that your body makes has the greatest disease risk. And your body makes more cholesterol when your diet is high in bad fats (saturated and trans fats). There are 2 kinds of fats you can eat:  · Good fats, or unsaturated fats (mono-unsaturated and poly-unsaturated). They raise the level of good cholesterol and lower the level of bad cholesterol. Good fats are found in vegetable oils such as olive, sunflower, corn, and soybean oils, and in nuts and seeds.  · Bad fats, or saturated fats (including foods high in cholesterol) and trans fats. These raise your risk of disease. They lower the good cholesterol and raise the level of bad cholesterol. Bad fats are found in animal products, including meat, whole-milk dairy products, and butter. Some plants are also high in bad fats (coconut and palm plants). Trans fats are found in hard (stick) margarines. They are also in many fast foods and commercially baked goods. Soft margarine sold in tubs has fewer trans fats and is safer to use.  High blood cholesterol is usually due to a diet high in saturated fat, along with not being physically active. In  some cases, genetics plays a role in causing high cholesterol. The tips below will help you create healthy eating habits that will help lower your blood cholesterol level.  Create a diet high in good fats, low in bad fats (and low in cholesterol)  The following steps will help you create a diet high in good fats and low in bad fats:  · Talk with your doctor before starting a low cholesterol diet or weight loss program.  · Learn to read nutrition labels and select appropriate portion sizes.  · When cooking, use plant-based unsaturated vegetable oils (sunflower, corn, soybean, canola, peanut, and olive oils).  · Avoid saturated fats found in animal products such as meat, dairy (whole-milk, cheese and ice cream), poultry skin, and egg yolks. Plants high in saturated oils include coconut oil, palm oil, and palm kernel oil.  · If you eat meat, choose smaller portions and lean cuts, such as round, avery, sirloin, or loin. Eat more meatless meals.  · Replace meat with fish at least 2 times a week. Fish is an important source of the unsaturated fat called omega-3 fatty acids. This fat has potential to lower the risk of heart disease.  · Replace whole-milk dairy products with low-fat or nonfat products. Try soy products. Soy helps to reduce total cholesterol.  · Supplement your diet with protective fibers. Eat nuts, seeds, and whole grains rather than white rice and bread. These foods lower both cholesterol and triglyceride levels. (Triglycerides are another fat found in the blood.) Walnuts are one of the best sources of omega-3 fatty acids.  · Eat plenty of fresh fruits and vegetables daily.  · Avoid fast foods and commercial baked goods. Assume they contain saturated fat unless labeled otherwise.  Date Last Reviewed: 8/1/2016 © 2000-2017 [employer]. [address] [place] [address], [place] [address]. All rights reserved. This information is not intended as a substitute for professional medical care. Always follow  your healthcare professional's instructions.        Low-Cholesterol Diet  Your body needs cholesterol to build new cells and create certain hormones. There are 2 kinds of cholesterol in your blood:     · HDL (good) cholesterol. This prevents fat deposits (plaque) from building up in your arteries. In this way it protects against heart disease and stroke.  · LDL (bad) cholesterol. This stays in your body and sticks to artery walls. Over time it may block blood flow to the heart and brain. This can cause a heart attack or stroke.  The cholesterol in your blood comes from 2 sources: cholesterol in food that you eat and cholesterol that your liver makes. You should limit the amount of cholesterol in your diet. But the cholesterol that your body makes has the greatest disease risk. And your body makes more cholesterol when your diet is high in bad fats (saturated and trans fats). There are 2 kinds of fats you can eat:  · Good fats, or unsaturated fats (mono-unsaturated and poly-unsaturated). They raise the level of good cholesterol and lower the level of bad cholesterol. Good fats are found in vegetable oils such as olive, sunflower, corn, and soybean oils, and in nuts and seeds.  · Bad fats, or saturated fats (including foods high in cholesterol) and trans fats. These raise your risk of disease. They lower the good cholesterol and raise the level of bad cholesterol. Bad fats are found in animal products, including meat, whole-milk dairy products, and butter. Some plants are also high in bad fats (coconut and palm plants). Trans fats are found in hard (stick) margarines. They are also in many fast foods and commercially baked goods. Soft margarine sold in tubs has fewer trans fats and is safer to use.  High blood cholesterol is usually due to a diet high in saturated fat, along with not being physically active. In some cases, genetics plays a role in causing high cholesterol. The tips below will help you create  healthy eating habits that will help lower your blood cholesterol level.  Create a diet high in good fats, low in bad fats (and low in cholesterol)  The following steps will help you create a diet high in good fats and low in bad fats:  · Talk with your doctor before starting a low cholesterol diet or weight loss program.  · Learn to read nutrition labels and select appropriate portion sizes.  · When cooking, use plant-based unsaturated vegetable oils (sunflower, corn, soybean, canola, peanut, and olive oils).  · Avoid saturated fats found in animal products such as meat, dairy (whole-milk, cheese and ice cream), poultry skin, and egg yolks. Plants high in saturated oils include coconut oil, palm oil, and palm kernel oil.  · If you eat meat, choose smaller portions and lean cuts, such as round, avery, sirloin, or loin. Eat more meatless meals.  · Replace meat with fish at least 2 times a week. Fish is an important source of the unsaturated fat called omega-3 fatty acids. This fat has potential to lower the risk of heart disease.  · Replace whole-milk dairy products with low-fat or nonfat products. Try soy products. Soy helps to reduce total cholesterol.  · Supplement your diet with protective fibers. Eat nuts, seeds, and whole grains rather than white rice and bread. These foods lower both cholesterol and triglyceride levels. (Triglycerides are another fat found in the blood.) Walnuts are one of the best sources of omega-3 fatty acids.  · Eat plenty of fresh fruits and vegetables daily.  · Avoid fast foods and commercial baked goods. Assume they contain saturated fat unless labeled otherwise.  Date Last Reviewed: 8/1/2016  © 6948-9292 [employer]. [address] [place] [address]. All rights reserved. This information is not intended as a substitute for professional medical care. Always follow your healthcare professional's instructions.

## 2019-10-06 RX ORDER — ALENDRONATE SODIUM 70 MG/1
70 TABLET ORAL
Qty: 12 TABLET | Refills: 1 | Status: SHIPPED | OUTPATIENT
Start: 2019-10-06 | End: 2020-04-14 | Stop reason: SDUPTHER

## 2019-10-06 RX ORDER — COLESTIPOL HYDROCHLORIDE 5 G/5G
5 GRANULE, FOR SUSPENSION ORAL 2 TIMES DAILY
Qty: 180 PACKET | Refills: 3 | Status: ON HOLD | OUTPATIENT
Start: 2019-10-06 | End: 2021-05-31 | Stop reason: ALTCHOICE

## 2019-10-14 ENCOUNTER — TELEPHONE (OUTPATIENT)
Dept: FAMILY MEDICINE | Facility: CLINIC | Age: 69
End: 2019-10-14

## 2019-10-14 NOTE — TELEPHONE ENCOUNTER
The following medication needs a prior authorization:     Medication Name: colestipol (COLESTID) 5 gram Pack    Dosage: 5 mg    Frequency: Take 5 g by mouth 2 (two) times daily. - Oral    Directions for use: Take 5 g by mouth 2 (two) times daily. - Oral    Diagnosis: Diarrhea following gastrointestinal surgery  - Primary     Is the request for a reauthorization? pa    Is the patient currently stable on therapy? New medication    Please list all therapeutic alternatives previously used with start/end dates and outcome:

## 2019-10-24 ENCOUNTER — OFFICE VISIT (OUTPATIENT)
Dept: ORTHOPEDICS | Facility: CLINIC | Age: 69
End: 2019-10-24
Payer: MEDICARE

## 2019-10-24 VITALS
SYSTOLIC BLOOD PRESSURE: 127 MMHG | DIASTOLIC BLOOD PRESSURE: 80 MMHG | HEART RATE: 83 BPM | BODY MASS INDEX: 32.54 KG/M2 | WEIGHT: 155 LBS | HEIGHT: 58 IN

## 2019-10-24 DIAGNOSIS — Z96.651 HISTORY OF TOTAL KNEE ARTHROPLASTY, RIGHT: Primary | ICD-10-CM

## 2019-10-24 DIAGNOSIS — M17.12 PRIMARY OSTEOARTHRITIS OF LEFT KNEE: ICD-10-CM

## 2019-10-24 PROCEDURE — 20610 LARGE JOINT ASPIRATION/INJECTION: L KNEE: ICD-10-PCS | Mod: LT,S$GLB,, | Performed by: ORTHOPAEDIC SURGERY

## 2019-10-24 PROCEDURE — 99213 PR OFFICE/OUTPT VISIT, EST, LEVL III, 20-29 MIN: ICD-10-PCS | Mod: 25,S$GLB,, | Performed by: ORTHOPAEDIC SURGERY

## 2019-10-24 PROCEDURE — 20610 DRAIN/INJ JOINT/BURSA W/O US: CPT | Mod: LT,S$GLB,, | Performed by: ORTHOPAEDIC SURGERY

## 2019-10-24 PROCEDURE — 99213 OFFICE O/P EST LOW 20 MIN: CPT | Mod: 25,S$GLB,, | Performed by: ORTHOPAEDIC SURGERY

## 2019-10-24 RX ORDER — METHYLPREDNISOLONE ACETATE 40 MG/ML
40 INJECTION, SUSPENSION INTRA-ARTICULAR; INTRALESIONAL; INTRAMUSCULAR; SOFT TISSUE
Status: DISCONTINUED | OUTPATIENT
Start: 2019-10-24 | End: 2019-10-24 | Stop reason: HOSPADM

## 2019-10-24 RX ADMIN — METHYLPREDNISOLONE ACETATE 40 MG: 40 INJECTION, SUSPENSION INTRA-ARTICULAR; INTRALESIONAL; INTRAMUSCULAR; SOFT TISSUE at 10:10

## 2019-10-24 NOTE — PROGRESS NOTES
[facility]    Subjective:     Chief Complaint:   Chief Complaint   Patient presents with    Right Knee - Pain     Right TKA 6.10.19. States that her knee is still sore most of the time.        Past Medical History:   Diagnosis Date    Arthritis     Breast cancer     right    Wears glasses     WEARS CONTACS       Past Surgical History:   Procedure Laterality Date    BREAST RECONSTRUCTION      CARPAL TUNNEL RELEASE       SECTION  1970, ,     CHOLECYSTECTOMY  2019        COLONOSCOPY  2017    KNEE ARTHROPLASTY Right 6/10/2019    Procedure: ARTHROPLASTY, KNEE;  Surgeon: Jony Marmolejo MD;  [facility]: [facility];  Service: Orthopedics;  Laterality: Right;    KNEE ARTHROSCOPY Bilateral 2008    MASTECTOMY Right 2007    PORTACATH PLACEMENT  2007    PORTACATH REMOVAL      SURGICAL REMOVAL OF BONE SPUR Bilateral     TUBAL LIGATION         Current Outpatient Medications   Medication Sig    alendronate (FOSAMAX) 70 MG tablet Take 1 tablet (70 mg total) by mouth every 7 days.    colestipol (COLESTID) 5 gram Pack Take 5 g by mouth 2 (two) times daily.     No current facility-administered medications for this visit.        Review of patient's allergies indicates:  No Known Allergies    Family History   Problem Relation Age of Onset    Arthritis Mother     Hyperlipidemia Mother     Stroke Mother     Dementia Mother        Social History     Socioeconomic History    Marital status:      Spouse name: Not on file    Number of children: Not on file    Years of education: Not on file    Highest education level: Not on file   Occupational History    Not on file   Social Needs    Financial resource strain: Not on file    Food insecurity:     Worry: Not on file     Inability: Not on file    Transportation needs:     Medical: Not on file     Non-medical: Not on file   Tobacco Use    Smoking status: Former Smoker     Types: Cigarettes    Smokeless  tobacco: Never Used    Tobacco comment: social smoker - on weekends only - quit 20 yrs ago   Substance and Sexual Activity    Alcohol use: Yes     Alcohol/week: 0.0 standard drinks     Frequency: Never     Comment: occ    Drug use: No    Sexual activity: Yes     Partners: Male   Lifestyle    Physical activity:     Days per week: Not on file     Minutes per session: Not on file    Stress: Not at all   Relationships    Social connections:     Talks on phone: Not on file     Gets together: Not on file     Attends Gnosticist service: Not on file     Active member of club or organization: Not on file     Attends meetings of clubs or organizations: Not on file     Relationship status: Not on file   Other Topics Concern    Not on file   Social History Narrative    Not on file       History of present illness:  Patient comes in today for the bilateral knees.  Her right knee is doing very well.  Really not having any problems. Her left knee is giving her a lot of discomfort and pain.      Review of Systems:    Constitution: Negative for chills, fever, and sweats.  Negative for unexplained weight loss.    HENT:  Negative for headaches and blurry vision.    Cardiovascular:Negative for chest pain or irregular heart beat. Negative for hypertension.    Respiratory:  Negative for cough and shortness of breath.    Gastrointestinal: Negative for abdominal pain, heartburn, melena, nausea, and vomitting.    Genitourinary:  Negative bladder incontinence and dysuria.    Musculoskeletal:  See HPI for details.     Neurological: Negative for numbness.    Psychiatric/Behavioral: Negative for depression.  The patient is not nervous/anxious.      Endocrine: Negative for polyuria    Hematologic/Lymphatic: Negative for bleeding problem.  Does not bruise/bleed easily.    Skin: Negative for poor would healing and rash    Objective:      Physical Examination:    Vital Signs:    Vitals:    10/24/19 1010   BP: 127/80   Pulse: 83       Body  mass index is 32.4 kg/m².    This a well-developed, well nourished patient in no acute distress.  They are alert and oriented and cooperative to examination.        Patient has range of motion of the right knee 0-120 degrees.  The knee is stable.  Her wound is well healed.  No effusion.  The left knee has a varus deformity.  Significantly medial joint line tenderness.  A 1+ effusion.  Negative straight leg raises EKG also intact Homans signs are negative bilaterally  Pertinent New Results:    XRAY Report / Interpretation:   AP lateral and sunrise views of the knees demonstrate her right total knee to be in excellent position.  No evidence of loosening.  No change in position from prior x-rays.  Her left knee demonstrates advanced arthritis of the knee with complete loss of medial compartment    Assessment/Plan:      Stable following right total knee.  No further intervention for that knee.  I injected the left knee with Depo-Medrol and lidocaine.  She will follow up p.r.n.      This note was created using Dragon voice recognition software that occasionally misinterpreted phrases or words.

## 2019-10-24 NOTE — PROCEDURES
Large Joint Aspiration/Injection: L knee  Date/Time: 10/24/2019 10:00 AM  Performed by: Jony Marmolejo MD  Authorized by: Jony Marmolejo MD     Consent Done?:  Yes (Verbal)  Indications:  Pain  Procedure site marked: Yes    Timeout: Prior to procedure the correct patient, procedure, and site was verified    Anesthesia  Local anesthesia used  Anesthesia: local infiltration  Anesthetic: lidocaine 1% without epinephrine    Location:  Knee  Site:  L knee  Prep: Patient was prepped and draped in usual sterile fashion    Needle size:  25 G  Medications:  40 mg methylPREDNISolone acetate 40 mg/mL; 40 mg methylPREDNISolone acetate 40 mg/mL  Patient tolerance:  Patient tolerated the procedure well with no immediate complications

## 2019-11-14 ENCOUNTER — OFFICE VISIT (OUTPATIENT)
Dept: HEMATOLOGY/ONCOLOGY | Facility: CLINIC | Age: 69
End: 2019-11-14
Payer: MEDICARE

## 2019-11-14 VITALS
RESPIRATION RATE: 19 BRPM | BODY MASS INDEX: 32.85 KG/M2 | DIASTOLIC BLOOD PRESSURE: 83 MMHG | WEIGHT: 157.19 LBS | HEART RATE: 73 BPM | TEMPERATURE: 98 F | SYSTOLIC BLOOD PRESSURE: 122 MMHG

## 2019-11-14 DIAGNOSIS — C50.111 MALIGNANT NEOPLASM OF CENTRAL PORTION OF RIGHT BREAST IN FEMALE, ESTROGEN RECEPTOR POSITIVE: ICD-10-CM

## 2019-11-14 DIAGNOSIS — Z17.0 MALIGNANT NEOPLASM OF CENTRAL PORTION OF RIGHT BREAST IN FEMALE, ESTROGEN RECEPTOR POSITIVE: ICD-10-CM

## 2019-11-14 PROCEDURE — 99213 PR OFFICE/OUTPT VISIT, EST, LEVL III, 20-29 MIN: ICD-10-PCS | Mod: S$GLB,,, | Performed by: INTERNAL MEDICINE

## 2019-11-14 PROCEDURE — 99213 OFFICE O/P EST LOW 20 MIN: CPT | Mod: S$GLB,,, | Performed by: INTERNAL MEDICINE

## 2019-11-14 NOTE — PROGRESS NOTES
PROGRESS NOTE    Subjective:       Patient ID: Camila Au is a 69 y.o. female.    Chief Complaint:  No chief complaint on file.    Right mastectomy 5/24/2007  [id number], 2cm tumor  Lobular carcinoma  Completed AC 10/2007  Completed XRT for close margins  Began AI 9/2007-11/2018      History of Present Illness:   Camila Au is a 69 y.o. female who presents with a history of breast cancer here for follow up.  No new complaints this visit. She is feeling well.         Family and Social history reviewed and is unchanged from 11/25/2014      ROS:  Review of Systems   Constitutional: Negative for fever.   Respiratory: Negative for shortness of breath.    Cardiovascular: Negative for chest pain and leg swelling.   Gastrointestinal: Negative for abdominal pain and blood in stool.   Genitourinary: Negative for hematuria.   Skin: Negative for rash.          Current Outpatient Medications:     alendronate (FOSAMAX) 70 MG tablet, Take 1 tablet (70 mg total) by mouth every 7 days., Disp: 12 tablet, Rfl: 1    calcium carbonate (CALCIUM 600 ORAL), Take by mouth 2 (two) times daily., Disp: , Rfl:     colestipol (COLESTID) 5 gram Pack, Take 5 g by mouth 2 (two) times daily., Disp: 180 packet, Rfl: 3        Objective:       Physical Examination:     /83   Pulse 73   Temp 97.9 °F (36.6 °C) (Oral)   Resp 19   Wt 71.3 kg (157 lb 3.2 oz)   BMI 32.85 kg/m²     Physical Exam   Constitutional: She appears well-developed and well-nourished.   HENT:   Head: Normocephalic and atraumatic.   Right Ear: External ear normal.   Left Ear: External ear normal.   Mouth/Throat: Oropharynx is clear and moist.   Eyes: Pupils are equal, round, and reactive to light. Conjunctivae are normal.   Neck: No tracheal deviation present. No thyromegaly present.   Cardiovascular: Normal rate, regular rhythm and normal heart sounds.   Pulmonary/Chest: Effort normal  and breath sounds normal.       Abdominal: Soft. Bowel sounds are normal. She exhibits no distension and no mass. There is no tenderness.   Musculoskeletal: She exhibits no edema.   Neurological:   Neuro intact througout   Skin: No rash noted.   Psychiatric: She has a normal mood and affect. Her behavior is normal. Judgment and thought content normal.       Labs:   No results found for this or any previous visit (from the past 336 hour(s)).  CMP  Sodium   Date Value Ref Range Status   06/11/2019 138 136 - 145 mmol/L Final   03/26/2019 137 134 - 144 mmol/L      Potassium   Date Value Ref Range Status   06/11/2019 4.2 3.5 - 5.1 mmol/L Final     Chloride   Date Value Ref Range Status   06/11/2019 104 95 - 110 mmol/L Final   03/26/2019 101 98 - 110 mmol/L      CO2   Date Value Ref Range Status   06/11/2019 25 23 - 29 mmol/L Final     Glucose   Date Value Ref Range Status   06/11/2019 114 (H) 70 - 110 mg/dL Final   03/26/2019 160 (H) 70 - 99 mg/dL      BUN, Bld   Date Value Ref Range Status   06/11/2019 12 8 - 23 mg/dL Final     Creatinine   Date Value Ref Range Status   06/11/2019 0.6 0.5 - 1.4 mg/dL Final   03/26/2019 0.50 (L) 0.60 - 1.40 mg/dL    08/08/2012 0.6 0.2 - 1.4 mg/dl Final     Calcium   Date Value Ref Range Status   06/11/2019 8.9 8.7 - 10.5 mg/dL Final   08/08/2012 9.5 8.6 - 10.2 mg/dl Final     Total Protein   Date Value Ref Range Status   05/30/2019 7.5 6.0 - 8.4 g/dL Final     Albumin   Date Value Ref Range Status   05/30/2019 3.9 3.5 - 5.2 g/dL Final   03/26/2019 4.0 3.1 - 4.7 g/dL      Total Bilirubin   Date Value Ref Range Status   05/30/2019 0.8 0.1 - 1.0 mg/dL Final     Comment:     For infants and newborns, interpretation of results should be based  on gestational age, weight and in agreement with clinical  observations.  Premature Infant recommended reference ranges:  Up to 24 hours.............<8.0 mg/dL  Up to 48 hours............<12.0 mg/dL  3-5 days..................<15.0 mg/dL  6-29  days.................<15.0 mg/dL       Alkaline Phosphatase   Date Value Ref Range Status   05/30/2019 131 55 - 135 U/L Final   08/08/2012 100 23 - 119 UNIT/L Final     AST   Date Value Ref Range Status   05/30/2019 80 (H) 10 - 40 U/L Final   08/08/2012 35 (H) 10 - 30 UNIT/L Final     ALT   Date Value Ref Range Status   05/30/2019 63 (H) 10 - 44 U/L Final     Anion Gap   Date Value Ref Range Status   06/11/2019 9 8 - 16 mmol/L Final   08/08/2012 9 5 - 15 meq/L Final     eGFR if    Date Value Ref Range Status   06/11/2019 >60 >60 mL/min/1.73 m^2 Final     eGFR if non    Date Value Ref Range Status   06/11/2019 >60 >60 mL/min/1.73 m^2 Final     Comment:     Calculation used to obtain the estimated glomerular filtration  rate (eGFR) is the CKD-EPI equation.        No results found for: CEA  No results found for: PSA        Assessment/Plan:     Problem List Items Addressed This Visit     Malignant neoplasm of central portion of right breast in female, estrogen receptor positive     Patient is doing well from this standpoint and has no sign of recurrence or new cancer.  Will continue to see her on a yearly basis and discussed this today.   Mammogram negative in April of this year.          Relevant Orders    Mammo Digital Diagnostic Bilat w/ Brian    US Breast Bilateral Complete          Discussion:     Follow up in about 1 year (around 11/14/2020).      Electronically signed by Jeffrey Ramos

## 2019-11-14 NOTE — ASSESSMENT & PLAN NOTE
Patient is doing well from this standpoint and has no sign of recurrence or new cancer.  Will continue to see her on a yearly basis and discussed this today.   Mammogram negative in April of this year.

## 2019-11-21 RX ORDER — SILVER SULFADIAZINE 10 G/1000G
CREAM TOPICAL
Qty: 400 G | Refills: 0 | Status: SHIPPED | OUTPATIENT
Start: 2019-11-21 | End: 2020-01-28

## 2019-12-04 ENCOUNTER — TELEPHONE (OUTPATIENT)
Dept: HEMATOLOGY/ONCOLOGY | Facility: CLINIC | Age: 69
End: 2019-12-04

## 2019-12-04 NOTE — TELEPHONE ENCOUNTER
I CALLED AND INFORMED THE IMAGING CENTER THAT DR VARMA DOES WANT A DIAGNOSTIC MAMMOGRAM DUE TO HER BREAST CANCER HISTORY.

## 2019-12-04 NOTE — TELEPHONE ENCOUNTER
----- Message from Regine Wagoner sent at 12/3/2019  3:26 PM CST -----  Regarding: verify order  Contact: [phone]  Jocelin from [facility] called to verify if patient should be scheduled for mammogram diagnostic with ultra sound or mammogram screening. Radiologist recommended screening follow up in April and patient asked that Jocelin call our office to verify before she schedules appt.

## 2020-01-27 ENCOUNTER — TELEPHONE (OUTPATIENT)
Dept: FAMILY MEDICINE | Facility: CLINIC | Age: 70
End: 2020-01-27

## 2020-01-27 RX ORDER — AZITHROMYCIN 250 MG/1
TABLET, FILM COATED ORAL
Qty: 6 TABLET | Refills: 0 | Status: SHIPPED | OUTPATIENT
Start: 2020-01-27 | End: 2020-01-28

## 2020-01-27 NOTE — TELEPHONE ENCOUNTER
Patient has a sore throat X one day and is requesting a Z tiffany. I wanted to schedule ov but she declined.

## 2020-01-28 ENCOUNTER — OFFICE VISIT (OUTPATIENT)
Dept: FAMILY MEDICINE | Facility: CLINIC | Age: 70
End: 2020-01-28
Payer: MEDICARE

## 2020-01-28 VITALS
BODY MASS INDEX: 34.08 KG/M2 | SYSTOLIC BLOOD PRESSURE: 132 MMHG | HEART RATE: 85 BPM | WEIGHT: 162.38 LBS | HEIGHT: 58 IN | DIASTOLIC BLOOD PRESSURE: 84 MMHG | TEMPERATURE: 98 F | OXYGEN SATURATION: 97 %

## 2020-01-28 DIAGNOSIS — J06.9 UPPER RESPIRATORY TRACT INFECTION, UNSPECIFIED TYPE: Primary | ICD-10-CM

## 2020-01-28 PROCEDURE — 1159F PR MEDICATION LIST DOCUMENTED IN MEDICAL RECORD: ICD-10-PCS | Mod: ,,, | Performed by: NURSE PRACTITIONER

## 2020-01-28 PROCEDURE — 1126F PR PAIN SEVERITY QUANTIFIED, NO PAIN PRESENT: ICD-10-PCS | Mod: ,,, | Performed by: NURSE PRACTITIONER

## 2020-01-28 PROCEDURE — 1159F MED LIST DOCD IN RCRD: CPT | Mod: ,,, | Performed by: NURSE PRACTITIONER

## 2020-01-28 PROCEDURE — 99213 PR OFFICE/OUTPT VISIT, EST, LEVL III, 20-29 MIN: ICD-10-PCS | Mod: S$PBB,,, | Performed by: NURSE PRACTITIONER

## 2020-01-28 PROCEDURE — 99213 OFFICE O/P EST LOW 20 MIN: CPT | Mod: S$PBB,,, | Performed by: NURSE PRACTITIONER

## 2020-01-28 PROCEDURE — 99214 OFFICE O/P EST MOD 30 MIN: CPT | Performed by: NURSE PRACTITIONER

## 2020-01-28 PROCEDURE — 1126F AMNT PAIN NOTED NONE PRSNT: CPT | Mod: ,,, | Performed by: NURSE PRACTITIONER

## 2020-01-28 RX ORDER — BENZONATATE 100 MG/1
CAPSULE ORAL
Qty: 30 CAPSULE | Refills: 0 | Status: SHIPPED | OUTPATIENT
Start: 2020-01-28 | End: 2020-04-14

## 2020-01-28 RX ORDER — FLUTICASONE PROPIONATE 50 MCG
1 SPRAY, SUSPENSION (ML) NASAL DAILY
Qty: 16 G | Refills: 0 | Status: SHIPPED | OUTPATIENT
Start: 2020-01-28 | End: 2020-04-14

## 2020-01-28 NOTE — PROGRESS NOTES
SUBJECTIVE:      Patient ID: Camila Au is a 69 y.o. female.    Chief Complaint: Otalgia; Sore Throat; Nasal Congestion; and Headache    Established patient of Dr. Arthur here for a sick visit today.     URI    This is a new problem. Episode onset: x5 days. The problem has been unchanged. There has been no fever. Associated symptoms include congestion, ear pain (both ), a plugged ear sensation, rhinorrhea and a sore throat. Pertinent negatives include no abdominal pain, chest pain, coughing, diarrhea, dysuria, headaches, joint pain, nausea, neck pain, rash, sinus pain, sneezing, swollen glands, vomiting or wheezing. She has tried acetaminophen for the symptoms. The treatment provided no relief.       Family History   Problem Relation Age of Onset    Arthritis Mother     Hyperlipidemia Mother     Stroke Mother     Dementia Mother       Social History     Socioeconomic History    Marital status:      Spouse name: Not on file    Number of children: Not on file    Years of education: Not on file    Highest education level: Not on file   Occupational History    Not on file   Social Needs    Financial resource strain: Not on file    Food insecurity:     Worry: Not on file     Inability: Not on file    Transportation needs:     Medical: Not on file     Non-medical: Not on file   Tobacco Use    Smoking status: Former Smoker     Types: Cigarettes    Smokeless tobacco: Never Used    Tobacco comment: social smoker - on weekends only - quit 20 yrs ago   Substance and Sexual Activity    Alcohol use: Yes     Alcohol/week: 0.0 standard drinks     Frequency: Never     Comment: occ    Drug use: No    Sexual activity: Yes     Partners: Male   Lifestyle    Physical activity:     Days per week: Not on file     Minutes per session: Not on file    Stress: Not at all   Relationships    Social connections:     Talks on phone: Not on file     Gets together: Not on file     Attends Christian  service: Not on file     Active member of club or organization: Not on file     Attends meetings of clubs or organizations: Not on file     Relationship status: Not on file   Other Topics Concern    Not on file   Social History Narrative    Not on file     Current Outpatient Medications   Medication Sig Dispense Refill    alendronate (FOSAMAX) 70 MG tablet Take 1 tablet (70 mg total) by mouth every 7 days. 12 tablet 1    calcium carbonate (CALCIUM 600 ORAL) Take by mouth 2 (two) times daily.      colestipol (COLESTID) 5 gram Pack Take 5 g by mouth 2 (two) times daily. 180 packet 3    benzonatate (TESSALON) 100 MG capsule Take 1-2 capsules by mouth at bedtime as needed for cough 30 capsule 0    fluticasone propionate (FLONASE) 50 mcg/actuation nasal spray 1 spray (50 mcg total) by Each Nostril route once daily. 16 g 0     No current facility-administered medications for this visit.      Review of patient's allergies indicates:  No Known Allergies   Past Medical History:   Diagnosis Date    Arthritis     Breast cancer     right    Wears glasses     WEARS CONTACS     Past Surgical History:   Procedure Laterality Date    BREAST RECONSTRUCTION      CARPAL TUNNEL RELEASE       SECTION  1970, ,     CHOLECYSTECTOMY  2019        COLONOSCOPY  2017    KNEE ARTHROPLASTY Right 6/10/2019    Procedure: ARTHROPLASTY, KNEE;  Surgeon: Jony Marmolejo MD;  Location: [facility];  Service: Orthopedics;  Laterality: Right;    KNEE ARTHROSCOPY Bilateral 2008    MASTECTOMY Right 2007    PORTACATH PLACEMENT  2007    PORTACATH REMOVAL      SURGICAL REMOVAL OF BONE SPUR Bilateral     TUBAL LIGATION         Review of Systems   Constitutional: Negative for appetite change, chills, diaphoresis and fever.   HENT: Positive for congestion, ear pain (both ), rhinorrhea, sinus pressure and sore throat. Negative for ear discharge, postnasal drip, sinus pain, sneezing, trouble  "swallowing and voice change.    Eyes: Negative for discharge, itching and visual disturbance.   Respiratory: Negative for cough, shortness of breath and wheezing.    Cardiovascular: Negative for chest pain.   Gastrointestinal: Negative for abdominal pain, diarrhea, nausea and vomiting.   Genitourinary: Negative for dysuria and frequency.   Musculoskeletal: Negative for joint pain, myalgias and neck pain.   Skin: Negative for rash.   Neurological: Negative for weakness and headaches.   Hematological: Negative for adenopathy.      OBJECTIVE:      Vitals:    01/28/20 1019   BP: 132/84   BP Location: Left arm   Patient Position: Sitting   BP Method: Large (Manual)   Pulse: 85   Temp: 98.3 °F (36.8 °C)   TempSrc: Oral   SpO2: 97%   Weight: 73.7 kg (162 lb 6.4 oz)   Height: 4' 10" (1.473 m)     Physical Exam   Constitutional: She is oriented to person, place, and time. She appears well-developed and well-nourished. No distress.   HENT:   Head: Normocephalic and atraumatic.   Right Ear: Tympanic membrane, external ear and ear canal normal.   Left Ear: Tympanic membrane, external ear and ear canal normal.   Nose: Mucosal edema and rhinorrhea (clear ) present. No epistaxis. Right sinus exhibits no maxillary sinus tenderness and no frontal sinus tenderness. Left sinus exhibits no maxillary sinus tenderness and no frontal sinus tenderness.   Mouth/Throat: Uvula is midline, oropharynx is clear and moist and mucous membranes are normal. No oropharyngeal exudate or posterior oropharyngeal erythema. Tonsils are 1+ on the right. Tonsils are 1+ on the left. No tonsillar exudate.   Eyes: Pupils are equal, round, and reactive to light. Conjunctivae and EOM are normal.   Neck: Normal range of motion. Neck supple. No thyromegaly present.   Cardiovascular: Normal rate, regular rhythm and normal heart sounds.   Pulmonary/Chest: Effort normal and breath sounds normal. No respiratory distress. She has no wheezes. She has no rales. "   Abdominal: Soft. Bowel sounds are normal. There is no tenderness.   Lymphadenopathy:     She has no cervical adenopathy.   Neurological: She is alert and oriented to person, place, and time.   Skin: Skin is warm and dry.   Psychiatric: She has a normal mood and affect. Her behavior is normal. Judgment and thought content normal.   Nursing note and vitals reviewed.     Assessment:       1. Upper respiratory tract infection, unspecified type        Plan:       Upper respiratory tract infection, unspecified type  -     fluticasone propionate (FLONASE) 50 mcg/actuation nasal spray; 1 spray (50 mcg total) by Each Nostril route once daily.  Dispense: 16 g; Refill: 0  -     benzonatate (TESSALON) 100 MG capsule; Take 1-2 capsules by mouth at bedtime as needed for cough  Dispense: 30 capsule; Refill: 0     *Patient's URI is likely secondary to an underlying viral infection. Antibiotics will likely be ineffective and will increase risk of microbial resistance and potential medication side effects. The patient was advised to remain hydrated and take the following medications for symptomatic relief: 1) Alternate with Ibuprofen/Tylenol for myalgias/fever >100.4, 2) Tessalon Perles for throat irritation, 3) Flonase for rhinitis, and 4) Mucinex for chest congestion. Patient will return to clinic if symptoms worsen or persist in the next 3-5 days. .     Follow up if symptoms worsen or fail to improve.      1/28/2020 CARLOS Parker, MOHSENP

## 2020-01-28 NOTE — PATIENT INSTRUCTIONS
When to Use Antibiotics   Antibiotics are medicines used to treat infections caused by bacteria. They dont work for illnesses caused by viruses or an allergic reaction. In fact, taking antibiotics for reasons other than a bacterial infection can cause problems. For example, you may have side effects from the medicine. And if you really need an antibiotic, it may not work well.                                                                                                                                              When antibiotics wont help  Your healthcare provider wont usually prescribe antibiotics for the following conditions. You can help by not asking for them if you have:   · A cold. This type of illness is caused by a virus. It can cause a runny nose, stuffed-up nose, sneezing, coughing, headache, mild body aches, and low fever. A cold gets better on its own in a few days to a week.  · The flu (influenza). This is a respiratory illness caused by a virus. The flu usually goes away on its own in a week or so. It can cause fever, body aches, sore throat, and fatigue.  · Bronchitis. This is an infection in the lungs most often caused by a virus. You may have coughing, phlegm, body aches, and a low fever. A common type of bronchitis is known as a chest cold (acute bronchitis). This often happens after you have a respiratory infection like a common cold. Bronchitis can take weeks to go away, but antibiotics usually dont help.  · Most sore throats. Sore throats are most often caused by viruses. Your throat may feel scratchy or achy, and it may hurt to swallow. You may also have a low fever and body aches. A sore throat usually gets better in a few days.  · Most ear infections. An ear infection may be caused by a virus or bacteria. It causes pain in the ear. Antibiotics usually dont help, and the infection goes away on its own.  · Most sinus infections (sinusitis). This kind of infection causes sinus pain and  swelling, and a runny nose. In most cases, sinusitis goes away on its own, and antibiotics dont make recovery quicker.  · Allergic rhinitis. This is a set of symptoms caused by an allergic reaction. You may have sneezing, a runny nose, itchy or watery eyes, or a sore throat. Allergies are not treated with antibiotics.  · Low fever. A mild fever thats less than 100.4°F (38°C) most likely doesnt need treatment with antibiotics.   When antibiotics can help   Antibiotics can be used to treat:                                                     · Strep throat. This is a throat infectioncaused by a certain type of bacteria. Symptoms of strep throat include a sore throat, white patches on the tonsils, red spots on the roof of the mouth, fever, body aches, and nausea and vomiting.  · Urinary tract infection (UTI). This is a bacterial infection of the bladder and the tube that takes urine out of the body. It can cause burning pain and urine thats cloudy or tinted with blood. UTIs are very common. Antibiotics usually help treat these infections.  · Some ear infections. In some cases, a healthcare provider may prescribe antibiotics for an ear infection. You may need a test to show whats causing the ear infection.  · Some sinus infections. In some cases, yourhealthcare provider may give you antibiotics. He or she may first need to make sure your symptoms arent caused by a virus, fungus, allergies, or air pollutants such as smoke.   Your doctor may also recommend antibiotics if you have a condition that can affect your immune system, such as diabetes or cancer.   Self-care at home   If your infection cant be treated with antibiotics, you can take other steps to feel better. Try the remedies below. In general:   · Rest and sleep as much as needed.  · Drink water and other clear fluids.  · Dont smoke, and avoid smoke from other people.  · Use over-the-counter medicine such as acetaminophen to ease pain or fever, as  directed by your healthcare provider.   To treat sinus pain or nasal congestion:   · Put a warm, moist washcloth on your face where you feel sinus pain or pressure.  · Use a nasal spray with medicine or saline, as directed by your healthcare provider.  · Breathe in steam from a hot shower.  · Use a humidifier or cool mist vaporizer.   To quiet a cough:   · Use a humidifier or cool mist vaporizer.  · Breathe in steam from a hot shower.  · Use cough lozenges.   To sooth a sore throat:   · Suck on ice chips, popsicles, or lozenges.  · Use a sore throat spray.  · Use a humidifier or cool mist vaporizer.  · Gargle with saltwater.  · Drink warm liquids.   To ease ear pain:   · Hold a warm, moist washcloth on the ear for 10 minutes at a time.  Date Last Reviewed: 9/1/2016  © 7666-0996 [employer]. [address] [place] [address]. All rights reserved. This information is not intended as a substitute for professional medical care. Always follow your healthcare professional's instructions.        Viral Upper Respiratory Illness (Adult)  You have a viral upper respiratory illness (URI), which is another term for the common cold. This illness is contagious during the first few days. It is spread through the air by coughing and sneezing. It may also be spread by direct contact (touching the sick person and then touching your own eyes, nose, or mouth). Frequent handwashing will decrease risk of spread. Most viral illnesses go away within 7 to 10 days with rest and simple home remedies. Sometimes the illness may last for several weeks. Antibiotics will not kill a virus, and they are generally not prescribed for this condition.    Home care  · If symptoms are severe, rest at home for the first 2 to 3 days. When you resume activity, don't let yourself get too tired.  · Avoid being exposed to cigarette smoke (yours or others).  · You may use acetaminophen or ibuprofen to control pain and fever, unless another  medicine was prescribed. (Note: If you have chronic liver or kidney disease, have ever had a stomach ulcer or gastrointestinal bleeding, or are taking blood-thinning medicines, talk with your healthcare provider before using these medicines.) Aspirin should never be given to anyone under 18 years of age who is ill with a viral infection or fever. It may cause severe liver or brain damage.  · Your appetite may be poor, so a light diet is fine. Avoid dehydration by drinking 6 to 8 glasses of fluids per day (water, soft drinks, juices, tea, or soup). Extra fluids will help loosen secretions in the nose and lungs.  · Over-the-counter cold medicines will not shorten the length of time youre sick, but they may be helpful for the following symptoms: cough, sore throat, and nasal and sinus congestion. (Note: Do not use decongestants if you have high blood pressure.)  Follow-up care  Follow up with your healthcare provider, or as advised.  When to seek medical advice  Call your healthcare provider right away if any of these occur:  · Cough with lots of colored sputum (mucus)  · Severe headache; face, neck, or ear pain  · Difficulty swallowing due to throat pain  · Fever of 100.4°F (38°C)  Call 911, or get immediate medical care  Call emergency services right away if any of these occur:  · Chest pain, shortness of breath, wheezing, or difficulty breathing  · Coughing up blood  · Inability to swallow due to throat pain  Date Last Reviewed: 9/13/2015  © 5344-0568 [employer]. [address] [place] [address], [place] [address]. All rights reserved. This information is not intended as a substitute for professional medical care. Always follow your healthcare professional's instructions.

## 2020-03-04 ENCOUNTER — OFFICE VISIT (OUTPATIENT)
Dept: ORTHOPEDICS | Facility: CLINIC | Age: 70
End: 2020-03-04
Payer: MEDICARE

## 2020-03-04 VITALS — DIASTOLIC BLOOD PRESSURE: 90 MMHG | SYSTOLIC BLOOD PRESSURE: 140 MMHG | BODY MASS INDEX: 33.23 KG/M2 | WEIGHT: 159 LBS

## 2020-03-04 DIAGNOSIS — S39.012A LUMBAR STRAIN, INITIAL ENCOUNTER: ICD-10-CM

## 2020-03-04 DIAGNOSIS — M54.50 LUMBAR PAIN: Primary | ICD-10-CM

## 2020-03-04 DIAGNOSIS — M51.36 DISC DEGENERATION, LUMBAR: ICD-10-CM

## 2020-03-04 DIAGNOSIS — M47.816 LUMBAR FACET ARTHROPATHY: ICD-10-CM

## 2020-03-04 PROCEDURE — 99213 PR OFFICE/OUTPT VISIT, EST, LEVL III, 20-29 MIN: ICD-10-PCS | Mod: S$GLB,,, | Performed by: ORTHOPAEDIC SURGERY

## 2020-03-04 PROCEDURE — 99213 OFFICE O/P EST LOW 20 MIN: CPT | Mod: S$GLB,,, | Performed by: ORTHOPAEDIC SURGERY

## 2020-03-04 RX ORDER — GABAPENTIN 300 MG/1
CAPSULE ORAL
COMMUNITY
Start: 2020-02-28 | End: 2020-07-22

## 2020-03-04 RX ORDER — NAPROXEN 500 MG/1
TABLET ORAL
COMMUNITY
Start: 2020-02-28 | End: 2020-07-22

## 2020-03-04 RX ORDER — CYCLOBENZAPRINE HCL 10 MG
TABLET ORAL
COMMUNITY
Start: 2020-02-28 | End: 2021-05-17

## 2020-03-04 NOTE — PROGRESS NOTES
Subjective:       Patient ID: Camila Au is a 69 y.o. female.    Chief Complaint: Pain of the Lumbar Spine (Lumbar is a little better. Pain does not radiate. No other treatment)      History of Present Illness  Patient is here with a concern of some recent acute onset midline lumbosacral pain.  Started about a week ago when she bent forward to  a relatively light bag.  Denies any lower extremity radicular symptoms and denies any bowel or bladder dysfunction.  She states that when she bent for she felt a pop in the midline lumbosacral region. She was last seen for lumbar spine in 2018.  - was treated by urgent care last week with a corticosteroid injection and a prescription of Flexeril    Current Medications  Current Outpatient Medications   Medication Sig Dispense Refill    alendronate (FOSAMAX) 70 MG tablet Take 1 tablet (70 mg total) by mouth every 7 days. 12 tablet 1    benzonatate (TESSALON) 100 MG capsule Take 1-2 capsules by mouth at bedtime as needed for cough 30 capsule 0    calcium carbonate (CALCIUM 600 ORAL) Take by mouth 2 (two) times daily.      colestipol (COLESTID) 5 gram Pack Take 5 g by mouth 2 (two) times daily. 180 packet 3    cyclobenzaprine (FLEXERIL) 10 MG tablet TK 1/2 TO 1 T PO TID PRF MUSCLE SPASMS . WILL CAUSE DROWSINESS      fluticasone propionate (FLONASE) 50 mcg/actuation nasal spray 1 spray (50 mcg total) by Each Nostril route once daily. 16 g 0    gabapentin (NEURONTIN) 300 MG capsule       naproxen (NAPROSYN) 500 MG tablet        No current facility-administered medications for this visit.        Allergies  Review of patient's allergies indicates:  No Known Allergies    Past Medical History  Past Medical History:   Diagnosis Date    Arthritis     Breast cancer     right    Wears glasses     WEARS CONTACS       Surgical History  Past Surgical History:   Procedure Laterality Date    BREAST RECONSTRUCTION      CARPAL TUNNEL RELEASE        SECTION  1970, 1972, 1976    CHOLECYSTECTOMY  04/17/2019        COLONOSCOPY  04/19/2017    KNEE ARTHROPLASTY Right 6/10/2019    Procedure: ARTHROPLASTY, KNEE;  Surgeon: Jony Marmolejo MD;  [facility]  Service: Orthopedics;  Laterality: Right;    KNEE ARTHROSCOPY Bilateral 2008    MASTECTOMY Right 2007    PORTACATH PLACEMENT  2007    PORTACATH REMOVAL      SURGICAL REMOVAL OF BONE SPUR Bilateral 2002    TUBAL LIGATION  1976       Family History:   Family History   Problem Relation Age of Onset    Arthritis Mother     Hyperlipidemia Mother     Stroke Mother     Dementia Mother        Social History:   Social History     Socioeconomic History    Marital status:      Spouse name: Not on file    Number of children: Not on file    Years of education: Not on file    Highest education level: Not on file   Occupational History    Not on file   Social Needs    Financial resource strain: Not on file    Food insecurity:     Worry: Not on file     Inability: Not on file    Transportation needs:     Medical: Not on file     Non-medical: Not on file   Tobacco Use    Smoking status: Former Smoker     Types: Cigarettes    Smokeless tobacco: Never Used    Tobacco comment: social smoker - on weekends only - quit 20 yrs ago   Substance and Sexual Activity    Alcohol use: Yes     Alcohol/week: 0.0 standard drinks     Frequency: Never     Comment: occ    Drug use: No    Sexual activity: Yes     Partners: Male   Lifestyle    Physical activity:     Days per week: Not on file     Minutes per session: Not on file    Stress: Not at all   Relationships    Social connections:     Talks on phone: Not on file     Gets together: Not on file     Attends Latter day service: Not on file     Active member of club or organization: Not on file     Attends meetings of clubs or organizations: Not on file     Relationship status: Not on file   Other Topics Concern    Not on file   Social History  Narrative    Not on file       Hospitalization/Major Diagnostic Procedure:     Review of Systems     General/Constitutional:  Chills denies. Fatigue denies. Fever denies. Weight gain denies. Weight loss denies.    Respiratory:  Shortness of breath denies.    Cardiovascular:  Chest pain denies.    Gastrointestinal:  Constipation denies. Diarrhea denies. Nausea denies. Vomiting denies.     Hematology:  Easy bruising denies. Prolonged bleeding denies.     Genitourinary:  Frequent urination denies. Pain in lower back denies. Painful urination denies.     Musculoskeletal:  See HPI for details    Skin:  Rash denies.    Neurologic:  Dizziness denies. Gait abnormalities denies. Seizures denies. Tingling/Numbess denies.    Psychiatric:  Anxiety denies. Depressed mood denies.     Objective:   Vital Signs:   Vitals:    03/04/20 1016   BP: (!) 140/90        Physical Exam      General Examination:     Constitutional: The patient is alert and oriented to lace person and time. Mood is pleasant.     Head/Face: Normal facial features normal eyebrows    Eyes: Normal extraocular motion bilaterally    Lungs: Respirations are equal and unlabored    Gait is coordinated.    Cardiovascular: There are no swelling or varicosities present.    Lymphatic: Negative for adenopathy    Skin: Normal    Neurological: Level of consciousness normal. Oriented to place person and time and situation    Psychiatric: Oriented to time place person and situation    Lumbosacral exam demonstrates a nonantalgic gait.  Limited range of motion secondary to pain and guarding.  Focal midline lumbosacral tenderness to palpation.  Bilateral lower extremities are distal neurovascular intact.  No erythema or edema noted.    XRAY Report/ Interpretation:  AP pelvis x-rays taken in the office today reviewed the patient demonstrates no abnormality.  Lumbar AP and lateral x-rays taken in the office today reviewed the patient demonstrate multilevel degenerative disc disease  "and facet sclerosis from L3 to the sacrum.  L3-4 grade 1 spondylolisthesis is noted.      Assessment:       1. Lumbar pain    2. Lumbar strain, initial encounter    3. Disc degeneration, lumbar    4. Lumbar facet arthropathy        Plan:       Camila was seen today for pain.    Diagnoses and all orders for this visit:    Lumbar pain  -     X-Ray Lumbar Spine Ap And Lateral  -     X-Ray Pelvis Routine AP    Lumbar strain, initial encounter    Disc degeneration, lumbar    Lumbar facet arthropathy         No follow-ups on file.  Zhen Orr, physician's assistant served in the capacity as a "scribe" for this patient encounter  A "face to face" encounter occurred with Dr. Perkins on this date  The treatment plan and medical decision making is outlined below:  At this time we recommend continued activity as tolerated and follow up as needed.    This note was created using Dragon voice recognition software that occasionally misinterpreted phrases or words.  "

## 2020-04-07 ENCOUNTER — OFFICE VISIT (OUTPATIENT)
Dept: ORTHOPEDICS | Facility: CLINIC | Age: 70
End: 2020-04-07
Payer: MEDICARE

## 2020-04-07 VITALS
SYSTOLIC BLOOD PRESSURE: 122 MMHG | HEART RATE: 75 BPM | DIASTOLIC BLOOD PRESSURE: 76 MMHG | HEIGHT: 58 IN | WEIGHT: 159 LBS | BODY MASS INDEX: 33.37 KG/M2

## 2020-04-07 DIAGNOSIS — Z96.651 HISTORY OF TOTAL KNEE ARTHROPLASTY, RIGHT: ICD-10-CM

## 2020-04-07 DIAGNOSIS — S46.011A TRAUMATIC TEAR OF RIGHT ROTATOR CUFF, UNSPECIFIED TEAR EXTENT, INITIAL ENCOUNTER: ICD-10-CM

## 2020-04-07 DIAGNOSIS — M75.41 SUBACROMIAL IMPINGEMENT, RIGHT: ICD-10-CM

## 2020-04-07 DIAGNOSIS — M17.12 PRIMARY OSTEOARTHRITIS OF LEFT KNEE: Primary | ICD-10-CM

## 2020-04-07 PROCEDURE — 20610 LARGE JOINT ASPIRATION/INJECTION: L KNEE: ICD-10-PCS | Mod: 50,S$GLB,, | Performed by: ORTHOPAEDIC SURGERY

## 2020-04-07 PROCEDURE — 99214 PR OFFICE/OUTPT VISIT, EST, LEVL IV, 30-39 MIN: ICD-10-PCS | Mod: 25,S$GLB,, | Performed by: ORTHOPAEDIC SURGERY

## 2020-04-07 PROCEDURE — 20610 DRAIN/INJ JOINT/BURSA W/O US: CPT | Mod: 50,S$GLB,, | Performed by: ORTHOPAEDIC SURGERY

## 2020-04-07 PROCEDURE — 99214 OFFICE O/P EST MOD 30 MIN: CPT | Mod: 25,S$GLB,, | Performed by: ORTHOPAEDIC SURGERY

## 2020-04-07 RX ORDER — METHYLPREDNISOLONE ACETATE 40 MG/ML
40 INJECTION, SUSPENSION INTRA-ARTICULAR; INTRALESIONAL; INTRAMUSCULAR; SOFT TISSUE
Status: DISCONTINUED | OUTPATIENT
Start: 2020-04-07 | End: 2020-04-07 | Stop reason: HOSPADM

## 2020-04-07 RX ADMIN — METHYLPREDNISOLONE ACETATE 40 MG: 40 INJECTION, SUSPENSION INTRA-ARTICULAR; INTRALESIONAL; INTRAMUSCULAR; SOFT TISSUE at 09:04

## 2020-04-07 NOTE — PROGRESS NOTES
[facility]    Subjective:     Chief Complaint:   Chief Complaint   Patient presents with    Right Shoulder - Pain     Right Shoulder Pain when she raises her arm, popping sensation   Left Knee Pain hurts when she walks and when she does up and down stairs having trouble sleeping pain increases at night wants an injection     Left Knee - Pain       Past Medical History:   Diagnosis Date    Arthritis     Breast cancer     right    Wears glasses     WEARS CONTACS       Past Surgical History:   Procedure Laterality Date    BREAST RECONSTRUCTION      CARPAL TUNNEL RELEASE       SECTION  1970, ,     CHOLECYSTECTOMY  2019        COLONOSCOPY  2017    KNEE ARTHROPLASTY Right 6/10/2019    Procedure: ARTHROPLASTY, KNEE;  Surgeon: Jony Marmolejo MD;  Location: [facility];  Service: Orthopedics;  Laterality: Right;    KNEE ARTHROSCOPY Bilateral 2008    MASTECTOMY Right 2007    PORTACATH PLACEMENT  2007    PORTACATH REMOVAL      SURGICAL REMOVAL OF BONE SPUR Bilateral     TUBAL LIGATION         Current Outpatient Medications   Medication Sig    calcium carbonate (CALCIUM 600 ORAL) Take by mouth 2 (two) times daily.    alendronate (FOSAMAX) 70 MG tablet Take 1 tablet (70 mg total) by mouth every 7 days. (Patient not taking: Reported on 2020)    benzonatate (TESSALON) 100 MG capsule Take 1-2 capsules by mouth at bedtime as needed for cough (Patient not taking: Reported on 2020)    colestipol (COLESTID) 5 gram Pack Take 5 g by mouth 2 (two) times daily. (Patient not taking: Reported on 2020)    cyclobenzaprine (FLEXERIL) 10 MG tablet TK 1/2 TO 1 T PO TID PRF MUSCLE SPASMS . WILL CAUSE DROWSINESS    fluticasone propionate (FLONASE) 50 mcg/actuation nasal spray 1 spray (50 mcg total) by Each Nostril route once daily. (Patient not taking: Reported on 2020)    gabapentin (NEURONTIN) 300 MG capsule     naproxen (NAPROSYN) 500 MG tablet       No current facility-administered medications for this visit.        Review of patient's allergies indicates:  No Known Allergies    Family History   Problem Relation Age of Onset    Arthritis Mother     Hyperlipidemia Mother     Stroke Mother     Dementia Mother        Social History     Socioeconomic History    Marital status:      Spouse name: Not on file    Number of children: Not on file    Years of education: Not on file    Highest education level: Not on file   Occupational History    Not on file   Social Needs    Financial resource strain: Not on file    Food insecurity:     Worry: Not on file     Inability: Not on file    Transportation needs:     Medical: Not on file     Non-medical: Not on file   Tobacco Use    Smoking status: Former Smoker     Types: Cigarettes    Smokeless tobacco: Never Used    Tobacco comment: social smoker - on weekends only - quit 20 yrs ago   Substance and Sexual Activity    Alcohol use: Yes     Alcohol/week: 0.0 standard drinks     Frequency: Never     Comment: occ    Drug use: No    Sexual activity: Yes     Partners: Male   Lifestyle    Physical activity:     Days per week: Not on file     Minutes per session: Not on file    Stress: Not at all   Relationships    Social connections:     Talks on phone: Not on file     Gets together: Not on file     Attends Hindu service: Not on file     Active member of club or organization: Not on file     Attends meetings of clubs or organizations: Not on file     Relationship status: Not on file   Other Topics Concern    Not on file   Social History Narrative    Not on file       History of present illness:  Patient comes in today for her right shoulder and her left knee.  She has had right shoulder pain for about 3 months.  She has received no treatment.  Denies any trauma.  In terms of her left knee she has longstanding arthritis of the left knee.      Review of Systems:    Constitution: Negative for  chills, fever, and sweats.  Negative for unexplained weight loss.    HENT:  Negative for headaches and blurry vision.    Cardiovascular:Negative for chest pain or irregular heart beat. Negative for hypertension.    Respiratory:  Negative for cough and shortness of breath.    Gastrointestinal: Negative for abdominal pain, heartburn, melena, nausea, and vomitting.    Genitourinary:  Negative bladder incontinence and dysuria.    Musculoskeletal:  See HPI for details.     Neurological: Negative for numbness.    Psychiatric/Behavioral: Negative for depression.  The patient is not nervous/anxious.      Endocrine: Negative for polyuria    Hematologic/Lymphatic: Negative for bleeding problem.  Does not bruise/bleed easily.    Skin: Negative for poor would healing and rash    Objective:      Physical Examination:    Vital Signs:    Vitals:    04/07/20 0915   BP: 122/76   Pulse: 75       Body mass index is 33.23 kg/m².    This a well-developed, well nourished patient in no acute distress.  They are alert and oriented and cooperative to examination.        Patient has range of motion of the left knee 0-120 degrees.  She has crepitus.  She has 1+ effusion.  The knee is stable to varus valgus stresses.  She has a well positioned incision on the right knee.  She has range of motion 0-130 degrees on the right knee.  No effusion.    Full range of motion of the right shoulder.  Positive impingement.  Positive crossover.  The rotator cuff is significantly weaker on the right than the left.  Spurling sign is negative.  Full range of motion of the cervical spine.  Pertinent New Results:    XRAY Report / Interpretation:   AP lateral and sunrise views of the left knee demonstrate advanced arthritis of the left knee with complete loss of medial compartment 3 compartment spurring.  A well-positioned right total knee is visualized.    Assessment/Plan:      Right shoulder impingement syndrome, rotator cuff tear.  I injected the right  subacromial space with Depo-Medrol and lidocaine.    Left knee bone-on-bone arthritis.  We spoke about joint replacement surgery.  I injected with Depo-Medrol and lidocaine.  Follow-up in 4 weeks      This note was created using Dragon voice recognition software that occasionally misinterpreted phrases or words.

## 2020-04-07 NOTE — PROCEDURES
Large Joint Aspiration/Injection: L knee  Date/Time: 4/7/2020 9:00 AM  Performed by: Jony Marmolejo MD  Authorized by: Jony Marmolejo MD     Consent Done?:  Yes (Verbal)  Indications:  Pain  Site marked: the procedure site was marked    Timeout: prior to procedure the correct patient, procedure, and site was verified    Prep: patient was prepped and draped in usual sterile fashion      Local anesthesia used?: Yes    Local anesthetic:  Lidocaine 1% without epinephrine    Details:  Needle Size:  25 G  Ultrasonic Guidance for needle placement?: No    Approach:  Lateral  Location:  Knee  Site:  L knee  Medications:  40 mg methylPREDNISolone acetate 40 mg/mL; 40 mg methylPREDNISolone acetate 40 mg/mL  Patient tolerance:  Patient tolerated the procedure well with no immediate complications  Large Joint Aspiration/Injection: R subacromial bursa  Date/Time: 4/7/2020 9:00 AM  Performed by: Jony Marmolejo MD  Authorized by: Jony Marmolejo MD     Consent Done?:  Yes (Verbal)  Indications:  Pain  Site marked: the procedure site was marked    Timeout: prior to procedure the correct patient, procedure, and site was verified    Prep: patient was prepped and draped in usual sterile fashion      Local anesthesia used?: Yes    Local anesthetic:  Lidocaine 1% without epinephrine    Details:  Needle Size:  25 G  Ultrasonic Guidance for needle placement?: No    Location:  Shoulder  Site:  R subacromial bursa  Medications:  40 mg methylPREDNISolone acetate 40 mg/mL; 40 mg methylPREDNISolone acetate 40 mg/mL  Patient tolerance:  Patient tolerated the procedure well with no immediate complications

## 2020-04-14 ENCOUNTER — OFFICE VISIT (OUTPATIENT)
Dept: FAMILY MEDICINE | Facility: CLINIC | Age: 70
End: 2020-04-14
Payer: MEDICARE

## 2020-04-14 ENCOUNTER — TELEPHONE (OUTPATIENT)
Dept: FAMILY MEDICINE | Facility: CLINIC | Age: 70
End: 2020-04-14

## 2020-04-14 DIAGNOSIS — E55.9 VITAMIN D DEFICIENCY: ICD-10-CM

## 2020-04-14 DIAGNOSIS — R73.9 ELEVATED BLOOD SUGAR: ICD-10-CM

## 2020-04-14 DIAGNOSIS — M85.89 OSTEOPENIA OF MULTIPLE SITES: ICD-10-CM

## 2020-04-14 DIAGNOSIS — Z78.0 MENOPAUSE: ICD-10-CM

## 2020-04-14 DIAGNOSIS — Z12.31 SCREENING MAMMOGRAM FOR HIGH-RISK PATIENT: ICD-10-CM

## 2020-04-14 DIAGNOSIS — R42 DIZZINESS: Primary | ICD-10-CM

## 2020-04-14 PROCEDURE — 99213 OFFICE O/P EST LOW 20 MIN: CPT | Mod: 95,,, | Performed by: INTERNAL MEDICINE

## 2020-04-14 PROCEDURE — 99213 PR OFFICE/OUTPT VISIT, EST, LEVL III, 20-29 MIN: ICD-10-PCS | Mod: 95,,, | Performed by: INTERNAL MEDICINE

## 2020-04-14 RX ORDER — ALENDRONATE SODIUM 70 MG/1
70 TABLET ORAL
Qty: 12 TABLET | Refills: 1 | Status: SHIPPED | OUTPATIENT
Start: 2020-04-14 | End: 2020-06-03

## 2020-04-14 RX ORDER — HYDROCHLOROTHIAZIDE 25 MG/1
TABLET ORAL
Qty: 3 TABLET | Refills: 0 | Status: SHIPPED | OUTPATIENT
Start: 2020-04-14 | End: 2020-07-22

## 2020-04-14 RX ORDER — PREDNISONE 20 MG/1
TABLET ORAL
Qty: 9 TABLET | Refills: 0 | Status: SHIPPED | OUTPATIENT
Start: 2020-04-14 | End: 2020-07-22

## 2020-04-14 RX ORDER — MECLIZINE HYDROCHLORIDE 25 MG/1
25 TABLET ORAL 3 TIMES DAILY PRN
Qty: 30 TABLET | Refills: 0 | Status: SHIPPED | OUTPATIENT
Start: 2020-04-14 | End: 2020-12-23

## 2020-04-14 NOTE — PROGRESS NOTES
Subjective:        The chief complaint leading to consultation is:dizziness for three weeks  The patient location is:  Home  Visit type: Virtual visit with synchronous audio/video or audio only  This was a video visit in lieu of in-person visit due to the coronavirus emergency. Patient acknowledged and consented to the video visit encounter.     Dizziness:   Chronicity - acute exacerbation: started with room spinning when she first got up but now when she get up room starts spinning-head feels like shes loaded.  Onset:  1 to 4 weeks ago  Progression since onset:  Gradually worsening  Frequency:  Constantly (once she sits down she can feel it-)  Pain Scale:  5/10  Severity:  Moderate  Duration:  Very brief  Dizziness characteristics:  Spacial disorientation, sensation of movement, off-balance, giddiness, lightheaded/impending faint and spinning inside head only  Frequency of Spells:  Hourly   Associated symptoms: ear congestion (left eaar butns during the night).no hearing loss, no ear pain, no fever, no headaches, no tinnitus, no nausea, no vomiting, no diaphoresis, no aural fullness, no weakness, no visual disturbances, no light-headedness, no palpitations, no panic, no facial weakness, no slurred speech, no numbness in extremities and no chest pain.  Aggravated by:  Getting up and bending  Risk factors:  Family history of inner ear, travel and occupational noise exposure  Treatments tried:  Body position changes and rest  Improvements on treatment:  Mild   PMH includes: environmental allergies.no strokes, no cardiac surgery, no neurologic disease, no head trauma, no balance testing, no ear trauma, no ear surgery, no head trauma, no ear infections, no anxiety, no ear tubes, no MRI head and no CT head.     BP is good at recent MD visit      No visits with results within 6 Month(s) from this visit.   Latest known visit with results is:   Admission on 06/10/2019, Discharged on 06/11/2019   Component Date Value Ref  Range Status    WBC 2019 8.40  3.90 - 12.70 K/uL Final    RBC 2019 3.93* 4.00 - 5.40 M/uL Final    Hemoglobin 2019 12.8  12.0 - 16.0 g/dL Final    Hematocrit 2019 37.8  37.0 - 48.5 % Final    Mean Corpuscular Volume 2019 96  82 - 98 fL Final    Mean Corpuscular Hemoglobin 2019 32.6* 27.0 - 31.0 pg Final    Mean Corpuscular Hemoglobin Conc 2019 33.9  32.0 - 36.0 g/dL Final    RDW 2019 13.3  11.5 - 14.5 % Final    Platelets 2019 173  150 - 350 K/uL Final    MPV 2019 10.1  9.2 - 12.9 fL Final    Gran # (ANC) 2019 6.0  1.8 - 7.7 K/uL Final    Lymph # 2019 1.4  1.0 - 4.8 K/uL Final    Mono # 2019 0.8  0.3 - 1.0 K/uL Final    Eos # 2019 0.2  0.0 - 0.5 K/uL Final    Baso # 2019 0.00  0.00 - 0.20 K/uL Final    Gran% 2019 71.1  38.0 - 73.0 % Final    Lymph% 2019 17.2* 18.0 - 48.0 % Final    Mono% 2019 9.3  4.0 - 15.0 % Final    Eosinophil% 2019 1.9  0.0 - 8.0 % Final    Basophil% 2019 0.5  0.0 - 1.9 % Final    Differential Method 2019 Automated   Final    Sodium 2019 138  136 - 145 mmol/L Final    Potassium 2019 4.2  3.5 - 5.1 mmol/L Final    Chloride 2019 104  95 - 110 mmol/L Final    CO2 2019 25  23 - 29 mmol/L Final    Glucose 2019 114* 70 - 110 mg/dL Final    BUN, Bld 2019 12  8 - 23 mg/dL Final    Creatinine 2019 0.6  0.5 - 1.4 mg/dL Final    Calcium 2019 8.9  8.7 - 10.5 mg/dL Final    Anion Gap 2019 9  8 - 16 mmol/L Final    eGFR if African American 2019 >60  >60 mL/min/1.73 m^2 Final    eGFR if non African American 2019 >60  >60 mL/min/1.73 m^2 Final       Past Surgical History:   Procedure Laterality Date    BREAST RECONSTRUCTION      CARPAL TUNNEL RELEASE       SECTION  1970, , 1976    CHOLECYSTECTOMY  2019        COLONOSCOPY  2017    KNEE ARTHROPLASTY  Right 6/10/2019    Procedure: ARTHROPLASTY, KNEE;  Surgeon: Jony Marmolejo MD;  Location: [facility];  Service: Orthopedics;  Laterality: Right;    KNEE ARTHROSCOPY Bilateral 2008    MASTECTOMY Right 2007    PORTACATH PLACEMENT  2007    PORTACATH REMOVAL      SURGICAL REMOVAL OF BONE SPUR Bilateral 2002    TUBAL LIGATION  1976     Past Medical History:   Diagnosis Date    Arthritis     Breast cancer 2007    right    Wears glasses     WEARS CONTACS     Family History   Problem Relation Age of Onset    Arthritis Mother     Hyperlipidemia Mother     Stroke Mother     Dementia Mother         Social History:   Marital Status:   Alcohol History:  reports that she drinks alcohol.  Tobacco History:  reports that she has quit smoking. Her smoking use included cigarettes. She has never used smokeless tobacco.  Drug History:  reports that she does not use drugs.    Review of patient's allergies indicates:  No Known Allergies    Current Outpatient Medications   Medication Sig Dispense Refill    alendronate (FOSAMAX) 70 MG tablet Take 1 tablet (70 mg total) by mouth every 7 days. 12 tablet 1    calcium carbonate (CALCIUM 600 ORAL) Take by mouth 2 (two) times daily.      colestipol (COLESTID) 5 gram Pack Take 5 g by mouth 2 (two) times daily. (Patient not taking: Reported on 4/7/2020) 180 packet 3    cyclobenzaprine (FLEXERIL) 10 MG tablet TK 1/2 TO 1 T PO TID PRF MUSCLE SPASMS . WILL CAUSE DROWSINESS      gabapentin (NEURONTIN) 300 MG capsule       hydroCHLOROthiazide (HYDRODIURIL) 25 MG tablet Take one a day for three days 3 tablet 0    meclizine (ANTIVERT) 25 mg tablet Take 1 tablet (25 mg total) by mouth 3 (three) times daily as needed for Dizziness. 30 tablet 0    naproxen (NAPROSYN) 500 MG tablet       predniSONE (DELTASONE) 20 MG tablet Three every morning for three days 9 tablet 0     No current facility-administered medications for this visit.        Review of Systems   Constitutional: Negative  for chills, diaphoresis, fatigue, fever and unexpected weight change.   HENT: Negative for congestion, ear pain, hearing loss, sinus pain, sore throat and tinnitus.    Eyes: Negative for pain and visual disturbance.   Respiratory: Negative for cough, shortness of breath and wheezing.    Cardiovascular: Negative for chest pain, palpitations and leg swelling.   Gastrointestinal: Negative for abdominal pain, blood in stool, constipation, diarrhea, nausea and vomiting.   Endocrine: Negative for cold intolerance and heat intolerance.   Genitourinary: Negative for difficulty urinating, dysuria, frequency, pelvic pain and urgency.   Musculoskeletal: Negative for back pain, joint swelling and neck pain.   Skin: Negative for pallor and rash.   Allergic/Immunologic: Positive for environmental allergies.   Neurological: Positive for dizziness. Negative for tremors, weakness, light-headedness, numbness and headaches.   Hematological: Does not bruise/bleed easily.   Psychiatric/Behavioral: Negative for agitation, sleep disturbance and suicidal ideas.         Objective:          Physical Exam         Assessment:       1. Dizziness    2. Vitamin D deficiency    3. Osteopenia of multiple sites    4. Elevated blood sugar    5. Menopause    6. Screening mammogram for high-risk patient      Plan:   Dizziness  -     CBC auto differential; Future; Expected date: 04/14/2020  -     Comprehensive metabolic panel; Future; Expected date: 04/14/2020  -     meclizine (ANTIVERT) 25 mg tablet; Take 1 tablet (25 mg total) by mouth 3 (three) times daily as needed for Dizziness.  Dispense: 30 tablet; Refill: 0  -     predniSONE (DELTASONE) 20 MG tablet; Three every morning for three days  Dispense: 9 tablet; Refill: 0  -     hydroCHLOROthiazide (HYDRODIURIL) 25 MG tablet; Take one a day for three days  Dispense: 3 tablet; Refill: 0        -     If sx continue we will get CNS evaluation sith CT and carotids    Vitamin D deficiency  -     Vitamin D;  Future; Expected date: 04/15/2020    Osteopenia of multiple sites  -     alendronate (FOSAMAX) 70 MG tablet; Take 1 tablet (70 mg total) by mouth every 7 days.  Dispense: 12 tablet; Refill: 1    Elevated blood sugar  -     Hemoglobin A1c; Future; Expected date: 04/14/2020    Menopause  -     DXA Bone Density Spine And Hip; Future; Expected date: 04/14/2020    Screening mammogram for high-risk patient  -     Mammo Digital Screening Bilat with Brian; Future; Expected date: 04/14/2020      Follow up in about 5 days (around 4/19/2020) for FOLLOW-UP STATUS, FOLLOW UP MEDICATIONS.    Total time spent with patient: 13 minutes    Each patient to whom he or she provides medical services by telemedicine is:  (1) informed of the relationship between the physician and patient and the respective role of any other health care provider with respect to management of the patient; and (2) notified that he or she may decline to receive medical services by telemedicine and may withdraw from such care at any time.    This note was created using [email or web address] voice recognition software that occasionally misinterprets phrases or words.

## 2020-04-20 ENCOUNTER — OFFICE VISIT (OUTPATIENT)
Dept: FAMILY MEDICINE | Facility: CLINIC | Age: 70
End: 2020-04-20
Payer: MEDICARE

## 2020-04-20 DIAGNOSIS — R42 DIZZINESS: Primary | ICD-10-CM

## 2020-04-20 PROCEDURE — 99441 PR PHYSICIAN TELEPHONE EVALUATION 5-10 MIN: ICD-10-PCS | Mod: 95,,, | Performed by: INTERNAL MEDICINE

## 2020-04-20 PROCEDURE — 99441 PR PHYSICIAN TELEPHONE EVALUATION 5-10 MIN: CPT | Mod: 95,,, | Performed by: INTERNAL MEDICINE

## 2020-04-20 NOTE — PROGRESS NOTES
"    Subjective:        The chief complaint leading to consultation is: DIZZINESS  The patient location is:  Home  Visit type: Virtual visit with synchronous audio/video or audio only  This was a phone conversation in lieu of in-person visit due to the coronavirus emergency. Patient acknowledged and agreed to the telephone encounter.     HPI     Dizziness-feels a little "floaty" in the head-the sx are much better but sometimes she feels like she needs to hold on to something-she can, however, stand with her eyes closed and not fall to either side-she can balance on one foot with the eyes open. Headaches --she has had none-    No visits with results within 6 Month(s) from this visit.   Latest known visit with results is:   Admission on 06/10/2019, Discharged on 06/11/2019   Component Date Value Ref Range Status    WBC 06/11/2019 8.40  3.90 - 12.70 K/uL Final    RBC 06/11/2019 3.93* 4.00 - 5.40 M/uL Final    Hemoglobin 06/11/2019 12.8  12.0 - 16.0 g/dL Final    Hematocrit 06/11/2019 37.8  37.0 - 48.5 % Final    Mean Corpuscular Volume 06/11/2019 96  82 - 98 fL Final    Mean Corpuscular Hemoglobin 06/11/2019 32.6* 27.0 - 31.0 pg Final    Mean Corpuscular Hemoglobin Conc 06/11/2019 33.9  32.0 - 36.0 g/dL Final    RDW 06/11/2019 13.3  11.5 - 14.5 % Final    Platelets 06/11/2019 173  150 - 350 K/uL Final    MPV 06/11/2019 10.1  9.2 - 12.9 fL Final    Gran # (ANC) 06/11/2019 6.0  1.8 - 7.7 K/uL Final    Lymph # 06/11/2019 1.4  1.0 - 4.8 K/uL Final    Mono # 06/11/2019 0.8  0.3 - 1.0 K/uL Final    Eos # 06/11/2019 0.2  0.0 - 0.5 K/uL Final    Baso # 06/11/2019 0.00  0.00 - 0.20 K/uL Final    Gran% 06/11/2019 71.1  38.0 - 73.0 % Final    Lymph% 06/11/2019 17.2* 18.0 - 48.0 % Final    Mono% 06/11/2019 9.3  4.0 - 15.0 % Final    Eosinophil% 06/11/2019 1.9  0.0 - 8.0 % Final    Basophil% 06/11/2019 0.5  0.0 - 1.9 % Final    Differential Method 06/11/2019 Automated   Final    Sodium 06/11/2019 138  136 - 145 " mmol/L Final    Potassium 2019 4.2  3.5 - 5.1 mmol/L Final    Chloride 2019 104  95 - 110 mmol/L Final    CO2 2019 25  23 - 29 mmol/L Final    Glucose 2019 114* 70 - 110 mg/dL Final    BUN, Bld 2019 12  8 - 23 mg/dL Final    Creatinine 2019 0.6  0.5 - 1.4 mg/dL Final    Calcium 2019 8.9  8.7 - 10.5 mg/dL Final    Anion Gap 2019 9  8 - 16 mmol/L Final    eGFR if African American 2019 >60  >60 mL/min/1.73 m^2 Final    eGFR if non African American 2019 >60  >60 mL/min/1.73 m^2 Final       Past Surgical History:   Procedure Laterality Date    BREAST RECONSTRUCTION      CARPAL TUNNEL RELEASE       SECTION  1970, ,     CHOLECYSTECTOMY  2019        COLONOSCOPY  2017    KNEE ARTHROPLASTY Right 6/10/2019    Procedure: ARTHROPLASTY, KNEE;  Surgeon: Jony Marmolejo MD;  Location: [facility];  Service: Orthopedics;  Laterality: Right;    KNEE ARTHROSCOPY Bilateral 2008    MASTECTOMY Right 2007    PORTACATH PLACEMENT  2007    PORTACATH REMOVAL      SURGICAL REMOVAL OF BONE SPUR Bilateral     TUBAL LIGATION       Past Medical History:   Diagnosis Date    Arthritis     Breast cancer     right    Wears glasses     WEARS CONTACS     Family History   Problem Relation Age of Onset    Arthritis Mother     Hyperlipidemia Mother     Stroke Mother     Dementia Mother         Social History:   Marital Status:   Alcohol History:  reports that she drinks alcohol.  Tobacco History:  reports that she has quit smoking. Her smoking use included cigarettes. She has never used smokeless tobacco.  Drug History:  reports that she does not use drugs.    Review of patient's allergies indicates:  No Known Allergies    Current Outpatient Medications   Medication Sig Dispense Refill    alendronate (FOSAMAX) 70 MG tablet Take 1 tablet (70 mg total) by mouth every 7 days. 12 tablet 1    calcium carbonate (CALCIUM  600 ORAL) Take by mouth 2 (two) times daily.      colestipol (COLESTID) 5 gram Pack Take 5 g by mouth 2 (two) times daily. (Patient not taking: Reported on 4/7/2020) 180 packet 3    cyclobenzaprine (FLEXERIL) 10 MG tablet TK 1/2 TO 1 T PO TID PRF MUSCLE SPASMS . WILL CAUSE DROWSINESS      gabapentin (NEURONTIN) 300 MG capsule       hydroCHLOROthiazide (HYDRODIURIL) 25 MG tablet Take one a day for three days 3 tablet 0    meclizine (ANTIVERT) 25 mg tablet Take 1 tablet (25 mg total) by mouth 3 (three) times daily as needed for Dizziness. 30 tablet 0    naproxen (NAPROSYN) 500 MG tablet       predniSONE (DELTASONE) 20 MG tablet Three every morning for three days 9 tablet 0     No current facility-administered medications for this visit.        Review of Systems   Constitutional: Negative for chills, fatigue, fever and unexpected weight change.   HENT: Negative for congestion, ear pain, hearing loss, sinus pain and sore throat.    Eyes: Negative for pain and visual disturbance.   Respiratory: Negative for cough, shortness of breath and wheezing.    Cardiovascular: Negative for chest pain, palpitations and leg swelling.   Gastrointestinal: Negative for abdominal pain, blood in stool, constipation, diarrhea, nausea and vomiting.   Endocrine: Negative for cold intolerance and heat intolerance.   Genitourinary: Negative for difficulty urinating, dysuria, frequency, pelvic pain and urgency.   Musculoskeletal: Negative for back pain, joint swelling and neck pain.   Skin: Negative for pallor and rash.   Neurological: Negative for dizziness (resolving), tremors, weakness, numbness and headaches.        HPI   Hematological: Does not bruise/bleed easily.   Psychiatric/Behavioral: Negative for agitation, sleep disturbance and suicidal ideas.         Objective:        Physical Exam         Assessment:       1. Dizziness      Plan:   Dizziness        -     Resolving    Follow up if symptoms worsen or fail to  improve.    Total time spent with patient: 8 minutes    Each patient to whom he or she provides medical services by telemedicine is:  (1) informed of the relationship between the physician and patient and the respective role of any other health care provider with respect to management of the patient; and (2) notified that he or she may decline to receive medical services by telemedicine and may withdraw from such care at any time.    This note was created using [employer] voice recognition software that occasionally misinterprets phrases or words.

## 2020-05-22 ENCOUNTER — HOSPITAL ENCOUNTER (OUTPATIENT)
Dept: RADIOLOGY | Facility: HOSPITAL | Age: 70
Discharge: HOME | End: 2020-05-22
Attending: INTERNAL MEDICINE
Payer: MEDICARE

## 2020-05-22 VITALS — BODY MASS INDEX: 33.36 KG/M2 | WEIGHT: 158.94 LBS | HEIGHT: 58 IN

## 2020-05-22 DIAGNOSIS — Z12.31 SCREENING MAMMOGRAM FOR HIGH-RISK PATIENT: ICD-10-CM

## 2020-05-22 PROCEDURE — 77067 SCR MAMMO BI INCL CAD: CPT | Mod: TC,PO

## 2020-05-26 ENCOUNTER — TELEPHONE (OUTPATIENT)
Dept: FAMILY MEDICINE | Facility: CLINIC | Age: 70
End: 2020-05-26

## 2020-07-16 ENCOUNTER — OCCUPATIONAL HEALTH (OUTPATIENT)
Dept: URGENT CARE | Facility: CLINIC | Age: 70
End: 2020-07-16

## 2020-07-16 DIAGNOSIS — Z02.89 ENCOUNTER FOR PHYSICAL EXAMINATION RELATED TO EMPLOYMENT: ICD-10-CM

## 2020-07-16 PROCEDURE — 99499 PR PHYSICAL - DOT/CDL: ICD-10-PCS | Mod: S$GLB,,, | Performed by: EMERGENCY MEDICINE

## 2020-07-16 PROCEDURE — 99499 UNLISTED E&M SERVICE: CPT | Mod: S$GLB,,, | Performed by: EMERGENCY MEDICINE

## 2020-07-22 ENCOUNTER — OFFICE VISIT (OUTPATIENT)
Dept: FAMILY MEDICINE | Facility: CLINIC | Age: 70
End: 2020-07-22
Payer: MEDICARE

## 2020-07-22 VITALS
WEIGHT: 168 LBS | OXYGEN SATURATION: 95 % | SYSTOLIC BLOOD PRESSURE: 124 MMHG | BODY MASS INDEX: 35.26 KG/M2 | TEMPERATURE: 99 F | HEIGHT: 58 IN | DIASTOLIC BLOOD PRESSURE: 80 MMHG | HEART RATE: 84 BPM | RESPIRATION RATE: 16 BRPM

## 2020-07-22 DIAGNOSIS — G25.0 BENIGN HEAD TREMOR: ICD-10-CM

## 2020-07-22 DIAGNOSIS — M85.89 OSTEOPENIA OF MULTIPLE SITES: ICD-10-CM

## 2020-07-22 DIAGNOSIS — G56.01 CARPAL TUNNEL SYNDROME OF RIGHT WRIST: Primary | ICD-10-CM

## 2020-07-22 DIAGNOSIS — R10.32 LLQ ABDOMINAL PAIN: ICD-10-CM

## 2020-07-22 PROCEDURE — 99214 PR OFFICE/OUTPT VISIT, EST, LEVL IV, 30-39 MIN: ICD-10-PCS | Mod: S$PBB,,, | Performed by: INTERNAL MEDICINE

## 2020-07-22 PROCEDURE — 99214 OFFICE O/P EST MOD 30 MIN: CPT | Mod: S$PBB,,, | Performed by: INTERNAL MEDICINE

## 2020-07-22 PROCEDURE — 99215 OFFICE O/P EST HI 40 MIN: CPT | Performed by: INTERNAL MEDICINE

## 2020-07-22 RX ORDER — ALENDRONATE SODIUM 70 MG/1
70 TABLET ORAL
Qty: 12 TABLET | Refills: 1 | Status: SHIPPED | OUTPATIENT
Start: 2020-07-22 | End: 2022-06-06 | Stop reason: SDUPTHER

## 2020-07-22 NOTE — PATIENT INSTRUCTIONS
The patient is asked to make an attempt to improve diet and exercise patterns to aid in medical management of this problem.    For sleep, chewable melatonin by Natalie Fusion    B6 200 mg daily

## 2020-07-24 ENCOUNTER — PROCEDURE VISIT (OUTPATIENT)
Dept: PHYSICAL MEDICINE AND REHAB | Facility: CLINIC | Age: 70
End: 2020-07-24
Payer: MEDICARE

## 2020-07-24 DIAGNOSIS — G56.01 CARPAL TUNNEL SYNDROME OF RIGHT WRIST: ICD-10-CM

## 2020-07-24 PROCEDURE — 95910 NRV CNDJ TEST 7-8 STUDIES: CPT | Mod: 26,S$PBB,, | Performed by: PHYSICAL MEDICINE & REHABILITATION

## 2020-07-24 PROCEDURE — 95910 PR NERVE CONDUCTION STUDY; 7-8 STUDIES: ICD-10-PCS | Mod: 26,S$PBB,, | Performed by: PHYSICAL MEDICINE & REHABILITATION

## 2020-07-24 PROCEDURE — 95910 NRV CNDJ TEST 7-8 STUDIES: CPT | Mod: PBBFAC,PN | Performed by: PHYSICAL MEDICINE & REHABILITATION

## 2020-07-24 NOTE — PROCEDURES
Procedures     [facility]  Physical Medicine and Rehabilitation   [address] [place] [address], [address]  [place] [address]             Patient: Cmaila Au   Patient ID: [id number]   Sex:     Date of Birth:     Age:     Notes:     Last visit date: 7/24/2020         Visit date and time: 7/24/2020 07:41   Patient Age on Visit Date:     Referring Physician:     Diagnoses:       Bilateral hand numbness      Sensory NCS      Nerve / Sites Rec. Site Onset Lat Peak Lat Ref. NP Amp Ref. PP Amp Ref. Segments Distance Velocity     ms ms ms µV µV µV µV  cm m/s   R Median - Digit II (Antidromic)      Wrist Dig II 2.19 3.44 ?3.40 27.2 ?15.0 26.9 ?20.0 Wrist - Dig II 13 59   L Median - Digit II (Antidromic)      Wrist Dig II 2.50 3.13 ?3.40 19.4 ?15.0 27.8 ?20.0 Wrist - Dig II 13 52   R Ulnar - Digit V (Antidromic)      Wrist Dig V 1.67 2.24 ?3.10 24.7 ?10.0 40.5 ?15.0 Wrist - Dig V 11 66   L Ulnar - Digit V (Antidromic)      Wrist Dig V 1.77 2.40 ?3.10 23.2 ?10.0 30.5 ?15.0 Wrist - Dig V 11 62       Motor NCS      Nerve / Sites Muscle Latency Ref. Amplitude Ref. Amp % Duration Segments Distance Lat Diff Velocity Ref.     ms ms mV mV % ms  cm ms m/s m/s   L Median - APB      Wrist APB 2.76 ?4.40 6.6 ?4.0 100 5.73 Wrist - APB 7         Elbow APB 6.09  6.6  99.7 5.89 Elbow - Wrist 18 3.33 54 ?49   R Median - APB      Wrist APB 3.23 ?4.40 4.8 ?4.0 100 5.68 Wrist - APB 7         Elbow APB 6.93  4.5  92.1 5.73 Elbow - Wrist 18 3.70 49 ?49   L Ulnar - ADM      Wrist ADM 2.29 ?3.60 7.3 ?5.0 100 6.56 Wrist - ADM 7         B.Elbow ADM 4.90  6.5  89.2 6.41 B.Elbow - Wrist 14 2.60 54 ?49      A.Elbow ADM 6.46  6.2  84.7 5.83 A.Elbow - B.Elbow 10 1.56 64 ?49   R Ulnar - ADM      Wrist ADM 2.08 ?3.60 6.2 ?5.0 100 6.35 Wrist - ADM 7         B.Elbow ADM 5.00  7.1  114 6.35 B.Elbow - Wrist 17 2.92 58 ?49      A.Elbow ADM 6.41  6.1  98.1 6.09 A.Elbow - B.Elbow 10 1.41 71 ?49       Summary    The motor conduction test was performed on 4  nerve(s). The results were normal in 3 nerve(s): L Median - APB, L Ulnar - ADM, R Ulnar - ADM. Results outside the specified normal range were found in 1 nerve(s), as follows:   In the R Median - APB study  o the take off velocity result was reduced for Elbow - Wrist segment    The sensory conduction test was performed on 4 nerve(s). The results were normal in 3 nerve(s): L Median - Digit II (Antidromic), R Ulnar - Digit V (Antidromic), L Ulnar - Digit V (Antidromic). Results outside the specified normal range were found in 1 nerve(s), as follows:   In the R Median - Digit II (Antidromic) study  o the peak latency result was increased for Wrist stimulation          Conclusion:     Mild right carpal tunnel syndrome          ____________________________  Suzi Parker D.O.

## 2020-07-31 ENCOUNTER — HOSPITAL ENCOUNTER (OUTPATIENT)
Dept: RADIOLOGY | Facility: HOSPITAL | Age: 70
Discharge: HOME | End: 2020-07-31
Attending: INTERNAL MEDICINE
Payer: MEDICARE

## 2020-07-31 DIAGNOSIS — G25.0 BENIGN HEAD TREMOR: ICD-10-CM

## 2020-07-31 LAB
CREAT SERPL-MCNC: 0.6 MG/DL (ref 0.5–1.4)
SAMPLE: NORMAL

## 2020-07-31 PROCEDURE — 70470 CT HEAD/BRAIN W/O & W/DYE: CPT | Mod: TC,PO

## 2020-07-31 PROCEDURE — 25500020 PHARM REV CODE 255: Mod: PO | Performed by: INTERNAL MEDICINE

## 2020-07-31 PROCEDURE — 74177 CT ABD & PELVIS W/CONTRAST: CPT | Mod: TC,PO

## 2020-07-31 RX ADMIN — IOHEXOL 100 ML: 350 INJECTION, SOLUTION INTRAVENOUS at 11:07

## 2020-10-06 ENCOUNTER — OFFICE VISIT (OUTPATIENT)
Dept: ORTHOPEDICS | Facility: CLINIC | Age: 70
End: 2020-10-06
Payer: MEDICARE

## 2020-10-06 VITALS
OXYGEN SATURATION: 97 % | DIASTOLIC BLOOD PRESSURE: 76 MMHG | BODY MASS INDEX: 35.26 KG/M2 | HEART RATE: 80 BPM | SYSTOLIC BLOOD PRESSURE: 100 MMHG | WEIGHT: 168 LBS | HEIGHT: 58 IN

## 2020-10-06 DIAGNOSIS — M17.12 PRIMARY OSTEOARTHRITIS OF LEFT KNEE: Primary | ICD-10-CM

## 2020-10-06 PROCEDURE — 99213 OFFICE O/P EST LOW 20 MIN: CPT | Mod: 25,S$GLB,, | Performed by: ORTHOPAEDIC SURGERY

## 2020-10-06 PROCEDURE — 99213 PR OFFICE/OUTPT VISIT, EST, LEVL III, 20-29 MIN: ICD-10-PCS | Mod: 25,S$GLB,, | Performed by: ORTHOPAEDIC SURGERY

## 2020-10-06 PROCEDURE — 20610 DRAIN/INJ JOINT/BURSA W/O US: CPT | Mod: LT,S$GLB,, | Performed by: ORTHOPAEDIC SURGERY

## 2020-10-06 PROCEDURE — 20610 LARGE JOINT ASPIRATION/INJECTION: L KNEE: ICD-10-PCS | Mod: LT,S$GLB,, | Performed by: ORTHOPAEDIC SURGERY

## 2020-10-06 RX ORDER — METHYLPREDNISOLONE ACETATE 40 MG/ML
40 INJECTION, SUSPENSION INTRA-ARTICULAR; INTRALESIONAL; INTRAMUSCULAR; SOFT TISSUE
Status: DISCONTINUED | OUTPATIENT
Start: 2020-10-06 | End: 2020-10-06 | Stop reason: HOSPADM

## 2020-10-06 RX ADMIN — METHYLPREDNISOLONE ACETATE 40 MG: 40 INJECTION, SUSPENSION INTRA-ARTICULAR; INTRALESIONAL; INTRAMUSCULAR; SOFT TISSUE at 10:10

## 2020-10-06 NOTE — PROGRESS NOTES
[facility]    Subjective:     Chief Complaint:   Chief Complaint   Patient presents with    Left Knee - Pain       Past Medical History:   Diagnosis Date    Arthritis     Breast cancer     right    Wears glasses     WEARS CONTACS       Past Surgical History:   Procedure Laterality Date    BREAST RECONSTRUCTION      CARPAL TUNNEL RELEASE       SECTION  , ,     CHOLECYSTECTOMY  2019        COLONOSCOPY  2017    KNEE ARTHROPLASTY Right 6/10/2019    Procedure: ARTHROPLASTY, KNEE;  Surgeon: Jony Marmolejo MD;  Location: [facility];  Service: Orthopedics;  Laterality: Right;    KNEE ARTHROSCOPY Bilateral 2008    MASTECTOMY Right 2007    PORTACATH PLACEMENT  2007    PORTACATH REMOVAL      SURGICAL REMOVAL OF BONE SPUR Bilateral     TUBAL LIGATION         Current Outpatient Medications   Medication Sig    alendronate (FOSAMAX) 70 MG tablet Take 1 tablet (70 mg total) by mouth every 7 days.    calcium carbonate (CALCIUM 600 ORAL) Take by mouth 2 (two) times daily.    colestipol (COLESTID) 5 gram Pack Take 5 g by mouth 2 (two) times daily.    cyclobenzaprine (FLEXERIL) 10 MG tablet TK 1/2 TO 1 T PO TID PRF MUSCLE SPASMS . WILL CAUSE DROWSINESS    meclizine (ANTIVERT) 25 mg tablet Take 1 tablet (25 mg total) by mouth 3 (three) times daily as needed for Dizziness. (Patient not taking: Reported on 2020)     No current facility-administered medications for this visit.        Review of patient's allergies indicates:  No Known Allergies    Family History   Problem Relation Age of Onset    Arthritis Mother     Hyperlipidemia Mother     Stroke Mother     Dementia Mother        Social History     Socioeconomic History    Marital status:      Spouse name: Not on file    Number of children: Not on file    Years of education: Not on file    Highest education level: Not on file   Occupational History    Not on file   Social Needs     Financial resource strain: Not on file    Food insecurity     Worry: Not on file     Inability: Not on file    Transportation needs     Medical: Not on file     Non-medical: Not on file   Tobacco Use    Smoking status: Former Smoker     Types: Cigarettes    Smokeless tobacco: Never Used    Tobacco comment: social smoker - on weekends only - quit 20 yrs ago   Substance and Sexual Activity    Alcohol use: Yes     Alcohol/week: 0.0 standard drinks     Frequency: Never     Comment: occ    Drug use: No    Sexual activity: Yes     Partners: Male   Lifestyle    Physical activity     Days per week: Not on file     Minutes per session: Not on file    Stress: Not at all   Relationships    Social connections     Talks on phone: Not on file     Gets together: Not on file     Attends Restorationist service: Not on file     Active member of club or organization: Not on file     Attends meetings of clubs or organizations: Not on file     Relationship status: Not on file   Other Topics Concern    Not on file   Social History Narrative    Not on file       History of present illness:  Patient comes in today still left knee.  She is having significant pain.  She has bone-on-bone arthritis.  She is really anxious to have a total knee done but she can not do until the summer.      Review of Systems:    Constitution: Negative for chills, fever, and sweats.  Negative for unexplained weight loss.    HENT:  Negative for headaches and blurry vision.    Cardiovascular:Negative for chest pain or irregular heart beat. Negative for hypertension.    Respiratory:  Negative for cough and shortness of breath.    Gastrointestinal: Negative for abdominal pain, heartburn, melena, nausea, and vomitting.    Genitourinary:  Negative bladder incontinence and dysuria.    Musculoskeletal:  See HPI for details.     Neurological: Negative for numbness.    Psychiatric/Behavioral: Negative for depression.  The patient is not nervous/anxious.       Endocrine: Negative for polyuria    Hematologic/Lymphatic: Negative for bleeding problem.  Does not bruise/bleed easily.    Skin: Negative for poor would healing and rash    Objective:      Physical Examination:    Vital Signs:  There were no vitals filed for this visit.    There is no height or weight on file to calculate BMI.    This a well-developed, well nourished patient in no acute distress.  They are alert and oriented and cooperative to examination.        Patient is range of motion 0-120 degrees.  Her knee is stable to varus valgus stresses.  She has severe crepitus.  Pertinent New Results:    XRAY Report / Interpretation:       Assessment/Plan:      Advanced arthritis of the left knee.  I injected the left knee with Depo-Medrol and lidocaine.  Follow-up in 1 month      This note was created using Dragon voice recognition software that occasionally misinterpreted phrases or words.

## 2020-10-06 NOTE — PROCEDURES
Large Joint Aspiration/Injection: L knee    Date/Time: 10/6/2020 10:45 AM  Performed by: Jony Marmolejo MD  Authorized by: Jony Marmolejo MD     Consent Done?:  Yes (Verbal)  Indications:  Pain  Site marked: the procedure site was marked    Timeout: prior to procedure the correct patient, procedure, and site was verified    Prep: patient was prepped and draped in usual sterile fashion      Local anesthesia used?: Yes    Local anesthetic:  Lidocaine 1% without epinephrine    Details:  Needle Size:  25 G  Ultrasonic Guidance for needle placement?: No    Location:  Knee  Site:  L knee  Medications:  40 mg methylPREDNISolone acetate 40 mg/mL  Patient tolerance:  Patient tolerated the procedure well with no immediate complications

## 2020-11-12 ENCOUNTER — OFFICE VISIT (OUTPATIENT)
Dept: HEMATOLOGY/ONCOLOGY | Facility: CLINIC | Age: 70
End: 2020-11-12
Payer: MEDICARE

## 2020-11-12 VITALS
HEART RATE: 78 BPM | WEIGHT: 167 LBS | TEMPERATURE: 98 F | BODY MASS INDEX: 34.9 KG/M2 | RESPIRATION RATE: 18 BRPM | SYSTOLIC BLOOD PRESSURE: 160 MMHG | DIASTOLIC BLOOD PRESSURE: 79 MMHG

## 2020-11-12 DIAGNOSIS — Z17.0 MALIGNANT NEOPLASM OF CENTRAL PORTION OF RIGHT BREAST IN FEMALE, ESTROGEN RECEPTOR POSITIVE: ICD-10-CM

## 2020-11-12 DIAGNOSIS — C50.111 MALIGNANT NEOPLASM OF CENTRAL PORTION OF RIGHT BREAST IN FEMALE, ESTROGEN RECEPTOR POSITIVE: ICD-10-CM

## 2020-11-12 PROCEDURE — 99213 PR OFFICE/OUTPT VISIT, EST, LEVL III, 20-29 MIN: ICD-10-PCS | Mod: S$GLB,,, | Performed by: INTERNAL MEDICINE

## 2020-11-12 PROCEDURE — 99213 OFFICE O/P EST LOW 20 MIN: CPT | Mod: S$GLB,,, | Performed by: INTERNAL MEDICINE

## 2020-11-12 NOTE — ASSESSMENT & PLAN NOTE
Patient is doing well and appears TARAS.  mammo was negative in May this year and exam is unremarkable.  Will continue yearly follow up and mammos.

## 2020-11-12 NOTE — PROGRESS NOTES
PROGRESS NOTE    Subjective:       Patient ID: Camila Au is a 70 y.o. female.    Chief Complaint:  No chief complaint on file.    Right mastectomy 5/24/2007  [id number], 2cm tumor  Lobular carcinoma  Completed AC 10/2007  Completed XRT for close margins  Began AI 9/2007-11/2018      History of Present Illness:   Camila Au is a 70 y.o. female who presents with a history of breast cancer here for yearly  follow up.      No new complaints this visit. She is feeling well.      Mammogram negative May, 2020       Family and Social history reviewed and is unchanged from 11/25/2014      ROS:  Review of Systems   Constitutional: Negative for fever.   Respiratory: Negative for shortness of breath.    Cardiovascular: Negative for chest pain and leg swelling.   Gastrointestinal: Negative for abdominal pain and blood in stool.   Genitourinary: Negative for hematuria.   Skin: Negative for rash.          Current Outpatient Medications:     alendronate (FOSAMAX) 70 MG tablet, Take 1 tablet (70 mg total) by mouth every 7 days., Disp: 12 tablet, Rfl: 1    calcium carbonate (CALCIUM 600 ORAL), Take by mouth 2 (two) times daily., Disp: , Rfl:     cyclobenzaprine (FLEXERIL) 10 MG tablet, TK 1/2 TO 1 T PO TID PRF MUSCLE SPASMS . WILL CAUSE DROWSINESS, Disp: , Rfl:     meclizine (ANTIVERT) 25 mg tablet, Take 1 tablet (25 mg total) by mouth 3 (three) times daily as needed for Dizziness., Disp: 30 tablet, Rfl: 0    colestipol (COLESTID) 5 gram Pack, Take 5 g by mouth 2 (two) times daily., Disp: 180 packet, Rfl: 3        Objective:       Physical Examination:     BP (!) 160/79   Pulse 78   Temp 97.6 °F (36.4 °C)   Resp 18   Wt 75.8 kg (167 lb)   BMI 34.90 kg/m²     Physical Exam  Constitutional:       Appearance: She is well-developed.   HENT:      Head: Normocephalic and atraumatic.      Right Ear: External ear normal.      Left Ear: External  ear normal.   Eyes:      Conjunctiva/sclera: Conjunctivae normal.      Pupils: Pupils are equal, round, and reactive to light.   Neck:      Thyroid: No thyromegaly.      Trachea: No tracheal deviation.   Cardiovascular:      Rate and Rhythm: Normal rate and regular rhythm.      Heart sounds: Normal heart sounds.   Pulmonary:      Effort: Pulmonary effort is normal.      Breath sounds: Normal breath sounds.   Chest:       Abdominal:      General: Bowel sounds are normal. There is no distension.      Palpations: Abdomen is soft. There is no mass.      Tenderness: There is no abdominal tenderness.   Skin:     Findings: No rash.   Neurological:      Comments: Neuro intact througout   Psychiatric:         Behavior: Behavior normal.         Thought Content: Thought content normal.         Judgment: Judgment normal.         Labs:   No results found for this or any previous visit (from the past 336 hour(s)).  CMP  Sodium   Date Value Ref Range Status   06/11/2019 138 136 - 145 mmol/L Final   03/26/2019 137 134 - 144 mmol/L      Potassium   Date Value Ref Range Status   06/11/2019 4.2 3.5 - 5.1 mmol/L Final     Chloride   Date Value Ref Range Status   06/11/2019 104 95 - 110 mmol/L Final   03/26/2019 101 98 - 110 mmol/L      CO2   Date Value Ref Range Status   06/11/2019 25 23 - 29 mmol/L Final     Glucose   Date Value Ref Range Status   06/11/2019 114 (H) 70 - 110 mg/dL Final   03/26/2019 160 (H) 70 - 99 mg/dL      BUN   Date Value Ref Range Status   06/11/2019 12 8 - 23 mg/dL Final     Creatinine   Date Value Ref Range Status   06/11/2019 0.6 0.5 - 1.4 mg/dL Final   03/26/2019 0.50 (L) 0.60 - 1.40 mg/dL    08/08/2012 0.6 0.2 - 1.4 mg/dl Final     Calcium   Date Value Ref Range Status   06/11/2019 8.9 8.7 - 10.5 mg/dL Final   08/08/2012 9.5 8.6 - 10.2 mg/dl Final     Total Protein   Date Value Ref Range Status   05/30/2019 7.5 6.0 - 8.4 g/dL Final     Albumin   Date Value Ref Range Status   05/30/2019 3.9 3.5 - 5.2 g/dL  Final   03/26/2019 4.0 3.1 - 4.7 g/dL      Total Bilirubin   Date Value Ref Range Status   05/30/2019 0.8 0.1 - 1.0 mg/dL Final     Comment:     For infants and newborns, interpretation of results should be based  on gestational age, weight and in agreement with clinical  observations.  Premature Infant recommended reference ranges:  Up to 24 hours.............<8.0 mg/dL  Up to 48 hours............<12.0 mg/dL  3-5 days..................<15.0 mg/dL  6-29 days.................<15.0 mg/dL       Alkaline Phosphatase   Date Value Ref Range Status   05/30/2019 131 55 - 135 U/L Final   08/08/2012 100 23 - 119 UNIT/L Final     AST   Date Value Ref Range Status   05/30/2019 80 (H) 10 - 40 U/L Final   08/08/2012 35 (H) 10 - 30 UNIT/L Final     ALT   Date Value Ref Range Status   05/30/2019 63 (H) 10 - 44 U/L Final     Anion Gap   Date Value Ref Range Status   06/11/2019 9 8 - 16 mmol/L Final   08/08/2012 9 5 - 15 meq/L Final     eGFR if    Date Value Ref Range Status   06/11/2019 >60 >60 mL/min/1.73 m^2 Final     eGFR if non    Date Value Ref Range Status   06/11/2019 >60 >60 mL/min/1.73 m^2 Final     Comment:     Calculation used to obtain the estimated glomerular filtration  rate (eGFR) is the CKD-EPI equation.        No results found for: CEA  No results found for: PSA        Assessment/Plan:     Problem List Items Addressed This Visit     Malignant neoplasm of central portion of right breast in female, estrogen receptor positive     Patient is doing well and appears TARAS.  mammo was negative in May this year and exam is unremarkable.  Will continue yearly follow up and mammos.           Relevant Orders    Mammo Digital Diagnostic Bilat with Brian          Discussion:     Follow up in about 1 year (around 11/12/2021).      Electronically signed by Jeffrey Ramos

## 2020-11-13 DIAGNOSIS — Z17.0 ESTROGEN RECEPTOR POSITIVE: ICD-10-CM

## 2020-11-13 DIAGNOSIS — C50.111 MALIGNANT NEOPLASM OF CENTRAL PORTION OF RIGHT FEMALE BREAST: Primary | ICD-10-CM

## 2020-12-23 ENCOUNTER — TELEPHONE (OUTPATIENT)
Dept: FAMILY MEDICINE | Facility: CLINIC | Age: 70
End: 2020-12-23

## 2020-12-23 DIAGNOSIS — R42 DIZZINESS: ICD-10-CM

## 2020-12-23 RX ORDER — MECLIZINE HYDROCHLORIDE 25 MG/1
TABLET ORAL
Qty: 30 TABLET | Refills: 0 | Status: SHIPPED | OUTPATIENT
Start: 2020-12-23 | End: 2021-12-13

## 2020-12-24 NOTE — TELEPHONE ENCOUNTER
Patient called and said she is having dizziness when she lays down and getting out of bed. She wants to know who you would recommend her seeing.

## 2020-12-28 ENCOUNTER — PATIENT MESSAGE (OUTPATIENT)
Dept: FAMILY MEDICINE | Facility: CLINIC | Age: 70
End: 2020-12-28

## 2020-12-28 DIAGNOSIS — R42 VERTIGO: Primary | ICD-10-CM

## 2020-12-30 NOTE — TELEPHONE ENCOUNTER
First try prednisone 10 mg a day for 5 days plus lasix 20 mg for three days and if no better see Dr Christopher Ramirez ENT

## 2021-02-03 ENCOUNTER — CLINICAL SUPPORT (OUTPATIENT)
Dept: FAMILY MEDICINE | Facility: CLINIC | Age: 71
End: 2021-02-03
Payer: MEDICARE

## 2021-02-03 DIAGNOSIS — Z23 FLU VACCINE NEED: Primary | ICD-10-CM

## 2021-02-03 PROCEDURE — 99211 OFF/OP EST MAY X REQ PHY/QHP: CPT | Mod: 25

## 2021-02-03 PROCEDURE — 90686 IIV4 VACC NO PRSV 0.5 ML IM: CPT | Mod: PBBFAC | Performed by: INTERNAL MEDICINE

## 2021-02-10 ENCOUNTER — TELEPHONE (OUTPATIENT)
Dept: ORTHOPEDICS | Facility: CLINIC | Age: 71
End: 2021-02-10

## 2021-03-11 ENCOUNTER — OFFICE VISIT (OUTPATIENT)
Dept: ORTHOPEDICS | Facility: CLINIC | Age: 71
End: 2021-03-11
Payer: MEDICARE

## 2021-03-11 VITALS
DIASTOLIC BLOOD PRESSURE: 72 MMHG | HEIGHT: 58 IN | SYSTOLIC BLOOD PRESSURE: 138 MMHG | BODY MASS INDEX: 34.63 KG/M2 | WEIGHT: 165 LBS | HEART RATE: 81 BPM

## 2021-03-11 DIAGNOSIS — M17.12 PRIMARY OSTEOARTHRITIS OF LEFT KNEE: Primary | ICD-10-CM

## 2021-03-11 PROCEDURE — 1125F PR PAIN SEVERITY QUANTIFIED, PAIN PRESENT: ICD-10-PCS | Mod: S$GLB,,, | Performed by: ORTHOPAEDIC SURGERY

## 2021-03-11 PROCEDURE — 1159F PR MEDICATION LIST DOCUMENTED IN MEDICAL RECORD: ICD-10-PCS | Mod: S$GLB,,, | Performed by: ORTHOPAEDIC SURGERY

## 2021-03-11 PROCEDURE — 20610 LARGE JOINT ASPIRATION/INJECTION: L KNEE: ICD-10-PCS | Mod: LT,S$GLB,, | Performed by: ORTHOPAEDIC SURGERY

## 2021-03-11 PROCEDURE — 1125F AMNT PAIN NOTED PAIN PRSNT: CPT | Mod: S$GLB,,, | Performed by: ORTHOPAEDIC SURGERY

## 2021-03-11 PROCEDURE — 99213 PR OFFICE/OUTPT VISIT, EST, LEVL III, 20-29 MIN: ICD-10-PCS | Mod: 25,S$GLB,, | Performed by: ORTHOPAEDIC SURGERY

## 2021-03-11 PROCEDURE — 99213 OFFICE O/P EST LOW 20 MIN: CPT | Mod: 25,S$GLB,, | Performed by: ORTHOPAEDIC SURGERY

## 2021-03-11 PROCEDURE — 1159F MED LIST DOCD IN RCRD: CPT | Mod: S$GLB,,, | Performed by: ORTHOPAEDIC SURGERY

## 2021-03-11 PROCEDURE — 20610 DRAIN/INJ JOINT/BURSA W/O US: CPT | Mod: LT,S$GLB,, | Performed by: ORTHOPAEDIC SURGERY

## 2021-03-11 RX ORDER — METHYLPREDNISOLONE ACETATE 40 MG/ML
40 INJECTION, SUSPENSION INTRA-ARTICULAR; INTRALESIONAL; INTRAMUSCULAR; SOFT TISSUE
Status: DISCONTINUED | OUTPATIENT
Start: 2021-03-11 | End: 2021-03-11 | Stop reason: HOSPADM

## 2021-03-11 RX ADMIN — METHYLPREDNISOLONE ACETATE 40 MG: 40 INJECTION, SUSPENSION INTRA-ARTICULAR; INTRALESIONAL; INTRAMUSCULAR; SOFT TISSUE at 12:03

## 2021-03-15 ENCOUNTER — PATIENT MESSAGE (OUTPATIENT)
Dept: ORTHOPEDICS | Facility: CLINIC | Age: 71
End: 2021-03-15

## 2021-03-22 DIAGNOSIS — Z01.818 PRE-OP TESTING: ICD-10-CM

## 2021-03-22 DIAGNOSIS — M17.12 PRIMARY OSTEOARTHRITIS OF LEFT KNEE: Primary | ICD-10-CM

## 2021-05-06 ENCOUNTER — PATIENT MESSAGE (OUTPATIENT)
Dept: RESEARCH | Facility: HOSPITAL | Age: 71
End: 2021-05-06

## 2021-05-12 ENCOUNTER — PATIENT MESSAGE (OUTPATIENT)
Dept: ORTHOPEDICS | Facility: CLINIC | Age: 71
End: 2021-05-12

## 2021-05-17 ENCOUNTER — HOSPITAL ENCOUNTER (OUTPATIENT)
Dept: PREADMISSION TESTING | Facility: HOSPITAL | Age: 71
Discharge: HOME | End: 2021-05-17
Attending: ORTHOPAEDIC SURGERY
Payer: MEDICARE

## 2021-05-17 ENCOUNTER — HOSPITAL ENCOUNTER (OUTPATIENT)
Dept: RADIOLOGY | Facility: HOSPITAL | Age: 71
Discharge: HOME | End: 2021-05-17
Attending: ORTHOPAEDIC SURGERY
Payer: MEDICARE

## 2021-05-17 VITALS — BODY MASS INDEX: 34.63 KG/M2 | WEIGHT: 165 LBS | HEIGHT: 58 IN

## 2021-05-17 DIAGNOSIS — M17.12 PRIMARY OSTEOARTHRITIS OF LEFT KNEE: Primary | ICD-10-CM

## 2021-05-17 LAB
ABO + RH BLD: NORMAL
ALBUMIN SERPL BCP-MCNC: 3.9 G/DL (ref 3.5–5.2)
ALP SERPL-CCNC: 157 U/L (ref 55–135)
ALT SERPL W/O P-5'-P-CCNC: 79 U/L (ref 10–44)
ANION GAP SERPL CALC-SCNC: 10 MMOL/L (ref 8–16)
AST SERPL-CCNC: 113 U/L (ref 10–40)
BASOPHILS # BLD AUTO: 0.11 K/UL (ref 0–0.2)
BASOPHILS NFR BLD: 1.2 % (ref 0–1.9)
BILIRUB SERPL-MCNC: 0.5 MG/DL (ref 0.1–1)
BLD GP AB SCN CELLS X3 SERPL QL: NORMAL
BUN SERPL-MCNC: 16 MG/DL (ref 8–23)
CALCIUM SERPL-MCNC: 9.7 MG/DL (ref 8.7–10.5)
CHLORIDE SERPL-SCNC: 101 MMOL/L (ref 95–110)
CO2 SERPL-SCNC: 30 MMOL/L (ref 23–29)
CREAT SERPL-MCNC: 0.6 MG/DL (ref 0.5–1.4)
DIFFERENTIAL METHOD: ABNORMAL
EOSINOPHIL # BLD AUTO: 0.2 K/UL (ref 0–0.5)
EOSINOPHIL NFR BLD: 2.5 % (ref 0–8)
ERYTHROCYTE [DISTWIDTH] IN BLOOD BY AUTOMATED COUNT: 13.3 % (ref 11.5–14.5)
EST. GFR  (AFRICAN AMERICAN): >60 ML/MIN/1.73 M^2
EST. GFR  (NON AFRICAN AMERICAN): >60 ML/MIN/1.73 M^2
GLUCOSE SERPL-MCNC: 61 MG/DL (ref 70–110)
HCT VFR BLD AUTO: 45.3 % (ref 37–48.5)
HGB BLD-MCNC: 15.5 G/DL (ref 12–16)
IMM GRANULOCYTES # BLD AUTO: 0.02 K/UL (ref 0–0.04)
IMM GRANULOCYTES NFR BLD AUTO: 0.2 % (ref 0–0.5)
LYMPHOCYTES # BLD AUTO: 3 K/UL (ref 1–4.8)
LYMPHOCYTES NFR BLD: 33.3 % (ref 18–48)
MCH RBC QN AUTO: 33 PG (ref 27–31)
MCHC RBC AUTO-ENTMCNC: 34.2 G/DL (ref 32–36)
MCV RBC AUTO: 97 FL (ref 82–98)
MONOCYTES # BLD AUTO: 0.8 K/UL (ref 0.3–1)
MONOCYTES NFR BLD: 9.4 % (ref 4–15)
NEUTROPHILS # BLD AUTO: 4.8 K/UL (ref 1.8–7.7)
NEUTROPHILS NFR BLD: 53.4 % (ref 38–73)
NRBC BLD-RTO: 0 /100 WBC
PLATELET # BLD AUTO: 227 K/UL (ref 150–450)
PMV BLD AUTO: 11 FL (ref 9.2–12.9)
POTASSIUM SERPL-SCNC: 4.1 MMOL/L (ref 3.5–5.1)
PROT SERPL-MCNC: 7.7 G/DL (ref 6–8.4)
RBC # BLD AUTO: 4.69 M/UL (ref 4–5.4)
SODIUM SERPL-SCNC: 141 MMOL/L (ref 136–145)
WBC # BLD AUTO: 8.92 K/UL (ref 3.9–12.7)

## 2021-05-17 PROCEDURE — 87081 CULTURE SCREEN ONLY: CPT | Performed by: ORTHOPAEDIC SURGERY

## 2021-05-17 PROCEDURE — 93010 EKG 12-LEAD: ICD-10-PCS | Mod: ,,, | Performed by: INTERNAL MEDICINE

## 2021-05-17 PROCEDURE — 99900103 DSU ONLY-NO CHARGE-INITIAL HR (STAT)

## 2021-05-17 PROCEDURE — 86900 BLOOD TYPING SEROLOGIC ABO: CPT | Performed by: ORTHOPAEDIC SURGERY

## 2021-05-17 PROCEDURE — 80053 COMPREHEN METABOLIC PANEL: CPT | Performed by: ORTHOPAEDIC SURGERY

## 2021-05-17 PROCEDURE — 71046 XR CHEST PA AND LATERAL: ICD-10-PCS | Mod: 26,,, | Performed by: RADIOLOGY

## 2021-05-17 PROCEDURE — 71046 X-RAY EXAM CHEST 2 VIEWS: CPT | Mod: 26,,, | Performed by: RADIOLOGY

## 2021-05-17 PROCEDURE — 36415 COLL VENOUS BLD VENIPUNCTURE: CPT | Performed by: ORTHOPAEDIC SURGERY

## 2021-05-17 PROCEDURE — 99900104 DSU ONLY-NO CHARGE-EA ADD'L HR (STAT)

## 2021-05-17 PROCEDURE — 93005 ELECTROCARDIOGRAM TRACING: CPT

## 2021-05-17 PROCEDURE — 93010 ELECTROCARDIOGRAM REPORT: CPT | Mod: ,,, | Performed by: INTERNAL MEDICINE

## 2021-05-17 PROCEDURE — 85025 COMPLETE CBC W/AUTO DIFF WBC: CPT | Performed by: ORTHOPAEDIC SURGERY

## 2021-05-17 PROCEDURE — 71046 X-RAY EXAM CHEST 2 VIEWS: CPT | Mod: TC,FY

## 2021-05-19 LAB — MRSA SPEC QL CULT: NORMAL

## 2021-05-24 ENCOUNTER — HOSPITAL ENCOUNTER (OUTPATIENT)
Dept: RADIOLOGY | Facility: HOSPITAL | Age: 71
Discharge: HOME | End: 2021-05-24
Attending: INTERNAL MEDICINE
Payer: MEDICARE

## 2021-05-24 DIAGNOSIS — Z17.0 MALIGNANT NEOPLASM OF CENTRAL PORTION OF RIGHT BREAST IN FEMALE, ESTROGEN RECEPTOR POSITIVE: ICD-10-CM

## 2021-05-24 DIAGNOSIS — Z17.0 ESTROGEN RECEPTOR POSITIVE: ICD-10-CM

## 2021-05-24 DIAGNOSIS — C50.111 MALIGNANT NEOPLASM OF CENTRAL PORTION OF RIGHT BREAST IN FEMALE, ESTROGEN RECEPTOR POSITIVE: ICD-10-CM

## 2021-05-24 DIAGNOSIS — C50.111 MALIGNANT NEOPLASM OF CENTRAL PORTION OF RIGHT FEMALE BREAST: ICD-10-CM

## 2021-05-24 PROCEDURE — 77066 DX MAMMO INCL CAD BI: CPT | Mod: TC,PO

## 2021-05-26 ENCOUNTER — TELEPHONE (OUTPATIENT)
Dept: ORTHOPEDICS | Facility: CLINIC | Age: 71
End: 2021-05-26

## 2021-05-28 ENCOUNTER — LAB VISIT (OUTPATIENT)
Dept: PRIMARY CARE CLINIC | Facility: CLINIC | Age: 71
End: 2021-05-28
Payer: MEDICARE

## 2021-05-28 ENCOUNTER — TELEPHONE (OUTPATIENT)
Dept: ORTHOPEDICS | Facility: CLINIC | Age: 71
End: 2021-05-28

## 2021-05-28 DIAGNOSIS — M17.12 PRIMARY OSTEOARTHRITIS OF LEFT KNEE: Primary | ICD-10-CM

## 2021-05-28 DIAGNOSIS — Z01.818 PRE-OP TESTING: ICD-10-CM

## 2021-05-28 PROCEDURE — U0005 INFEC AGEN DETEC AMPLI PROBE: HCPCS | Performed by: ORTHOPAEDIC SURGERY

## 2021-05-28 PROCEDURE — U0003 INFECTIOUS AGENT DETECTION BY NUCLEIC ACID (DNA OR RNA); SEVERE ACUTE RESPIRATORY SYNDROME CORONAVIRUS 2 (SARS-COV-2) (CORONAVIRUS DISEASE [COVID-19]), AMPLIFIED PROBE TECHNIQUE, MAKING USE OF HIGH THROUGHPUT TECHNOLOGIES AS DESCRIBED BY CMS-2020-01-R: HCPCS | Performed by: ORTHOPAEDIC SURGERY

## 2021-05-29 ENCOUNTER — ANESTHESIA EVENT (OUTPATIENT)
Dept: SURGERY | Facility: HOSPITAL | Age: 71
End: 2021-05-29
Payer: MEDICARE

## 2021-05-29 LAB — SARS-COV-2 RNA RESP QL NAA+PROBE: NOT DETECTED

## 2021-05-31 ENCOUNTER — ANESTHESIA (OUTPATIENT)
Dept: SURGERY | Facility: HOSPITAL | Age: 71
End: 2021-05-31
Payer: MEDICARE

## 2021-05-31 ENCOUNTER — HOSPITAL ENCOUNTER (OUTPATIENT)
Facility: HOSPITAL | Age: 71
Discharge: HOME HEALTH | End: 2021-06-01
Attending: ORTHOPAEDIC SURGERY | Admitting: ORTHOPAEDIC SURGERY
Payer: MEDICARE

## 2021-05-31 DIAGNOSIS — M17.12 PRIMARY OSTEOARTHRITIS OF LEFT KNEE: Primary | ICD-10-CM

## 2021-05-31 DIAGNOSIS — R07.9 CHEST PAIN: ICD-10-CM

## 2021-05-31 PROCEDURE — 27000221 HC OXYGEN, UP TO 24 HOURS

## 2021-05-31 PROCEDURE — 25000003 PHARM REV CODE 250: Performed by: ANESTHESIOLOGY

## 2021-05-31 PROCEDURE — 63600175 PHARM REV CODE 636 W HCPCS: Performed by: ORTHOPAEDIC SURGERY

## 2021-05-31 PROCEDURE — 76942 ECHO GUIDE FOR BIOPSY: CPT | Performed by: ANESTHESIOLOGY

## 2021-05-31 PROCEDURE — D9220A PRA ANESTHESIA: ICD-10-PCS | Mod: CRNA,,, | Performed by: NURSE ANESTHETIST, CERTIFIED REGISTERED

## 2021-05-31 PROCEDURE — C1713 ANCHOR/SCREW BN/BN,TIS/BN: HCPCS | Performed by: ORTHOPAEDIC SURGERY

## 2021-05-31 PROCEDURE — D9220A PRA ANESTHESIA: ICD-10-PCS | Mod: ANES,,, | Performed by: ANESTHESIOLOGY

## 2021-05-31 PROCEDURE — 25000003 PHARM REV CODE 250: Performed by: ORTHOPAEDIC SURGERY

## 2021-05-31 PROCEDURE — 36000711: Performed by: ORTHOPAEDIC SURGERY

## 2021-05-31 PROCEDURE — 36000710: Performed by: ORTHOPAEDIC SURGERY

## 2021-05-31 PROCEDURE — C1776 JOINT DEVICE (IMPLANTABLE): HCPCS | Performed by: ORTHOPAEDIC SURGERY

## 2021-05-31 PROCEDURE — 97110 THERAPEUTIC EXERCISES: CPT

## 2021-05-31 PROCEDURE — 64447 NJX AA&/STRD FEMORAL NRV IMG: CPT | Mod: 59,LT,, | Performed by: ANESTHESIOLOGY

## 2021-05-31 PROCEDURE — 37000009 HC ANESTHESIA EA ADD 15 MINS: Performed by: ORTHOPAEDIC SURGERY

## 2021-05-31 PROCEDURE — 27200750 HC INSULATED NEEDLE/ STIMUPLEX: Performed by: ANESTHESIOLOGY

## 2021-05-31 PROCEDURE — 64447 NJX AA&/STRD FEMORAL NRV IMG: CPT | Mod: 59 | Performed by: ANESTHESIOLOGY

## 2021-05-31 PROCEDURE — 76942 PR U/S GUIDANCE FOR NEEDLE GUIDANCE: ICD-10-PCS | Mod: 26,,, | Performed by: ANESTHESIOLOGY

## 2021-05-31 PROCEDURE — 37000008 HC ANESTHESIA 1ST 15 MINUTES: Performed by: ORTHOPAEDIC SURGERY

## 2021-05-31 PROCEDURE — 71000039 HC RECOVERY, EACH ADD'L HOUR: Performed by: ORTHOPAEDIC SURGERY

## 2021-05-31 PROCEDURE — C9290 INJ, BUPIVACAINE LIPOSOME: HCPCS | Performed by: ANESTHESIOLOGY

## 2021-05-31 PROCEDURE — 63600175 PHARM REV CODE 636 W HCPCS: Performed by: ANESTHESIOLOGY

## 2021-05-31 PROCEDURE — D9220A PRA ANESTHESIA: Mod: CRNA,,, | Performed by: NURSE ANESTHETIST, CERTIFIED REGISTERED

## 2021-05-31 PROCEDURE — D9220A PRA ANESTHESIA: Mod: ANES,,, | Performed by: ANESTHESIOLOGY

## 2021-05-31 PROCEDURE — 63600175 PHARM REV CODE 636 W HCPCS: Performed by: NURSE PRACTITIONER

## 2021-05-31 PROCEDURE — 64447 PR NERVE BLOCK INJ, ANES/STEROID, FEMORAL, INCL IMAG GUIDANCE: ICD-10-PCS | Mod: 59,LT,, | Performed by: ANESTHESIOLOGY

## 2021-05-31 PROCEDURE — 99900104 DSU ONLY-NO CHARGE-EA ADD'L HR (STAT): Performed by: ORTHOPAEDIC SURGERY

## 2021-05-31 PROCEDURE — 99900103 DSU ONLY-NO CHARGE-INITIAL HR (STAT): Performed by: ORTHOPAEDIC SURGERY

## 2021-05-31 PROCEDURE — 63600175 PHARM REV CODE 636 W HCPCS

## 2021-05-31 PROCEDURE — 27200688 HC TRAY, SPINAL-HYPER/ ISOBARIC: Performed by: ANESTHESIOLOGY

## 2021-05-31 PROCEDURE — 94761 N-INVAS EAR/PLS OXIMETRY MLT: CPT

## 2021-05-31 PROCEDURE — 63600175 PHARM REV CODE 636 W HCPCS: Performed by: NURSE ANESTHETIST, CERTIFIED REGISTERED

## 2021-05-31 PROCEDURE — 71000033 HC RECOVERY, INTIAL HOUR: Performed by: ORTHOPAEDIC SURGERY

## 2021-05-31 PROCEDURE — S5010 5% DEXTROSE AND 0.45% SALINE: HCPCS | Performed by: ORTHOPAEDIC SURGERY

## 2021-05-31 PROCEDURE — 97116 GAIT TRAINING THERAPY: CPT

## 2021-05-31 PROCEDURE — 27201423 OPTIME MED/SURG SUP & DEVICES STERILE SUPPLY: Performed by: ORTHOPAEDIC SURGERY

## 2021-05-31 PROCEDURE — 94799 UNLISTED PULMONARY SVC/PX: CPT

## 2021-05-31 PROCEDURE — 76942 ECHO GUIDE FOR BIOPSY: CPT | Mod: 26,,, | Performed by: ANESTHESIOLOGY

## 2021-05-31 PROCEDURE — 25000003 PHARM REV CODE 250: Performed by: NURSE ANESTHETIST, CERTIFIED REGISTERED

## 2021-05-31 PROCEDURE — 97161 PT EVAL LOW COMPLEX 20 MIN: CPT

## 2021-05-31 DEVICE — PATELLA ONLAY 29MM TRI PEG: Type: IMPLANTABLE DEVICE | Site: KNEE | Status: FUNCTIONAL

## 2021-05-31 DEVICE — BASEPLATE TIB SZ 3 L EVOLUTION: Type: IMPLANTABLE DEVICE | Site: KNEE | Status: FUNCTIONAL

## 2021-05-31 DEVICE — COMPONENT FEM SZ 3 CS/CR PRI L: Type: IMPLANTABLE DEVICE | Site: KNEE | Status: FUNCTIONAL

## 2021-05-31 DEVICE — CEMENT BONE SIMPLEX HV RADPQ: Type: IMPLANTABLE DEVICE | Site: KNEE | Status: FUNCTIONAL

## 2021-05-31 RX ORDER — DEXTROSE MONOHYDRATE AND SODIUM CHLORIDE 5; .45 G/100ML; G/100ML
INJECTION, SOLUTION INTRAVENOUS CONTINUOUS
Status: DISCONTINUED | OUTPATIENT
Start: 2021-05-31 | End: 2021-05-31

## 2021-05-31 RX ORDER — BISACODYL 10 MG
10 SUPPOSITORY, RECTAL RECTAL DAILY
Status: DISCONTINUED | OUTPATIENT
Start: 2021-05-31 | End: 2021-06-01 | Stop reason: HOSPADM

## 2021-05-31 RX ORDER — OXYCODONE AND ACETAMINOPHEN 7.5; 325 MG/1; MG/1
1 TABLET ORAL EVERY 4 HOURS PRN
Qty: 28 TABLET | Refills: 0 | Status: SHIPPED | OUTPATIENT
Start: 2021-05-31 | End: 2021-06-07 | Stop reason: SDUPTHER

## 2021-05-31 RX ORDER — SODIUM CHLORIDE 0.9 % (FLUSH) 0.9 %
5 SYRINGE (ML) INJECTION
Status: DISCONTINUED | OUTPATIENT
Start: 2021-05-31 | End: 2021-06-01

## 2021-05-31 RX ORDER — ACETAMINOPHEN 500 MG
1000 TABLET ORAL
Status: COMPLETED | OUTPATIENT
Start: 2021-05-31 | End: 2021-05-31

## 2021-05-31 RX ORDER — OXYCODONE HYDROCHLORIDE 10 MG/1
10 TABLET ORAL
Status: DISCONTINUED | OUTPATIENT
Start: 2021-05-31 | End: 2021-06-01 | Stop reason: HOSPADM

## 2021-05-31 RX ORDER — ONDANSETRON 8 MG/1
8 TABLET, ORALLY DISINTEGRATING ORAL EVERY 8 HOURS PRN
Status: DISCONTINUED | OUTPATIENT
Start: 2021-05-31 | End: 2021-06-01 | Stop reason: HOSPADM

## 2021-05-31 RX ORDER — CELECOXIB 100 MG/1
400 CAPSULE ORAL ONCE
Status: DISCONTINUED | OUTPATIENT
Start: 2021-05-31 | End: 2021-05-31

## 2021-05-31 RX ORDER — OXYCODONE HYDROCHLORIDE 10 MG/1
10 TABLET ORAL EVERY 4 HOURS PRN
Status: DISCONTINUED | OUTPATIENT
Start: 2021-05-31 | End: 2021-05-31

## 2021-05-31 RX ORDER — OXYCODONE HYDROCHLORIDE 5 MG/1
5 TABLET ORAL
Status: DISCONTINUED | OUTPATIENT
Start: 2021-05-31 | End: 2021-05-31 | Stop reason: HOSPADM

## 2021-05-31 RX ORDER — PHENYLEPHRINE HYDROCHLORIDE 10 MG/ML
INJECTION INTRAVENOUS
Status: DISCONTINUED | OUTPATIENT
Start: 2021-05-31 | End: 2021-05-31

## 2021-05-31 RX ORDER — OXYCODONE HYDROCHLORIDE 5 MG/1
5 TABLET ORAL
Status: DISCONTINUED | OUTPATIENT
Start: 2021-05-31 | End: 2021-06-01 | Stop reason: HOSPADM

## 2021-05-31 RX ORDER — PROPOFOL 10 MG/ML
VIAL (ML) INTRAVENOUS CONTINUOUS PRN
Status: DISCONTINUED | OUTPATIENT
Start: 2021-05-31 | End: 2021-05-31

## 2021-05-31 RX ORDER — CELECOXIB 100 MG/1
200 CAPSULE ORAL DAILY
Status: DISCONTINUED | OUTPATIENT
Start: 2021-06-01 | End: 2021-05-31

## 2021-05-31 RX ORDER — ACETAMINOPHEN 325 MG/1
650 TABLET ORAL EVERY 4 HOURS PRN
Status: DISCONTINUED | OUTPATIENT
Start: 2021-05-31 | End: 2021-05-31

## 2021-05-31 RX ORDER — BUPIVACAINE HYDROCHLORIDE 5 MG/ML
INJECTION, SOLUTION EPIDURAL; INTRACAUDAL
Status: COMPLETED | OUTPATIENT
Start: 2021-05-31 | End: 2021-05-31

## 2021-05-31 RX ORDER — LIDOCAINE HYDROCHLORIDE 10 MG/ML
1 INJECTION, SOLUTION EPIDURAL; INFILTRATION; INTRACAUDAL; PERINEURAL ONCE
Status: COMPLETED | OUTPATIENT
Start: 2021-05-31 | End: 2021-05-31

## 2021-05-31 RX ORDER — MORPHINE SULFATE 2 MG/ML
2 INJECTION, SOLUTION INTRAMUSCULAR; INTRAVENOUS
Status: DISCONTINUED | OUTPATIENT
Start: 2021-05-31 | End: 2021-06-01 | Stop reason: HOSPADM

## 2021-05-31 RX ORDER — SODIUM,POTASSIUM PHOSPHATES 280-250MG
2 POWDER IN PACKET (EA) ORAL
Status: DISCONTINUED | OUTPATIENT
Start: 2021-05-31 | End: 2021-06-01 | Stop reason: HOSPADM

## 2021-05-31 RX ORDER — GLUCAGON 1 MG
1 KIT INJECTION
Status: DISCONTINUED | OUTPATIENT
Start: 2021-05-31 | End: 2021-06-01 | Stop reason: HOSPADM

## 2021-05-31 RX ORDER — ENOXAPARIN SODIUM 100 MG/ML
40 INJECTION SUBCUTANEOUS EVERY 24 HOURS
Status: DISCONTINUED | OUTPATIENT
Start: 2021-05-31 | End: 2021-05-31

## 2021-05-31 RX ORDER — LANOLIN ALCOHOL/MO/W.PET/CERES
800 CREAM (GRAM) TOPICAL
Status: DISCONTINUED | OUTPATIENT
Start: 2021-05-31 | End: 2021-06-01 | Stop reason: HOSPADM

## 2021-05-31 RX ORDER — FAMOTIDINE 20 MG/1
20 TABLET, FILM COATED ORAL 2 TIMES DAILY
Status: DISCONTINUED | OUTPATIENT
Start: 2021-05-31 | End: 2021-06-01 | Stop reason: HOSPADM

## 2021-05-31 RX ORDER — CELECOXIB 100 MG/1
200 CAPSULE ORAL 2 TIMES DAILY
Status: DISCONTINUED | OUTPATIENT
Start: 2021-06-01 | End: 2021-06-01 | Stop reason: HOSPADM

## 2021-05-31 RX ORDER — MELOXICAM 15 MG/1
TABLET ORAL
COMMUNITY
Start: 2021-05-20 | End: 2021-12-13

## 2021-05-31 RX ORDER — ONDANSETRON 2 MG/ML
INJECTION INTRAMUSCULAR; INTRAVENOUS
Status: DISCONTINUED | OUTPATIENT
Start: 2021-05-31 | End: 2021-05-31

## 2021-05-31 RX ORDER — PREGABALIN 75 MG/1
75 CAPSULE ORAL NIGHTLY
Status: DISCONTINUED | OUTPATIENT
Start: 2021-05-31 | End: 2021-06-01 | Stop reason: HOSPADM

## 2021-05-31 RX ORDER — CEFAZOLIN SODIUM 2 G/50ML
2 SOLUTION INTRAVENOUS
Status: COMPLETED | OUTPATIENT
Start: 2021-05-31 | End: 2021-06-01

## 2021-05-31 RX ORDER — PREGABALIN 75 MG/1
75 CAPSULE ORAL ONCE
Status: COMPLETED | OUTPATIENT
Start: 2021-05-31 | End: 2021-05-31

## 2021-05-31 RX ORDER — IBUPROFEN 200 MG
24 TABLET ORAL
Status: DISCONTINUED | OUTPATIENT
Start: 2021-05-31 | End: 2021-06-01 | Stop reason: HOSPADM

## 2021-05-31 RX ORDER — DEXAMETHASONE SODIUM PHOSPHATE 4 MG/ML
INJECTION, SOLUTION INTRA-ARTICULAR; INTRALESIONAL; INTRAMUSCULAR; INTRAVENOUS; SOFT TISSUE
Status: DISCONTINUED | OUTPATIENT
Start: 2021-05-31 | End: 2021-05-31

## 2021-05-31 RX ORDER — ACETAMINOPHEN 325 MG/1
650 TABLET ORAL EVERY 8 HOURS PRN
Status: DISCONTINUED | OUTPATIENT
Start: 2021-05-31 | End: 2021-06-01 | Stop reason: HOSPADM

## 2021-05-31 RX ORDER — ONDANSETRON 8 MG/1
8 TABLET, ORALLY DISINTEGRATING ORAL EVERY 8 HOURS PRN
Status: DISCONTINUED | OUTPATIENT
Start: 2021-05-31 | End: 2021-05-31

## 2021-05-31 RX ORDER — ZOLPIDEM TARTRATE 5 MG/1
5 TABLET ORAL NIGHTLY PRN
Status: DISCONTINUED | OUTPATIENT
Start: 2021-05-31 | End: 2021-05-31

## 2021-05-31 RX ORDER — LIDOCAINE HYDROCHLORIDE 20 MG/ML
INJECTION INTRAVENOUS
Status: DISCONTINUED | OUTPATIENT
Start: 2021-05-31 | End: 2021-05-31

## 2021-05-31 RX ORDER — MIDAZOLAM HYDROCHLORIDE 1 MG/ML
INJECTION, SOLUTION INTRAMUSCULAR; INTRAVENOUS
Status: DISCONTINUED | OUTPATIENT
Start: 2021-05-31 | End: 2021-05-31

## 2021-05-31 RX ORDER — SODIUM CHLORIDE 0.9 % (FLUSH) 0.9 %
10 SYRINGE (ML) INJECTION
Status: DISCONTINUED | OUTPATIENT
Start: 2021-05-31 | End: 2021-06-01

## 2021-05-31 RX ORDER — CELECOXIB 100 MG/1
400 CAPSULE ORAL ONCE
Status: COMPLETED | OUTPATIENT
Start: 2021-05-31 | End: 2021-05-31

## 2021-05-31 RX ORDER — PROCHLORPERAZINE EDISYLATE 5 MG/ML
5 INJECTION INTRAMUSCULAR; INTRAVENOUS EVERY 30 MIN PRN
Status: DISCONTINUED | OUTPATIENT
Start: 2021-05-31 | End: 2021-05-31 | Stop reason: HOSPADM

## 2021-05-31 RX ORDER — OXYCODONE HCL 10 MG/1
10 TABLET, FILM COATED, EXTENDED RELEASE ORAL EVERY 12 HOURS
Status: DISCONTINUED | OUTPATIENT
Start: 2021-05-31 | End: 2021-06-01 | Stop reason: HOSPADM

## 2021-05-31 RX ORDER — AMOXICILLIN 250 MG
1 CAPSULE ORAL DAILY PRN
Status: DISCONTINUED | OUTPATIENT
Start: 2021-05-31 | End: 2021-06-01 | Stop reason: HOSPADM

## 2021-05-31 RX ORDER — METOCLOPRAMIDE HYDROCHLORIDE 5 MG/ML
10 INJECTION INTRAMUSCULAR; INTRAVENOUS EVERY 10 MIN PRN
Status: DISCONTINUED | OUTPATIENT
Start: 2021-05-31 | End: 2021-05-31 | Stop reason: HOSPADM

## 2021-05-31 RX ORDER — CEFAZOLIN SODIUM 2 G/50ML
2 SOLUTION INTRAVENOUS
Status: COMPLETED | OUTPATIENT
Start: 2021-05-31 | End: 2021-05-31

## 2021-05-31 RX ORDER — NAPROXEN SODIUM 220 MG/1
81 TABLET, FILM COATED ORAL 2 TIMES DAILY
Status: DISCONTINUED | OUTPATIENT
Start: 2021-05-31 | End: 2021-06-01 | Stop reason: HOSPADM

## 2021-05-31 RX ORDER — OXYCODONE HCL 10 MG/1
10 TABLET, FILM COATED, EXTENDED RELEASE ORAL ONCE
Status: COMPLETED | OUTPATIENT
Start: 2021-05-31 | End: 2021-05-31

## 2021-05-31 RX ORDER — FENTANYL CITRATE 50 UG/ML
INJECTION, SOLUTION INTRAMUSCULAR; INTRAVENOUS
Status: DISCONTINUED | OUTPATIENT
Start: 2021-05-31 | End: 2021-05-31

## 2021-05-31 RX ORDER — SODIUM CHLORIDE 0.9 % (FLUSH) 0.9 %
10 SYRINGE (ML) INJECTION
Status: DISCONTINUED | OUTPATIENT
Start: 2021-05-31 | End: 2021-06-01 | Stop reason: HOSPADM

## 2021-05-31 RX ORDER — PROPOFOL 10 MG/ML
VIAL (ML) INTRAVENOUS
Status: DISCONTINUED | OUTPATIENT
Start: 2021-05-31 | End: 2021-05-31

## 2021-05-31 RX ORDER — FENTANYL CITRATE 50 UG/ML
25 INJECTION, SOLUTION INTRAMUSCULAR; INTRAVENOUS EVERY 5 MIN PRN
Status: DISCONTINUED | OUTPATIENT
Start: 2021-05-31 | End: 2021-05-31 | Stop reason: HOSPADM

## 2021-05-31 RX ORDER — SODIUM CHLORIDE 0.9 % (FLUSH) 0.9 %
10 SYRINGE (ML) INJECTION EVERY 8 HOURS PRN
Status: DISCONTINUED | OUTPATIENT
Start: 2021-05-31 | End: 2021-06-01

## 2021-05-31 RX ORDER — POLYETHYLENE GLYCOL 3350 17 G/17G
17 POWDER, FOR SOLUTION ORAL DAILY
Status: DISCONTINUED | OUTPATIENT
Start: 2021-05-31 | End: 2021-06-01 | Stop reason: HOSPADM

## 2021-05-31 RX ORDER — IBUPROFEN 200 MG
16 TABLET ORAL
Status: DISCONTINUED | OUTPATIENT
Start: 2021-05-31 | End: 2021-06-01 | Stop reason: HOSPADM

## 2021-05-31 RX ORDER — MORPHINE SULFATE 2 MG/ML
2 INJECTION, SOLUTION INTRAMUSCULAR; INTRAVENOUS EVERY 4 HOURS PRN
Status: DISCONTINUED | OUTPATIENT
Start: 2021-05-31 | End: 2021-05-31

## 2021-05-31 RX ORDER — ONDANSETRON 2 MG/ML
4 INJECTION INTRAMUSCULAR; INTRAVENOUS EVERY 12 HOURS PRN
Status: DISCONTINUED | OUTPATIENT
Start: 2021-05-31 | End: 2021-05-31

## 2021-05-31 RX ORDER — TRANEXAMIC ACID 100 MG/ML
1000 INJECTION, SOLUTION INTRAVENOUS
Status: COMPLETED | OUTPATIENT
Start: 2021-05-31 | End: 2021-05-31

## 2021-05-31 RX ORDER — TALC
6 POWDER (GRAM) TOPICAL NIGHTLY PRN
Status: DISCONTINUED | OUTPATIENT
Start: 2021-05-31 | End: 2021-06-01 | Stop reason: HOSPADM

## 2021-05-31 RX ADMIN — TRANEXAMIC ACID 750 MG: 100 INJECTION, SOLUTION INTRAVENOUS at 07:05

## 2021-05-31 RX ADMIN — CELECOXIB 400 MG: 100 CAPSULE ORAL at 05:05

## 2021-05-31 RX ADMIN — FENTANYL CITRATE 50 MCG: 50 INJECTION, SOLUTION INTRAMUSCULAR; INTRAVENOUS at 06:05

## 2021-05-31 RX ADMIN — FAMOTIDINE 20 MG: 20 TABLET, FILM COATED ORAL at 09:05

## 2021-05-31 RX ADMIN — DEXTROSE AND SODIUM CHLORIDE: 5; .45 INJECTION, SOLUTION INTRAVENOUS at 10:05

## 2021-05-31 RX ADMIN — ASPIRIN 81 MG CHEWABLE TABLET 81 MG: 81 TABLET CHEWABLE at 02:05

## 2021-05-31 RX ADMIN — OXYCODONE HYDROCHLORIDE 10 MG: 10 TABLET, FILM COATED, EXTENDED RELEASE ORAL at 05:05

## 2021-05-31 RX ADMIN — PHENYLEPHRINE HYDROCHLORIDE 100 MCG: 10 INJECTION INTRAVENOUS at 08:05

## 2021-05-31 RX ADMIN — ACETAMINOPHEN 1000 MG: 500 TABLET ORAL at 05:05

## 2021-05-31 RX ADMIN — FAMOTIDINE 20 MG: 20 TABLET, FILM COATED ORAL at 02:05

## 2021-05-31 RX ADMIN — POLYETHYLENE GLYCOL 3350 17 G: 17 POWDER, FOR SOLUTION ORAL at 02:05

## 2021-05-31 RX ADMIN — GLYCOPYRROLATE 0.1 MCG: 0.2 INJECTION, SOLUTION INTRAMUSCULAR; INTRAVITREAL at 07:05

## 2021-05-31 RX ADMIN — PREGABALIN 75 MG: 75 CAPSULE ORAL at 09:05

## 2021-05-31 RX ADMIN — ENOXAPARIN SODIUM 40 MG: 40 INJECTION SUBCUTANEOUS at 05:05

## 2021-05-31 RX ADMIN — ONDANSETRON 4 MG: 2 INJECTION, SOLUTION INTRAMUSCULAR; INTRAVENOUS at 07:05

## 2021-05-31 RX ADMIN — CEFAZOLIN SODIUM 2 G: 2 SOLUTION INTRAVENOUS at 07:05

## 2021-05-31 RX ADMIN — LIDOCAINE HYDROCHLORIDE 10 MG: 10 INJECTION, SOLUTION EPIDURAL; INFILTRATION; INTRACAUDAL; PERINEURAL at 05:05

## 2021-05-31 RX ADMIN — OXYCODONE HYDROCHLORIDE 10 MG: 10 TABLET, FILM COATED, EXTENDED RELEASE ORAL at 09:05

## 2021-05-31 RX ADMIN — PROPOFOL 30 MG: 10 INJECTION, EMULSION INTRAVENOUS at 07:05

## 2021-05-31 RX ADMIN — CEFAZOLIN SODIUM 2 G: 2 SOLUTION INTRAVENOUS at 02:05

## 2021-05-31 RX ADMIN — BUPIVACAINE 20 ML: 13.3 INJECTION, SUSPENSION, LIPOSOMAL INFILTRATION at 06:05

## 2021-05-31 RX ADMIN — SODIUM CHLORIDE, SODIUM GLUCONATE, SODIUM ACETATE, POTASSIUM CHLORIDE, MAGNESIUM CHLORIDE, SODIUM PHOSPHATE, DIBASIC, AND POTASSIUM PHOSPHATE: .53; .5; .37; .037; .03; .012; .00082 INJECTION, SOLUTION INTRAVENOUS at 06:05

## 2021-05-31 RX ADMIN — LIDOCAINE HYDROCHLORIDE 70 MG: 20 INJECTION, SOLUTION INTRAVENOUS at 07:05

## 2021-05-31 RX ADMIN — MIDAZOLAM 2 MG: 1 INJECTION INTRAMUSCULAR; INTRAVENOUS at 06:05

## 2021-05-31 RX ADMIN — BUPIVACAINE HYDROCHLORIDE 10 ML: 5 INJECTION, SOLUTION EPIDURAL; INTRACAUDAL; PERINEURAL at 06:05

## 2021-05-31 RX ADMIN — SODIUM CHLORIDE, SODIUM GLUCONATE, SODIUM ACETATE, POTASSIUM CHLORIDE, MAGNESIUM CHLORIDE, SODIUM PHOSPHATE, DIBASIC, AND POTASSIUM PHOSPHATE: .53; .5; .37; .037; .03; .012; .00082 INJECTION, SOLUTION INTRAVENOUS at 07:05

## 2021-05-31 RX ADMIN — ASPIRIN 81 MG CHEWABLE TABLET 81 MG: 81 TABLET CHEWABLE at 09:05

## 2021-05-31 RX ADMIN — PROPOFOL 50 MCG/KG/MIN: 10 INJECTION, EMULSION INTRAVENOUS at 07:05

## 2021-05-31 RX ADMIN — BUPIVACAINE HYDROCHLORIDE 2.2 ML: 5 INJECTION, SOLUTION EPIDURAL; INTRACAUDAL at 07:05

## 2021-05-31 RX ADMIN — PREGABALIN 75 MG: 75 CAPSULE ORAL at 05:05

## 2021-05-31 RX ADMIN — OXYCODONE HYDROCHLORIDE 10 MG: 10 TABLET ORAL at 02:05

## 2021-05-31 RX ADMIN — FENTANYL CITRATE 25 MCG: 50 INJECTION, SOLUTION INTRAMUSCULAR; INTRAVENOUS at 07:05

## 2021-05-31 RX ADMIN — DEXAMETHASONE SODIUM PHOSPHATE 4 MG: 4 INJECTION, SOLUTION INTRA-ARTICULAR; INTRALESIONAL; INTRAMUSCULAR; INTRAVENOUS; SOFT TISSUE at 07:05

## 2021-06-01 VITALS
RESPIRATION RATE: 18 BRPM | HEIGHT: 58 IN | TEMPERATURE: 98 F | OXYGEN SATURATION: 94 % | HEART RATE: 83 BPM | WEIGHT: 165 LBS | BODY MASS INDEX: 34.63 KG/M2 | DIASTOLIC BLOOD PRESSURE: 57 MMHG | SYSTOLIC BLOOD PRESSURE: 118 MMHG

## 2021-06-01 LAB
ANION GAP SERPL CALC-SCNC: 5 MMOL/L (ref 8–16)
BASOPHILS # BLD AUTO: 0.05 K/UL (ref 0–0.2)
BASOPHILS # BLD AUTO: 0.05 K/UL (ref 0–0.2)
BASOPHILS NFR BLD: 0.3 % (ref 0–1.9)
BASOPHILS NFR BLD: 0.3 % (ref 0–1.9)
BUN SERPL-MCNC: 9 MG/DL (ref 8–23)
CALCIUM SERPL-MCNC: 8.4 MG/DL (ref 8.7–10.5)
CHLORIDE SERPL-SCNC: 104 MMOL/L (ref 95–110)
CO2 SERPL-SCNC: 28 MMOL/L (ref 23–29)
CREAT SERPL-MCNC: 0.6 MG/DL (ref 0.5–1.4)
DIFFERENTIAL METHOD: ABNORMAL
DIFFERENTIAL METHOD: ABNORMAL
EOSINOPHIL # BLD AUTO: 0 K/UL (ref 0–0.5)
EOSINOPHIL # BLD AUTO: 0 K/UL (ref 0–0.5)
EOSINOPHIL NFR BLD: 0.3 % (ref 0–8)
EOSINOPHIL NFR BLD: 0.3 % (ref 0–8)
ERYTHROCYTE [DISTWIDTH] IN BLOOD BY AUTOMATED COUNT: 13.4 % (ref 11.5–14.5)
ERYTHROCYTE [DISTWIDTH] IN BLOOD BY AUTOMATED COUNT: 13.4 % (ref 11.5–14.5)
EST. GFR  (AFRICAN AMERICAN): >60 ML/MIN/1.73 M^2
EST. GFR  (NON AFRICAN AMERICAN): >60 ML/MIN/1.73 M^2
GLUCOSE SERPL-MCNC: 113 MG/DL (ref 70–110)
HCT VFR BLD AUTO: 37.3 % (ref 37–48.5)
HCT VFR BLD AUTO: 37.3 % (ref 37–48.5)
HGB BLD-MCNC: 13 G/DL (ref 12–16)
HGB BLD-MCNC: 13 G/DL (ref 12–16)
IMM GRANULOCYTES # BLD AUTO: 0.07 K/UL (ref 0–0.04)
IMM GRANULOCYTES # BLD AUTO: 0.07 K/UL (ref 0–0.04)
IMM GRANULOCYTES NFR BLD AUTO: 0.5 % (ref 0–0.5)
IMM GRANULOCYTES NFR BLD AUTO: 0.5 % (ref 0–0.5)
LYMPHOCYTES # BLD AUTO: 1.9 K/UL (ref 1–4.8)
LYMPHOCYTES # BLD AUTO: 1.9 K/UL (ref 1–4.8)
LYMPHOCYTES NFR BLD: 12 % (ref 18–48)
LYMPHOCYTES NFR BLD: 12 % (ref 18–48)
MCH RBC QN AUTO: 33.8 PG (ref 27–31)
MCH RBC QN AUTO: 33.8 PG (ref 27–31)
MCHC RBC AUTO-ENTMCNC: 34.9 G/DL (ref 32–36)
MCHC RBC AUTO-ENTMCNC: 34.9 G/DL (ref 32–36)
MCV RBC AUTO: 97 FL (ref 82–98)
MCV RBC AUTO: 97 FL (ref 82–98)
MONOCYTES # BLD AUTO: 1.2 K/UL (ref 0.3–1)
MONOCYTES # BLD AUTO: 1.2 K/UL (ref 0.3–1)
MONOCYTES NFR BLD: 8 % (ref 4–15)
MONOCYTES NFR BLD: 8 % (ref 4–15)
NEUTROPHILS # BLD AUTO: 12.2 K/UL (ref 1.8–7.7)
NEUTROPHILS # BLD AUTO: 12.2 K/UL (ref 1.8–7.7)
NEUTROPHILS NFR BLD: 78.9 % (ref 38–73)
NEUTROPHILS NFR BLD: 78.9 % (ref 38–73)
NRBC BLD-RTO: 0 /100 WBC
NRBC BLD-RTO: 0 /100 WBC
PLATELET # BLD AUTO: 220 K/UL (ref 150–450)
PLATELET # BLD AUTO: 220 K/UL (ref 150–450)
PMV BLD AUTO: 11.2 FL (ref 9.2–12.9)
PMV BLD AUTO: 11.2 FL (ref 9.2–12.9)
POTASSIUM SERPL-SCNC: 4.3 MMOL/L (ref 3.5–5.1)
RBC # BLD AUTO: 3.85 M/UL (ref 4–5.4)
RBC # BLD AUTO: 3.85 M/UL (ref 4–5.4)
SODIUM SERPL-SCNC: 137 MMOL/L (ref 136–145)
WBC # BLD AUTO: 15.45 K/UL (ref 3.9–12.7)
WBC # BLD AUTO: 15.45 K/UL (ref 3.9–12.7)

## 2021-06-01 PROCEDURE — 97116 GAIT TRAINING THERAPY: CPT

## 2021-06-01 PROCEDURE — 25000003 PHARM REV CODE 250: Performed by: ANESTHESIOLOGY

## 2021-06-01 PROCEDURE — 97110 THERAPEUTIC EXERCISES: CPT

## 2021-06-01 PROCEDURE — 25000003 PHARM REV CODE 250: Performed by: ORTHOPAEDIC SURGERY

## 2021-06-01 PROCEDURE — 80048 BASIC METABOLIC PNL TOTAL CA: CPT | Performed by: NURSE PRACTITIONER

## 2021-06-01 PROCEDURE — 63600175 PHARM REV CODE 636 W HCPCS: Performed by: ORTHOPAEDIC SURGERY

## 2021-06-01 PROCEDURE — 94799 UNLISTED PULMONARY SVC/PX: CPT

## 2021-06-01 PROCEDURE — 85025 COMPLETE CBC W/AUTO DIFF WBC: CPT | Performed by: NURSE PRACTITIONER

## 2021-06-01 PROCEDURE — 94761 N-INVAS EAR/PLS OXIMETRY MLT: CPT

## 2021-06-01 PROCEDURE — 36415 COLL VENOUS BLD VENIPUNCTURE: CPT | Performed by: NURSE PRACTITIONER

## 2021-06-01 PROCEDURE — 97165 OT EVAL LOW COMPLEX 30 MIN: CPT

## 2021-06-01 RX ORDER — ASPIRIN 81 MG/1
81 TABLET ORAL 2 TIMES DAILY
Qty: 60 TABLET | Refills: 0 | Status: SHIPPED | OUTPATIENT
Start: 2021-06-01 | End: 2021-12-13

## 2021-06-01 RX ADMIN — ASPIRIN 81 MG CHEWABLE TABLET 81 MG: 81 TABLET CHEWABLE at 09:06

## 2021-06-01 RX ADMIN — FAMOTIDINE 20 MG: 20 TABLET, FILM COATED ORAL at 09:06

## 2021-06-01 RX ADMIN — OXYCODONE 5 MG: 5 TABLET ORAL at 04:06

## 2021-06-01 RX ADMIN — OXYCODONE HYDROCHLORIDE 10 MG: 10 TABLET ORAL at 10:06

## 2021-06-01 RX ADMIN — CEFAZOLIN SODIUM 2 G: 2 SOLUTION INTRAVENOUS at 12:06

## 2021-06-01 RX ADMIN — OXYCODONE HYDROCHLORIDE 10 MG: 10 TABLET ORAL at 02:06

## 2021-06-01 RX ADMIN — CELECOXIB 200 MG: 100 CAPSULE ORAL at 09:06

## 2021-06-01 RX ADMIN — POLYETHYLENE GLYCOL 3350 17 G: 17 POWDER, FOR SOLUTION ORAL at 09:06

## 2021-06-02 ENCOUNTER — TELEPHONE (OUTPATIENT)
Dept: MEDSURG UNIT | Facility: HOSPITAL | Age: 71
End: 2021-06-02

## 2021-06-04 ENCOUNTER — TELEPHONE (OUTPATIENT)
Dept: ORTHOPEDICS | Facility: CLINIC | Age: 71
End: 2021-06-04

## 2021-06-07 DIAGNOSIS — M17.12 PRIMARY OSTEOARTHRITIS OF LEFT KNEE: Primary | ICD-10-CM

## 2021-06-07 RX ORDER — OXYCODONE AND ACETAMINOPHEN 7.5; 325 MG/1; MG/1
1 TABLET ORAL EVERY 6 HOURS PRN
Qty: 28 TABLET | Refills: 0 | Status: SHIPPED | OUTPATIENT
Start: 2021-06-07 | End: 2021-06-15 | Stop reason: DRUGHIGH

## 2021-06-09 ENCOUNTER — EXTERNAL HOME HEALTH (OUTPATIENT)
Dept: HOME HEALTH SERVICES | Facility: HOSPITAL | Age: 71
End: 2021-06-09

## 2021-06-14 DIAGNOSIS — M17.12 PRIMARY OSTEOARTHRITIS OF LEFT KNEE: ICD-10-CM

## 2021-06-15 ENCOUNTER — HOSPITAL ENCOUNTER (OUTPATIENT)
Dept: RADIOLOGY | Facility: HOSPITAL | Age: 71
Discharge: HOME | End: 2021-06-15
Attending: ORTHOPAEDIC SURGERY
Payer: MEDICARE

## 2021-06-15 ENCOUNTER — OFFICE VISIT (OUTPATIENT)
Dept: ORTHOPEDICS | Facility: CLINIC | Age: 71
End: 2021-06-15
Payer: MEDICARE

## 2021-06-15 ENCOUNTER — TELEPHONE (OUTPATIENT)
Dept: ORTHOPEDICS | Facility: CLINIC | Age: 71
End: 2021-06-15

## 2021-06-15 VITALS — HEIGHT: 58 IN | BODY MASS INDEX: 34 KG/M2 | WEIGHT: 162 LBS

## 2021-06-15 DIAGNOSIS — Z96.652 S/P TOTAL KNEE ARTHROPLASTY, LEFT: ICD-10-CM

## 2021-06-15 DIAGNOSIS — M79.662 PAIN AND SWELLING OF LEFT LOWER LEG: ICD-10-CM

## 2021-06-15 DIAGNOSIS — M79.89 PAIN AND SWELLING OF LEFT LOWER LEG: ICD-10-CM

## 2021-06-15 DIAGNOSIS — Z96.652 S/P TOTAL KNEE ARTHROPLASTY, LEFT: Primary | ICD-10-CM

## 2021-06-15 PROCEDURE — 99024 PR POST-OP FOLLOW-UP VISIT: ICD-10-PCS | Mod: S$GLB,POP,, | Performed by: ORTHOPAEDIC SURGERY

## 2021-06-15 PROCEDURE — 93971 EXTREMITY STUDY: CPT | Mod: TC,LT

## 2021-06-15 PROCEDURE — 99024 POSTOP FOLLOW-UP VISIT: CPT | Mod: S$GLB,POP,, | Performed by: ORTHOPAEDIC SURGERY

## 2021-06-15 RX ORDER — OXYCODONE AND ACETAMINOPHEN 5; 325 MG/1; MG/1
1 TABLET ORAL EVERY 6 HOURS PRN
Qty: 28 TABLET | Refills: 0 | Status: SHIPPED | OUTPATIENT
Start: 2021-06-15 | End: 2021-06-16

## 2021-06-16 DIAGNOSIS — Z96.652 S/P TOTAL KNEE ARTHROPLASTY, LEFT: Primary | ICD-10-CM

## 2021-06-16 RX ORDER — OXYCODONE AND ACETAMINOPHEN 7.5; 325 MG/1; MG/1
1 TABLET ORAL EVERY 6 HOURS PRN
Qty: 28 TABLET | Refills: 0 | Status: SHIPPED | OUTPATIENT
Start: 2021-06-16 | End: 2021-06-22

## 2021-06-22 DIAGNOSIS — Z96.652 S/P TOTAL KNEE ARTHROPLASTY, LEFT: Primary | ICD-10-CM

## 2021-06-23 RX ORDER — OXYCODONE AND ACETAMINOPHEN 5; 325 MG/1; MG/1
1 TABLET ORAL EVERY 8 HOURS PRN
Qty: 21 TABLET | Refills: 0 | Status: SHIPPED | OUTPATIENT
Start: 2021-06-23 | End: 2021-06-30 | Stop reason: SDUPTHER

## 2021-06-30 DIAGNOSIS — Z96.652 S/P TOTAL KNEE ARTHROPLASTY, LEFT: ICD-10-CM

## 2021-06-30 RX ORDER — OXYCODONE AND ACETAMINOPHEN 5; 325 MG/1; MG/1
1 TABLET ORAL EVERY 8 HOURS PRN
Qty: 21 TABLET | Refills: 0 | Status: SHIPPED | OUTPATIENT
Start: 2021-06-30 | End: 2021-07-08 | Stop reason: SDUPTHER

## 2021-07-08 DIAGNOSIS — Z96.652 S/P TOTAL KNEE ARTHROPLASTY, LEFT: ICD-10-CM

## 2021-07-09 RX ORDER — OXYCODONE AND ACETAMINOPHEN 5; 325 MG/1; MG/1
1 TABLET ORAL EVERY 8 HOURS PRN
Qty: 21 TABLET | Refills: 0 | Status: SHIPPED | OUTPATIENT
Start: 2021-07-09 | End: 2021-09-23

## 2021-07-12 ENCOUNTER — OCCUPATIONAL HEALTH (OUTPATIENT)
Dept: URGENT CARE | Facility: CLINIC | Age: 71
End: 2021-07-12

## 2021-07-12 PROCEDURE — 99499 UNLISTED E&M SERVICE: CPT | Mod: S$GLB,,, | Performed by: EMERGENCY MEDICINE

## 2021-07-12 PROCEDURE — 99499 PR PHYSICAL - DOT/CDL: ICD-10-PCS | Mod: S$GLB,,, | Performed by: EMERGENCY MEDICINE

## 2021-07-15 ENCOUNTER — OFFICE VISIT (OUTPATIENT)
Dept: ORTHOPEDICS | Facility: CLINIC | Age: 71
End: 2021-07-15
Payer: MEDICARE

## 2021-07-15 VITALS — BODY MASS INDEX: 34 KG/M2 | WEIGHT: 162 LBS | HEIGHT: 58 IN

## 2021-07-15 DIAGNOSIS — Z96.652 S/P TOTAL KNEE ARTHROPLASTY, LEFT: Primary | ICD-10-CM

## 2021-07-15 PROCEDURE — 99024 PR POST-OP FOLLOW-UP VISIT: ICD-10-PCS | Mod: S$GLB,POP,, | Performed by: PHYSICIAN ASSISTANT

## 2021-07-15 PROCEDURE — 99024 POSTOP FOLLOW-UP VISIT: CPT | Mod: S$GLB,POP,, | Performed by: PHYSICIAN ASSISTANT

## 2021-09-23 ENCOUNTER — OFFICE VISIT (OUTPATIENT)
Dept: FAMILY MEDICINE | Facility: CLINIC | Age: 71
End: 2021-09-23
Payer: MEDICARE

## 2021-09-23 VITALS
HEART RATE: 77 BPM | BODY MASS INDEX: 34.6 KG/M2 | OXYGEN SATURATION: 95 % | TEMPERATURE: 98 F | WEIGHT: 164.81 LBS | HEIGHT: 58 IN | SYSTOLIC BLOOD PRESSURE: 128 MMHG | DIASTOLIC BLOOD PRESSURE: 72 MMHG

## 2021-09-23 DIAGNOSIS — Z23 NEED FOR INFLUENZA VACCINATION: ICD-10-CM

## 2021-09-23 DIAGNOSIS — L98.9 SKIN LESION OF HAND: ICD-10-CM

## 2021-09-23 DIAGNOSIS — R22.33 NODULE OF FINGER OF BOTH HANDS: Primary | ICD-10-CM

## 2021-09-23 DIAGNOSIS — Z23 IMMUNIZATION DUE: ICD-10-CM

## 2021-09-23 PROCEDURE — 99215 OFFICE O/P EST HI 40 MIN: CPT | Mod: 25 | Performed by: NURSE PRACTITIONER

## 2021-09-23 PROCEDURE — 99213 PR OFFICE/OUTPT VISIT, EST, LEVL III, 20-29 MIN: ICD-10-PCS | Mod: S$PBB,,, | Performed by: NURSE PRACTITIONER

## 2021-09-23 PROCEDURE — G0009 ADMIN PNEUMOCOCCAL VACCINE: HCPCS | Mod: PBBFAC | Performed by: NURSE PRACTITIONER

## 2021-09-23 PROCEDURE — G0008 ADMIN INFLUENZA VIRUS VAC: HCPCS | Mod: PBBFAC | Performed by: NURSE PRACTITIONER

## 2021-09-23 PROCEDURE — 99213 OFFICE O/P EST LOW 20 MIN: CPT | Mod: S$PBB,,, | Performed by: NURSE PRACTITIONER

## 2021-09-23 PROCEDURE — 90732 PPSV23 VACC 2 YRS+ SUBQ/IM: CPT | Mod: PBBFAC | Performed by: NURSE PRACTITIONER

## 2021-10-14 ENCOUNTER — OFFICE VISIT (OUTPATIENT)
Dept: ORTHOPEDICS | Facility: CLINIC | Age: 71
End: 2021-10-14
Payer: MEDICARE

## 2021-10-14 VITALS
BODY MASS INDEX: 34.43 KG/M2 | HEART RATE: 78 BPM | WEIGHT: 164 LBS | HEIGHT: 58 IN | SYSTOLIC BLOOD PRESSURE: 130 MMHG | OXYGEN SATURATION: 98 % | DIASTOLIC BLOOD PRESSURE: 74 MMHG

## 2021-10-14 DIAGNOSIS — M76.32 IT BAND SYNDROME, LEFT: ICD-10-CM

## 2021-10-14 DIAGNOSIS — Z96.652 HISTORY OF TOTAL KNEE ARTHROPLASTY, LEFT: Primary | ICD-10-CM

## 2021-10-14 PROCEDURE — 99213 PR OFFICE/OUTPT VISIT, EST, LEVL III, 20-29 MIN: ICD-10-PCS | Mod: 25,S$GLB,, | Performed by: ORTHOPAEDIC SURGERY

## 2021-10-14 PROCEDURE — 20550 NJX 1 TENDON SHEATH/LIGAMENT: CPT | Mod: LT,S$GLB,, | Performed by: ORTHOPAEDIC SURGERY

## 2021-10-14 PROCEDURE — 20550 PR INJECT TENDON SHEATH/LIGAMENT: ICD-10-PCS | Mod: LT,S$GLB,, | Performed by: ORTHOPAEDIC SURGERY

## 2021-10-14 PROCEDURE — 99213 OFFICE O/P EST LOW 20 MIN: CPT | Mod: 25,S$GLB,, | Performed by: ORTHOPAEDIC SURGERY

## 2021-10-14 RX ORDER — TRIAMCINOLONE ACETONIDE 40 MG/ML
40 INJECTION, SUSPENSION INTRA-ARTICULAR; INTRAMUSCULAR
Status: DISCONTINUED | OUTPATIENT
Start: 2021-10-14 | End: 2021-10-14 | Stop reason: HOSPADM

## 2021-10-14 RX ADMIN — TRIAMCINOLONE ACETONIDE 40 MG: 40 INJECTION, SUSPENSION INTRA-ARTICULAR; INTRAMUSCULAR at 10:10

## 2021-11-15 ENCOUNTER — OFFICE VISIT (OUTPATIENT)
Dept: DERMATOLOGY | Facility: CLINIC | Age: 71
End: 2021-11-15
Payer: MEDICARE

## 2021-11-15 DIAGNOSIS — M71.30 MYXOID CYST: Primary | ICD-10-CM

## 2021-11-15 PROCEDURE — 99202 PR OFFICE/OUTPT VISIT, NEW, LEVL II, 15-29 MIN: ICD-10-PCS | Mod: S$PBB,,, | Performed by: STUDENT IN AN ORGANIZED HEALTH CARE EDUCATION/TRAINING PROGRAM

## 2021-11-15 PROCEDURE — 99999 PR PBB SHADOW E&M-EST. PATIENT-LVL IV: CPT | Mod: PBBFAC,,, | Performed by: STUDENT IN AN ORGANIZED HEALTH CARE EDUCATION/TRAINING PROGRAM

## 2021-11-15 PROCEDURE — 99999 PR PBB SHADOW E&M-EST. PATIENT-LVL IV: ICD-10-PCS | Mod: PBBFAC,,, | Performed by: STUDENT IN AN ORGANIZED HEALTH CARE EDUCATION/TRAINING PROGRAM

## 2021-11-15 PROCEDURE — 99202 OFFICE O/P NEW SF 15 MIN: CPT | Mod: S$PBB,,, | Performed by: STUDENT IN AN ORGANIZED HEALTH CARE EDUCATION/TRAINING PROGRAM

## 2021-11-15 PROCEDURE — 99214 OFFICE O/P EST MOD 30 MIN: CPT | Mod: PBBFAC,PO | Performed by: STUDENT IN AN ORGANIZED HEALTH CARE EDUCATION/TRAINING PROGRAM

## 2021-11-17 ENCOUNTER — TELEPHONE (OUTPATIENT)
Dept: DERMATOLOGY | Facility: CLINIC | Age: 71
End: 2021-11-17
Payer: MEDICARE

## 2021-12-02 ENCOUNTER — OFFICE VISIT (OUTPATIENT)
Dept: ORTHOPEDICS | Facility: CLINIC | Age: 71
End: 2021-12-02
Payer: MEDICARE

## 2021-12-02 VITALS
WEIGHT: 164 LBS | DIASTOLIC BLOOD PRESSURE: 82 MMHG | SYSTOLIC BLOOD PRESSURE: 133 MMHG | BODY MASS INDEX: 34.43 KG/M2 | HEIGHT: 58 IN

## 2021-12-02 DIAGNOSIS — Z96.652 HISTORY OF TOTAL KNEE ARTHROPLASTY, LEFT: Primary | ICD-10-CM

## 2021-12-02 DIAGNOSIS — Z01.818 PRE-OPERATIVE EXAM: ICD-10-CM

## 2021-12-02 DIAGNOSIS — M19.042 ARTHRITIS OF FINGER OF LEFT HAND: ICD-10-CM

## 2021-12-02 DIAGNOSIS — M19.042 ARTHRITIS OF FINGER OF LEFT HAND: Primary | ICD-10-CM

## 2021-12-02 DIAGNOSIS — M71.30 MYXOID CYST: ICD-10-CM

## 2021-12-02 DIAGNOSIS — M70.62 GREATER TROCHANTERIC BURSITIS OF LEFT HIP: ICD-10-CM

## 2021-12-02 PROCEDURE — 99214 PR OFFICE/OUTPT VISIT, EST, LEVL IV, 30-39 MIN: ICD-10-PCS | Mod: 25,S$GLB,, | Performed by: ORTHOPAEDIC SURGERY

## 2021-12-02 PROCEDURE — 20610 LARGE JOINT ASPIRATION/INJECTION: L GREATER TROCHANTERIC BURSA: ICD-10-PCS | Mod: LT,S$GLB,, | Performed by: ORTHOPAEDIC SURGERY

## 2021-12-02 PROCEDURE — 20610 DRAIN/INJ JOINT/BURSA W/O US: CPT | Mod: LT,S$GLB,, | Performed by: ORTHOPAEDIC SURGERY

## 2021-12-02 PROCEDURE — 99214 OFFICE O/P EST MOD 30 MIN: CPT | Mod: 25,S$GLB,, | Performed by: ORTHOPAEDIC SURGERY

## 2021-12-02 RX ORDER — TRIAMCINOLONE ACETONIDE 40 MG/ML
40 INJECTION, SUSPENSION INTRA-ARTICULAR; INTRAMUSCULAR
Status: DISCONTINUED | OUTPATIENT
Start: 2021-12-02 | End: 2021-12-02 | Stop reason: HOSPADM

## 2021-12-02 RX ADMIN — TRIAMCINOLONE ACETONIDE 40 MG: 40 INJECTION, SUSPENSION INTRA-ARTICULAR; INTRAMUSCULAR at 09:12

## 2021-12-13 ENCOUNTER — HOSPITAL ENCOUNTER (OUTPATIENT)
Dept: PREADMISSION TESTING | Facility: HOSPITAL | Age: 71
Discharge: HOME | End: 2021-12-13
Attending: ORTHOPAEDIC SURGERY
Payer: MEDICARE

## 2021-12-13 ENCOUNTER — HOSPITAL ENCOUNTER (OUTPATIENT)
Dept: RADIOLOGY | Facility: HOSPITAL | Age: 71
Discharge: HOME | End: 2021-12-13
Attending: ORTHOPAEDIC SURGERY
Payer: MEDICARE

## 2021-12-13 VITALS
OXYGEN SATURATION: 95 % | HEIGHT: 58 IN | HEART RATE: 81 BPM | DIASTOLIC BLOOD PRESSURE: 72 MMHG | WEIGHT: 159 LBS | SYSTOLIC BLOOD PRESSURE: 119 MMHG | BODY MASS INDEX: 33.37 KG/M2 | TEMPERATURE: 97 F

## 2021-12-13 DIAGNOSIS — Z01.818 PRE-OP TESTING: Primary | ICD-10-CM

## 2021-12-13 DIAGNOSIS — M19.042 ARTHRITIS OF FINGER OF LEFT HAND: ICD-10-CM

## 2021-12-13 LAB — SARS-COV-2 RDRP RESP QL NAA+PROBE: NEGATIVE

## 2021-12-13 PROCEDURE — 93005 ELECTROCARDIOGRAM TRACING: CPT | Performed by: INTERNAL MEDICINE

## 2021-12-13 PROCEDURE — 71046 X-RAY EXAM CHEST 2 VIEWS: CPT | Mod: TC

## 2021-12-13 PROCEDURE — 93010 ELECTROCARDIOGRAM REPORT: CPT | Mod: ,,, | Performed by: INTERNAL MEDICINE

## 2021-12-13 PROCEDURE — U0002 COVID-19 LAB TEST NON-CDC: HCPCS | Performed by: ORTHOPAEDIC SURGERY

## 2021-12-13 PROCEDURE — 93010 EKG 12-LEAD: ICD-10-PCS | Mod: ,,, | Performed by: INTERNAL MEDICINE

## 2021-12-13 RX ORDER — ZINC GLUCONATE 50 MG
50 TABLET ORAL DAILY
COMMUNITY
End: 2023-10-09

## 2021-12-13 RX ORDER — CHOLECALCIFEROL (VITAMIN D3) 25 MCG
1000 TABLET ORAL DAILY
COMMUNITY
End: 2023-10-09

## 2021-12-13 RX ORDER — VITAMIN A 3000 MCG
10000 CAPSULE ORAL DAILY
COMMUNITY
End: 2022-06-06

## 2021-12-13 RX ORDER — IBUPROFEN 100 MG/5ML
1000 SUSPENSION, ORAL (FINAL DOSE FORM) ORAL DAILY
COMMUNITY
End: 2023-10-09

## 2021-12-15 ENCOUNTER — ANESTHESIA (OUTPATIENT)
Dept: SURGERY | Facility: HOSPITAL | Age: 71
End: 2021-12-15
Payer: MEDICARE

## 2021-12-15 ENCOUNTER — ANESTHESIA EVENT (OUTPATIENT)
Dept: SURGERY | Facility: HOSPITAL | Age: 71
End: 2021-12-15
Payer: MEDICARE

## 2021-12-15 ENCOUNTER — HOSPITAL ENCOUNTER (OUTPATIENT)
Facility: HOSPITAL | Age: 71
Discharge: HOME | End: 2021-12-15
Attending: ORTHOPAEDIC SURGERY | Admitting: ORTHOPAEDIC SURGERY
Payer: MEDICARE

## 2021-12-15 VITALS
SYSTOLIC BLOOD PRESSURE: 158 MMHG | WEIGHT: 158.75 LBS | RESPIRATION RATE: 17 BRPM | HEART RATE: 94 BPM | DIASTOLIC BLOOD PRESSURE: 74 MMHG | HEIGHT: 58 IN | TEMPERATURE: 98 F | OXYGEN SATURATION: 95 % | BODY MASS INDEX: 33.32 KG/M2

## 2021-12-15 DIAGNOSIS — R52 PAIN: ICD-10-CM

## 2021-12-15 DIAGNOSIS — M19.042 ARTHRITIS OF FINGER OF LEFT HAND: Primary | ICD-10-CM

## 2021-12-15 DIAGNOSIS — Z01.818 PRE-OP TESTING: ICD-10-CM

## 2021-12-15 LAB — POTASSIUM SERPL-SCNC: 3.7 MMOL/L (ref 3.5–5.1)

## 2021-12-15 PROCEDURE — 71000033 HC RECOVERY, INTIAL HOUR: Performed by: ORTHOPAEDIC SURGERY

## 2021-12-15 PROCEDURE — 63600175 PHARM REV CODE 636 W HCPCS: Performed by: ANESTHESIOLOGY

## 2021-12-15 PROCEDURE — 36000709 HC OR TIME LEV III EA ADD 15 MIN: Performed by: ORTHOPAEDIC SURGERY

## 2021-12-15 PROCEDURE — C1713 ANCHOR/SCREW BN/BN,TIS/BN: HCPCS | Performed by: ORTHOPAEDIC SURGERY

## 2021-12-15 PROCEDURE — 27201423 OPTIME MED/SURG SUP & DEVICES STERILE SUPPLY: Performed by: ORTHOPAEDIC SURGERY

## 2021-12-15 PROCEDURE — 37000008 HC ANESTHESIA 1ST 15 MINUTES: Performed by: ORTHOPAEDIC SURGERY

## 2021-12-15 PROCEDURE — 37000009 HC ANESTHESIA EA ADD 15 MINS: Performed by: ORTHOPAEDIC SURGERY

## 2021-12-15 PROCEDURE — C1769 GUIDE WIRE: HCPCS | Performed by: ORTHOPAEDIC SURGERY

## 2021-12-15 PROCEDURE — 71000016 HC POSTOP RECOV ADDL HR: Performed by: ORTHOPAEDIC SURGERY

## 2021-12-15 PROCEDURE — 71000015 HC POSTOP RECOV 1ST HR: Performed by: ORTHOPAEDIC SURGERY

## 2021-12-15 PROCEDURE — 25000003 PHARM REV CODE 250: Performed by: ANESTHESIOLOGY

## 2021-12-15 PROCEDURE — 63600175 PHARM REV CODE 636 W HCPCS: Performed by: NURSE ANESTHETIST, CERTIFIED REGISTERED

## 2021-12-15 PROCEDURE — 25000003 PHARM REV CODE 250: Performed by: NURSE ANESTHETIST, CERTIFIED REGISTERED

## 2021-12-15 PROCEDURE — 25000003 PHARM REV CODE 250: Performed by: ORTHOPAEDIC SURGERY

## 2021-12-15 PROCEDURE — 36000708 HC OR TIME LEV III 1ST 15 MIN: Performed by: ORTHOPAEDIC SURGERY

## 2021-12-15 PROCEDURE — 84132 ASSAY OF SERUM POTASSIUM: CPT | Performed by: ORTHOPAEDIC SURGERY

## 2021-12-15 DEVICE — IMPLANTABLE DEVICE: Type: IMPLANTABLE DEVICE | Site: HAND | Status: FUNCTIONAL

## 2021-12-15 RX ORDER — OXYCODONE HYDROCHLORIDE 5 MG/1
5 TABLET ORAL
Status: DISCONTINUED | OUTPATIENT
Start: 2021-12-15 | End: 2021-12-15 | Stop reason: HOSPADM

## 2021-12-15 RX ORDER — SODIUM CHLORIDE 0.9 G/100ML
IRRIGANT IRRIGATION
Status: DISCONTINUED | OUTPATIENT
Start: 2021-12-15 | End: 2021-12-15 | Stop reason: HOSPADM

## 2021-12-15 RX ORDER — DEXAMETHASONE SODIUM PHOSPHATE 4 MG/ML
INJECTION, SOLUTION INTRA-ARTICULAR; INTRALESIONAL; INTRAMUSCULAR; INTRAVENOUS; SOFT TISSUE
Status: DISCONTINUED | OUTPATIENT
Start: 2021-12-15 | End: 2021-12-15

## 2021-12-15 RX ORDER — CEFAZOLIN SODIUM 2 G/50ML
2 SOLUTION INTRAVENOUS
Status: DISCONTINUED | OUTPATIENT
Start: 2021-12-15 | End: 2021-12-15 | Stop reason: HOSPADM

## 2021-12-15 RX ORDER — LIDOCAINE HCL/PF 100 MG/5ML
SYRINGE (ML) INTRAVENOUS
Status: DISCONTINUED | OUTPATIENT
Start: 2021-12-15 | End: 2021-12-15

## 2021-12-15 RX ORDER — DIPHENHYDRAMINE HYDROCHLORIDE 50 MG/ML
INJECTION INTRAMUSCULAR; INTRAVENOUS
Status: DISCONTINUED | OUTPATIENT
Start: 2021-12-15 | End: 2021-12-15

## 2021-12-15 RX ORDER — MEPERIDINE HYDROCHLORIDE 50 MG/ML
12.5 INJECTION INTRAMUSCULAR; INTRAVENOUS; SUBCUTANEOUS EVERY 10 MIN PRN
Status: DISCONTINUED | OUTPATIENT
Start: 2021-12-15 | End: 2021-12-15 | Stop reason: HOSPADM

## 2021-12-15 RX ORDER — OXYCODONE AND ACETAMINOPHEN 7.5; 325 MG/1; MG/1
1 TABLET ORAL EVERY 4 HOURS PRN
Qty: 28 TABLET | Refills: 0 | Status: SHIPPED | OUTPATIENT
Start: 2021-12-15 | End: 2022-06-06

## 2021-12-15 RX ORDER — MIDAZOLAM HYDROCHLORIDE 1 MG/ML
INJECTION INTRAMUSCULAR; INTRAVENOUS
Status: DISCONTINUED | OUTPATIENT
Start: 2021-12-15 | End: 2021-12-15

## 2021-12-15 RX ORDER — DIPHENHYDRAMINE HYDROCHLORIDE 50 MG/ML
12.5 INJECTION INTRAMUSCULAR; INTRAVENOUS
Status: DISCONTINUED | OUTPATIENT
Start: 2021-12-15 | End: 2021-12-15 | Stop reason: HOSPADM

## 2021-12-15 RX ORDER — SODIUM CHLORIDE 0.9 % (FLUSH) 0.9 %
10 SYRINGE (ML) INJECTION
Status: DISCONTINUED | OUTPATIENT
Start: 2021-12-15 | End: 2021-12-15 | Stop reason: HOSPADM

## 2021-12-15 RX ORDER — ONDANSETRON 2 MG/ML
4 INJECTION INTRAMUSCULAR; INTRAVENOUS DAILY PRN
Status: DISCONTINUED | OUTPATIENT
Start: 2021-12-15 | End: 2021-12-15 | Stop reason: HOSPADM

## 2021-12-15 RX ORDER — ACETAMINOPHEN 10 MG/ML
INJECTION, SOLUTION INTRAVENOUS
Status: DISCONTINUED | OUTPATIENT
Start: 2021-12-15 | End: 2021-12-15

## 2021-12-15 RX ORDER — ONDANSETRON 2 MG/ML
INJECTION INTRAMUSCULAR; INTRAVENOUS
Status: DISCONTINUED | OUTPATIENT
Start: 2021-12-15 | End: 2021-12-15

## 2021-12-15 RX ORDER — SODIUM CHLORIDE, SODIUM LACTATE, POTASSIUM CHLORIDE, CALCIUM CHLORIDE 600; 310; 30; 20 MG/100ML; MG/100ML; MG/100ML; MG/100ML
INJECTION, SOLUTION INTRAVENOUS CONTINUOUS PRN
Status: DISCONTINUED | OUTPATIENT
Start: 2021-12-15 | End: 2021-12-15

## 2021-12-15 RX ORDER — HYDROMORPHONE HYDROCHLORIDE 1 MG/ML
0.2 INJECTION, SOLUTION INTRAMUSCULAR; INTRAVENOUS; SUBCUTANEOUS EVERY 5 MIN PRN
Status: DISCONTINUED | OUTPATIENT
Start: 2021-12-15 | End: 2021-12-15 | Stop reason: HOSPADM

## 2021-12-15 RX ORDER — FENTANYL CITRATE 50 UG/ML
INJECTION, SOLUTION INTRAMUSCULAR; INTRAVENOUS
Status: DISCONTINUED | OUTPATIENT
Start: 2021-12-15 | End: 2021-12-15

## 2021-12-15 RX ADMIN — HYDROMORPHONE HYDROCHLORIDE 0.2 MG: 1 INJECTION, SOLUTION INTRAMUSCULAR; INTRAVENOUS; SUBCUTANEOUS at 02:12

## 2021-12-15 RX ADMIN — DIPHENHYDRAMINE HYDROCHLORIDE 6.25 MG: 50 INJECTION, SOLUTION INTRAMUSCULAR; INTRAVENOUS at 01:12

## 2021-12-15 RX ADMIN — DEXAMETHASONE SODIUM PHOSPHATE 8 MG: 4 INJECTION, SOLUTION INTRAMUSCULAR; INTRAVENOUS at 01:12

## 2021-12-15 RX ADMIN — OXYCODONE HYDROCHLORIDE 5 MG: 5 TABLET ORAL at 02:12

## 2021-12-15 RX ADMIN — FENTANYL CITRATE 50 MCG: 50 INJECTION INTRAMUSCULAR; INTRAVENOUS at 01:12

## 2021-12-15 RX ADMIN — ACETAMINOPHEN 1000 MG: 10 INJECTION, SOLUTION INTRAVENOUS at 01:12

## 2021-12-15 RX ADMIN — HYDROMORPHONE HYDROCHLORIDE 0.2 MG: 1 INJECTION, SOLUTION INTRAMUSCULAR; INTRAVENOUS; SUBCUTANEOUS at 03:12

## 2021-12-15 RX ADMIN — ONDANSETRON 4 MG: 2 INJECTION INTRAMUSCULAR; INTRAVENOUS at 01:12

## 2021-12-15 RX ADMIN — SODIUM CHLORIDE, SODIUM LACTATE, POTASSIUM CHLORIDE, AND CALCIUM CHLORIDE: .6; .31; .03; .02 INJECTION, SOLUTION INTRAVENOUS at 11:12

## 2021-12-15 RX ADMIN — LIDOCAINE HYDROCHLORIDE 60 MG: 20 INJECTION, SOLUTION INTRAVENOUS at 01:12

## 2021-12-15 RX ADMIN — MIDAZOLAM HYDROCHLORIDE 2 MG: 1 INJECTION, SOLUTION INTRAMUSCULAR; INTRAVENOUS at 01:12

## 2021-12-29 ENCOUNTER — OFFICE VISIT (OUTPATIENT)
Dept: ORTHOPEDICS | Facility: CLINIC | Age: 71
End: 2021-12-29
Payer: MEDICARE

## 2021-12-29 VITALS — BODY MASS INDEX: 33.17 KG/M2 | WEIGHT: 158 LBS | HEIGHT: 58 IN

## 2021-12-29 DIAGNOSIS — Z98.1 STATUS POST FINGER JOINT FUSION: ICD-10-CM

## 2021-12-29 DIAGNOSIS — M19.042 ARTHRITIS OF FINGER OF LEFT HAND: Primary | ICD-10-CM

## 2021-12-29 PROCEDURE — 99024 PR POST-OP FOLLOW-UP VISIT: ICD-10-PCS | Mod: S$GLB,POP,, | Performed by: PHYSICIAN ASSISTANT

## 2021-12-29 PROCEDURE — 99024 POSTOP FOLLOW-UP VISIT: CPT | Mod: S$GLB,POP,, | Performed by: PHYSICIAN ASSISTANT

## 2022-01-27 ENCOUNTER — OFFICE VISIT (OUTPATIENT)
Dept: ORTHOPEDICS | Facility: CLINIC | Age: 72
End: 2022-01-27
Payer: MEDICARE

## 2022-01-27 VITALS — HEIGHT: 58 IN | WEIGHT: 158 LBS | BODY MASS INDEX: 33.17 KG/M2

## 2022-01-27 DIAGNOSIS — Z98.1 STATUS POST FINGER JOINT FUSION: Primary | ICD-10-CM

## 2022-01-27 PROCEDURE — 99024 POSTOP FOLLOW-UP VISIT: CPT | Mod: S$GLB,POP,, | Performed by: ORTHOPAEDIC SURGERY

## 2022-01-27 PROCEDURE — 99024 PR POST-OP FOLLOW-UP VISIT: ICD-10-PCS | Mod: S$GLB,POP,, | Performed by: ORTHOPAEDIC SURGERY

## 2022-01-27 NOTE — PROGRESS NOTES
[facility] ORTHOPEDICS POST-OP NOTE    Subjective:           Chief Complaint:   Chief Complaint   Patient presents with    Left Hand - Post-op Evaluation     Pt is here for a PO f/up Lt. Longfinger DIP fusion 12/15/21, Feeling better but hard to grab things with finger       Past Medical History:   Diagnosis Date    Arthritis     Breast cancer     right    Pain in finger     quentin long finger pain    Wears glasses     WEARS CONTACS    Wears partial dentures     UPPER AND LOWER       Past Surgical History:   Procedure Laterality Date    BREAST RECONSTRUCTION      CARPAL TUNNEL RELEASE       SECTION  1970, ,     CHOLECYSTECTOMY  2019        COLONOSCOPY  2017    JOINT REPLACEMENT Right     KNEE    KNEE ARTHROPLASTY Right 6/10/2019    Procedure: ARTHROPLASTY, KNEE;  Surgeon: Jony Marmolejo MD;  Location: [facility] OR;  Service: Orthopedics;  Laterality: Right;    KNEE ARTHROPLASTY Left 2021    Procedure: ARTHROPLASTY, KNEE;  Surgeon: Jony Marmolejo MD;  Location: [facility] OR;  Service: Orthopedics;  Laterality: Left;    KNEE ARTHROSCOPY Bilateral 2008    MASTECTOMY Right 2007    PORTACATH PLACEMENT  2007    PORTACATH REMOVAL      SURGICAL REMOVAL OF BONE SPUR Bilateral     TUBAL LIGATION         Current Outpatient Medications   Medication Sig    alendronate (FOSAMAX) 70 MG tablet Take 1 tablet (70 mg total) by mouth every 7 days. (Patient taking differently: Take 70 mg by mouth every 7 days. sundays)    ascorbic acid, vitamin C, (VITAMIN C) 1000 MG tablet Take 1,000 mg by mouth once daily.    calcium carbonate (CALCIUM 600 ORAL) Take by mouth 2 (two) times daily.    multivitamin capsule Take 1 capsule by mouth once daily.    TUMERIC-GING-OLIVE-OREG-CAPRYL ORAL Take by mouth.    vitamin A 37100 UNIT capsule Take 10,000 Units by mouth once daily.    vitamin D (VITAMIN D3) 1000 units Tab Take 1,000 Units by mouth once daily.    zinc gluconate 50 mg  tablet Take 50 mg by mouth once daily.    oxyCODONE-acetaminophen (PERCOCET) 7.5-325 mg per tablet Take 1 tablet by mouth every 4 (four) hours as needed for Pain. (Patient not taking: Reported on 2022)     No current facility-administered medications for this visit.       Review of patient's allergies indicates:  No Known Allergies    Family History   Problem Relation Age of Onset    Arthritis Mother     Hyperlipidemia Mother     Stroke Mother     Dementia Mother     Eczema Daughter     Eczema Grandchild     Lupus Neg Hx     Psoriasis Neg Hx     Melanoma Neg Hx        Social History     Socioeconomic History    Marital status:    Tobacco Use    Smoking status: Former Smoker     Packs/day: 0.25     Types: Cigarettes     Start date:      Quit date:      Years since quittin.0    Smokeless tobacco: Never Used    Tobacco comment: social smoker - on weekends only - quit 20 yrs ago   Substance and Sexual Activity    Alcohol use: Yes     Alcohol/week: 0.0 standard drinks     Comment: occ    Drug use: No    Sexual activity: Not Currently     Partners: Male       History of present illness:  Patient is here for follow-up for her left middle finger D IP joint fusion.  She is 6 weeks postop and doing well.  She reports she has no pain.      Review of Systems:    Musculoskeletal:  See HPI      Objective:        Physical Examination:    Vital Signs:  There were no vitals filed for this visit.    Body mass index is 33.02 kg/m².    This a well-developed, well nourished patient in no acute distress.  They are alert and oriented and cooperative to examination.        Left hand exam:  Skin left hand clean dry and intact.  Incision over left middle finger the IP joint is well healed without wound dehiscence or drainage.  There is no erythema or ecchymosis.  There are no signs or symptoms of infection.  Patient is neurovascularly intact throughout the left upper extremity.  She can open and  "close her left hand into a fist.  She can oppose left thumb to all digits in her left hand without difficulty.  Radial pulses 2+.    Pertinent New Results:    XRAY Report / Interpretation:   Three views were taken of her left fingers today, specifically her left middle finger:  AP, lateral oblique views.  They reveal no acute fractures or dislocations.  There is an intramedullary early placed screw affixing the distal phalanx to the middle phalanx traversing the distal interphalangeal joint of the left middle finger.  There is no evidence of hardware failure.    Assessment/Plan:      1.  Status post left middle finger distal interphalangeal joint fusion    Reassurance was provided to her today.  She will continue to improve for 3 more months.  She has not oral better in 3 months, she should follow up with us.  If she is doing well and has no residual discomfort or swelling, she can simply cancel that appointment.    Jeronimo Boss, Physician Assistant, served in the capacity as a "scribe" for this patient encounter.  A "face-to-face" encounter occurred with Dr. Jony Marmolejo on this date.  The treatment plan and medical decision-making is outlined above. Patient was seen and examined with a chaperone.         This note was created using Dragon voice recognition software that occasionally misinterpreted phrases or words.        "

## 2022-03-24 ENCOUNTER — OFFICE VISIT (OUTPATIENT)
Dept: HEMATOLOGY/ONCOLOGY | Facility: CLINIC | Age: 72
End: 2022-03-24
Payer: MEDICARE

## 2022-03-24 VITALS
SYSTOLIC BLOOD PRESSURE: 121 MMHG | HEART RATE: 87 BPM | TEMPERATURE: 98 F | BODY MASS INDEX: 33.02 KG/M2 | RESPIRATION RATE: 18 BRPM | DIASTOLIC BLOOD PRESSURE: 75 MMHG | HEIGHT: 58 IN

## 2022-03-24 DIAGNOSIS — C50.111 MALIGNANT NEOPLASM OF CENTRAL PORTION OF RIGHT BREAST IN FEMALE, ESTROGEN RECEPTOR POSITIVE: ICD-10-CM

## 2022-03-24 DIAGNOSIS — Z17.0 MALIGNANT NEOPLASM OF CENTRAL PORTION OF RIGHT BREAST IN FEMALE, ESTROGEN RECEPTOR POSITIVE: ICD-10-CM

## 2022-03-24 DIAGNOSIS — Z12.31 ENCOUNTER FOR SCREENING MAMMOGRAM FOR HIGH-RISK PATIENT: ICD-10-CM

## 2022-03-24 PROCEDURE — 99213 PR OFFICE/OUTPT VISIT, EST, LEVL III, 20-29 MIN: ICD-10-PCS | Mod: S$GLB,,, | Performed by: INTERNAL MEDICINE

## 2022-03-24 PROCEDURE — 99213 OFFICE O/P EST LOW 20 MIN: CPT | Mod: S$GLB,,, | Performed by: INTERNAL MEDICINE

## 2022-03-24 NOTE — ASSESSMENT & PLAN NOTE
Patient is doing well and appears TARAS.  She will will be due for mammogram again in May this year and will continue to see her yearly.

## 2022-03-24 NOTE — PROGRESS NOTES
PROGRESS NOTE    Subjective:       Patient ID: Camila Au is a 71 y.o. female.    Chief Complaint:  No chief complaint on file.    Right mastectomy 5/24/2007  [id number], 2cm tumor  Lobular carcinoma  Completed AC 10/2007  Completed XRT for close margins  Began AI 9/2007-11/2018      History of Present Illness:   Camila Au is a 71 y.o. female who presents with a history of breast cancer here for yearly  follow up.      No new complaints this visit. She is feeling well.      Mammogram negative May,2021      Family and Social history reviewed and is unchanged from 11/25/2014      ROS:  Review of Systems   Constitutional: Negative for fever.   Respiratory: Negative for shortness of breath.    Cardiovascular: Negative for chest pain and leg swelling.   Gastrointestinal: Negative for abdominal pain and blood in stool.   Genitourinary: Negative for hematuria.   Skin: Negative for rash.          Current Outpatient Medications:     alendronate (FOSAMAX) 70 MG tablet, Take 1 tablet (70 mg total) by mouth every 7 days. (Patient taking differently: Take 70 mg by mouth every 7 days. sundays), Disp: 12 tablet, Rfl: 1    ascorbic acid, vitamin C, (VITAMIN C) 1000 MG tablet, Take 1,000 mg by mouth once daily., Disp: , Rfl:     calcium carbonate (CALCIUM 600 ORAL), Take by mouth 2 (two) times daily., Disp: , Rfl:     multivitamin capsule, Take 1 capsule by mouth once daily., Disp: , Rfl:     oxyCODONE-acetaminophen (PERCOCET) 7.5-325 mg per tablet, Take 1 tablet by mouth every 4 (four) hours as needed for Pain., Disp: 28 tablet, Rfl: 0    TUMERIC-GING-OLIVE-OREG-CAPRYL ORAL, Take by mouth., Disp: , Rfl:     vitamin A 10260 UNIT capsule, Take 10,000 Units by mouth once daily., Disp: , Rfl:     vitamin D (VITAMIN D3) 1000 units Tab, Take 1,000 Units by mouth once daily., Disp: , Rfl:     zinc gluconate 50 mg tablet, Take 50 mg by mouth once  "daily., Disp: , Rfl:         Objective:       Physical Examination:     /75   Pulse 87   Temp 97.6 °F (36.4 °C)   Resp 18   Ht 4' 10" (1.473 m)   BMI 33.02 kg/m²     Physical Exam  Constitutional:       Appearance: She is well-developed.   HENT:      Head: Normocephalic and atraumatic.      Right Ear: External ear normal.      Left Ear: External ear normal.   Eyes:      Conjunctiva/sclera: Conjunctivae normal.      Pupils: Pupils are equal, round, and reactive to light.   Neck:      Thyroid: No thyromegaly.      Trachea: No tracheal deviation.   Cardiovascular:      Rate and Rhythm: Normal rate and regular rhythm.      Heart sounds: Normal heart sounds.   Pulmonary:      Effort: Pulmonary effort is normal.      Breath sounds: Normal breath sounds.   Chest:       Abdominal:      General: Bowel sounds are normal. There is no distension.      Palpations: Abdomen is soft. There is no mass.      Tenderness: There is no abdominal tenderness.   Skin:     Findings: No rash.   Neurological:      Comments: Neuro intact througout   Psychiatric:         Behavior: Behavior normal.         Thought Content: Thought content normal.         Judgment: Judgment normal.         Labs:   No results found for this or any previous visit (from the past 336 hour(s)).  CMP  Sodium   Date Value Ref Range Status   12/13/2021 139 136 - 145 mmol/L Final   03/26/2019 137 134 - 144 mmol/L      Potassium   Date Value Ref Range Status   12/15/2021 3.7 3.5 - 5.1 mmol/L Final     Chloride   Date Value Ref Range Status   12/13/2021 101 95 - 110 mmol/L Final   03/26/2019 101 98 - 110 mmol/L      CO2   Date Value Ref Range Status   12/13/2021 30 (H) 23 - 29 mmol/L Final     Glucose   Date Value Ref Range Status   12/13/2021 83 70 - 110 mg/dL Final   03/26/2019 160 (H) 70 - 99 mg/dL      BUN   Date Value Ref Range Status   12/13/2021 16 8 - 23 mg/dL Final     Creatinine   Date Value Ref Range Status   12/13/2021 0.5 0.5 - 1.4 mg/dL Final "   03/26/2019 0.50 (L) 0.60 - 1.40 mg/dL    08/08/2012 0.6 0.2 - 1.4 mg/dl Final     Calcium   Date Value Ref Range Status   12/13/2021 9.4 8.7 - 10.5 mg/dL Final   08/08/2012 9.5 8.6 - 10.2 mg/dl Final     Total Protein   Date Value Ref Range Status   12/13/2021 7.8 6.0 - 8.4 g/dL Final     Albumin   Date Value Ref Range Status   12/13/2021 4.0 3.5 - 5.2 g/dL Final   03/26/2019 4.0 3.1 - 4.7 g/dL      Total Bilirubin   Date Value Ref Range Status   12/13/2021 1.3 (H) 0.1 - 1.0 mg/dL Final     Comment:     For infants and newborns, interpretation of results should be based  on gestational age, weight and in agreement with clinical  observations.    Premature Infant recommended reference ranges:  Up to 24 hours.............<8.0 mg/dL  Up to 48 hours............<12.0 mg/dL  3-5 days..................<15.0 mg/dL  6-29 days.................<15.0 mg/dL       Alkaline Phosphatase   Date Value Ref Range Status   12/13/2021 137 (H) 55 - 135 U/L Final   08/08/2012 100 23 - 119 UNIT/L Final     AST   Date Value Ref Range Status   12/13/2021 86 (H) 10 - 40 U/L Final   08/08/2012 35 (H) 10 - 30 UNIT/L Final     ALT   Date Value Ref Range Status   12/13/2021 88 (H) 10 - 44 U/L Final     Anion Gap   Date Value Ref Range Status   12/13/2021 8 8 - 16 mmol/L Final   08/08/2012 9 5 - 15 meq/L Final     eGFR if    Date Value Ref Range Status   12/13/2021 >60.0 >60 mL/min/1.73 m^2 Final     eGFR if non    Date Value Ref Range Status   12/13/2021 >60.0 >60 mL/min/1.73 m^2 Final     Comment:     Calculation used to obtain the estimated glomerular filtration  rate (eGFR) is the CKD-EPI equation.        No results found for: CEA  No results found for: PSA        Assessment/Plan:     Problem List Items Addressed This Visit     Malignant neoplasm of central portion of right breast in female, estrogen receptor positive     Patient is doing well and appears TARAS.  She will will be due for mammogram again in May  this year and will continue to see her yearly.             Relevant Orders    Mammo Digital Screening Bilat w/ Brian    Encounter for screening mammogram for high-risk patient    Relevant Orders    Mammo Digital Screening Bilat w/ Brian          Discussion:     Follow up in about 1 year (around 3/24/2023).      Electronically signed by Jeffrey Ramos

## 2022-05-11 NOTE — PROGRESS NOTES
SUBJECTIVE:    Patient ID: Camila Au is a 72 y.o. female.    Chief Complaint: Cough (Post-covid 6 weeks ago) and Follow-up    HPI     Post COVID chest pain    COVID 5 weeks ago-sounds like the Omicron variant-she did have lots of mucous with chest congestion with increased coughing-4 weeks prior had had a URI...she still has a cough-dry and aggravating-deep breath may cause cough-pain is at lateral sternal border on left    Prior to that , she started having mid abdominal discomfort with spicey foods-sometimes it causes burning, no flatulence-she may get diarrhea with lots of pains before BM-describes increased GC reflex-sx made better ??-none tried  -  Headaches-bifrontal and top of head-no sinus congestion aura-takes tylenol and lays downno light or sound sensitivity-recent eye check    Admission on 12/15/2021, Discharged on 12/15/2021   Component Date Value Ref Range Status    Potassium 12/15/2021 3.7  3.5 - 5.1 mmol/L Final   Lab Visit on 12/13/2021   Component Date Value Ref Range Status    Sodium 12/13/2021 139  136 - 145 mmol/L Final    Potassium 12/13/2021 3.3 (A) 3.5 - 5.1 mmol/L Final    Chloride 12/13/2021 101  95 - 110 mmol/L Final    CO2 12/13/2021 30 (A) 23 - 29 mmol/L Final    Glucose 12/13/2021 83  70 - 110 mg/dL Final    BUN 12/13/2021 16  8 - 23 mg/dL Final    Creatinine 12/13/2021 0.5  0.5 - 1.4 mg/dL Final    Calcium 12/13/2021 9.4  8.7 - 10.5 mg/dL Final    Total Protein 12/13/2021 7.8  6.0 - 8.4 g/dL Final    Albumin 12/13/2021 4.0  3.5 - 5.2 g/dL Final    Total Bilirubin 12/13/2021 1.3 (A) 0.1 - 1.0 mg/dL Final    Alkaline Phosphatase 12/13/2021 137 (A) 55 - 135 U/L Final    AST 12/13/2021 86 (A) 10 - 40 U/L Final    ALT 12/13/2021 88 (A) 10 - 44 U/L Final    Anion Gap 12/13/2021 8  8 - 16 mmol/L Final    eGFR if African American 12/13/2021 >60.0  >60 mL/min/1.73 m^2 Final    eGFR if non African American 12/13/2021 >60.0  >60 mL/min/1.73 m^2 Final    WBC  12/13/2021 10.85  3.90 - 12.70 K/uL Final    RBC 12/13/2021 4.97  4.00 - 5.40 M/uL Final    Hemoglobin 12/13/2021 15.7  12.0 - 16.0 g/dL Final    Hematocrit 12/13/2021 46.9  37.0 - 48.5 % Final    MCV 12/13/2021 94  82 - 98 fL Final    MCH 12/13/2021 31.6 (A) 27.0 - 31.0 pg Final    MCHC 12/13/2021 33.5  32.0 - 36.0 g/dL Final    RDW 12/13/2021 13.7  11.5 - 14.5 % Final    Platelets 12/13/2021 250  150 - 450 K/uL Final    MPV 12/13/2021 10.3  9.2 - 12.9 fL Final    Immature Granulocytes 12/13/2021 0.5  0.0 - 0.5 % Final    Gran # (ANC) 12/13/2021 7.3  1.8 - 7.7 K/uL Final    Immature Grans (Abs) 12/13/2021 0.05 (A) 0.00 - 0.04 K/uL Final    Lymph # 12/13/2021 2.5  1.0 - 4.8 K/uL Final    Mono # 12/13/2021 0.8  0.3 - 1.0 K/uL Final    Eos # 12/13/2021 0.1  0.0 - 0.5 K/uL Final    Baso # 12/13/2021 0.08  0.00 - 0.20 K/uL Final    nRBC 12/13/2021 0  0 /100 WBC Final    Gran % 12/13/2021 67.3  38.0 - 73.0 % Final    Lymph % 12/13/2021 22.9  18.0 - 48.0 % Final    Mono % 12/13/2021 7.7  4.0 - 15.0 % Final    Eosinophil % 12/13/2021 0.9  0.0 - 8.0 % Final    Basophil % 12/13/2021 0.7  0.0 - 1.9 % Final    Differential Method 12/13/2021 Automated   Final   Hospital Outpatient Visit on 12/13/2021   Component Date Value Ref Range Status    SARS-CoV-2 RNA, Amplification, Qual 12/13/2021 Negative  Negative Final   Admission on 05/31/2021, Discharged on 06/01/2021   Component Date Value Ref Range Status    Sodium 06/01/2021 137  136 - 145 mmol/L Final    Potassium 06/01/2021 4.3  3.5 - 5.1 mmol/L Final    Chloride 06/01/2021 104  95 - 110 mmol/L Final    CO2 06/01/2021 28  23 - 29 mmol/L Final    Glucose 06/01/2021 113 (A) 70 - 110 mg/dL Final    BUN 06/01/2021 9  8 - 23 mg/dL Final    Creatinine 06/01/2021 0.6  0.5 - 1.4 mg/dL Final    Calcium 06/01/2021 8.4 (A) 8.7 - 10.5 mg/dL Final    Anion Gap 06/01/2021 5 (A) 8 - 16 mmol/L Final    eGFR if African American 06/01/2021 >60  >60 mL/min/1.73  m^2 Final    eGFR if non African American 06/01/2021 >60  >60 mL/min/1.73 m^2 Final    WBC 06/01/2021 15.45 (A) 3.90 - 12.70 K/uL Final    RBC 06/01/2021 3.85 (A) 4.00 - 5.40 M/uL Final    Hemoglobin 06/01/2021 13.0  12.0 - 16.0 g/dL Final    Hematocrit 06/01/2021 37.3  37.0 - 48.5 % Final    MCV 06/01/2021 97  82 - 98 fL Final    MCH 06/01/2021 33.8 (A) 27.0 - 31.0 pg Final    MCHC 06/01/2021 34.9  32.0 - 36.0 g/dL Final    RDW 06/01/2021 13.4  11.5 - 14.5 % Final    Platelets 06/01/2021 220  150 - 450 K/uL Final    MPV 06/01/2021 11.2  9.2 - 12.9 fL Final    Immature Granulocytes 06/01/2021 0.5  0.0 - 0.5 % Final    Gran # (ANC) 06/01/2021 12.2 (A) 1.8 - 7.7 K/uL Final    Immature Grans (Abs) 06/01/2021 0.07 (A) 0.00 - 0.04 K/uL Final    Lymph # 06/01/2021 1.9  1.0 - 4.8 K/uL Final    Mono # 06/01/2021 1.2 (A) 0.3 - 1.0 K/uL Final    Eos # 06/01/2021 0.0  0.0 - 0.5 K/uL Final    Baso # 06/01/2021 0.05  0.00 - 0.20 K/uL Final    nRBC 06/01/2021 0  0 /100 WBC Final    Gran % 06/01/2021 78.9 (A) 38.0 - 73.0 % Final    Lymph % 06/01/2021 12.0 (A) 18.0 - 48.0 % Final    Mono % 06/01/2021 8.0  4.0 - 15.0 % Final    Eosinophil % 06/01/2021 0.3  0.0 - 8.0 % Final    Basophil % 06/01/2021 0.3  0.0 - 1.9 % Final    Differential Method 06/01/2021 Automated   Final    WBC 06/01/2021 15.45 (A) 3.90 - 12.70 K/uL Final    RBC 06/01/2021 3.85 (A) 4.00 - 5.40 M/uL Final    Hemoglobin 06/01/2021 13.0  12.0 - 16.0 g/dL Final    Hematocrit 06/01/2021 37.3  37.0 - 48.5 % Final    MCV 06/01/2021 97  82 - 98 fL Final    MCH 06/01/2021 33.8 (A) 27.0 - 31.0 pg Final    MCHC 06/01/2021 34.9  32.0 - 36.0 g/dL Final    RDW 06/01/2021 13.4  11.5 - 14.5 % Final    Platelets 06/01/2021 220  150 - 450 K/uL Final    MPV 06/01/2021 11.2  9.2 - 12.9 fL Final    Immature Granulocytes 06/01/2021 0.5  0.0 - 0.5 % Final    Gran # (ANC) 06/01/2021 12.2 (A) 1.8 - 7.7 K/uL Final    Immature Grans (Abs) 06/01/2021 0.07  (A) 0.00 - 0.04 K/uL Final    Lymph # 06/01/2021 1.9  1.0 - 4.8 K/uL Final    Mono # 06/01/2021 1.2 (A) 0.3 - 1.0 K/uL Final    Eos # 06/01/2021 0.0  0.0 - 0.5 K/uL Final    Baso # 06/01/2021 0.05  0.00 - 0.20 K/uL Final    nRBC 06/01/2021 0  0 /100 WBC Final    Gran % 06/01/2021 78.9 (A) 38.0 - 73.0 % Final    Lymph % 06/01/2021 12.0 (A) 18.0 - 48.0 % Final    Mono % 06/01/2021 8.0  4.0 - 15.0 % Final    Eosinophil % 06/01/2021 0.3  0.0 - 8.0 % Final    Basophil % 06/01/2021 0.3  0.0 - 1.9 % Final    Differential Method 06/01/2021 Automated   Final   Lab Visit on 05/28/2021   Component Date Value Ref Range Status    SARS-CoV2 (COVID-19) Qualitative P* 05/28/2021 Not Detected  Not Detected Final   Hospital Outpatient Visit on 05/17/2021   Component Date Value Ref Range Status    WBC 05/17/2021 8.92  3.90 - 12.70 K/uL Final    RBC 05/17/2021 4.69  4.00 - 5.40 M/uL Final    Hemoglobin 05/17/2021 15.5  12.0 - 16.0 g/dL Final    Hematocrit 05/17/2021 45.3  37.0 - 48.5 % Final    MCV 05/17/2021 97  82 - 98 fL Final    MCH 05/17/2021 33.0 (A) 27.0 - 31.0 pg Final    MCHC 05/17/2021 34.2  32.0 - 36.0 g/dL Final    RDW 05/17/2021 13.3  11.5 - 14.5 % Final    Platelets 05/17/2021 227  150 - 450 K/uL Final    MPV 05/17/2021 11.0  9.2 - 12.9 fL Final    Immature Granulocytes 05/17/2021 0.2  0.0 - 0.5 % Final    Gran # (ANC) 05/17/2021 4.8  1.8 - 7.7 K/uL Final    Immature Grans (Abs) 05/17/2021 0.02  0.00 - 0.04 K/uL Final    Lymph # 05/17/2021 3.0  1.0 - 4.8 K/uL Final    Mono # 05/17/2021 0.8  0.3 - 1.0 K/uL Final    Eos # 05/17/2021 0.2  0.0 - 0.5 K/uL Final    Baso # 05/17/2021 0.11  0.00 - 0.20 K/uL Final    nRBC 05/17/2021 0  0 /100 WBC Final    Gran % 05/17/2021 53.4  38.0 - 73.0 % Final    Lymph % 05/17/2021 33.3  18.0 - 48.0 % Final    Mono % 05/17/2021 9.4  4.0 - 15.0 % Final    Eosinophil % 05/17/2021 2.5  0.0 - 8.0 % Final    Basophil % 05/17/2021 1.2  0.0 - 1.9 % Final     Differential Method 2021 Automated   Final    Sodium 2021 141  136 - 145 mmol/L Final    Potassium 2021 4.1  3.5 - 5.1 mmol/L Final    Chloride 2021 101  95 - 110 mmol/L Final    CO2 2021 30 (A) 23 - 29 mmol/L Final    Glucose 2021 61 (A) 70 - 110 mg/dL Final    BUN 2021 16  8 - 23 mg/dL Final    Creatinine 2021 0.6  0.5 - 1.4 mg/dL Final    Calcium 2021 9.7  8.7 - 10.5 mg/dL Final    Total Protein 2021 7.7  6.0 - 8.4 g/dL Final    Albumin 2021 3.9  3.5 - 5.2 g/dL Final    Total Bilirubin 2021 0.5  0.1 - 1.0 mg/dL Final    Alkaline Phosphatase 2021 157 (A) 55 - 135 U/L Final    AST 2021 113 (A) 10 - 40 U/L Final    ALT 2021 79 (A) 10 - 44 U/L Final    Anion Gap 2021 10  8 - 16 mmol/L Final    eGFR if African American 2021 >60  >60 mL/min/1.73 m^2 Final    eGFR if non African American 2021 >60  >60 mL/min/1.73 m^2 Final    Group & Rh 2021 O POS   Final    Indirect Arthur 2021 NEG   Final    MRSA Surveillance Screen 2021 No MRSA isolated   Final       Past Medical History:   Diagnosis Date    Arthritis     Breast cancer 2007    right    Pain in finger     quentin long finger pain    Wears glasses     WEARS CONTACS    Wears partial dentures     UPPER AND LOWER     Past Surgical History:   Procedure Laterality Date    BREAST RECONSTRUCTION      CARPAL TUNNEL RELEASE       SECTION  , ,     CHOLECYSTECTOMY  2019        COLONOSCOPY  2017    JOINT REPLACEMENT Right     KNEE    KNEE ARTHROPLASTY Right 6/10/2019    Procedure: ARTHROPLASTY, KNEE;  Surgeon: Jony Marmolejo MD;  [facility];  Service: Orthopedics;  Laterality: Right;    KNEE ARTHROPLASTY Left 2021    Procedure: ARTHROPLASTY, KNEE;  Surgeon: Jony Marmolejo MD;  [facility];  Service: Orthopedics;  Laterality: Left;    KNEE ARTHROSCOPY Bilateral      MASTECTOMY Right 2007    PORTACATH PLACEMENT  2007    PORTACATH REMOVAL      SURGICAL REMOVAL OF BONE SPUR Bilateral 2002    TUBAL LIGATION  1976     Family History   Problem Relation Age of Onset    Arthritis Mother     Hyperlipidemia Mother     Stroke Mother     Dementia Mother     Eczema Daughter     Eczema Grandchild     Lupus Neg Hx     Psoriasis Neg Hx     Melanoma Neg Hx        Marital Status:   Alcohol History:  reports current alcohol use.  Tobacco History:  reports that she quit smoking about 41 years ago. Her smoking use included cigarettes. She started smoking about 42 years ago. She smoked 0.25 packs per day. She has never used smokeless tobacco.  Drug History:  reports no history of drug use.    Review of patient's allergies indicates:  No Known Allergies    Current Outpatient Medications:     alendronate (FOSAMAX) 70 MG tablet, Take 1 tablet (70 mg total) by mouth every 7 days. (Patient taking differently: Take 70 mg by mouth every 7 days. sundays), Disp: 12 tablet, Rfl: 1    ascorbic acid, vitamin C, (VITAMIN C) 1000 MG tablet, Take 1,000 mg by mouth once daily., Disp: , Rfl:     benzonatate (TESSALON) 100 MG capsule, Take 1 capsule (100 mg total) by mouth 3 (three) times daily as needed for Cough., Disp: 90 capsule, Rfl: 0    calcium carbonate (CALCIUM 600 ORAL), Take by mouth 2 (two) times daily., Disp: , Rfl:     cyclobenzaprine (FLEXERIL) 10 MG tablet, Take 1 tablet (10 mg total) by mouth 3 (three) times daily as needed for Muscle spasms., Disp: 15 tablet, Rfl: 0    multivitamin capsule, Take 1 capsule by mouth once daily., Disp: , Rfl:     omeprazole (PRILOSEC) 20 MG capsule, Take 1 capsule (20 mg total) by mouth once daily., Disp: 30 capsule, Rfl: 5    oxyCODONE-acetaminophen (PERCOCET) 7.5-325 mg per tablet, Take 1 tablet by mouth every 4 (four) hours as needed for Pain., Disp: 28 tablet, Rfl: 0    TUMERIC-GING-OLIVE-OREG-CAPRYL ORAL, Take by mouth., Disp: , Rfl:      vitamin A 99119 UNIT capsule, Take 10,000 Units by mouth once daily., Disp: , Rfl:     vitamin D (VITAMIN D3) 1000 units Tab, Take 1,000 Units by mouth once daily., Disp: , Rfl:     zinc gluconate 50 mg tablet, Take 50 mg by mouth once daily., Disp: , Rfl:     Review of Systems   Constitutional: Positive for fatigue. Negative for appetite change, chills, diaphoresis, fever and unexpected weight change.   HENT: Positive for congestion. Negative for ear pain, hearing loss, nosebleeds, postnasal drip, sinus pressure, sinus pain, sneezing, sore throat, tinnitus, trouble swallowing and voice change.    Eyes: Negative for photophobia, pain, itching and visual disturbance.   Respiratory: Positive for cough. Negative for apnea, chest tightness, shortness of breath, wheezing and stridor.    Cardiovascular: Negative for chest pain, palpitations and leg swelling.   Gastrointestinal: Positive for diarrhea. Negative for abdominal distention, abdominal pain, blood in stool, nausea and vomiting.   Endocrine: Negative for cold intolerance, heat intolerance, polydipsia and polyuria.   Genitourinary: Negative for difficulty urinating, dyspareunia, dysuria, flank pain, frequency, hematuria, menstrual problem, pelvic pain, urgency, vaginal discharge and vaginal pain.   Musculoskeletal: Positive for arthralgias (knees). Negative for back pain, joint swelling, myalgias, neck pain and neck stiffness.   Skin: Negative for pallor.   Allergic/Immunologic: Negative for environmental allergies and food allergies.   Neurological: Positive for headaches. Negative for dizziness, tremors, speech difficulty, weakness, light-headedness and numbness.   Hematological: Does not bruise/bleed easily.   Psychiatric/Behavioral: Negative for agitation, confusion, decreased concentration, sleep disturbance and suicidal ideas. The patient is not nervous/anxious.           Objective:      Vitals:    05/12/22 1027   BP: 122/78   Pulse: 76   Resp: 16  "  Temp: 98.3 °F (36.8 °C)   SpO2: 97%   Weight: 73.5 kg (162 lb)   Height: 4' 10" (1.473 m)     Physical Exam  Vitals and nursing note reviewed.   Constitutional:       General: She is not in acute distress.     Appearance: She is obese. She is not ill-appearing, toxic-appearing or diaphoretic.   HENT:      Head: Normocephalic and atraumatic.   Eyes:      Extraocular Movements: Extraocular movements intact.      Conjunctiva/sclera: Conjunctivae normal.      Pupils: Pupils are equal, round, and reactive to light.   Cardiovascular:      Rate and Rhythm: Normal rate and regular rhythm.      Pulses: Normal pulses.      Heart sounds: Normal heart sounds.   Pulmonary:      Effort: Pulmonary effort is normal.      Breath sounds: Normal breath sounds.      Comments: Mild breath leads to cough  Abdominal:      General: Bowel sounds are normal.      Palpations: Abdomen is soft.      Tenderness: There is abdominal tenderness (mid abdomen).   Musculoskeletal:      Right lower leg: No edema.      Left lower leg: No edema.   Lymphadenopathy:      Cervical: No cervical adenopathy.   Skin:     General: Skin is warm and dry.   Neurological:      General: No focal deficit present.      Mental Status: She is alert and oriented to person, place, and time.   Psychiatric:         Mood and Affect: Mood normal.         Thought Content: Thought content normal.         Judgment: Judgment normal.           Assessment:       1. Cough    2. Periumbilical abdominal pain    3. Bilateral headaches    4. Benign hypertension    5. Need for Tdap vaccination    6. Hyperlipidemia, unspecified hyperlipidemia type    7. Need for shingles vaccine    8. Menopause    9. Osteoporosis screening         Plan:       Cough  -     benzonatate (TESSALON) 100 MG capsule; Take 1 capsule (100 mg total) by mouth 3 (three) times daily as needed for Cough.  Dispense: 90 capsule; Refill: 0  -     CBC Auto Differential; Future; Expected date: 05/12/2022  -     X-Ray Chest " PA And Lateral; Future; Expected date: 05/12/2022    Periumbilical abdominal pain  -     omeprazole (PRILOSEC) 20 MG capsule; Take 1 capsule (20 mg total) by mouth once daily.  Dispense: 30 capsule; Refill: 5    Bilateral headaches  -     cyclobenzaprine (FLEXERIL) 10 MG tablet; Take 1 tablet (10 mg total) by mouth 3 (three) times daily as needed for Muscle spasms.  Dispense: 15 tablet; Refill: 0    Benign hypertension  -     Basic Metabolic Panel; Future; Expected date: 05/16/2022    Need for Tdap vaccination  -     DIPH,PERTUSS,ACEL,,TET VAC,PF, ADULT (ADACEL) 2 Lf-(2.5-5-3-5 mcg)-5Lf/0.5 mL Syrg; Inject 0.5 mLs into the muscle once. for 1 dose  Dispense: 0.5 mL; Refill: 0    Hyperlipidemia, unspecified hyperlipidemia type  -     Lipid Panel; Future; Expected date: 05/16/2022    Need for shingles vaccine  -     adjuvant AS01B, PF,vial 1 of 2 (SHINGRIX ADJUVANT COMPONENT-PF) Susp; Inject 0.5 mLs into the muscle once. for 1 dose  Dispense: 0.5 mL; Refill: 1    Menopause  -     DXA Bone Density Spine And Hip; Future; Expected date: 05/13/2022    Osteoporosis screening  -     DXA Bone Density Spine And Hip; Future; Expected date: 05/13/2022      Follow up in about 4 weeks (around 6/9/2022) for FOLLOW-UP STATUS.        5/15/2022 Kasie Arthur M.D.

## 2022-05-12 ENCOUNTER — OFFICE VISIT (OUTPATIENT)
Dept: FAMILY MEDICINE | Facility: CLINIC | Age: 72
End: 2022-05-12
Payer: MEDICARE

## 2022-05-12 ENCOUNTER — HOSPITAL ENCOUNTER (OUTPATIENT)
Dept: RADIOLOGY | Facility: HOSPITAL | Age: 72
Discharge: HOME | End: 2022-05-12
Attending: INTERNAL MEDICINE
Payer: MEDICARE

## 2022-05-12 VITALS
DIASTOLIC BLOOD PRESSURE: 78 MMHG | WEIGHT: 162 LBS | RESPIRATION RATE: 16 BRPM | SYSTOLIC BLOOD PRESSURE: 122 MMHG | OXYGEN SATURATION: 97 % | HEIGHT: 58 IN | TEMPERATURE: 98 F | BODY MASS INDEX: 34 KG/M2 | HEART RATE: 76 BPM

## 2022-05-12 DIAGNOSIS — Z13.820 OSTEOPOROSIS SCREENING: ICD-10-CM

## 2022-05-12 DIAGNOSIS — R10.33 PERIUMBILICAL ABDOMINAL PAIN: ICD-10-CM

## 2022-05-12 DIAGNOSIS — R05.9 COUGH: Primary | ICD-10-CM

## 2022-05-12 DIAGNOSIS — R05.9 COUGH: ICD-10-CM

## 2022-05-12 DIAGNOSIS — R51.9 BILATERAL HEADACHES: ICD-10-CM

## 2022-05-12 DIAGNOSIS — E78.5 HYPERLIPIDEMIA, UNSPECIFIED HYPERLIPIDEMIA TYPE: ICD-10-CM

## 2022-05-12 DIAGNOSIS — Z23 NEED FOR SHINGLES VACCINE: ICD-10-CM

## 2022-05-12 DIAGNOSIS — I10 BENIGN HYPERTENSION: ICD-10-CM

## 2022-05-12 DIAGNOSIS — Z78.0 MENOPAUSE: ICD-10-CM

## 2022-05-12 DIAGNOSIS — Z23 NEED FOR TDAP VACCINATION: ICD-10-CM

## 2022-05-12 PROCEDURE — 99214 PR OFFICE/OUTPT VISIT, EST, LEVL IV, 30-39 MIN: ICD-10-PCS | Mod: S$PBB,AQ,, | Performed by: INTERNAL MEDICINE

## 2022-05-12 PROCEDURE — 71046 X-RAY EXAM CHEST 2 VIEWS: CPT | Mod: TC,PO

## 2022-05-12 PROCEDURE — 99214 OFFICE O/P EST MOD 30 MIN: CPT | Mod: S$PBB,AQ,, | Performed by: INTERNAL MEDICINE

## 2022-05-12 PROCEDURE — 99215 OFFICE O/P EST HI 40 MIN: CPT | Mod: 25 | Performed by: INTERNAL MEDICINE

## 2022-05-12 RX ORDER — ADJUVANT AS01B/PF, VIAL 1 OF 2
0.5 VIAL (ML) INTRAMUSCULAR ONCE
Qty: 0.5 ML | Refills: 1 | Status: SHIPPED | OUTPATIENT
Start: 2022-05-12 | End: 2022-05-12

## 2022-05-12 RX ORDER — BENZONATATE 100 MG/1
100 CAPSULE ORAL 3 TIMES DAILY PRN
Qty: 90 CAPSULE | Refills: 0 | Status: SHIPPED | OUTPATIENT
Start: 2022-05-12 | End: 2022-05-22

## 2022-05-12 RX ORDER — OMEPRAZOLE 20 MG/1
20 CAPSULE, DELAYED RELEASE ORAL DAILY
Qty: 30 CAPSULE | Refills: 5 | Status: SHIPPED | OUTPATIENT
Start: 2022-05-12 | End: 2023-01-30 | Stop reason: ALTCHOICE

## 2022-05-12 RX ORDER — CYCLOBENZAPRINE HCL 10 MG
10 TABLET ORAL 3 TIMES DAILY PRN
Qty: 15 TABLET | Refills: 0 | Status: SHIPPED | OUTPATIENT
Start: 2022-05-12 | End: 2022-05-22

## 2022-05-12 NOTE — PROGRESS NOTES
Xray show that your lungs are not as expanded at the bottoms on both sides as they could be-as your cough improves start taking at least one deep breat or two every thirty minutes

## 2022-05-13 ENCOUNTER — TELEPHONE (OUTPATIENT)
Dept: FAMILY MEDICINE | Facility: CLINIC | Age: 72
End: 2022-05-13

## 2022-05-17 ENCOUNTER — TELEPHONE (OUTPATIENT)
Dept: FAMILY MEDICINE | Facility: CLINIC | Age: 72
End: 2022-05-17

## 2022-05-17 NOTE — TELEPHONE ENCOUNTER
Spoke to patient. Notified the Flexeril was denied.   She is paying for the medication without insurance.   She is still having some pain on her left side of chest.  She start Dr Arthur recommendations with taking a deep breathes to ventilate lungs  She will call back at the end of the week with up date

## 2022-05-26 ENCOUNTER — HOSPITAL ENCOUNTER (OUTPATIENT)
Dept: RADIOLOGY | Facility: HOSPITAL | Age: 72
Discharge: HOME | End: 2022-05-26
Attending: INTERNAL MEDICINE
Payer: MEDICARE

## 2022-05-26 VITALS
HEIGHT: 58 IN | WEIGHT: 162.06 LBS | BODY MASS INDEX: 34.02 KG/M2 | BODY MASS INDEX: 34.02 KG/M2 | HEIGHT: 58 IN | WEIGHT: 162.06 LBS

## 2022-05-26 DIAGNOSIS — Z78.0 MENOPAUSE: ICD-10-CM

## 2022-05-26 DIAGNOSIS — Z12.31 ENCOUNTER FOR SCREENING MAMMOGRAM FOR HIGH-RISK PATIENT: ICD-10-CM

## 2022-05-26 DIAGNOSIS — Z17.0 MALIGNANT NEOPLASM OF CENTRAL PORTION OF RIGHT BREAST IN FEMALE, ESTROGEN RECEPTOR POSITIVE: ICD-10-CM

## 2022-05-26 DIAGNOSIS — Z13.820 OSTEOPOROSIS SCREENING: ICD-10-CM

## 2022-05-26 DIAGNOSIS — C50.111 MALIGNANT NEOPLASM OF CENTRAL PORTION OF RIGHT BREAST IN FEMALE, ESTROGEN RECEPTOR POSITIVE: ICD-10-CM

## 2022-05-26 PROCEDURE — 77080 DXA BONE DENSITY AXIAL: CPT | Mod: TC,PO

## 2022-05-26 PROCEDURE — 77063 BREAST TOMOSYNTHESIS BI: CPT | Mod: TC,PO

## 2022-05-26 PROCEDURE — 77067 SCR MAMMO BI INCL CAD: CPT | Mod: TC,PO

## 2022-06-06 ENCOUNTER — OFFICE VISIT (OUTPATIENT)
Dept: FAMILY MEDICINE | Facility: CLINIC | Age: 72
End: 2022-06-06
Payer: MEDICARE

## 2022-06-06 VITALS
BODY MASS INDEX: 33.58 KG/M2 | TEMPERATURE: 98 F | SYSTOLIC BLOOD PRESSURE: 110 MMHG | HEIGHT: 58 IN | DIASTOLIC BLOOD PRESSURE: 74 MMHG | RESPIRATION RATE: 14 BRPM | HEART RATE: 92 BPM | OXYGEN SATURATION: 95 % | WEIGHT: 160 LBS

## 2022-06-06 DIAGNOSIS — R05.9 COUGH: Primary | ICD-10-CM

## 2022-06-06 DIAGNOSIS — R74.01 ELEVATED ALT MEASUREMENT: ICD-10-CM

## 2022-06-06 DIAGNOSIS — M85.89 OSTEOPENIA OF MULTIPLE SITES: ICD-10-CM

## 2022-06-06 PROCEDURE — 99214 OFFICE O/P EST MOD 30 MIN: CPT | Performed by: INTERNAL MEDICINE

## 2022-06-06 PROCEDURE — 99213 PR OFFICE/OUTPT VISIT, EST, LEVL III, 20-29 MIN: ICD-10-PCS | Mod: S$PBB,AQ,, | Performed by: INTERNAL MEDICINE

## 2022-06-06 PROCEDURE — 99213 OFFICE O/P EST LOW 20 MIN: CPT | Mod: S$PBB,AQ,, | Performed by: INTERNAL MEDICINE

## 2022-06-06 RX ORDER — ALENDRONATE SODIUM 70 MG/1
70 TABLET ORAL
Qty: 12 TABLET | Refills: 3 | Status: SHIPPED | OUTPATIENT
Start: 2022-06-06

## 2022-06-06 RX ORDER — METHYLPREDNISOLONE 4 MG/1
TABLET ORAL
Qty: 1 EACH | Refills: 0 | Status: SHIPPED | OUTPATIENT
Start: 2022-06-06 | End: 2022-06-27

## 2022-06-06 RX ORDER — HYDROCODONE POLISTIREX AND CHLORPHENIRAMINE POLISTIREX 10; 8 MG/5ML; MG/5ML
5 SUSPENSION, EXTENDED RELEASE ORAL EVERY 12 HOURS PRN
Qty: 70 ML | Refills: 0 | Status: SHIPPED | OUTPATIENT
Start: 2022-06-06 | End: 2022-10-17

## 2022-06-06 NOTE — PROGRESS NOTES
SUBJECTIVE:    Patient ID: Camila Au is a 72 y.o. female.    Chief Complaint: Results and Follow-up    HPI     In for recheck-she continues to have her cough-there is no mucous production-deep breath stimulates same or if she is talking too much-    CXR-slight atelectasis both bases    Admission on 12/15/2021, Discharged on 12/15/2021   Component Date Value Ref Range Status    Potassium 12/15/2021 3.7  3.5 - 5.1 mmol/L Final   Lab Visit on 12/13/2021   Component Date Value Ref Range Status    Sodium 12/13/2021 139  136 - 145 mmol/L Final    Potassium 12/13/2021 3.3 (A) 3.5 - 5.1 mmol/L Final    Chloride 12/13/2021 101  95 - 110 mmol/L Final    CO2 12/13/2021 30 (A) 23 - 29 mmol/L Final    Glucose 12/13/2021 83  70 - 110 mg/dL Final    BUN 12/13/2021 16  8 - 23 mg/dL Final    Creatinine 12/13/2021 0.5  0.5 - 1.4 mg/dL Final    Calcium 12/13/2021 9.4  8.7 - 10.5 mg/dL Final    Total Protein 12/13/2021 7.8  6.0 - 8.4 g/dL Final    Albumin 12/13/2021 4.0  3.5 - 5.2 g/dL Final    Total Bilirubin 12/13/2021 1.3 (A) 0.1 - 1.0 mg/dL Final    Alkaline Phosphatase 12/13/2021 137 (A) 55 - 135 U/L Final    AST 12/13/2021 86 (A) 10 - 40 U/L Final    ALT 12/13/2021 88 (A) 10 - 44 U/L Final    Anion Gap 12/13/2021 8  8 - 16 mmol/L Final    eGFR if African American 12/13/2021 >60.0  >60 mL/min/1.73 m^2 Final    eGFR if non African American 12/13/2021 >60.0  >60 mL/min/1.73 m^2 Final    WBC 12/13/2021 10.85  3.90 - 12.70 K/uL Final    RBC 12/13/2021 4.97  4.00 - 5.40 M/uL Final    Hemoglobin 12/13/2021 15.7  12.0 - 16.0 g/dL Final    Hematocrit 12/13/2021 46.9  37.0 - 48.5 % Final    MCV 12/13/2021 94  82 - 98 fL Final    MCH 12/13/2021 31.6 (A) 27.0 - 31.0 pg Final    MCHC 12/13/2021 33.5  32.0 - 36.0 g/dL Final    RDW 12/13/2021 13.7  11.5 - 14.5 % Final    Platelets 12/13/2021 250  150 - 450 K/uL Final    MPV 12/13/2021 10.3  9.2 - 12.9 fL Final    Immature Granulocytes 12/13/2021 0.5   0.0 - 0.5 % Final    Gran # (ANC) 2021 7.3  1.8 - 7.7 K/uL Final    Immature Grans (Abs) 2021 0.05 (A) 0.00 - 0.04 K/uL Final    Lymph # 2021 2.5  1.0 - 4.8 K/uL Final    Mono # 2021 0.8  0.3 - 1.0 K/uL Final    Eos # 2021 0.1  0.0 - 0.5 K/uL Final    Baso # 2021 0.08  0.00 - 0.20 K/uL Final    nRBC 2021 0  0 /100 WBC Final    Gran % 2021 67.3  38.0 - 73.0 % Final    Lymph % 2021 22.9  18.0 - 48.0 % Final    Mono % 2021 7.7  4.0 - 15.0 % Final    Eosinophil % 2021 0.9  0.0 - 8.0 % Final    Basophil % 2021 0.7  0.0 - 1.9 % Final    Differential Method 2021 Automated   Final   Hospital Outpatient Visit on 2021   Component Date Value Ref Range Status    SARS-CoV-2 RNA, Amplification, Qual 2021 Negative  Negative Final       Past Medical History:   Diagnosis Date    Arthritis     Breast cancer     right    Pain in finger     quentin long finger pain    Wears glasses     WEARS CONTACS    Wears partial dentures     UPPER AND LOWER     Past Surgical History:   Procedure Laterality Date    BREAST RECONSTRUCTION      CARPAL TUNNEL RELEASE       SECTION  1970, , 1976    CHOLECYSTECTOMY  2019        COLONOSCOPY  2017    JOINT REPLACEMENT Right     KNEE    KNEE ARTHROPLASTY Right 6/10/2019    Procedure: ARTHROPLASTY, KNEE;  Surgeon: Jony Marmolejo MD;  Location: [facility] OR;  Service: Orthopedics;  Laterality: Right;    KNEE ARTHROPLASTY Left 2021    Procedure: ARTHROPLASTY, KNEE;  Surgeon: Jony Marmolejo MD;  Location: [facility] OR;  Service: Orthopedics;  Laterality: Left;    KNEE ARTHROSCOPY Bilateral 2008    MASTECTOMY Right 2007    PORTACATH PLACEMENT  2007    PORTACATH REMOVAL      SURGICAL REMOVAL OF BONE SPUR Bilateral     TUBAL LIGATION  1976     Family History   Problem Relation Age of Onset    Arthritis Mother     Hyperlipidemia Mother     Stroke Mother      Dementia Mother     Eczema Daughter     Eczema Grandchild     Lupus Neg Hx     Psoriasis Neg Hx     Melanoma Neg Hx        Marital Status:   Alcohol History:  reports current alcohol use.  Tobacco History:  reports that she quit smoking about 41 years ago. Her smoking use included cigarettes. She started smoking about 42 years ago. She smoked 0.25 packs per day. She has never used smokeless tobacco.  Drug History:  reports no history of drug use.    Review of patient's allergies indicates:  No Known Allergies    Current Outpatient Medications:     ascorbic acid, vitamin C, (VITAMIN C) 1000 MG tablet, Take 1,000 mg by mouth once daily., Disp: , Rfl:     calcium carbonate (CALCIUM 600 ORAL), Take by mouth 2 (two) times daily., Disp: , Rfl:     multivitamin capsule, Take 1 capsule by mouth once daily., Disp: , Rfl:     omeprazole (PRILOSEC) 20 MG capsule, Take 1 capsule (20 mg total) by mouth once daily., Disp: 30 capsule, Rfl: 5    vitamin D (VITAMIN D3) 1000 units Tab, Take 1,000 Units by mouth once daily., Disp: , Rfl:     zinc gluconate 50 mg tablet, Take 50 mg by mouth once daily., Disp: , Rfl:     alendronate (FOSAMAX) 70 MG tablet, Take 1 tablet (70 mg total) by mouth every 7 days., Disp: 12 tablet, Rfl: 3    hydrocodone-chlorpheniramine (TUSSIONEX) 10-8 mg/5 mL suspension, Take 5 mLs by mouth every 12 (twelve) hours as needed for Cough., Disp: 70 mL, Rfl: 0    methylPREDNISolone (MEDROL DOSEPACK) 4 mg tablet, use as directed, Disp: 1 each, Rfl: 0    Review of Systems   Constitutional: Negative for activity change, appetite change, chills, fatigue, fever and unexpected weight change.   HENT: Negative for congestion, ear pain, hearing loss, postnasal drip, sinus pain, sneezing, sore throat, tinnitus and trouble swallowing.    Eyes: Negative for pain, discharge and visual disturbance.   Respiratory: Positive for cough. Negative for choking, shortness of breath and wheezing.    Cardiovascular:  Negative for chest pain, palpitations and leg swelling.   Gastrointestinal: Negative for abdominal distention, abdominal pain, blood in stool, constipation, diarrhea, nausea and vomiting.   Endocrine: Negative for cold intolerance and heat intolerance.   Genitourinary: Negative for difficulty urinating, dysuria, frequency, pelvic pain and urgency.   Musculoskeletal: Negative for back pain, joint swelling and neck pain.   Skin: Negative for pallor and rash.   Allergic/Immunologic: Negative for environmental allergies and food allergies.   Neurological: Negative for dizziness, tremors, weakness, numbness and headaches.   Hematological: Does not bruise/bleed easily.   Psychiatric/Behavioral: Negative for agitation, confusion, dysphoric mood, sleep disturbance and suicidal ideas. The patient is not nervous/anxious.           Objective:      Last 3 sets of Vitals    Vitals - 1 value per visit 5/26/2022 6/6/2022 6/6/2022   SYSTOLIC - - 110   DIASTOLIC - - 74   Pulse - - 92   Temp - - 98.4   Resp - - 14   SPO2 - - 95   Weight (lb) 162.04 - 160   Weight (kg) 73.5 - 72.576   Height 58 - 58   BMI (Calculated) 33.9 - 33.4   VISIT REPORT - - -   Pain Score  - 0 -   Some recent data might be hidden       Physical Exam  Constitutional:       Appearance: Normal appearance. She is obese.   HENT:      Head: Normocephalic and atraumatic.   Eyes:      Extraocular Movements: Extraocular movements intact.      Pupils: Pupils are equal, round, and reactive to light.   Cardiovascular:      Rate and Rhythm: Normal rate and regular rhythm.      Pulses: Normal pulses.      Heart sounds: Normal heart sounds.   Pulmonary:      Effort: Pulmonary effort is normal.      Breath sounds: Normal breath sounds.   Musculoskeletal:      Right lower leg: No edema.      Left lower leg: No edema.   Skin:     General: Skin is warm and dry.           Assessment:       1. Cough    2. Elevated ALT measurement    3. Osteopenia of multiple sites         Plan:        Cough  -     hydrocodone-chlorpheniramine (TUSSIONEX) 10-8 mg/5 mL suspension; Take 5 mLs by mouth every 12 (twelve) hours as needed for Cough.  Dispense: 70 mL; Refill: 0  -     methylPREDNISolone (MEDROL DOSEPACK) 4 mg tablet; use as directed  Dispense: 1 each; Refill: 0    Elevated ALT measurement  -     Comprehensive Metabolic Panel; Future; Expected date: 06/06/2022    Osteopenia of multiple sites  -     alendronate (FOSAMAX) 70 MG tablet; Take 1 tablet (70 mg total) by mouth every 7 days.  Dispense: 12 tablet; Refill: 3      No follow-ups on file.        6/6/2022 Kasie Arthur M.D.

## 2022-06-13 ENCOUNTER — LAB VISIT (OUTPATIENT)
Dept: LAB | Facility: HOSPITAL | Age: 72
End: 2022-06-13
Attending: INTERNAL MEDICINE
Payer: MEDICARE

## 2022-06-13 DIAGNOSIS — I10 BENIGN HYPERTENSION: ICD-10-CM

## 2022-06-13 DIAGNOSIS — R05.9 COUGH: ICD-10-CM

## 2022-06-13 DIAGNOSIS — E78.5 HYPERLIPIDEMIA, UNSPECIFIED HYPERLIPIDEMIA TYPE: ICD-10-CM

## 2022-06-13 DIAGNOSIS — R74.01 ELEVATED ALT MEASUREMENT: ICD-10-CM

## 2022-06-13 LAB
ALBUMIN SERPL BCP-MCNC: 3.7 G/DL (ref 3.5–5.2)
ALP SERPL-CCNC: 105 U/L (ref 55–135)
ALT SERPL W/O P-5'-P-CCNC: 74 U/L (ref 10–44)
ANION GAP SERPL CALC-SCNC: 7 MMOL/L (ref 8–16)
ANION GAP SERPL CALC-SCNC: 7 MMOL/L (ref 8–16)
AST SERPL-CCNC: 78 U/L (ref 10–40)
BASOPHILS # BLD AUTO: 0.08 K/UL (ref 0–0.2)
BASOPHILS NFR BLD: 1 % (ref 0–1.9)
BILIRUB SERPL-MCNC: 1 MG/DL (ref 0.1–1)
BUN SERPL-MCNC: 11 MG/DL (ref 8–23)
BUN SERPL-MCNC: 11 MG/DL (ref 8–23)
CALCIUM SERPL-MCNC: 9 MG/DL (ref 8.7–10.5)
CALCIUM SERPL-MCNC: 9 MG/DL (ref 8.7–10.5)
CHLORIDE SERPL-SCNC: 101 MMOL/L (ref 95–110)
CHLORIDE SERPL-SCNC: 101 MMOL/L (ref 95–110)
CHOLEST SERPL-MCNC: 231 MG/DL (ref 120–199)
CHOLEST/HDLC SERPL: 3.9 {RATIO} (ref 2–5)
CO2 SERPL-SCNC: 29 MMOL/L (ref 23–29)
CO2 SERPL-SCNC: 29 MMOL/L (ref 23–29)
CREAT SERPL-MCNC: 0.4 MG/DL (ref 0.5–1.4)
CREAT SERPL-MCNC: 0.4 MG/DL (ref 0.5–1.4)
DIFFERENTIAL METHOD: ABNORMAL
EOSINOPHIL # BLD AUTO: 0.2 K/UL (ref 0–0.5)
EOSINOPHIL NFR BLD: 1.9 % (ref 0–8)
ERYTHROCYTE [DISTWIDTH] IN BLOOD BY AUTOMATED COUNT: 13.8 % (ref 11.5–14.5)
EST. GFR  (AFRICAN AMERICAN): >60 ML/MIN/1.73 M^2
EST. GFR  (AFRICAN AMERICAN): >60 ML/MIN/1.73 M^2
EST. GFR  (NON AFRICAN AMERICAN): >60 ML/MIN/1.73 M^2
EST. GFR  (NON AFRICAN AMERICAN): >60 ML/MIN/1.73 M^2
GLUCOSE SERPL-MCNC: 113 MG/DL (ref 70–110)
GLUCOSE SERPL-MCNC: 113 MG/DL (ref 70–110)
HCT VFR BLD AUTO: 44.4 % (ref 37–48.5)
HDLC SERPL-MCNC: 60 MG/DL (ref 40–75)
HDLC SERPL: 26 % (ref 20–50)
HGB BLD-MCNC: 15 G/DL (ref 12–16)
IMM GRANULOCYTES # BLD AUTO: 0.03 K/UL (ref 0–0.04)
IMM GRANULOCYTES NFR BLD AUTO: 0.4 % (ref 0–0.5)
LDLC SERPL CALC-MCNC: 153.2 MG/DL (ref 63–159)
LYMPHOCYTES # BLD AUTO: 2.1 K/UL (ref 1–4.8)
LYMPHOCYTES NFR BLD: 26.2 % (ref 18–48)
MCH RBC QN AUTO: 32.3 PG (ref 27–31)
MCHC RBC AUTO-ENTMCNC: 33.8 G/DL (ref 32–36)
MCV RBC AUTO: 96 FL (ref 82–98)
MONOCYTES # BLD AUTO: 0.8 K/UL (ref 0.3–1)
MONOCYTES NFR BLD: 9.8 % (ref 4–15)
NEUTROPHILS # BLD AUTO: 4.8 K/UL (ref 1.8–7.7)
NEUTROPHILS NFR BLD: 60.7 % (ref 38–73)
NONHDLC SERPL-MCNC: 171 MG/DL
NRBC BLD-RTO: 0 /100 WBC
PLATELET # BLD AUTO: 182 K/UL (ref 150–450)
PMV BLD AUTO: 11.2 FL (ref 9.2–12.9)
POTASSIUM SERPL-SCNC: 3.9 MMOL/L (ref 3.5–5.1)
POTASSIUM SERPL-SCNC: 3.9 MMOL/L (ref 3.5–5.1)
PROT SERPL-MCNC: 6.8 G/DL (ref 6–8.4)
RBC # BLD AUTO: 4.65 M/UL (ref 4–5.4)
SODIUM SERPL-SCNC: 137 MMOL/L (ref 136–145)
SODIUM SERPL-SCNC: 137 MMOL/L (ref 136–145)
TRIGL SERPL-MCNC: 89 MG/DL (ref 30–150)
WBC # BLD AUTO: 7.86 K/UL (ref 3.9–12.7)

## 2022-06-13 PROCEDURE — 36415 COLL VENOUS BLD VENIPUNCTURE: CPT | Performed by: INTERNAL MEDICINE

## 2022-06-13 PROCEDURE — 80061 LIPID PANEL: CPT | Performed by: INTERNAL MEDICINE

## 2022-06-13 PROCEDURE — 85025 COMPLETE CBC W/AUTO DIFF WBC: CPT | Performed by: INTERNAL MEDICINE

## 2022-06-13 PROCEDURE — 80053 COMPREHEN METABOLIC PANEL: CPT | Performed by: INTERNAL MEDICINE

## 2022-06-23 ENCOUNTER — HOSPITAL ENCOUNTER (OUTPATIENT)
Dept: RADIOLOGY | Facility: HOSPITAL | Age: 72
Discharge: HOME | End: 2022-06-23
Attending: NURSE PRACTITIONER
Payer: MEDICARE

## 2022-06-23 VITALS — HEIGHT: 58 IN | WEIGHT: 160.06 LBS | BODY MASS INDEX: 33.6 KG/M2

## 2022-06-23 DIAGNOSIS — Z85.3 PERSONAL HISTORY OF BREAST CANCER: ICD-10-CM

## 2022-06-23 PROCEDURE — 25500020 PHARM REV CODE 255: Mod: PO | Performed by: NURSE PRACTITIONER

## 2022-06-23 PROCEDURE — 77049 MRI BREAST C-+ W/CAD BI: CPT | Mod: TC,PO

## 2022-06-23 PROCEDURE — A9585 GADOBUTROL INJECTION: HCPCS | Mod: PO | Performed by: NURSE PRACTITIONER

## 2022-06-23 RX ORDER — GADOBUTROL 604.72 MG/ML
7.5 INJECTION INTRAVENOUS
Status: COMPLETED | OUTPATIENT
Start: 2022-06-23 | End: 2022-06-23

## 2022-06-23 RX ADMIN — GADOBUTROL 7.5 ML: 604.72 INJECTION INTRAVENOUS at 11:06

## 2022-06-30 ENCOUNTER — OCCUPATIONAL HEALTH (OUTPATIENT)
Dept: URGENT CARE | Facility: CLINIC | Age: 72
End: 2022-06-30

## 2022-06-30 DIAGNOSIS — Z00.00 ENCOUNTER FOR PHYSICAL EXAMINATION: Primary | ICD-10-CM

## 2022-06-30 LAB — COLLECTION ONLY: NORMAL

## 2022-06-30 PROCEDURE — 99499 UNLISTED E&M SERVICE: CPT | Mod: S$GLB,,, | Performed by: NURSE PRACTITIONER

## 2022-06-30 PROCEDURE — 99499 DOT PHYSICAL: ICD-10-PCS | Mod: S$GLB,,, | Performed by: NURSE PRACTITIONER

## 2022-07-14 ENCOUNTER — OFFICE VISIT (OUTPATIENT)
Dept: DERMATOLOGY | Facility: CLINIC | Age: 72
End: 2022-07-14
Payer: MEDICARE

## 2022-07-14 DIAGNOSIS — L72.0 MILIA: Primary | ICD-10-CM

## 2022-07-14 DIAGNOSIS — L82.1 SEBORRHEIC KERATOSES: ICD-10-CM

## 2022-07-14 PROCEDURE — 99999 PR PBB SHADOW E&M-EST. PATIENT-LVL II: CPT | Mod: PBBFAC,,, | Performed by: STUDENT IN AN ORGANIZED HEALTH CARE EDUCATION/TRAINING PROGRAM

## 2022-07-14 PROCEDURE — 99999 PR PBB SHADOW E&M-EST. PATIENT-LVL II: ICD-10-PCS | Mod: PBBFAC,,, | Performed by: STUDENT IN AN ORGANIZED HEALTH CARE EDUCATION/TRAINING PROGRAM

## 2022-07-14 PROCEDURE — 99212 OFFICE O/P EST SF 10 MIN: CPT | Mod: PBBFAC,PO | Performed by: STUDENT IN AN ORGANIZED HEALTH CARE EDUCATION/TRAINING PROGRAM

## 2022-07-14 PROCEDURE — 99212 OFFICE O/P EST SF 10 MIN: CPT | Mod: S$PBB,,, | Performed by: STUDENT IN AN ORGANIZED HEALTH CARE EDUCATION/TRAINING PROGRAM

## 2022-07-14 PROCEDURE — 99212 PR OFFICE/OUTPT VISIT, EST, LEVL II, 10-19 MIN: ICD-10-PCS | Mod: S$PBB,,, | Performed by: STUDENT IN AN ORGANIZED HEALTH CARE EDUCATION/TRAINING PROGRAM

## 2022-07-14 NOTE — PROGRESS NOTES
Subjective:       Patient ID:  Camila Au is a 72 y.o. female who presents for   Chief Complaint   Patient presents with    Spot     Right cheek     LOV 11/15/21    Patient here today for skin check on face   C/o spot on right cheek x 6 months. Getting bigger and feels dry.    Denies Phx of NMSC   Denies Fhx of MM      Review of Systems   Constitutional: Negative for fever, chills and fatigue.   Respiratory: Negative for cough and shortness of breath.    Gastrointestinal: Negative for nausea and vomiting.   Skin: Positive for daily sunscreen use and activity-related sunscreen use.   Hematologic/Lymphatic: Bruises/bleeds easily.        Objective:    Physical Exam   Constitutional: She appears well-developed and well-nourished.   Neurological: She is alert and oriented to person, place, and time.   Psychiatric: She has a normal mood and affect.   Skin:   Areas Examined (abnormalities noted in diagram):   Head / Face Inspection Performed              Diagram Legend     Erythematous scaling macule/papule c/w actinic keratosis       Vascular papule c/w angioma      Pigmented verrucoid papule/plaque c/w seborrheic keratosis      Yellow umbilicated papule c/w sebaceous hyperplasia      Irregularly shaped tan macule c/w lentigo     1-2 mm smooth white papules consistent with Milia      Movable subcutaneous cyst with punctum c/w epidermal inclusion cyst      Subcutaneous movable cyst c/w pilar cyst      Firm pink to brown papule c/w dermatofibroma      Pedunculated fleshy papule(s) c/w skin tag(s)      Evenly pigmented macule c/w junctional nevus     Mildly variegated pigmented, slightly irregular-bordered macule c/w mildly atypical nevus      Flesh colored to evenly pigmented papule c/w intradermal nevus       Pink pearly papule/plaque c/w basal cell carcinoma      Erythematous hyperkeratotic cursted plaque c/w SCC      Surgical scar with no sign of skin cancer recurrence      Open and closed comedones       Inflammatory papules and pustules      Verrucoid papule consistent consistent with wart     Erythematous eczematous patches and plaques     Dystrophic onycholytic nail with subungual debris c/w onychomycosis     Umbilicated papule    Erythematous-base heme-crusted tan verrucoid plaque consistent with inflamed seborrheic keratosis     Erythematous Silvery Scaling Plaque c/w Psoriasis     See annotation      Assessment / Plan:        Milia  - reassurance, no concerning features    Seborrheic keratoses  These are benign inherited growths without a malignant potential. Reassurance given to patient. No treatment is necessary.   Discussed risks and benefits of LN2           No follow-ups on file.

## 2022-07-17 NOTE — PATIENT INSTRUCTIONS

## 2022-09-27 ENCOUNTER — TELEPHONE (OUTPATIENT)
Dept: FAMILY MEDICINE | Facility: CLINIC | Age: 72
End: 2022-09-27

## 2022-10-16 NOTE — PROGRESS NOTES
SUBJECTIVE:    Patient ID: Camila Au is a 72 y.o. female.    Chief Complaint: pains in chest (Goes to arm and around to back) and Flu Vaccine    Three weeks ago she developed the sudden onset of pain in the left chest and around the chest to the back and down the outer arm and around the back to the midline-it worsened slightly with deep breath-she had some shortness of breath-the pain cam every 5 minutes-no cough or fever-first time it lasted two days and then after a few days uit started again and lasted intermittently a few hours and then none until this past week same type sx-no sx the last two days    Chest Pain   This is a new problem. The current episode started 1 to 4 weeks ago. The onset quality is sudden. The problem occurs 2 to 4 times per day. The problem has been gradually improving. The pain is present in the lateral region. The pain is at a severity of 6/10. The pain is moderate. The quality of the pain is described as sharp and stabbing. Radiates to: see above. Associated symptoms include shortness of breath. Pertinent negatives include no abdominal pain, back pain, claudication, cough, diaphoresis, dizziness, exertional chest pressure, fever, headaches, irregular heartbeat, lower extremity edema, malaise/fatigue, nausea, near-syncope, numbness, palpitations, vomiting or weakness. The pain is aggravated by deep breathing (turning to left). She has tried nothing for the symptoms. There are no known risk factors.     She denies leg pain-no other constitutional sx-she is not a smoker    Xray negative  Lab Visit on 06/13/2022   Component Date Value Ref Range Status    Sodium 06/13/2022 137  136 - 145 mmol/L Final    Potassium 06/13/2022 3.9  3.5 - 5.1 mmol/L Final    Chloride 06/13/2022 101  95 - 110 mmol/L Final    CO2 06/13/2022 29  23 - 29 mmol/L Final    Glucose 06/13/2022 113 (H)  70 - 110 mg/dL Final    BUN 06/13/2022 11  8 - 23 mg/dL Final    Creatinine 06/13/2022 0.4 (L)  0.5 -  1.4 mg/dL Final    Calcium 06/13/2022 9.0  8.7 - 10.5 mg/dL Final    Anion Gap 06/13/2022 7 (L)  8 - 16 mmol/L Final    eGFR if African American 06/13/2022 >60.0  >60 mL/min/1.73 m^2 Final    eGFR if non African American 06/13/2022 >60.0  >60 mL/min/1.73 m^2 Final    Cholesterol 06/13/2022 231 (H)  120 - 199 mg/dL Final    Triglycerides 06/13/2022 89  30 - 150 mg/dL Final    HDL 06/13/2022 60  40 - 75 mg/dL Final    LDL Cholesterol 06/13/2022 153.2  63.0 - 159.0 mg/dL Final    HDL/Cholesterol Ratio 06/13/2022 26.0  20.0 - 50.0 % Final    Total Cholesterol/HDL Ratio 06/13/2022 3.9  2.0 - 5.0 Final    Non-HDL Cholesterol 06/13/2022 171  mg/dL Final    WBC 06/13/2022 7.86  3.90 - 12.70 K/uL Final    RBC 06/13/2022 4.65  4.00 - 5.40 M/uL Final    Hemoglobin 06/13/2022 15.0  12.0 - 16.0 g/dL Final    Hematocrit 06/13/2022 44.4  37.0 - 48.5 % Final    MCV 06/13/2022 96  82 - 98 fL Final    MCH 06/13/2022 32.3 (H)  27.0 - 31.0 pg Final    MCHC 06/13/2022 33.8  32.0 - 36.0 g/dL Final    RDW 06/13/2022 13.8  11.5 - 14.5 % Final    Platelets 06/13/2022 182  150 - 450 K/uL Final    MPV 06/13/2022 11.2  9.2 - 12.9 fL Final    Immature Granulocytes 06/13/2022 0.4  0.0 - 0.5 % Final    Gran # (ANC) 06/13/2022 4.8  1.8 - 7.7 K/uL Final    Immature Grans (Abs) 06/13/2022 0.03  0.00 - 0.04 K/uL Final    Lymph # 06/13/2022 2.1  1.0 - 4.8 K/uL Final    Mono # 06/13/2022 0.8  0.3 - 1.0 K/uL Final    Eos # 06/13/2022 0.2  0.0 - 0.5 K/uL Final    Baso # 06/13/2022 0.08  0.00 - 0.20 K/uL Final    nRBC 06/13/2022 0  0 /100 WBC Final    Gran % 06/13/2022 60.7  38.0 - 73.0 % Final    Lymph % 06/13/2022 26.2  18.0 - 48.0 % Final    Mono % 06/13/2022 9.8  4.0 - 15.0 % Final    Eosinophil % 06/13/2022 1.9  0.0 - 8.0 % Final    Basophil % 06/13/2022 1.0  0.0 - 1.9 % Final    Differential Method 06/13/2022 Automated   Final    Sodium 06/13/2022 137  136 - 145 mmol/L Final    Potassium 06/13/2022 3.9  3.5 - 5.1 mmol/L Final    Chloride 06/13/2022  101  95 - 110 mmol/L Final    CO2 06/13/2022 29  23 - 29 mmol/L Final    Glucose 06/13/2022 113 (H)  70 - 110 mg/dL Final    BUN 06/13/2022 11  8 - 23 mg/dL Final    Creatinine 06/13/2022 0.4 (L)  0.5 - 1.4 mg/dL Final    Calcium 06/13/2022 9.0  8.7 - 10.5 mg/dL Final    Total Protein 06/13/2022 6.8  6.0 - 8.4 g/dL Final    Albumin 06/13/2022 3.7  3.5 - 5.2 g/dL Final    Total Bilirubin 06/13/2022 1.0  0.1 - 1.0 mg/dL Final    Alkaline Phosphatase 06/13/2022 105  55 - 135 U/L Final    AST 06/13/2022 78 (H)  10 - 40 U/L Final    ALT 06/13/2022 74 (H)  10 - 44 U/L Final    Anion Gap 06/13/2022 7 (L)  8 - 16 mmol/L Final    eGFR if African American 06/13/2022 >60.0  >60 mL/min/1.73 m^2 Final    eGFR if non African American 06/13/2022 >60.0  >60 mL/min/1.73 m^2 Final   Admission on 12/15/2021, Discharged on 12/15/2021   Component Date Value Ref Range Status    Potassium 12/15/2021 3.7  3.5 - 5.1 mmol/L Final   Lab Visit on 12/13/2021   Component Date Value Ref Range Status    Sodium 12/13/2021 139  136 - 145 mmol/L Final    Potassium 12/13/2021 3.3 (L)  3.5 - 5.1 mmol/L Final    Chloride 12/13/2021 101  95 - 110 mmol/L Final    CO2 12/13/2021 30 (H)  23 - 29 mmol/L Final    Glucose 12/13/2021 83  70 - 110 mg/dL Final    BUN 12/13/2021 16  8 - 23 mg/dL Final    Creatinine 12/13/2021 0.5  0.5 - 1.4 mg/dL Final    Calcium 12/13/2021 9.4  8.7 - 10.5 mg/dL Final    Total Protein 12/13/2021 7.8  6.0 - 8.4 g/dL Final    Albumin 12/13/2021 4.0  3.5 - 5.2 g/dL Final    Total Bilirubin 12/13/2021 1.3 (H)  0.1 - 1.0 mg/dL Final    Alkaline Phosphatase 12/13/2021 137 (H)  55 - 135 U/L Final    AST 12/13/2021 86 (H)  10 - 40 U/L Final    ALT 12/13/2021 88 (H)  10 - 44 U/L Final    Anion Gap 12/13/2021 8  8 - 16 mmol/L Final    eGFR if African American 12/13/2021 >60.0  >60 mL/min/1.73 m^2 Final    eGFR if non African American 12/13/2021 >60.0  >60 mL/min/1.73 m^2 Final    WBC 12/13/2021 10.85  3.90 - 12.70 K/uL Final    RBC  2021 4.97  4.00 - 5.40 M/uL Final    Hemoglobin 2021 15.7  12.0 - 16.0 g/dL Final    Hematocrit 2021 46.9  37.0 - 48.5 % Final    MCV 2021 94  82 - 98 fL Final    MCH 2021 31.6 (H)  27.0 - 31.0 pg Final    MCHC 2021 33.5  32.0 - 36.0 g/dL Final    RDW 2021 13.7  11.5 - 14.5 % Final    Platelets 2021 250  150 - 450 K/uL Final    MPV 2021 10.3  9.2 - 12.9 fL Final    Immature Granulocytes 2021 0.5  0.0 - 0.5 % Final    Gran # (ANC) 2021 7.3  1.8 - 7.7 K/uL Final    Immature Grans (Abs) 2021 0.05 (H)  0.00 - 0.04 K/uL Final    Lymph # 2021 2.5  1.0 - 4.8 K/uL Final    Mono # 2021 0.8  0.3 - 1.0 K/uL Final    Eos # 2021 0.1  0.0 - 0.5 K/uL Final    Baso # 2021 0.08  0.00 - 0.20 K/uL Final    nRBC 2021 0  0 /100 WBC Final    Gran % 2021 67.3  38.0 - 73.0 % Final    Lymph % 2021 22.9  18.0 - 48.0 % Final    Mono % 2021 7.7  4.0 - 15.0 % Final    Eosinophil % 2021 0.9  0.0 - 8.0 % Final    Basophil % 2021 0.7  0.0 - 1.9 % Final    Differential Method 2021 Automated   Final   Hospital Outpatient Visit on 2021   Component Date Value Ref Range Status    SARS-CoV-2 RNA, Amplification, Qual 2021 Negative  Negative Final       Past Medical History:   Diagnosis Date    Arthritis     Breast cancer 2007    right    Pain in finger     quentin long finger pain    Wears glasses     WEARS CONTACS    Wears partial dentures     UPPER AND LOWER     Past Surgical History:   Procedure Laterality Date    BREAST RECONSTRUCTION      CARPAL TUNNEL RELEASE       SECTION  , ,     CHOLECYSTECTOMY  2019        COLONOSCOPY  2017    JOINT REPLACEMENT Right     KNEE    KNEE ARTHROPLASTY Right 6/10/2019    Procedure: ARTHROPLASTY, KNEE;  Surgeon: Jony Marmolejo MD;  Location: [facility];  Service: Orthopedics;  Laterality: Right;    KNEE ARTHROPLASTY Left 2021     Procedure: ARTHROPLASTY, KNEE;  Surgeon: Jony Marmolejo MD;  Location: [facility];  Service: Orthopedics;  Laterality: Left;    KNEE ARTHROSCOPY Bilateral 2008    MASTECTOMY Right 2007    PORTACATH PLACEMENT  2007    PORTACATH REMOVAL      SURGICAL REMOVAL OF BONE SPUR Bilateral 2002    TUBAL LIGATION  1976     Family History   Problem Relation Age of Onset    Arthritis Mother     Hyperlipidemia Mother     Stroke Mother     Dementia Mother     Eczema Daughter     Eczema Grandchild     Lupus Neg Hx     Psoriasis Neg Hx     Melanoma Neg Hx        Marital Status:   Alcohol History:  reports current alcohol use.  Tobacco History:  reports that she quit smoking about 41 years ago. Her smoking use included cigarettes. She started smoking about 42 years ago. She smoked an average of .25 packs per day. She has never used smokeless tobacco.  Drug History:  reports no history of drug use.    Review of patient's allergies indicates:  No Known Allergies    Current Outpatient Medications:     alendronate (FOSAMAX) 70 MG tablet, Take 1 tablet (70 mg total) by mouth every 7 days., Disp: 12 tablet, Rfl: 3    ascorbic acid, vitamin C, (VITAMIN C) 1000 MG tablet, Take 1,000 mg by mouth once daily., Disp: , Rfl:     calcium carbonate (CALCIUM 600 ORAL), Take by mouth 2 (two) times daily., Disp: , Rfl:     ibuprofen (ADVIL,MOTRIN) 800 MG tablet, Take 800 mg by mouth every 8 (eight) hours as needed., Disp: , Rfl:     multivitamin capsule, Take 1 capsule by mouth once daily., Disp: , Rfl:     omeprazole (PRILOSEC) 20 MG capsule, Take 1 capsule (20 mg total) by mouth once daily., Disp: 30 capsule, Rfl: 5    vitamin D (VITAMIN D3) 1000 units Tab, Take 1,000 Units by mouth once daily., Disp: , Rfl:     zinc gluconate 50 mg tablet, Take 50 mg by mouth once daily., Disp: , Rfl:     Review of Systems   Constitutional:  Negative for appetite change, chills, diaphoresis, fatigue, fever, malaise/fatigue and unexpected weight change.   HENT:   Negative for congestion, ear pain, hearing loss, nosebleeds, postnasal drip, sinus pressure, sinus pain, sneezing, sore throat, tinnitus, trouble swallowing and voice change.    Eyes:  Negative for photophobia, pain, itching and visual disturbance.   Respiratory:  Positive for shortness of breath. Negative for apnea, cough, chest tightness, wheezing and stridor.    Cardiovascular:  Positive for chest pain. Negative for palpitations, claudication, leg swelling and near-syncope.   Gastrointestinal:  Negative for abdominal distention, abdominal pain, blood in stool, constipation, diarrhea, nausea and vomiting.   Endocrine: Negative for cold intolerance, heat intolerance, polydipsia and polyuria.   Genitourinary:  Negative for difficulty urinating, dyspareunia, dysuria, flank pain, frequency, hematuria, menstrual problem, pelvic pain, urgency, vaginal discharge and vaginal pain.   Musculoskeletal:  Negative for arthralgias, back pain, joint swelling, myalgias, neck pain and neck stiffness.   Skin:  Negative for pallor.   Allergic/Immunologic: Negative for environmental allergies and food allergies.   Neurological:  Negative for dizziness, tremors, speech difficulty, weakness, light-headedness, numbness and headaches.   Hematological:  Does not bruise/bleed easily.   Psychiatric/Behavioral:  Negative for agitation, confusion, decreased concentration, sleep disturbance and suicidal ideas. The patient is not nervous/anxious.         Objective:      Vitals    Vitals - 1 value per visit 6/6/2022 6/23/2022 7/14/2022 10/17/2022 10/17/2022   SYSTOLIC 110 - - - 120   DIASTOLIC 74 - - - 74   Pulse 92 - - - 74   Temp 98.4 - - - 98.4   Resp 14 - - - 14   SPO2 95 - - - 95   Weight (lb) 160 160.05 - - 165   Weight (kg) 72.576 72.6 - - 74.844   Height 58 58 - - 58   BMI (Calculated) 33.4 33.5 - - 34.5   VISIT REPORT - - - - -   Pain Score  - - 0 0 -   Some recent data might be hidden       Physical Exam  Vitals and nursing note  reviewed.   Constitutional:       General: She is not in acute distress.     Appearance: She is obese. She is not ill-appearing.   Eyes:      Extraocular Movements: Extraocular movements intact.      Conjunctiva/sclera: Conjunctivae normal.      Pupils: Pupils are equal, round, and reactive to light.   Neck:      Vascular: No carotid bruit.   Cardiovascular:      Rate and Rhythm: Normal rate and regular rhythm.      Pulses: Normal pulses.      Heart sounds: Normal heart sounds. No murmur heard.  Pulmonary:      Effort: Pulmonary effort is normal.      Breath sounds: Normal breath sounds.   Abdominal:      General: Bowel sounds are normal.      Palpations: Abdomen is soft.   Musculoskeletal:      Right lower leg: No edema.      Left lower leg: No edema.   Lymphadenopathy:      Cervical: No cervical adenopathy.   Skin:     General: Skin is warm and dry.   Neurological:      General: No focal deficit present.      Mental Status: She is alert and oriented to person, place, and time.   Psychiatric:         Mood and Affect: Mood normal.         Behavior: Behavior normal.         Thought Content: Thought content normal.         Assessment:       1. Pleurisy    2. Flu vaccine need         Plan:       Pleurisy        -     resolved-if it recurs we will do a CTA    Flu vaccine need    Follow up if symptoms worsen or fail to improve.        10/17/2022 Kasie Arthur M.D.

## 2022-10-17 ENCOUNTER — OFFICE VISIT (OUTPATIENT)
Dept: FAMILY MEDICINE | Facility: CLINIC | Age: 72
End: 2022-10-17
Payer: MEDICARE

## 2022-10-17 ENCOUNTER — HOSPITAL ENCOUNTER (OUTPATIENT)
Dept: RADIOLOGY | Facility: HOSPITAL | Age: 72
Discharge: HOME | End: 2022-10-17
Attending: INTERNAL MEDICINE
Payer: MEDICARE

## 2022-10-17 VITALS
SYSTOLIC BLOOD PRESSURE: 120 MMHG | TEMPERATURE: 98 F | HEART RATE: 74 BPM | WEIGHT: 165 LBS | OXYGEN SATURATION: 95 % | HEIGHT: 58 IN | DIASTOLIC BLOOD PRESSURE: 74 MMHG | BODY MASS INDEX: 34.63 KG/M2 | RESPIRATION RATE: 14 BRPM

## 2022-10-17 DIAGNOSIS — R93.89 ABNORMAL CHEST X-RAY: ICD-10-CM

## 2022-10-17 DIAGNOSIS — R09.1 PLEURISY: ICD-10-CM

## 2022-10-17 DIAGNOSIS — Z23 FLU VACCINE NEED: ICD-10-CM

## 2022-10-17 DIAGNOSIS — R07.81 PLEURITIC CHEST PAIN: Primary | ICD-10-CM

## 2022-10-17 DIAGNOSIS — R09.1 PLEURISY: Primary | ICD-10-CM

## 2022-10-17 PROCEDURE — 90662 IIV NO PRSV INCREASED AG IM: CPT | Mod: PBBFAC | Performed by: INTERNAL MEDICINE

## 2022-10-17 PROCEDURE — 99214 OFFICE O/P EST MOD 30 MIN: CPT | Mod: 25,S$PBB,AQ, | Performed by: INTERNAL MEDICINE

## 2022-10-17 PROCEDURE — G0008 ADMIN INFLUENZA VIRUS VAC: HCPCS | Mod: PBBFAC | Performed by: INTERNAL MEDICINE

## 2022-10-17 PROCEDURE — 99215 OFFICE O/P EST HI 40 MIN: CPT | Mod: 25 | Performed by: INTERNAL MEDICINE

## 2022-10-17 PROCEDURE — 71046 X-RAY EXAM CHEST 2 VIEWS: CPT | Mod: TC,PO

## 2022-10-17 PROCEDURE — 99214 PR OFFICE/OUTPT VISIT, EST, LEVL IV, 30-39 MIN: ICD-10-PCS | Mod: 25,S$PBB,AQ, | Performed by: INTERNAL MEDICINE

## 2022-10-17 RX ORDER — IBUPROFEN 800 MG/1
800 TABLET ORAL EVERY 8 HOURS PRN
COMMUNITY
Start: 2022-10-10 | End: 2023-07-31

## 2022-10-19 ENCOUNTER — TELEPHONE (OUTPATIENT)
Dept: FAMILY MEDICINE | Facility: CLINIC | Age: 72
End: 2022-10-19

## 2022-10-19 NOTE — TELEPHONE ENCOUNTER
----- Message from Kasie Arthur MD sent at 10/17/2022  3:38 PM CDT -----  Patient has to see me in follow-up Monday and all labs ordered today must be completed by then

## 2022-10-19 NOTE — TELEPHONE ENCOUNTER
Patient notified of results and recommendations,   Verbalizes understanding She will get labs on Friday.   Patient cannot come to follow up until Wednesday,due to work and scheduled test

## 2022-10-19 NOTE — TELEPHONE ENCOUNTER
----- Message from Kasie Arthur MD sent at 10/17/2022  4:40 PM CDT -----  Patient needs more labs and orders I wrote today and see me Monday

## 2022-10-21 ENCOUNTER — HOSPITAL ENCOUNTER (OUTPATIENT)
Dept: CARDIOLOGY | Facility: HOSPITAL | Age: 72
Discharge: HOME | End: 2022-10-21
Attending: INTERNAL MEDICINE
Payer: MEDICARE

## 2022-10-21 VITALS — BODY MASS INDEX: 34.63 KG/M2 | HEIGHT: 58 IN | WEIGHT: 165 LBS

## 2022-10-21 DIAGNOSIS — R07.81 PLEURITIC CHEST PAIN: ICD-10-CM

## 2022-10-21 DIAGNOSIS — R93.89 ABNORMAL CHEST X-RAY: ICD-10-CM

## 2022-10-21 PROCEDURE — 93306 TTE W/DOPPLER COMPLETE: CPT

## 2022-10-21 PROCEDURE — 93306 ECHO (CUPID ONLY): ICD-10-PCS | Mod: 26,,, | Performed by: SPECIALIST

## 2022-10-21 PROCEDURE — 93306 TTE W/DOPPLER COMPLETE: CPT | Mod: 26,,, | Performed by: SPECIALIST

## 2022-10-22 LAB
AORTIC ROOT ANNULUS: 3.5 CM
AORTIC VALVE CUSP SEPERATION: 1.5 CM
AV INDEX (PROSTH): 0.92
AV MEAN GRADIENT: 5 MMHG
AV PEAK GRADIENT: 8 MMHG
AV VALVE AREA: 2.9 CM2
AV VELOCITY RATIO: 0.72
BSA FOR ECHO PROCEDURE: 1.75 M2
CV ECHO LV RWT: 0.55 CM
DOP CALC AO PEAK VEL: 1.45 M/S
DOP CALC AO VTI: 29.5 CM
DOP CALC LVOT AREA: 3.1 CM2
DOP CALC LVOT DIAMETER: 2 CM
DOP CALC LVOT PEAK VEL: 1.05 M/S
DOP CALC LVOT STROKE VOLUME: 85.41 CM3
DOP CALCLVOT PEAK VEL VTI: 27.2 CM
E WAVE DECELERATION TIME: 190 MS
E/A RATIO: 3.23
E/E' RATIO: 10.78 M/S
ECHO LV POSTERIOR WALL: 1.13 CM (ref 0.6–1.1)
EJECTION FRACTION: 57 %
FRACTIONAL SHORTENING: 34 % (ref 28–44)
INTERVENTRICULAR SEPTUM: 1.22 CM (ref 0.6–1.1)
IVRT: 95 MS
LA MAJOR: 4.1 CM
LEFT ATRIUM SIZE: 4.2 CM
LEFT INTERNAL DIMENSION IN SYSTOLE: 2.71 CM (ref 2.1–4)
LEFT VENTRICLE MASS INDEX: 99 G/M2
LEFT VENTRICULAR INTERNAL DIMENSION IN DIASTOLE: 4.11 CM (ref 3.5–6)
LEFT VENTRICULAR MASS: 167.13 G
LV LATERAL E/E' RATIO: 12.13 M/S
LV SEPTAL E/E' RATIO: 9.7 M/S
MV PEAK A VEL: 0.3 M/S
MV PEAK E VEL: 0.97 M/S
PV MEAN GRADIENT: 1 MMHG
PV PEAK VELOCITY: 0.87 M/S
RA PRESSURE: 3 MMHG
RIGHT VENTRICULAR END-DIASTOLIC DIMENSION: 2.28 CM
TDI LATERAL: 0.08 M/S
TDI SEPTAL: 0.1 M/S
TDI: 0.09 M/S

## 2022-10-25 NOTE — PROGRESS NOTES
SUBJECTIVE:    Patient ID: Camila Au is a 72 y.o. female.    Chief Complaint: Results and Follow-up    HPI    Follow-up labs    Pt has had one more episode of a light pleurisy but the prior episodes were a lot stronger-review of her xray in May did not describe what was seen on the last xray yet the radiologist suggested it appeared prior-the xray did describe mild cardiomegaly which was not seen before-so it is that this pleuritic type pain may just be some CTD involving the pleura or pericardium-her DANIEL is positive in a speckled pattern 1:160 -CRP is normal-    Her shortness of breath needs further evaluation so ECHO is indicated as well as CT of the chest ( ?CTA if this is some CTD but we will start with CT to get a clearer idea of the xray findings      Hospital Outpatient Visit on 10/21/2022   Component Date Value Ref Range Status    Left Atrium Major Axis 10/21/2022 4.10  cm Final    LA size 10/21/2022 4.20  cm Final    AORTIC VALVE CUSP SEPERATION 10/21/2022 1.50  cm Final    AV mean gradient 10/21/2022 5  mmHg Final    Ao VTI 10/21/2022 29.50  cm Final    Ao peak austen 10/21/2022 1.45  m/s Final    AV peak gradient 10/21/2022 8  mmHg Final    Ao root annulus 10/21/2022 3.50  cm Final    IVRT 10/21/2022 95.00  ms Final    IVS 10/21/2022 1.22 (A)  0.6 - 1.1 cm Final    LVIDd 10/21/2022 4.11  3.5 - 6.0 cm Final    LVIDs 10/21/2022 2.71  2.1 - 4.0 cm Final    LVOT diameter 10/21/2022 2.00  cm Final    LVOT peak VTI 10/21/2022 27.20  cm Final    LVOT peak austen 10/21/2022 1.05  m/s Final    Posterior Wall 10/21/2022 1.13 (A)  0.6 - 1.1 cm Final    E wave deceleration time 10/21/2022 190.00  ms Final    MV Peak A Austen 10/21/2022 0.30  m/s Final    MV Peak E Austen 10/21/2022 0.97  m/s Final    PV mean gradient 10/21/2022 1.00  mmHg Final    PV PEAK VELOCITY 10/21/2022 0.87  m/s Final    RVDD 10/21/2022 2.28  cm Final    BSA 10/21/2022 1.75  m2 Final    TDI SEPTAL 10/21/2022 0.10  m/s Final    LV LATERAL  E/E' RATIO 10/21/2022 12.13  m/s Final    LV SEPTAL E/E' RATIO 10/21/2022 9.70  m/s Final    TDI LATERAL 10/21/2022 0.08  m/s Final    FS 10/21/2022 34  28 - 44 % Final    LV mass 10/21/2022 167.13  g Final    Left Ventricle Relative Wall Thick* 10/21/2022 0.55  cm Final    AV valve area 10/21/2022 2.90  cm2 Final    AV Velocity Ratio 10/21/2022 0.72   Final    AV index (prosthetic) 10/21/2022 0.92   Final    E/A ratio 10/21/2022 3.23   Final    Mean e' 10/21/2022 0.09  m/s Final    LVOT area 10/21/2022 3.1  cm2 Final    LVOT stroke volume 10/21/2022 85.41  cm3 Final    E/E' ratio 10/21/2022 10.78  m/s Final    LV Mass Index 10/21/2022 99  g/m2 Final    Right Atrial Pressure (from IVC) 10/21/2022 3  mmHg Final    EF 10/21/2022 57  % Final   Lab Visit on 10/21/2022   Component Date Value Ref Range Status    DANIEL 10/21/2022 Positive (A)   Final    CRP 10/21/2022 0.41  <0.76 mg/dL Final    Sodium 10/21/2022 137  136 - 145 mmol/L Final    Potassium 10/21/2022 3.8  3.5 - 5.1 mmol/L Final    Chloride 10/21/2022 101  95 - 110 mmol/L Final    CO2 10/21/2022 29  23 - 29 mmol/L Final    Glucose 10/21/2022 98  70 - 110 mg/dL Final    BUN 10/21/2022 16  8 - 23 mg/dL Final    Creatinine 10/21/2022 0.5  0.5 - 1.4 mg/dL Final    Calcium 10/21/2022 9.4  8.7 - 10.5 mg/dL Final    Total Protein 10/21/2022 7.8  6.0 - 8.4 g/dL Final    Albumin 10/21/2022 4.1  3.5 - 5.2 g/dL Final    Total Bilirubin 10/21/2022 1.1 (H)  0.1 - 1.0 mg/dL Final    Alkaline Phosphatase 10/21/2022 98  55 - 135 U/L Final    AST 10/21/2022 85 (H)  10 - 40 U/L Final    ALT 10/21/2022 65 (H)  10 - 44 U/L Final    Anion Gap 10/21/2022 7 (L)  8 - 16 mmol/L Final    eGFR 10/21/2022 >60.0  >60 mL/min/1.73 m^2 Final    CPK 10/21/2022 151  20 - 180 U/L Final    WBC 10/21/2022 6.81  3.90 - 12.70 K/uL Final    RBC 10/21/2022 5.23  4.00 - 5.40 M/uL Final    Hemoglobin 10/21/2022 17.3 (H)  12.0 - 16.0 g/dL Final    Hematocrit 10/21/2022 48.9 (H)  37.0 - 48.5 % Final    MCV  10/21/2022 94  82 - 98 fL Final    MCH 10/21/2022 33.1 (H)  27.0 - 31.0 pg Final    MCHC 10/21/2022 35.4  32.0 - 36.0 g/dL Final    RDW 10/21/2022 13.2  11.5 - 14.5 % Final    Platelets 10/21/2022 205  150 - 450 K/uL Final    MPV 10/21/2022 11.1  9.2 - 12.9 fL Final    Immature Granulocytes 10/21/2022 0.3  0.0 - 0.5 % Final    Gran # (ANC) 10/21/2022 3.3  1.8 - 7.7 K/uL Final    Immature Grans (Abs) 10/21/2022 0.02  0.00 - 0.04 K/uL Final    Lymph # 10/21/2022 2.8  1.0 - 4.8 K/uL Final    Mono # 10/21/2022 0.5  0.3 - 1.0 K/uL Final    Eos # 10/21/2022 0.1  0.0 - 0.5 K/uL Final    Baso # 10/21/2022 0.06  0.00 - 0.20 K/uL Final    nRBC 10/21/2022 0  0 /100 WBC Final    Gran % 10/21/2022 48.5  38.0 - 73.0 % Final    Lymph % 10/21/2022 40.8  18.0 - 48.0 % Final    Mono % 10/21/2022 7.9  4.0 - 15.0 % Final    Eosinophil % 10/21/2022 1.6  0.0 - 8.0 % Final    Basophil % 10/21/2022 0.9  0.0 - 1.9 % Final    Differential Method 10/21/2022 Automated   Final    Speckled Pattern 10/21/2022 1:160 (H)   Final    DANIEL Note 10/21/2022 Comment   Final   Lab Visit on 06/13/2022   Component Date Value Ref Range Status    Sodium 06/13/2022 137  136 - 145 mmol/L Final    Potassium 06/13/2022 3.9  3.5 - 5.1 mmol/L Final    Chloride 06/13/2022 101  95 - 110 mmol/L Final    CO2 06/13/2022 29  23 - 29 mmol/L Final    Glucose 06/13/2022 113 (H)  70 - 110 mg/dL Final    BUN 06/13/2022 11  8 - 23 mg/dL Final    Creatinine 06/13/2022 0.4 (L)  0.5 - 1.4 mg/dL Final    Calcium 06/13/2022 9.0  8.7 - 10.5 mg/dL Final    Anion Gap 06/13/2022 7 (L)  8 - 16 mmol/L Final    eGFR if African American 06/13/2022 >60.0  >60 mL/min/1.73 m^2 Final    eGFR if non African American 06/13/2022 >60.0  >60 mL/min/1.73 m^2 Final    Cholesterol 06/13/2022 231 (H)  120 - 199 mg/dL Final    Triglycerides 06/13/2022 89  30 - 150 mg/dL Final    HDL 06/13/2022 60  40 - 75 mg/dL Final    LDL Cholesterol 06/13/2022 153.2  63.0 - 159.0 mg/dL Final    HDL/Cholesterol Ratio  06/13/2022 26.0  20.0 - 50.0 % Final    Total Cholesterol/HDL Ratio 06/13/2022 3.9  2.0 - 5.0 Final    Non-HDL Cholesterol 06/13/2022 171  mg/dL Final    WBC 06/13/2022 7.86  3.90 - 12.70 K/uL Final    RBC 06/13/2022 4.65  4.00 - 5.40 M/uL Final    Hemoglobin 06/13/2022 15.0  12.0 - 16.0 g/dL Final    Hematocrit 06/13/2022 44.4  37.0 - 48.5 % Final    MCV 06/13/2022 96  82 - 98 fL Final    MCH 06/13/2022 32.3 (H)  27.0 - 31.0 pg Final    MCHC 06/13/2022 33.8  32.0 - 36.0 g/dL Final    RDW 06/13/2022 13.8  11.5 - 14.5 % Final    Platelets 06/13/2022 182  150 - 450 K/uL Final    MPV 06/13/2022 11.2  9.2 - 12.9 fL Final    Immature Granulocytes 06/13/2022 0.4  0.0 - 0.5 % Final    Gran # (ANC) 06/13/2022 4.8  1.8 - 7.7 K/uL Final    Immature Grans (Abs) 06/13/2022 0.03  0.00 - 0.04 K/uL Final    Lymph # 06/13/2022 2.1  1.0 - 4.8 K/uL Final    Mono # 06/13/2022 0.8  0.3 - 1.0 K/uL Final    Eos # 06/13/2022 0.2  0.0 - 0.5 K/uL Final    Baso # 06/13/2022 0.08  0.00 - 0.20 K/uL Final    nRBC 06/13/2022 0  0 /100 WBC Final    Gran % 06/13/2022 60.7  38.0 - 73.0 % Final    Lymph % 06/13/2022 26.2  18.0 - 48.0 % Final    Mono % 06/13/2022 9.8  4.0 - 15.0 % Final    Eosinophil % 06/13/2022 1.9  0.0 - 8.0 % Final    Basophil % 06/13/2022 1.0  0.0 - 1.9 % Final    Differential Method 06/13/2022 Automated   Final    Sodium 06/13/2022 137  136 - 145 mmol/L Final    Potassium 06/13/2022 3.9  3.5 - 5.1 mmol/L Final    Chloride 06/13/2022 101  95 - 110 mmol/L Final    CO2 06/13/2022 29  23 - 29 mmol/L Final    Glucose 06/13/2022 113 (H)  70 - 110 mg/dL Final    BUN 06/13/2022 11  8 - 23 mg/dL Final    Creatinine 06/13/2022 0.4 (L)  0.5 - 1.4 mg/dL Final    Calcium 06/13/2022 9.0  8.7 - 10.5 mg/dL Final    Total Protein 06/13/2022 6.8  6.0 - 8.4 g/dL Final    Albumin 06/13/2022 3.7  3.5 - 5.2 g/dL Final    Total Bilirubin 06/13/2022 1.0  0.1 - 1.0 mg/dL Final    Alkaline Phosphatase 06/13/2022 105  55 - 135 U/L Final    AST 06/13/2022  78 (H)  10 - 40 U/L Final    ALT 06/13/2022 74 (H)  10 - 44 U/L Final    Anion Gap 06/13/2022 7 (L)  8 - 16 mmol/L Final    eGFR if African American 06/13/2022 >60.0  >60 mL/min/1.73 m^2 Final    eGFR if non African American 06/13/2022 >60.0  >60 mL/min/1.73 m^2 Final   Admission on 12/15/2021, Discharged on 12/15/2021   Component Date Value Ref Range Status    Potassium 12/15/2021 3.7  3.5 - 5.1 mmol/L Final   Lab Visit on 12/13/2021   Component Date Value Ref Range Status    Sodium 12/13/2021 139  136 - 145 mmol/L Final    Potassium 12/13/2021 3.3 (L)  3.5 - 5.1 mmol/L Final    Chloride 12/13/2021 101  95 - 110 mmol/L Final    CO2 12/13/2021 30 (H)  23 - 29 mmol/L Final    Glucose 12/13/2021 83  70 - 110 mg/dL Final    BUN 12/13/2021 16  8 - 23 mg/dL Final    Creatinine 12/13/2021 0.5  0.5 - 1.4 mg/dL Final    Calcium 12/13/2021 9.4  8.7 - 10.5 mg/dL Final    Total Protein 12/13/2021 7.8  6.0 - 8.4 g/dL Final    Albumin 12/13/2021 4.0  3.5 - 5.2 g/dL Final    Total Bilirubin 12/13/2021 1.3 (H)  0.1 - 1.0 mg/dL Final    Alkaline Phosphatase 12/13/2021 137 (H)  55 - 135 U/L Final    AST 12/13/2021 86 (H)  10 - 40 U/L Final    ALT 12/13/2021 88 (H)  10 - 44 U/L Final    Anion Gap 12/13/2021 8  8 - 16 mmol/L Final    eGFR if African American 12/13/2021 >60.0  >60 mL/min/1.73 m^2 Final    eGFR if non African American 12/13/2021 >60.0  >60 mL/min/1.73 m^2 Final    WBC 12/13/2021 10.85  3.90 - 12.70 K/uL Final    RBC 12/13/2021 4.97  4.00 - 5.40 M/uL Final    Hemoglobin 12/13/2021 15.7  12.0 - 16.0 g/dL Final    Hematocrit 12/13/2021 46.9  37.0 - 48.5 % Final    MCV 12/13/2021 94  82 - 98 fL Final    MCH 12/13/2021 31.6 (H)  27.0 - 31.0 pg Final    MCHC 12/13/2021 33.5  32.0 - 36.0 g/dL Final    RDW 12/13/2021 13.7  11.5 - 14.5 % Final    Platelets 12/13/2021 250  150 - 450 K/uL Final    MPV 12/13/2021 10.3  9.2 - 12.9 fL Final    Immature Granulocytes 12/13/2021 0.5  0.0 - 0.5 % Final    Gran # (ANC) 12/13/2021 7.3  1.8  - 7.7 K/uL Final    Immature Grans (Abs) 2021 0.05 (H)  0.00 - 0.04 K/uL Final    Lymph # 2021 2.5  1.0 - 4.8 K/uL Final    Mono # 2021 0.8  0.3 - 1.0 K/uL Final    Eos # 2021 0.1  0.0 - 0.5 K/uL Final    Baso # 2021 0.08  0.00 - 0.20 K/uL Final    nRBC 2021 0  0 /100 WBC Final    Gran % 2021 67.3  38.0 - 73.0 % Final    Lymph % 2021 22.9  18.0 - 48.0 % Final    Mono % 2021 7.7  4.0 - 15.0 % Final    Eosinophil % 2021 0.9  0.0 - 8.0 % Final    Basophil % 2021 0.7  0.0 - 1.9 % Final    Differential Method 2021 Automated   Final   Hospital Outpatient Visit on 2021   Component Date Value Ref Range Status    SARS-CoV-2 RNA, Amplification, Qual 2021 Negative  Negative Final       Past Medical History:   Diagnosis Date    Arthritis     Breast cancer     right    Pain in finger     quentin long finger pain    Wears glasses     WEARS CONTACS    Wears partial dentures     UPPER AND LOWER     Past Surgical History:   Procedure Laterality Date    BREAST RECONSTRUCTION      CARPAL TUNNEL RELEASE       SECTION  1970, ,     CHOLECYSTECTOMY  2019        COLONOSCOPY  2017    JOINT REPLACEMENT Right     KNEE    KNEE ARTHROPLASTY Right 6/10/2019    Procedure: ARTHROPLASTY, KNEE;  Surgeon: Jony Marmolejo MD;  Location: [facility] OR;  Service: Orthopedics;  Laterality: Right;    KNEE ARTHROPLASTY Left 2021    Procedure: ARTHROPLASTY, KNEE;  Surgeon: Jony Marmolejo MD;  Location: [facility] OR;  Service: Orthopedics;  Laterality: Left;    KNEE ARTHROSCOPY Bilateral 2008    MASTECTOMY Right 2007    PORTACATH PLACEMENT  2007    PORTACATH REMOVAL      SURGICAL REMOVAL OF BONE SPUR Bilateral     TUBAL LIGATION  1976     Family History   Problem Relation Age of Onset    Arthritis Mother     Hyperlipidemia Mother     Stroke Mother     Dementia Mother     Eczema Daughter     Eczema Grandchild     Lupus Neg Hx     Psoriasis Neg  Hx     Melanoma Neg Hx        Marital Status:   Alcohol History:  reports current alcohol use.  Tobacco History:  reports that she quit smoking about 41 years ago. Her smoking use included cigarettes. She started smoking about 42 years ago. She smoked an average of .25 packs per day. She has never used smokeless tobacco.  Drug History:  reports no history of drug use.    Review of patient's allergies indicates:  No Known Allergies    Current Outpatient Medications:     alendronate (FOSAMAX) 70 MG tablet, Take 1 tablet (70 mg total) by mouth every 7 days., Disp: 12 tablet, Rfl: 3    ascorbic acid, vitamin C, (VITAMIN C) 1000 MG tablet, Take 1,000 mg by mouth once daily., Disp: , Rfl:     calcium carbonate (CALCIUM 600 ORAL), Take by mouth 2 (two) times daily., Disp: , Rfl:     ibuprofen (ADVIL,MOTRIN) 800 MG tablet, Take 800 mg by mouth every 8 (eight) hours as needed., Disp: , Rfl:     multivitamin capsule, Take 1 capsule by mouth once daily., Disp: , Rfl:     omeprazole (PRILOSEC) 20 MG capsule, Take 1 capsule (20 mg total) by mouth once daily., Disp: 30 capsule, Rfl: 5    vitamin D (VITAMIN D3) 1000 units Tab, Take 1,000 Units by mouth once daily., Disp: , Rfl:     zinc gluconate 50 mg tablet, Take 50 mg by mouth once daily., Disp: , Rfl:     furosemide (LASIX) 20 MG tablet, One po daily, Disp: 10 tablet, Rfl: 0    Review of Systems     Constitutional:  Negative for appetite change, chills, diaphoresis, fatigue, fever, malaise/fatigue and unexpected weight change.   HENT:  Negative for congestion, ear pain, hearing loss, nosebleeds, postnasal drip, sinus pressure, sinus pain, sneezing, sore throat, tinnitus, trouble swallowing and voice change.    Eyes:  Negative for photophobia, pain, itching and visual disturbance.   Respiratory:  Positive for shortness of breath. Negative for apnea, cough, chest tightness, wheezing and stridor.    Cardiovascular:  Positive for chest pain. Negative for palpitations,  claudication, leg swelling and near-syncope.   Gastrointestinal:  Negative for abdominal distention, abdominal pain, blood in stool, constipation, diarrhea, nausea and vomiting.   Endocrine: Negative for cold intolerance, heat intolerance, polydipsia and polyuria.   Genitourinary:  Negative for difficulty urinating, dyspareunia, dysuria, flank pain, frequency, hematuria, menstrual problem, pelvic pain, urgency, vaginal discharge and vaginal pain.   Musculoskeletal:  Negative for arthralgias, back pain, joint swelling, myalgias, neck pain and neck stiffness.   Skin:  Negative for pallor.   Allergic/Immunologic: Negative for environmental allergies and food allergies.   Neurological:  Negative for dizziness, tremors, speech difficulty, weakness, light-headedness, numbness and headaches.   Hematological:  Does not bruise/bleed easily.   Psychiatric/Behavioral:  Negative for agitation, confusion, decreased concentration, sleep disturbance and suicidal ideas. The patient is not nervous/anxious.        Objective:      Vitals    Vitals - 1 value per visit 10/17/2022 10/17/2022 10/21/2022 10/26/2022 10/26/2022   SYSTOLIC - 120 - - 126   DIASTOLIC - 74 - - 70   Pulse - 74 - - 76   Temp - 98.4 - - 98   Resp - 14 - - 14   SPO2 - 95 - - 94   Weight (lb) - 165 165 - 164   Weight (kg) - 74.844 74.844 - 74.39   Height - 58 58 - 58   BMI (Calculated) - 34.5 34.5 - 34.3   VISIT REPORT - - - - -   Pain Score  0 - - 0 -   Some recent data might be hidden       Physical Exam      Physical Exam  Vitals and nursing note reviewed.   Constitutional:       General: She is not in acute distress.     Appearance: She is obese. She is not ill-appearing.   Eyes:      Extraocular Movements: Extraocular movements intact.      Conjunctiva/sclera: Conjunctivae normal.      Pupils: Pupils are equal, round, and reactive to light.   Neck:      Vascular: No carotid bruit.   Cardiovascular:      Rate and Rhythm: Normal rate and regular rhythm.       Pulses: Normal pulses.      Heart sounds: Normal heart sounds. No murmur heard.  Pulmonary:      Effort: Pulmonary effort is normal.      Breath sounds: Normal breath sounds.   Abdominal:      General: Bowel sounds are normal.      Palpations: Abdomen is soft.   Musculoskeletal:      Right lower leg: No edema.      Left lower leg: No edema.   Lymphadenopathy:      Cervical: No cervical adenopathy.   Skin:     General: Skin is warm and dry.   Neurological:      General: No focal deficit present.      Mental Status: She is alert and oriented to person, place, and time.   Psychiatric:         Mood and Affect: Mood normal.         Behavior: Behavior normal.         Thought Content: Thought content normal.         Assessment:       1. Abnormal chest x-ray    2. Positive DANIEL (antinuclear antibody)    3. Pleuritic chest pain    4. Mild cardiomegaly         Plan:       Abnormal chest x-ray  -     BNP; Future; Expected date: 10/27/2022  -     Echo; Future  -     Sedimentation rate, automated; Future; Expected date: 10/27/2022  -     furosemide (LASIX) 20 MG tablet; One po daily  Dispense: 10 tablet; Refill: 0  -     CT Chest Without Contrast; Future; Expected date: 10/26/2022    Positive DANIEL (antinuclear antibody)  -     BNP; Future; Expected date: 10/27/2022  -     Sedimentation rate, automated; Future; Expected date: 10/27/2022  -     furosemide (LASIX) 20 MG tablet; One po daily  Dispense: 10 tablet; Refill: 0  -     CT Chest Without Contrast; Future; Expected date: 10/26/2022    Pleuritic chest pain    Mild cardiomegaly    Follow up after chest CT.        10/26/2022 Kasie Arthur M.D.

## 2022-10-26 ENCOUNTER — OFFICE VISIT (OUTPATIENT)
Dept: FAMILY MEDICINE | Facility: CLINIC | Age: 72
End: 2022-10-26
Payer: MEDICARE

## 2022-10-26 VITALS
SYSTOLIC BLOOD PRESSURE: 126 MMHG | RESPIRATION RATE: 14 BRPM | HEART RATE: 76 BPM | TEMPERATURE: 98 F | DIASTOLIC BLOOD PRESSURE: 70 MMHG | OXYGEN SATURATION: 94 % | WEIGHT: 164 LBS | BODY MASS INDEX: 34.43 KG/M2 | HEIGHT: 58 IN

## 2022-10-26 DIAGNOSIS — I51.7 MILD CARDIOMEGALY: ICD-10-CM

## 2022-10-26 DIAGNOSIS — R93.89 ABNORMAL CHEST X-RAY: Primary | ICD-10-CM

## 2022-10-26 DIAGNOSIS — R07.81 PLEURITIC CHEST PAIN: ICD-10-CM

## 2022-10-26 DIAGNOSIS — R76.8 POSITIVE ANA (ANTINUCLEAR ANTIBODY): ICD-10-CM

## 2022-10-26 PROCEDURE — 99214 OFFICE O/P EST MOD 30 MIN: CPT | Mod: S$PBB,AQ,, | Performed by: INTERNAL MEDICINE

## 2022-10-26 PROCEDURE — 99215 OFFICE O/P EST HI 40 MIN: CPT | Performed by: INTERNAL MEDICINE

## 2022-10-26 PROCEDURE — 99214 PR OFFICE/OUTPT VISIT, EST, LEVL IV, 30-39 MIN: ICD-10-PCS | Mod: S$PBB,AQ,, | Performed by: INTERNAL MEDICINE

## 2022-10-26 RX ORDER — FUROSEMIDE 20 MG/1
TABLET ORAL
Qty: 10 TABLET | Refills: 0 | Status: SHIPPED | OUTPATIENT
Start: 2022-10-26 | End: 2022-11-07

## 2022-10-28 ENCOUNTER — TELEPHONE (OUTPATIENT)
Dept: FAMILY MEDICINE | Facility: CLINIC | Age: 72
End: 2022-10-28

## 2022-10-28 NOTE — TELEPHONE ENCOUNTER
[facility] lab Clarifying orders for repeat Echo  or is their something specific you need scanned   She is scheduled 10/31/2022   Please advise

## 2022-10-31 ENCOUNTER — HOSPITAL ENCOUNTER (OUTPATIENT)
Dept: RADIOLOGY | Facility: HOSPITAL | Age: 72
Discharge: HOME | End: 2022-10-31
Attending: INTERNAL MEDICINE
Payer: MEDICARE

## 2022-10-31 DIAGNOSIS — R76.8 POSITIVE ANA (ANTINUCLEAR ANTIBODY): ICD-10-CM

## 2022-10-31 DIAGNOSIS — R93.89 ABNORMAL CHEST X-RAY: ICD-10-CM

## 2022-10-31 PROCEDURE — 71250 CT THORAX DX C-: CPT | Mod: TC

## 2022-10-31 NOTE — PROGRESS NOTES
There seems to be a little more scarring in the lung but then again I am trying to compare a CT to old xrsys-I do know that in 12-21 there was no mention of any such abnormalities so we will recheck a CT to compare in three months

## 2022-11-29 ENCOUNTER — OFFICE VISIT (OUTPATIENT)
Dept: FAMILY MEDICINE | Facility: CLINIC | Age: 72
End: 2022-11-29
Payer: MEDICARE

## 2022-11-29 VITALS
HEIGHT: 58 IN | HEART RATE: 80 BPM | DIASTOLIC BLOOD PRESSURE: 74 MMHG | OXYGEN SATURATION: 95 % | RESPIRATION RATE: 14 BRPM | WEIGHT: 163 LBS | SYSTOLIC BLOOD PRESSURE: 126 MMHG | BODY MASS INDEX: 34.22 KG/M2

## 2022-11-29 DIAGNOSIS — M62.838 CERVICAL PARASPINAL MUSCLE SPASM: ICD-10-CM

## 2022-11-29 DIAGNOSIS — R93.89 ABNORMAL CHEST X-RAY: ICD-10-CM

## 2022-11-29 DIAGNOSIS — R74.01 ELEVATED ALT MEASUREMENT: Primary | ICD-10-CM

## 2022-11-29 DIAGNOSIS — R07.81 PLEURITIC CHEST PAIN: ICD-10-CM

## 2022-11-29 PROCEDURE — 99215 OFFICE O/P EST HI 40 MIN: CPT | Performed by: INTERNAL MEDICINE

## 2022-11-29 PROCEDURE — 99213 PR OFFICE/OUTPT VISIT, EST, LEVL III, 20-29 MIN: ICD-10-PCS | Mod: S$PBB,AQ,, | Performed by: INTERNAL MEDICINE

## 2022-11-29 PROCEDURE — 99213 OFFICE O/P EST LOW 20 MIN: CPT | Mod: S$PBB,AQ,, | Performed by: INTERNAL MEDICINE

## 2022-11-29 NOTE — PROGRESS NOTES
SUBJECTIVE:    Patient ID: Camila Au is a 72 y.o. female.    Chief Complaint: Results and Follow-up    HPI    In for lab results    CT chest:  1.   Bandlike density involving the right middle lobe felt to reflect subsegmental atelectasis or scarring.  2.  Focal nodular-like density noted in the medial costophrenic angle of the right lung reflect a nodule or focal atelectasis. Follow-up chest CT in three months recommended.    Over the weekend with deep breaths she has sharp type pains like with her pleurisy she had years ago    Labs-CRP normal  DANIEL positive 1:160 speckled--her only joint pains are fingers and behind the right knee-this could be med related -or ? Related to elevated liver function-will watch  ALT 65-long hx same  H/H 17.3/48.9    Hospital Outpatient Visit on 10/21/2022   Component Date Value Ref Range Status    Left Atrium Major Axis 10/21/2022 4.10  cm Final    LA size 10/21/2022 4.20  cm Final    AORTIC VALVE CUSP SEPERATION 10/21/2022 1.50  cm Final    AV mean gradient 10/21/2022 5  mmHg Final    Ao VTI 10/21/2022 29.50  cm Final    Ao peak austen 10/21/2022 1.45  m/s Final    AV peak gradient 10/21/2022 8  mmHg Final    Ao root annulus 10/21/2022 3.50  cm Final    IVRT 10/21/2022 95.00  ms Final    IVS 10/21/2022 1.22 (A)  0.6 - 1.1 cm Final    LVIDd 10/21/2022 4.11  3.5 - 6.0 cm Final    LVIDs 10/21/2022 2.71  2.1 - 4.0 cm Final    LVOT diameter 10/21/2022 2.00  cm Final    LVOT peak VTI 10/21/2022 27.20  cm Final    LVOT peak austen 10/21/2022 1.05  m/s Final    Posterior Wall 10/21/2022 1.13 (A)  0.6 - 1.1 cm Final    E wave deceleration time 10/21/2022 190.00  ms Final    MV Peak A Austen 10/21/2022 0.30  m/s Final    MV Peak E Austen 10/21/2022 0.97  m/s Final    PV mean gradient 10/21/2022 1.00  mmHg Final    PV PEAK VELOCITY 10/21/2022 0.87  m/s Final    RVDD 10/21/2022 2.28  cm Final    BSA 10/21/2022 1.75  m2 Final    TDI SEPTAL 10/21/2022 0.10  m/s Final    LV LATERAL E/E' RATIO  10/21/2022 12.13  m/s Final    LV SEPTAL E/E' RATIO 10/21/2022 9.70  m/s Final    TDI LATERAL 10/21/2022 0.08  m/s Final    FS 10/21/2022 34  28 - 44 % Final    LV mass 10/21/2022 167.13  g Final    Left Ventricle Relative Wall Thick* 10/21/2022 0.55  cm Final    AV valve area 10/21/2022 2.90  cm2 Final    AV Velocity Ratio 10/21/2022 0.72   Final    AV index (prosthetic) 10/21/2022 0.92   Final    E/A ratio 10/21/2022 3.23   Final    Mean e' 10/21/2022 0.09  m/s Final    LVOT area 10/21/2022 3.1  cm2 Final    LVOT stroke volume 10/21/2022 85.41  cm3 Final    E/E' ratio 10/21/2022 10.78  m/s Final    LV Mass Index 10/21/2022 99  g/m2 Final    Right Atrial Pressure (from IVC) 10/21/2022 3  mmHg Final    EF 10/21/2022 57  % Final   Lab Visit on 10/21/2022   Component Date Value Ref Range Status    DANIEL 10/21/2022 Positive (A)   Final    CRP 10/21/2022 0.41  <0.76 mg/dL Final    Sodium 10/21/2022 137  136 - 145 mmol/L Final    Potassium 10/21/2022 3.8  3.5 - 5.1 mmol/L Final    Chloride 10/21/2022 101  95 - 110 mmol/L Final    CO2 10/21/2022 29  23 - 29 mmol/L Final    Glucose 10/21/2022 98  70 - 110 mg/dL Final    BUN 10/21/2022 16  8 - 23 mg/dL Final    Creatinine 10/21/2022 0.5  0.5 - 1.4 mg/dL Final    Calcium 10/21/2022 9.4  8.7 - 10.5 mg/dL Final    Total Protein 10/21/2022 7.8  6.0 - 8.4 g/dL Final    Albumin 10/21/2022 4.1  3.5 - 5.2 g/dL Final    Total Bilirubin 10/21/2022 1.1 (H)  0.1 - 1.0 mg/dL Final    Alkaline Phosphatase 10/21/2022 98  55 - 135 U/L Final    AST 10/21/2022 85 (H)  10 - 40 U/L Final    ALT 10/21/2022 65 (H)  10 - 44 U/L Final    Anion Gap 10/21/2022 7 (L)  8 - 16 mmol/L Final    eGFR 10/21/2022 >60.0  >60 mL/min/1.73 m^2 Final    CPK 10/21/2022 151  20 - 180 U/L Final    WBC 10/21/2022 6.81  3.90 - 12.70 K/uL Final    RBC 10/21/2022 5.23  4.00 - 5.40 M/uL Final    Hemoglobin 10/21/2022 17.3 (H)  12.0 - 16.0 g/dL Final    Hematocrit 10/21/2022 48.9 (H)  37.0 - 48.5 % Final    MCV 10/21/2022  94  82 - 98 fL Final    MCH 10/21/2022 33.1 (H)  27.0 - 31.0 pg Final    MCHC 10/21/2022 35.4  32.0 - 36.0 g/dL Final    RDW 10/21/2022 13.2  11.5 - 14.5 % Final    Platelets 10/21/2022 205  150 - 450 K/uL Final    MPV 10/21/2022 11.1  9.2 - 12.9 fL Final    Immature Granulocytes 10/21/2022 0.3  0.0 - 0.5 % Final    Gran # (ANC) 10/21/2022 3.3  1.8 - 7.7 K/uL Final    Immature Grans (Abs) 10/21/2022 0.02  0.00 - 0.04 K/uL Final    Lymph # 10/21/2022 2.8  1.0 - 4.8 K/uL Final    Mono # 10/21/2022 0.5  0.3 - 1.0 K/uL Final    Eos # 10/21/2022 0.1  0.0 - 0.5 K/uL Final    Baso # 10/21/2022 0.06  0.00 - 0.20 K/uL Final    nRBC 10/21/2022 0  0 /100 WBC Final    Gran % 10/21/2022 48.5  38.0 - 73.0 % Final    Lymph % 10/21/2022 40.8  18.0 - 48.0 % Final    Mono % 10/21/2022 7.9  4.0 - 15.0 % Final    Eosinophil % 10/21/2022 1.6  0.0 - 8.0 % Final    Basophil % 10/21/2022 0.9  0.0 - 1.9 % Final    Differential Method 10/21/2022 Automated   Final    Speckled Pattern 10/21/2022 1:160 (H)   Final    DANIEL Note 10/21/2022 Comment   Final   Lab Visit on 06/13/2022   Component Date Value Ref Range Status    Sodium 06/13/2022 137  136 - 145 mmol/L Final    Potassium 06/13/2022 3.9  3.5 - 5.1 mmol/L Final    Chloride 06/13/2022 101  95 - 110 mmol/L Final    CO2 06/13/2022 29  23 - 29 mmol/L Final    Glucose 06/13/2022 113 (H)  70 - 110 mg/dL Final    BUN 06/13/2022 11  8 - 23 mg/dL Final    Creatinine 06/13/2022 0.4 (L)  0.5 - 1.4 mg/dL Final    Calcium 06/13/2022 9.0  8.7 - 10.5 mg/dL Final    Anion Gap 06/13/2022 7 (L)  8 - 16 mmol/L Final    eGFR if African American 06/13/2022 >60.0  >60 mL/min/1.73 m^2 Final    eGFR if non African American 06/13/2022 >60.0  >60 mL/min/1.73 m^2 Final    Cholesterol 06/13/2022 231 (H)  120 - 199 mg/dL Final    Triglycerides 06/13/2022 89  30 - 150 mg/dL Final    HDL 06/13/2022 60  40 - 75 mg/dL Final    LDL Cholesterol 06/13/2022 153.2  63.0 - 159.0 mg/dL Final    HDL/Cholesterol Ratio 06/13/2022  26.0  20.0 - 50.0 % Final    Total Cholesterol/HDL Ratio 06/13/2022 3.9  2.0 - 5.0 Final    Non-HDL Cholesterol 06/13/2022 171  mg/dL Final    WBC 06/13/2022 7.86  3.90 - 12.70 K/uL Final    RBC 06/13/2022 4.65  4.00 - 5.40 M/uL Final    Hemoglobin 06/13/2022 15.0  12.0 - 16.0 g/dL Final    Hematocrit 06/13/2022 44.4  37.0 - 48.5 % Final    MCV 06/13/2022 96  82 - 98 fL Final    MCH 06/13/2022 32.3 (H)  27.0 - 31.0 pg Final    MCHC 06/13/2022 33.8  32.0 - 36.0 g/dL Final    RDW 06/13/2022 13.8  11.5 - 14.5 % Final    Platelets 06/13/2022 182  150 - 450 K/uL Final    MPV 06/13/2022 11.2  9.2 - 12.9 fL Final    Immature Granulocytes 06/13/2022 0.4  0.0 - 0.5 % Final    Gran # (ANC) 06/13/2022 4.8  1.8 - 7.7 K/uL Final    Immature Grans (Abs) 06/13/2022 0.03  0.00 - 0.04 K/uL Final    Lymph # 06/13/2022 2.1  1.0 - 4.8 K/uL Final    Mono # 06/13/2022 0.8  0.3 - 1.0 K/uL Final    Eos # 06/13/2022 0.2  0.0 - 0.5 K/uL Final    Baso # 06/13/2022 0.08  0.00 - 0.20 K/uL Final    nRBC 06/13/2022 0  0 /100 WBC Final    Gran % 06/13/2022 60.7  38.0 - 73.0 % Final    Lymph % 06/13/2022 26.2  18.0 - 48.0 % Final    Mono % 06/13/2022 9.8  4.0 - 15.0 % Final    Eosinophil % 06/13/2022 1.9  0.0 - 8.0 % Final    Basophil % 06/13/2022 1.0  0.0 - 1.9 % Final    Differential Method 06/13/2022 Automated   Final    Sodium 06/13/2022 137  136 - 145 mmol/L Final    Potassium 06/13/2022 3.9  3.5 - 5.1 mmol/L Final    Chloride 06/13/2022 101  95 - 110 mmol/L Final    CO2 06/13/2022 29  23 - 29 mmol/L Final    Glucose 06/13/2022 113 (H)  70 - 110 mg/dL Final    BUN 06/13/2022 11  8 - 23 mg/dL Final    Creatinine 06/13/2022 0.4 (L)  0.5 - 1.4 mg/dL Final    Calcium 06/13/2022 9.0  8.7 - 10.5 mg/dL Final    Total Protein 06/13/2022 6.8  6.0 - 8.4 g/dL Final    Albumin 06/13/2022 3.7  3.5 - 5.2 g/dL Final    Total Bilirubin 06/13/2022 1.0  0.1 - 1.0 mg/dL Final    Alkaline Phosphatase 06/13/2022 105  55 - 135 U/L Final    AST 06/13/2022 78 (H)  10  - 40 U/L Final    ALT 06/13/2022 74 (H)  10 - 44 U/L Final    Anion Gap 06/13/2022 7 (L)  8 - 16 mmol/L Final    eGFR if African American 06/13/2022 >60.0  >60 mL/min/1.73 m^2 Final    eGFR if non African American 06/13/2022 >60.0  >60 mL/min/1.73 m^2 Final   Admission on 12/15/2021, Discharged on 12/15/2021   Component Date Value Ref Range Status    Potassium 12/15/2021 3.7  3.5 - 5.1 mmol/L Final   Lab Visit on 12/13/2021   Component Date Value Ref Range Status    Sodium 12/13/2021 139  136 - 145 mmol/L Final    Potassium 12/13/2021 3.3 (L)  3.5 - 5.1 mmol/L Final    Chloride 12/13/2021 101  95 - 110 mmol/L Final    CO2 12/13/2021 30 (H)  23 - 29 mmol/L Final    Glucose 12/13/2021 83  70 - 110 mg/dL Final    BUN 12/13/2021 16  8 - 23 mg/dL Final    Creatinine 12/13/2021 0.5  0.5 - 1.4 mg/dL Final    Calcium 12/13/2021 9.4  8.7 - 10.5 mg/dL Final    Total Protein 12/13/2021 7.8  6.0 - 8.4 g/dL Final    Albumin 12/13/2021 4.0  3.5 - 5.2 g/dL Final    Total Bilirubin 12/13/2021 1.3 (H)  0.1 - 1.0 mg/dL Final    Alkaline Phosphatase 12/13/2021 137 (H)  55 - 135 U/L Final    AST 12/13/2021 86 (H)  10 - 40 U/L Final    ALT 12/13/2021 88 (H)  10 - 44 U/L Final    Anion Gap 12/13/2021 8  8 - 16 mmol/L Final    eGFR if African American 12/13/2021 >60.0  >60 mL/min/1.73 m^2 Final    eGFR if non African American 12/13/2021 >60.0  >60 mL/min/1.73 m^2 Final    WBC 12/13/2021 10.85  3.90 - 12.70 K/uL Final    RBC 12/13/2021 4.97  4.00 - 5.40 M/uL Final    Hemoglobin 12/13/2021 15.7  12.0 - 16.0 g/dL Final    Hematocrit 12/13/2021 46.9  37.0 - 48.5 % Final    MCV 12/13/2021 94  82 - 98 fL Final    MCH 12/13/2021 31.6 (H)  27.0 - 31.0 pg Final    MCHC 12/13/2021 33.5  32.0 - 36.0 g/dL Final    RDW 12/13/2021 13.7  11.5 - 14.5 % Final    Platelets 12/13/2021 250  150 - 450 K/uL Final    MPV 12/13/2021 10.3  9.2 - 12.9 fL Final    Immature Granulocytes 12/13/2021 0.5  0.0 - 0.5 % Final    Gran # (ANC) 12/13/2021 7.3  1.8 - 7.7 K/uL  Final    Immature Grans (Abs) 2021 0.05 (H)  0.00 - 0.04 K/uL Final    Lymph # 2021 2.5  1.0 - 4.8 K/uL Final    Mono # 2021 0.8  0.3 - 1.0 K/uL Final    Eos # 2021 0.1  0.0 - 0.5 K/uL Final    Baso # 2021 0.08  0.00 - 0.20 K/uL Final    nRBC 2021 0  0 /100 WBC Final    Gran % 2021 67.3  38.0 - 73.0 % Final    Lymph % 2021 22.9  18.0 - 48.0 % Final    Mono % 2021 7.7  4.0 - 15.0 % Final    Eosinophil % 2021 0.9  0.0 - 8.0 % Final    Basophil % 2021 0.7  0.0 - 1.9 % Final    Differential Method 2021 Automated   Final   Hospital Outpatient Visit on 2021   Component Date Value Ref Range Status    SARS-CoV-2 RNA, Amplification, Qual 2021 Negative  Negative Final       Past Medical History:   Diagnosis Date    Arthritis     Breast cancer     right    Pain in finger     quentin long finger pain    Wears glasses     WEARS CONTACS    Wears partial dentures     UPPER AND LOWER     Past Surgical History:   Procedure Laterality Date    BREAST RECONSTRUCTION      CARPAL TUNNEL RELEASE       SECTION  1970, 1972,     CHOLECYSTECTOMY  2019        COLONOSCOPY  2017    JOINT REPLACEMENT Right     KNEE    KNEE ARTHROPLASTY Right 6/10/2019    Procedure: ARTHROPLASTY, KNEE;  Surgeon: Jony Marmolejo MD;  Location: [facility] OR;  Service: Orthopedics;  Laterality: Right;    KNEE ARTHROPLASTY Left 2021    Procedure: ARTHROPLASTY, KNEE;  Surgeon: Jony Marmolejo MD;  Location: [facility] OR;  Service: Orthopedics;  Laterality: Left;    KNEE ARTHROSCOPY Bilateral 2008    MASTECTOMY Right 2007    PORTACATH PLACEMENT  2007    PORTACATH REMOVAL      SURGICAL REMOVAL OF BONE SPUR Bilateral     TUBAL LIGATION  1976     Family History   Problem Relation Age of Onset    Arthritis Mother     Hyperlipidemia Mother     Stroke Mother     Dementia Mother     Eczema Daughter     Eczema Grandchild     Lupus Neg Hx     Psoriasis Neg Hx      Melanoma Neg Hx        Marital Status:   Alcohol History:  reports current alcohol use.  Tobacco History:  reports that she quit smoking about 41 years ago. Her smoking use included cigarettes. She started smoking about 42 years ago. She smoked an average of .25 packs per day. She has never used smokeless tobacco.  Drug History:  reports no history of drug use.    Review of patient's allergies indicates:  No Known Allergies    Current Outpatient Medications:     alendronate (FOSAMAX) 70 MG tablet, Take 1 tablet (70 mg total) by mouth every 7 days., Disp: 12 tablet, Rfl: 3    ascorbic acid, vitamin C, (VITAMIN C) 1000 MG tablet, Take 1,000 mg by mouth once daily., Disp: , Rfl:     calcium carbonate (CALCIUM 600 ORAL), Take by mouth 2 (two) times daily., Disp: , Rfl:     furosemide (LASIX) 20 MG tablet, TAKE 1 TABLET BY MOUTH DAILY, Disp: 10 tablet, Rfl: 0    ibuprofen (ADVIL,MOTRIN) 800 MG tablet, Take 800 mg by mouth every 8 (eight) hours as needed., Disp: , Rfl:     multivitamin capsule, Take 1 capsule by mouth once daily., Disp: , Rfl:     omeprazole (PRILOSEC) 20 MG capsule, Take 1 capsule (20 mg total) by mouth once daily., Disp: 30 capsule, Rfl: 5    vitamin D (VITAMIN D3) 1000 units Tab, Take 1,000 Units by mouth once daily., Disp: , Rfl:     zinc gluconate 50 mg tablet, Take 50 mg by mouth once daily., Disp: , Rfl:     Review of Systems       Constitutional:  Negative for appetite change, chills, diaphoresis, fatigue, fever, malaise/fatigue and unexpected weight change.   HENT:  Negative for congestion, ear pain, hearing loss, nosebleeds, postnasal drip, sinus pressure, sinus pain, sneezing, sore throat, tinnitus, trouble swallowing and voice change.    Eyes:  Negative for photophobia, pain, itching and visual disturbance.   Respiratory: negative now for shortness of breath. Negative for apnea, cough, chest tightness, wheezing and stridor.    Cardiovascular:  Positive for chest pain-now more anterior  and lower left hemithorax Negative for palpitations, claudication, leg swelling and near-syncope.   Gastrointestinal:  Negative for abdominal distention, abdominal pain, blood in stool, constipation, diarrhea, nausea and vomiting.   Endocrine: Negative for cold intolerance, heat intolerance, polydipsia and polyuria.   Genitourinary:  Negative for difficulty urinating, dyspareunia, dysuria, flank pain, frequency, hematuria, menstrual problem, pelvic pain, urgency, vaginal discharge and vaginal pain.   Musculoskeletal:  Negative for arthralgias, back pain, joint swelling, myalgias, neck pain and neck stiffness.   Skin:  Negative for pallor.   Allergic/Immunologic: Negative for environmental allergies and food allergies.   Neurological:  Negative for dizziness, tremors, speech difficulty, weakness, light-headedness, numbness and headaches.   Hematological:  Does not bruise/bleed easily.   Psychiatric/Behavioral:  Negative for agitation, confusion, decreased concentration, sleep disturbance and    Objective:      Vitals    Vitals - 1 value per visit 10/21/2022 10/26/2022 10/26/2022 11/29/2022 11/29/2022   SYSTOLIC - - 126 - 126   DIASTOLIC - - 70 - 74   Pulse - - 76 - 80   Temp - - 98 - -   Resp - - 14 - 14   SPO2 - - 94 - 95   Weight (lb) 165 - 164 - 163   Weight (kg) 74.844 - 74.39 - 73.936   Height 58 - 58 - 58   BMI (Calculated) 34.5 - 34.3 - 34.1   VISIT REPORT - - - - -   Pain Score  - 0 - 0 -   Some recent data might be hidden       Physical Exam      Vitals and nursing note reviewed.   Constitutional:       General: She is not in acute distress.     Appearance: She is obese. She is not ill-appearing.   Eyes:      Extraocular Movements: Extraocular movements intact.      Conjunctiva/sclera: Conjunctivae normal.      Pupils: Pupils are equal, round, and reactive to light.   Neck:      Vascular: No carotid bruit. Tender bilateral traps left greater than right  Cardiovascular:      Rate and Rhythm: Normal rate and  regular rhythm.      Pulses: Normal pulses.      Heart sounds: Normal heart sounds. No murmur heard.  Pulmonary:      Effort: Pulmonary effort is normal.      Breath sounds: Normal breath sounds.   Abdominal:      General: Bowel sounds are normal.      Palpations: Abdomen is soft.   Musculoskeletal:      Right lower leg: No edema.      Left lower leg: No edema.   Lymphadenopathy:      Cervical: No cervical adenopathy.   Skin:     General: Skin is warm and dry.   Neurological:      General: No focal deficit present.      Mental Status: She is alert and oriented to person, place, and time.   Psychiatric:         Mood and Affect: Mood normal.         Behavior: Behavior normal.         Thought Content: Thought content normal.     Assessment:       1. Elevated ALT measurement    2. Cervical paraspinal muscle spasm    3. Abnormal chest x-ray    4. Pleuritic chest pain         Plan:       Elevated ALT measurement  -     US Abdomen Limited; Future; Expected date: 11/29/2022    Cervical paraspinal muscle spasm  -     Ambulatory referral/consult to Physical/Occupational Therapy; Future; Expected date: 12/06/2022    Abnormal chest x-ray        -      follow-up CT 3 months    Pleuritic chest pain    Follow up in about 6 weeks (around 1/10/2023) for chest wall pain post PT.        11/30/2022 Kasie Arthur M.D.

## 2022-12-19 RX ORDER — HYDROCODONE POLISTIREX AND CHLORPHENIRAMINE POLISTIREX 10; 8 MG/5ML; MG/5ML
5 SUSPENSION, EXTENDED RELEASE ORAL EVERY 12 HOURS PRN
Qty: 115 ML | Refills: 0 | OUTPATIENT
Start: 2022-12-19

## 2022-12-19 NOTE — TELEPHONE ENCOUNTER
Patient called Went to Urgent care last week   She tested negative to Flu and COVID   Still coughing and sneezing.   Denies SOB.   Requesting cough syrup   Medication pended.

## 2022-12-20 ENCOUNTER — OFFICE VISIT (OUTPATIENT)
Dept: FAMILY MEDICINE | Facility: CLINIC | Age: 72
End: 2022-12-20
Payer: MEDICARE

## 2022-12-20 VITALS
SYSTOLIC BLOOD PRESSURE: 144 MMHG | WEIGHT: 161 LBS | DIASTOLIC BLOOD PRESSURE: 85 MMHG | RESPIRATION RATE: 16 BRPM | HEIGHT: 58 IN | TEMPERATURE: 98 F | HEART RATE: 90 BPM | BODY MASS INDEX: 33.8 KG/M2 | OXYGEN SATURATION: 97 %

## 2022-12-20 DIAGNOSIS — J45.909 BRONCHITIS WITH ASTHMA, SUBACUTE: Primary | ICD-10-CM

## 2022-12-20 DIAGNOSIS — J20.9 BRONCHITIS WITH ASTHMA, SUBACUTE: Primary | ICD-10-CM

## 2022-12-20 PROCEDURE — 99214 OFFICE O/P EST MOD 30 MIN: CPT | Mod: S$PBB,AQ,, | Performed by: INTERNAL MEDICINE

## 2022-12-20 PROCEDURE — 94640 AIRWAY INHALATION TREATMENT: CPT | Mod: PBBFAC | Performed by: INTERNAL MEDICINE

## 2022-12-20 PROCEDURE — 99214 PR OFFICE/OUTPT VISIT, EST, LEVL IV, 30-39 MIN: ICD-10-PCS | Mod: S$PBB,AQ,, | Performed by: INTERNAL MEDICINE

## 2022-12-20 PROCEDURE — 99215 OFFICE O/P EST HI 40 MIN: CPT | Mod: 25 | Performed by: INTERNAL MEDICINE

## 2022-12-20 RX ORDER — PROMETHAZINE HYDROCHLORIDE AND DEXTROMETHORPHAN HYDROBROMIDE 6.25; 15 MG/5ML; MG/5ML
5 SYRUP ORAL EVERY 6 HOURS PRN
COMMUNITY
Start: 2022-12-15 | End: 2023-01-30 | Stop reason: ALTCHOICE

## 2022-12-20 RX ORDER — IPRATROPIUM BROMIDE AND ALBUTEROL SULFATE 2.5; .5 MG/3ML; MG/3ML
3 SOLUTION RESPIRATORY (INHALATION)
Status: COMPLETED | OUTPATIENT
Start: 2022-12-20 | End: 2022-12-20

## 2022-12-20 RX ORDER — BENZONATATE 100 MG/1
100 CAPSULE ORAL 3 TIMES DAILY
COMMUNITY
Start: 2022-12-15 | End: 2023-01-30 | Stop reason: ALTCHOICE

## 2022-12-20 RX ORDER — FLUTICASONE PROPIONATE 50 MCG
1 SPRAY, SUSPENSION (ML) NASAL DAILY
Qty: 9.9 ML | Refills: 1 | Status: SHIPPED | OUTPATIENT
Start: 2022-12-20 | End: 2023-04-19

## 2022-12-20 RX ORDER — AMOXICILLIN AND CLAVULANATE POTASSIUM 875; 125 MG/1; MG/1
1 TABLET, FILM COATED ORAL 2 TIMES DAILY
Qty: 20 TABLET | Refills: 0 | Status: SHIPPED | OUTPATIENT
Start: 2022-12-20 | End: 2023-01-30 | Stop reason: ALTCHOICE

## 2022-12-20 RX ORDER — AMOXICILLIN 500 MG/1
500 CAPSULE ORAL EVERY 8 HOURS
COMMUNITY
Start: 2022-12-15 | End: 2022-12-20

## 2022-12-20 RX ORDER — HYDROCODONE POLISTIREX AND CHLORPHENIRAMINE POLISTIREX 10; 8 MG/5ML; MG/5ML
5 SUSPENSION, EXTENDED RELEASE ORAL EVERY 12 HOURS PRN
Qty: 70 ML | Refills: 0 | Status: SHIPPED | OUTPATIENT
Start: 2022-12-20 | End: 2023-01-30 | Stop reason: ALTCHOICE

## 2022-12-20 RX ADMIN — IPRATROPIUM BROMIDE AND ALBUTEROL SULFATE 3 ML: .5; 3 SOLUTION RESPIRATORY (INHALATION) at 04:12

## 2022-12-20 NOTE — PROGRESS NOTES
SUBJECTIVE:    Patient ID: Camila Au is a 72 y.o. female.    Chief Complaint: Cough and sinus congestion (Treated at urgent care on thursday)    HPI    Started with runny nose 5 days ago and was in the rain in and out of a bus and she went to Urgent Care the next day-COVID and flu were neg-got two shots and three rx-amoxil tessalon and promethazine-has heard self wheezing -no more temp-highest 99-she has had no muscle pains-no known exposures but she drives a bus-she has no chest pain-slightly productive cough-no shortness of breath-feels full in the sinuses--has both headache and tooth pain from the sinuses-appetite is poor-no soreness of throat-no  sx-    Has no nebulizer at the house as of now    Hospital Outpatient Visit on 10/21/2022   Component Date Value Ref Range Status    Left Atrium Major Axis 10/21/2022 4.10  cm Final    LA size 10/21/2022 4.20  cm Final    AORTIC VALVE CUSP SEPERATION 10/21/2022 1.50  cm Final    AV mean gradient 10/21/2022 5  mmHg Final    Ao VTI 10/21/2022 29.50  cm Final    Ao peak austen 10/21/2022 1.45  m/s Final    AV peak gradient 10/21/2022 8  mmHg Final    Ao root annulus 10/21/2022 3.50  cm Final    IVRT 10/21/2022 95.00  ms Final    IVS 10/21/2022 1.22 (A)  0.6 - 1.1 cm Final    LVIDd 10/21/2022 4.11  3.5 - 6.0 cm Final    LVIDs 10/21/2022 2.71  2.1 - 4.0 cm Final    LVOT diameter 10/21/2022 2.00  cm Final    LVOT peak VTI 10/21/2022 27.20  cm Final    LVOT peak austen 10/21/2022 1.05  m/s Final    Posterior Wall 10/21/2022 1.13 (A)  0.6 - 1.1 cm Final    E wave deceleration time 10/21/2022 190.00  ms Final    MV Peak A Austen 10/21/2022 0.30  m/s Final    MV Peak E Austen 10/21/2022 0.97  m/s Final    PV mean gradient 10/21/2022 1.00  mmHg Final    PV PEAK VELOCITY 10/21/2022 0.87  m/s Final    RVDD 10/21/2022 2.28  cm Final    BSA 10/21/2022 1.75  m2 Final    TDI SEPTAL 10/21/2022 0.10  m/s Final    LV LATERAL E/E' RATIO 10/21/2022 12.13  m/s Final    LV SEPTAL E/E'  RATIO 10/21/2022 9.70  m/s Final    TDI LATERAL 10/21/2022 0.08  m/s Final    FS 10/21/2022 34  28 - 44 % Final    LV mass 10/21/2022 167.13  g Final    Left Ventricle Relative Wall Thick* 10/21/2022 0.55  cm Final    AV valve area 10/21/2022 2.90  cm2 Final    AV Velocity Ratio 10/21/2022 0.72   Final    AV index (prosthetic) 10/21/2022 0.92   Final    E/A ratio 10/21/2022 3.23   Final    Mean e' 10/21/2022 0.09  m/s Final    LVOT area 10/21/2022 3.1  cm2 Final    LVOT stroke volume 10/21/2022 85.41  cm3 Final    E/E' ratio 10/21/2022 10.78  m/s Final    LV Mass Index 10/21/2022 99  g/m2 Final    Right Atrial Pressure (from IVC) 10/21/2022 3  mmHg Final    EF 10/21/2022 57  % Final   Lab Visit on 10/21/2022   Component Date Value Ref Range Status    DANIEL 10/21/2022 Positive (A)   Final    CRP 10/21/2022 0.41  <0.76 mg/dL Final    Sodium 10/21/2022 137  136 - 145 mmol/L Final    Potassium 10/21/2022 3.8  3.5 - 5.1 mmol/L Final    Chloride 10/21/2022 101  95 - 110 mmol/L Final    CO2 10/21/2022 29  23 - 29 mmol/L Final    Glucose 10/21/2022 98  70 - 110 mg/dL Final    BUN 10/21/2022 16  8 - 23 mg/dL Final    Creatinine 10/21/2022 0.5  0.5 - 1.4 mg/dL Final    Calcium 10/21/2022 9.4  8.7 - 10.5 mg/dL Final    Total Protein 10/21/2022 7.8  6.0 - 8.4 g/dL Final    Albumin 10/21/2022 4.1  3.5 - 5.2 g/dL Final    Total Bilirubin 10/21/2022 1.1 (H)  0.1 - 1.0 mg/dL Final    Alkaline Phosphatase 10/21/2022 98  55 - 135 U/L Final    AST 10/21/2022 85 (H)  10 - 40 U/L Final    ALT 10/21/2022 65 (H)  10 - 44 U/L Final    Anion Gap 10/21/2022 7 (L)  8 - 16 mmol/L Final    eGFR 10/21/2022 >60.0  >60 mL/min/1.73 m^2 Final    CPK 10/21/2022 151  20 - 180 U/L Final    WBC 10/21/2022 6.81  3.90 - 12.70 K/uL Final    RBC 10/21/2022 5.23  4.00 - 5.40 M/uL Final    Hemoglobin 10/21/2022 17.3 (H)  12.0 - 16.0 g/dL Final    Hematocrit 10/21/2022 48.9 (H)  37.0 - 48.5 % Final    MCV 10/21/2022 94  82 - 98 fL Final    MCH 10/21/2022 33.1  (H)  27.0 - 31.0 pg Final    MCHC 10/21/2022 35.4  32.0 - 36.0 g/dL Final    RDW 10/21/2022 13.2  11.5 - 14.5 % Final    Platelets 10/21/2022 205  150 - 450 K/uL Final    MPV 10/21/2022 11.1  9.2 - 12.9 fL Final    Immature Granulocytes 10/21/2022 0.3  0.0 - 0.5 % Final    Gran # (ANC) 10/21/2022 3.3  1.8 - 7.7 K/uL Final    Immature Grans (Abs) 10/21/2022 0.02  0.00 - 0.04 K/uL Final    Lymph # 10/21/2022 2.8  1.0 - 4.8 K/uL Final    Mono # 10/21/2022 0.5  0.3 - 1.0 K/uL Final    Eos # 10/21/2022 0.1  0.0 - 0.5 K/uL Final    Baso # 10/21/2022 0.06  0.00 - 0.20 K/uL Final    nRBC 10/21/2022 0  0 /100 WBC Final    Gran % 10/21/2022 48.5  38.0 - 73.0 % Final    Lymph % 10/21/2022 40.8  18.0 - 48.0 % Final    Mono % 10/21/2022 7.9  4.0 - 15.0 % Final    Eosinophil % 10/21/2022 1.6  0.0 - 8.0 % Final    Basophil % 10/21/2022 0.9  0.0 - 1.9 % Final    Differential Method 10/21/2022 Automated   Final    Speckled Pattern 10/21/2022 1:160 (H)   Final    DANIEL Note 10/21/2022 Comment   Final   Lab Visit on 06/13/2022   Component Date Value Ref Range Status    Sodium 06/13/2022 137  136 - 145 mmol/L Final    Potassium 06/13/2022 3.9  3.5 - 5.1 mmol/L Final    Chloride 06/13/2022 101  95 - 110 mmol/L Final    CO2 06/13/2022 29  23 - 29 mmol/L Final    Glucose 06/13/2022 113 (H)  70 - 110 mg/dL Final    BUN 06/13/2022 11  8 - 23 mg/dL Final    Creatinine 06/13/2022 0.4 (L)  0.5 - 1.4 mg/dL Final    Calcium 06/13/2022 9.0  8.7 - 10.5 mg/dL Final    Anion Gap 06/13/2022 7 (L)  8 - 16 mmol/L Final    eGFR if African American 06/13/2022 >60.0  >60 mL/min/1.73 m^2 Final    eGFR if non African American 06/13/2022 >60.0  >60 mL/min/1.73 m^2 Final    Cholesterol 06/13/2022 231 (H)  120 - 199 mg/dL Final    Triglycerides 06/13/2022 89  30 - 150 mg/dL Final    HDL 06/13/2022 60  40 - 75 mg/dL Final    LDL Cholesterol 06/13/2022 153.2  63.0 - 159.0 mg/dL Final    HDL/Cholesterol Ratio 06/13/2022 26.0  20.0 - 50.0 % Final    Total  Cholesterol/HDL Ratio 06/13/2022 3.9  2.0 - 5.0 Final    Non-HDL Cholesterol 06/13/2022 171  mg/dL Final    WBC 06/13/2022 7.86  3.90 - 12.70 K/uL Final    RBC 06/13/2022 4.65  4.00 - 5.40 M/uL Final    Hemoglobin 06/13/2022 15.0  12.0 - 16.0 g/dL Final    Hematocrit 06/13/2022 44.4  37.0 - 48.5 % Final    MCV 06/13/2022 96  82 - 98 fL Final    MCH 06/13/2022 32.3 (H)  27.0 - 31.0 pg Final    MCHC 06/13/2022 33.8  32.0 - 36.0 g/dL Final    RDW 06/13/2022 13.8  11.5 - 14.5 % Final    Platelets 06/13/2022 182  150 - 450 K/uL Final    MPV 06/13/2022 11.2  9.2 - 12.9 fL Final    Immature Granulocytes 06/13/2022 0.4  0.0 - 0.5 % Final    Gran # (ANC) 06/13/2022 4.8  1.8 - 7.7 K/uL Final    Immature Grans (Abs) 06/13/2022 0.03  0.00 - 0.04 K/uL Final    Lymph # 06/13/2022 2.1  1.0 - 4.8 K/uL Final    Mono # 06/13/2022 0.8  0.3 - 1.0 K/uL Final    Eos # 06/13/2022 0.2  0.0 - 0.5 K/uL Final    Baso # 06/13/2022 0.08  0.00 - 0.20 K/uL Final    nRBC 06/13/2022 0  0 /100 WBC Final    Gran % 06/13/2022 60.7  38.0 - 73.0 % Final    Lymph % 06/13/2022 26.2  18.0 - 48.0 % Final    Mono % 06/13/2022 9.8  4.0 - 15.0 % Final    Eosinophil % 06/13/2022 1.9  0.0 - 8.0 % Final    Basophil % 06/13/2022 1.0  0.0 - 1.9 % Final    Differential Method 06/13/2022 Automated   Final    Sodium 06/13/2022 137  136 - 145 mmol/L Final    Potassium 06/13/2022 3.9  3.5 - 5.1 mmol/L Final    Chloride 06/13/2022 101  95 - 110 mmol/L Final    CO2 06/13/2022 29  23 - 29 mmol/L Final    Glucose 06/13/2022 113 (H)  70 - 110 mg/dL Final    BUN 06/13/2022 11  8 - 23 mg/dL Final    Creatinine 06/13/2022 0.4 (L)  0.5 - 1.4 mg/dL Final    Calcium 06/13/2022 9.0  8.7 - 10.5 mg/dL Final    Total Protein 06/13/2022 6.8  6.0 - 8.4 g/dL Final    Albumin 06/13/2022 3.7  3.5 - 5.2 g/dL Final    Total Bilirubin 06/13/2022 1.0  0.1 - 1.0 mg/dL Final    Alkaline Phosphatase 06/13/2022 105  55 - 135 U/L Final    AST 06/13/2022 78 (H)  10 - 40 U/L Final    ALT 06/13/2022  74 (H)  10 - 44 U/L Final    Anion Gap 2022 7 (L)  8 - 16 mmol/L Final    eGFR if African American 2022 >60.0  >60 mL/min/1.73 m^2 Final    eGFR if non African American 2022 >60.0  >60 mL/min/1.73 m^2 Final       Past Medical History:   Diagnosis Date    Arthritis     Breast cancer 2007    right    Pain in finger     quentin long finger pain    Wears glasses     WEARS CONTACS    Wears partial dentures     UPPER AND LOWER     Past Surgical History:   Procedure Laterality Date    BREAST RECONSTRUCTION      CARPAL TUNNEL RELEASE       SECTION  1970, ,     CHOLECYSTECTOMY  2019        COLONOSCOPY  2017    JOINT REPLACEMENT Right     KNEE    KNEE ARTHROPLASTY Right 6/10/2019    Procedure: ARTHROPLASTY, KNEE;  Surgeon: Jony Marmolejo MD;  Location: [facility] OR;  Service: Orthopedics;  Laterality: Right;    KNEE ARTHROPLASTY Left 2021    Procedure: ARTHROPLASTY, KNEE;  Surgeon: Jony Marmolejo MD;  Location: [facility] OR;  Service: Orthopedics;  Laterality: Left;    KNEE ARTHROSCOPY Bilateral 2008    MASTECTOMY Right 2007    PORTACATH PLACEMENT  2007    PORTACATH REMOVAL      SURGICAL REMOVAL OF BONE SPUR Bilateral     TUBAL LIGATION       Family History   Problem Relation Age of Onset    Arthritis Mother     Hyperlipidemia Mother     Stroke Mother     Dementia Mother     Eczema Daughter     Eczema Grandchild     Lupus Neg Hx     Psoriasis Neg Hx     Melanoma Neg Hx        Marital Status:   Alcohol History:  reports current alcohol use.  Tobacco History:  reports that she quit smoking about 41 years ago. Her smoking use included cigarettes. She started smoking about 42 years ago. She smoked an average of .25 packs per day. She has never used smokeless tobacco.  Drug History:  reports no history of drug use.    Review of patient's allergies indicates:  No Known Allergies    Current Outpatient Medications:     alendronate (FOSAMAX) 70 MG tablet, Take 1 tablet (70  mg total) by mouth every 7 days., Disp: 12 tablet, Rfl: 3    amoxicillin (AMOXIL) 500 MG capsule, Take 500 mg by mouth every 8 (eight) hours., Disp: , Rfl:     ascorbic acid, vitamin C, (VITAMIN C) 1000 MG tablet, Take 1,000 mg by mouth once daily., Disp: , Rfl:     benzonatate (TESSALON) 100 MG capsule, Take 100 mg by mouth 3 (three) times daily., Disp: , Rfl:     calcium carbonate (CALCIUM 600 ORAL), Take by mouth 2 (two) times daily., Disp: , Rfl:     furosemide (LASIX) 20 MG tablet, TAKE 1 TABLET BY MOUTH DAILY, Disp: 10 tablet, Rfl: 0    ibuprofen (ADVIL,MOTRIN) 800 MG tablet, Take 800 mg by mouth every 8 (eight) hours as needed., Disp: , Rfl:     multivitamin capsule, Take 1 capsule by mouth once daily., Disp: , Rfl:     omeprazole (PRILOSEC) 20 MG capsule, Take 1 capsule (20 mg total) by mouth once daily., Disp: 30 capsule, Rfl: 5    promethazine-dextromethorphan (PROMETHAZINE-DM) 6.25-15 mg/5 mL Syrp, Take 5 mLs by mouth every 6 (six) hours as needed., Disp: , Rfl:     vitamin D (VITAMIN D3) 1000 units Tab, Take 1,000 Units by mouth once daily., Disp: , Rfl:     zinc gluconate 50 mg tablet, Take 50 mg by mouth once daily., Disp: , Rfl:     Review of Systems       Objective:      Vitals    Vitals - 1 value per visit 10/26/2022 11/29/2022 11/29/2022 12/20/2022 12/20/2022   SYSTOLIC 126 - 126 - 144   DIASTOLIC 70 - 74 - 85   Pulse 76 - 80 - 90   Temp 98 - - - 97.6   Resp 14 - 14 - 16   SPO2 94 - 95 - 97   Weight (lb) 164 - 163 - 161   Weight (kg) 74.39 - 73.936 - 73.029   Height 58 - 58 - 58   BMI (Calculated) 34.3 - 34.1 - 33.7   VISIT REPORT - - - - -   Pain Score  - 0 - 0 -   Some recent data might be hidden       Physical Exam      Assessment:       No diagnosis found.     Plan:       There are no diagnoses linked to this encounter.  No follow-ups on file.        12/20/2022 Kasie Arthur M.D.

## 2022-12-22 ENCOUNTER — HOSPITAL ENCOUNTER (OUTPATIENT)
Dept: RADIOLOGY | Facility: HOSPITAL | Age: 72
Discharge: HOME | End: 2022-12-22
Attending: INTERNAL MEDICINE
Payer: MEDICARE

## 2022-12-22 DIAGNOSIS — J20.9 BRONCHITIS WITH ASTHMA, SUBACUTE: ICD-10-CM

## 2022-12-22 DIAGNOSIS — R74.01 ELEVATED ALT MEASUREMENT: ICD-10-CM

## 2022-12-22 DIAGNOSIS — J45.909 BRONCHITIS WITH ASTHMA, SUBACUTE: ICD-10-CM

## 2022-12-22 PROCEDURE — 76705 ECHO EXAM OF ABDOMEN: CPT | Mod: TC,PO

## 2022-12-22 PROCEDURE — 71046 X-RAY EXAM CHEST 2 VIEWS: CPT | Mod: TC,PO

## 2022-12-22 NOTE — PROGRESS NOTES
Patient notified of result and breathing instructions. Pt verbalized understanding of all. Recheck appt scheduled.

## 2022-12-22 NOTE — PROGRESS NOTES
Xray shows decreased expansion at the lung bases so practice taking one deep breath every half hour to try to expand lungs-she needs recheck first week of January

## 2022-12-22 NOTE — PROGRESS NOTES
US suggests chronic liver disease-such as cirrhosis-we will discuss on recheck the first week of January

## 2022-12-22 NOTE — PROGRESS NOTES
Pt scheduled for 11:00 on 1-12-22. ( Pt rescheduled to 11:00) Pt verbalized understanding of new time on 1/12/22

## 2022-12-27 NOTE — PROGRESS NOTES
Xray looks a little worse-see me nextweek or go to the ER if you ar4e having any respiratory difficulty

## 2023-01-26 ENCOUNTER — OFFICE VISIT (OUTPATIENT)
Dept: ORTHOPEDICS | Facility: CLINIC | Age: 73
End: 2023-01-26
Payer: MEDICARE

## 2023-01-26 VITALS
SYSTOLIC BLOOD PRESSURE: 126 MMHG | DIASTOLIC BLOOD PRESSURE: 80 MMHG | BODY MASS INDEX: 34.22 KG/M2 | HEIGHT: 58 IN | WEIGHT: 163 LBS

## 2023-01-26 DIAGNOSIS — Z98.890 HISTORY OF REPAIR OF LEFT ROTATOR CUFF: ICD-10-CM

## 2023-01-26 DIAGNOSIS — M75.41 IMPINGEMENT SYNDROME OF SHOULDER REGION, RIGHT: ICD-10-CM

## 2023-01-26 DIAGNOSIS — M19.011 ARTHRITIS OF BOTH GLENOHUMERAL JOINTS: ICD-10-CM

## 2023-01-26 DIAGNOSIS — M19.012 ARTHRITIS OF BOTH GLENOHUMERAL JOINTS: ICD-10-CM

## 2023-01-26 DIAGNOSIS — M75.101 NONTRAUMATIC TEAR OF RIGHT ROTATOR CUFF, UNSPECIFIED TEAR EXTENT: Primary | ICD-10-CM

## 2023-01-26 PROCEDURE — 20610 LARGE JOINT ASPIRATION/INJECTION: R SUBACROMIAL BURSA: ICD-10-PCS | Mod: 50,S$GLB,, | Performed by: ORTHOPAEDIC SURGERY

## 2023-01-26 PROCEDURE — 99213 PR OFFICE/OUTPT VISIT, EST, LEVL III, 20-29 MIN: ICD-10-PCS | Mod: 25,S$GLB,, | Performed by: ORTHOPAEDIC SURGERY

## 2023-01-26 PROCEDURE — 20610 DRAIN/INJ JOINT/BURSA W/O US: CPT | Mod: 50,S$GLB,, | Performed by: ORTHOPAEDIC SURGERY

## 2023-01-26 PROCEDURE — 99213 OFFICE O/P EST LOW 20 MIN: CPT | Mod: 25,S$GLB,, | Performed by: ORTHOPAEDIC SURGERY

## 2023-01-26 RX ORDER — METHYLPREDNISOLONE ACETATE 40 MG/ML
40 INJECTION, SUSPENSION INTRA-ARTICULAR; INTRALESIONAL; INTRAMUSCULAR; SOFT TISSUE
Status: DISCONTINUED | OUTPATIENT
Start: 2023-01-26 | End: 2023-01-26 | Stop reason: HOSPADM

## 2023-01-26 RX ADMIN — METHYLPREDNISOLONE ACETATE 40 MG: 40 INJECTION, SUSPENSION INTRA-ARTICULAR; INTRALESIONAL; INTRAMUSCULAR; SOFT TISSUE at 09:01

## 2023-01-26 NOTE — PROCEDURES
Large Joint Aspiration/Injection: R subacromial bursa    Date/Time: 1/26/2023 9:30 AM  Performed by: Jony Marmolejo MD  Authorized by: Jony Marmolejo MD     Consent Done?:  Yes (Verbal)  Indications:  Pain  Site marked: the procedure site was marked    Timeout: prior to procedure the correct patient, procedure, and site was verified    Prep: patient was prepped and draped in usual sterile fashion      Local anesthesia used?: Yes    Local anesthetic:  Lidocaine 1% without epinephrine    Details:  Needle Size:  25 G  Ultrasonic Guidance for needle placement?: No    Location:  Shoulder  Site:  R subacromial bursa  Medications:  40 mg methylPREDNISolone acetate 40 mg/mL  Patient tolerance:  Patient tolerated the procedure well with no immediate complications  Large Joint Aspiration/Injection: L subacromial bursa    Date/Time: 1/26/2023 9:30 AM  Performed by: Jony Marmolejo MD  Authorized by: Jony Marmolejo MD     Consent Done?:  Yes (Verbal)  Indications:  Pain  Site marked: the procedure site was marked    Timeout: prior to procedure the correct patient, procedure, and site was verified    Prep: patient was prepped and draped in usual sterile fashion      Local anesthesia used?: Yes    Local anesthetic:  Lidocaine 1% without epinephrine    Details:  Needle Size:  25 G  Ultrasonic Guidance for needle placement?: No    Location:  Shoulder  Site:  L subacromial bursa  Medications:  40 mg methylPREDNISolone acetate 40 mg/mL  Patient tolerance:  Patient tolerated the procedure well with no immediate complications

## 2023-01-26 NOTE — PROGRESS NOTES
[facility]    Subjective:     Chief Complaint:   Chief Complaint   Patient presents with    Left Shoulder - Pain     Had left shoulder rotator cuff repair about 5 or so years ago, having b/l shoulder pain, right worse than left, limited and painful ROM, able to reach back    Right Shoulder - Pain       Past Medical History:   Diagnosis Date    Arthritis     Breast cancer     right    Pain in finger     quentin long finger pain    Wears glasses     WEARS CONTACS    Wears partial dentures     UPPER AND LOWER       Past Surgical History:   Procedure Laterality Date    BREAST RECONSTRUCTION      CARPAL TUNNEL RELEASE       SECTION  1970, ,     CHOLECYSTECTOMY  2019        COLONOSCOPY  2017    JOINT REPLACEMENT Right     KNEE    KNEE ARTHROPLASTY Right 6/10/2019    Procedure: ARTHROPLASTY, KNEE;  Surgeon: Jony Marmolejo MD;  Location: [facility] OR;  Service: Orthopedics;  Laterality: Right;    KNEE ARTHROPLASTY Left 2021    Procedure: ARTHROPLASTY, KNEE;  Surgeon: Jony Marmolejo MD;  Location: [facility] OR;  Service: Orthopedics;  Laterality: Left;    KNEE ARTHROSCOPY Bilateral 2008    MASTECTOMY Right 2007    PORTACATH PLACEMENT  2007    PORTACATH REMOVAL      SURGICAL REMOVAL OF BONE SPUR Bilateral     TUBAL LIGATION         Current Outpatient Medications   Medication Sig    ascorbic acid, vitamin C, (VITAMIN C) 1000 MG tablet Take 1,000 mg by mouth once daily.    calcium carbonate (CALCIUM 600 ORAL) Take by mouth 2 (two) times daily.    fluticasone propionate (FLONASE) 50 mcg/actuation nasal spray 1 spray (50 mcg total) by Each Nostril route once daily.    ibuprofen (ADVIL,MOTRIN) 800 MG tablet Take 800 mg by mouth every 8 (eight) hours as needed.    multivitamin capsule Take 1 capsule by mouth once daily.    omeprazole (PRILOSEC) 20 MG capsule Take 1 capsule (20 mg total) by mouth once daily.    vitamin D (VITAMIN D3) 1000 units Tab Take 1,000 Units by mouth once  daily.    zinc gluconate 50 mg tablet Take 50 mg by mouth once daily.    alendronate (FOSAMAX) 70 MG tablet Take 1 tablet (70 mg total) by mouth every 7 days. (Patient not taking: Reported on 2023)    amoxicillin-clavulanate 875-125mg (AUGMENTIN) 875-125 mg per tablet Take 1 tablet by mouth 2 (two) times daily. (Patient not taking: Reported on 2023)    benzonatate (TESSALON) 100 MG capsule Take 100 mg by mouth 3 (three) times daily.    furosemide (LASIX) 20 MG tablet TAKE 1 TABLET BY MOUTH DAILY (Patient not taking: Reported on 2023)    hydrocodone-chlorpheniramine (TUSSIONEX) 10-8 mg/5 mL suspension Take 5 mLs by mouth every 12 (twelve) hours as needed. (Patient not taking: Reported on 2023)    promethazine-dextromethorphan (PROMETHAZINE-DM) 6.25-15 mg/5 mL Syrp Take 5 mLs by mouth every 6 (six) hours as needed.     No current facility-administered medications for this visit.       Review of patient's allergies indicates:  No Known Allergies    Family History   Problem Relation Age of Onset    Arthritis Mother     Hyperlipidemia Mother     Stroke Mother     Dementia Mother     Eczema Daughter     Eczema Grandchild     Lupus Neg Hx     Psoriasis Neg Hx     Melanoma Neg Hx        Social History     Socioeconomic History    Marital status:    Tobacco Use    Smoking status: Former     Packs/day: 0.25     Types: Cigarettes     Start date:      Quit date:      Years since quittin.0    Smokeless tobacco: Never    Tobacco comments:     social smoker - on weekends only - quit 20 yrs ago   Substance and Sexual Activity    Alcohol use: Yes     Alcohol/week: 0.0 standard drinks     Comment: occ    Drug use: No    Sexual activity: Not Currently     Partners: Male       History of present illness:  72-year-old female returns to clinic today to follow-up on her bilateral shoulders.  We have treated both the shoulder before in the past.  We did a left rotator a few years back.  We have also  treated her for subacromial impingement, right shoulder we did an injection in 2020.    She is here today with complaints of pain and limited range of motion, right shoulder.  Similar left but not as severe as the right.      Review of Systems:    Constitution: Negative for chills, fever, and sweats.  Negative for unexplained weight loss.    HENT:  Negative for headaches and blurry vision.    Cardiovascular:Negative for chest pain or irregular heart beat. Negative for hypertension.    Respiratory:  Negative for cough and shortness of breath.    Gastrointestinal: Negative for abdominal pain, heartburn, melena, nausea, and vomitting.    Genitourinary:  Negative bladder incontinence and dysuria.    Musculoskeletal:  See HPI for details.     Neurological: Negative for numbness.    Psychiatric/Behavioral: Negative for depression.  The patient is not nervous/anxious.      Endocrine: Negative for polyuria    Hematologic/Lymphatic: Negative for bleeding problem.  Does not bruise/bleed easily.    Skin: Negative for poor would healing and rash    Objective:      Physical Examination:    Vital Signs:    Vitals:    01/26/23 0931   BP: 126/80       Body mass index is 34.07 kg/m².    This a well-developed, well nourished patient in no acute distress.  They are alert and oriented and cooperative to examination.        Examination of the right shoulder, skin is dry and intact, no erythema ecchymosis, no signs symptoms of infection.  Range of motion, patient cannot forward flex greater than 90°, extremely painful passively maybe get her a few degrees higher than that.  External rotation is limited to less than 45°, internal rotation to the level Of the right hip.  She has pain with all impingement test, Yobany's test painful and weak.    Examination of the left shoulder, skin is dry and intact, no erythema ecchymosis, no signs symptoms of infection.  Range of motion is slightly better than the right shoulder.  She can forward flex to  "about 100 and 30°, externally rotate to 70°, internally rotate to posterior left hip.  Pain with all impingement test, Yobany's test painful but not grossly weak.    Pertinent New Results:    XRAY Report / Interpretation:   AP and lateral views of bilateral shoulders taken today in the office demonstrate a metallic anchor in the left humeral head consistent with history of prior left rotator cuff repair.  She has advanced glenohumeral arthritis in the left shoulder but no significant migration of the humeral head within the glenoid.  In terms of the right shoulder, she does have advanced glenohumeral arthritis, AC joint arthrosis as well as superior migration of the humeral head within the glenoid.    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  She appears to have rotator cuff arthropathy in the right shoulder with no subacromial space noted, she does have some moderate migration of the humeral head within the glenoid.  She has a large inferior humeral spur.  We think the patient needs a reverse total shoulder arthroplasty, but would like to get a MRI of the right shoulder to evaluate the integrity rotator cuff for surgical planning.  Left shoulder, history of rotator cuff repair, I suspect she has similar pathology in the left shoulder as well.  We injected both shoulders today lidocaine Depo-Medrol subacromial space posterior approach sterile technique patient tolerated well will check her back in 2 weeks with the MRI plan would be for surgical intervention on the right shoulder 1st she would like to do that in May end of May she is a  do her rehab and recovery while she is not working.    Jadon Flores, Physician Assistant, served in the capacity as a "scribe" for this patient encounter.  A "face-to-face" encounter occurred with Dr. Jony Marmolejo on this date.  The treatment plan and medical decision-making is outlined above. Patient was seen and examined with a " chaperone.             This note was created using Dragon voice recognition software that occasionally misinterpreted phrases or words.

## 2023-01-29 NOTE — PROGRESS NOTES
SUBJECTIVE:    Patient ID: Camila Au is a 72 y.o. female.    Chief Complaint: Results and Follow-up    HPI    In for labs     BNP 23 WBC 7.42 H/H 15.7/47.2 Platelets 222,000 LIver function remains elevated and US suggests cirrhosis-one issue is she has to take occ ibuprofen for her pain    Patient say she feels better then with the last office visit-just wants to lose weight-    Says she needs shoulder replacement and has significant pain-is receiving PT for shoulder and neck sx-    Lab Visit on 12/22/2022   Component Date Value Ref Range Status    BNP 12/22/2022 23  0 - 99 pg/mL Final    WBC 12/22/2022 7.42  3.90 - 12.70 K/uL Final    RBC 12/22/2022 4.83  4.00 - 5.40 M/uL Final    Hemoglobin 12/22/2022 15.7  12.0 - 16.0 g/dL Final    Hematocrit 12/22/2022 47.2  37.0 - 48.5 % Final    MCV 12/22/2022 98  82 - 98 fL Final    MCH 12/22/2022 32.5 (H)  27.0 - 31.0 pg Final    MCHC 12/22/2022 33.3  32.0 - 36.0 g/dL Final    RDW 12/22/2022 13.7  11.5 - 14.5 % Final    Platelets 12/22/2022 222  150 - 450 K/uL Final    MPV 12/22/2022 11.1  9.2 - 12.9 fL Final    Immature Granulocytes 12/22/2022 0.4  0.0 - 0.5 % Final    Gran # (ANC) 12/22/2022 3.4  1.8 - 7.7 K/uL Final    Immature Grans (Abs) 12/22/2022 0.03  0.00 - 0.04 K/uL Final    Lymph # 12/22/2022 2.9  1.0 - 4.8 K/uL Final    Mono # 12/22/2022 0.7  0.3 - 1.0 K/uL Final    Eos # 12/22/2022 0.3  0.0 - 0.5 K/uL Final    Baso # 12/22/2022 0.09  0.00 - 0.20 K/uL Final    nRBC 12/22/2022 0  0 /100 WBC Final    Gran % 12/22/2022 45.9  38.0 - 73.0 % Final    Lymph % 12/22/2022 39.5  18.0 - 48.0 % Final    Mono % 12/22/2022 9.6  4.0 - 15.0 % Final    Eosinophil % 12/22/2022 3.4  0.0 - 8.0 % Final    Basophil % 12/22/2022 1.2  0.0 - 1.9 % Final    Differential Method 12/22/2022 Automated   Final   Hospital Outpatient Visit on 10/21/2022   Component Date Value Ref Range Status    Left Atrium Major Axis 10/21/2022 4.10  cm Final    LA size 10/21/2022 4.20   cm Final    AORTIC VALVE CUSP SEPERATION 10/21/2022 1.50  cm Final    AV mean gradient 10/21/2022 5  mmHg Final    Ao VTI 10/21/2022 29.50  cm Final    Ao peak austen 10/21/2022 1.45  m/s Final    AV peak gradient 10/21/2022 8  mmHg Final    Ao root annulus 10/21/2022 3.50  cm Final    IVRT 10/21/2022 95.00  ms Final    IVS 10/21/2022 1.22 (A)  0.6 - 1.1 cm Final    LVIDd 10/21/2022 4.11  3.5 - 6.0 cm Final    LVIDs 10/21/2022 2.71  2.1 - 4.0 cm Final    LVOT diameter 10/21/2022 2.00  cm Final    LVOT peak VTI 10/21/2022 27.20  cm Final    LVOT peak austen 10/21/2022 1.05  m/s Final    Posterior Wall 10/21/2022 1.13 (A)  0.6 - 1.1 cm Final    E wave deceleration time 10/21/2022 190.00  ms Final    MV Peak A Austen 10/21/2022 0.30  m/s Final    MV Peak E Austen 10/21/2022 0.97  m/s Final    PV mean gradient 10/21/2022 1.00  mmHg Final    PV PEAK VELOCITY 10/21/2022 0.87  m/s Final    RVDD 10/21/2022 2.28  cm Final    BSA 10/21/2022 1.75  m2 Final    TDI SEPTAL 10/21/2022 0.10  m/s Final    LV LATERAL E/E' RATIO 10/21/2022 12.13  m/s Final    LV SEPTAL E/E' RATIO 10/21/2022 9.70  m/s Final    TDI LATERAL 10/21/2022 0.08  m/s Final    FS 10/21/2022 34  28 - 44 % Final    LV mass 10/21/2022 167.13  g Final    Left Ventricle Relative Wall Thick* 10/21/2022 0.55  cm Final    AV valve area 10/21/2022 2.90  cm2 Final    AV Velocity Ratio 10/21/2022 0.72   Final    AV index (prosthetic) 10/21/2022 0.92   Final    E/A ratio 10/21/2022 3.23   Final    Mean e' 10/21/2022 0.09  m/s Final    LVOT area 10/21/2022 3.1  cm2 Final    LVOT stroke volume 10/21/2022 85.41  cm3 Final    E/E' ratio 10/21/2022 10.78  m/s Final    LV Mass Index 10/21/2022 99  g/m2 Final    Right Atrial Pressure (from IVC) 10/21/2022 3  mmHg Final    EF 10/21/2022 57  % Final   Lab Visit on 10/21/2022   Component Date Value Ref Range Status    DANIEL 10/21/2022 Positive (A)   Final    CRP 10/21/2022 0.41  <0.76 mg/dL Final    Sodium 10/21/2022 137  136 - 145 mmol/L Final     Potassium 10/21/2022 3.8  3.5 - 5.1 mmol/L Final    Chloride 10/21/2022 101  95 - 110 mmol/L Final    CO2 10/21/2022 29  23 - 29 mmol/L Final    Glucose 10/21/2022 98  70 - 110 mg/dL Final    BUN 10/21/2022 16  8 - 23 mg/dL Final    Creatinine 10/21/2022 0.5  0.5 - 1.4 mg/dL Final    Calcium 10/21/2022 9.4  8.7 - 10.5 mg/dL Final    Total Protein 10/21/2022 7.8  6.0 - 8.4 g/dL Final    Albumin 10/21/2022 4.1  3.5 - 5.2 g/dL Final    Total Bilirubin 10/21/2022 1.1 (H)  0.1 - 1.0 mg/dL Final    Alkaline Phosphatase 10/21/2022 98  55 - 135 U/L Final    AST 10/21/2022 85 (H)  10 - 40 U/L Final    ALT 10/21/2022 65 (H)  10 - 44 U/L Final    Anion Gap 10/21/2022 7 (L)  8 - 16 mmol/L Final    eGFR 10/21/2022 >60.0  >60 mL/min/1.73 m^2 Final    CPK 10/21/2022 151  20 - 180 U/L Final    WBC 10/21/2022 6.81  3.90 - 12.70 K/uL Final    RBC 10/21/2022 5.23  4.00 - 5.40 M/uL Final    Hemoglobin 10/21/2022 17.3 (H)  12.0 - 16.0 g/dL Final    Hematocrit 10/21/2022 48.9 (H)  37.0 - 48.5 % Final    MCV 10/21/2022 94  82 - 98 fL Final    MCH 10/21/2022 33.1 (H)  27.0 - 31.0 pg Final    MCHC 10/21/2022 35.4  32.0 - 36.0 g/dL Final    RDW 10/21/2022 13.2  11.5 - 14.5 % Final    Platelets 10/21/2022 205  150 - 450 K/uL Final    MPV 10/21/2022 11.1  9.2 - 12.9 fL Final    Immature Granulocytes 10/21/2022 0.3  0.0 - 0.5 % Final    Gran # (ANC) 10/21/2022 3.3  1.8 - 7.7 K/uL Final    Immature Grans (Abs) 10/21/2022 0.02  0.00 - 0.04 K/uL Final    Lymph # 10/21/2022 2.8  1.0 - 4.8 K/uL Final    Mono # 10/21/2022 0.5  0.3 - 1.0 K/uL Final    Eos # 10/21/2022 0.1  0.0 - 0.5 K/uL Final    Baso # 10/21/2022 0.06  0.00 - 0.20 K/uL Final    nRBC 10/21/2022 0  0 /100 WBC Final    Gran % 10/21/2022 48.5  38.0 - 73.0 % Final    Lymph % 10/21/2022 40.8  18.0 - 48.0 % Final    Mono % 10/21/2022 7.9  4.0 - 15.0 % Final    Eosinophil % 10/21/2022 1.6  0.0 - 8.0 % Final    Basophil % 10/21/2022 0.9  0.0 - 1.9 % Final    Differential Method 10/21/2022  Automated   Final    Speckled Pattern 10/21/2022 1:160 (H)   Final    DANIEL Note 10/21/2022 Comment   Final   Lab Visit on 06/13/2022   Component Date Value Ref Range Status    Sodium 06/13/2022 137  136 - 145 mmol/L Final    Potassium 06/13/2022 3.9  3.5 - 5.1 mmol/L Final    Chloride 06/13/2022 101  95 - 110 mmol/L Final    CO2 06/13/2022 29  23 - 29 mmol/L Final    Glucose 06/13/2022 113 (H)  70 - 110 mg/dL Final    BUN 06/13/2022 11  8 - 23 mg/dL Final    Creatinine 06/13/2022 0.4 (L)  0.5 - 1.4 mg/dL Final    Calcium 06/13/2022 9.0  8.7 - 10.5 mg/dL Final    Anion Gap 06/13/2022 7 (L)  8 - 16 mmol/L Final    eGFR if African American 06/13/2022 >60.0  >60 mL/min/1.73 m^2 Final    eGFR if non African American 06/13/2022 >60.0  >60 mL/min/1.73 m^2 Final    Cholesterol 06/13/2022 231 (H)  120 - 199 mg/dL Final    Triglycerides 06/13/2022 89  30 - 150 mg/dL Final    HDL 06/13/2022 60  40 - 75 mg/dL Final    LDL Cholesterol 06/13/2022 153.2  63.0 - 159.0 mg/dL Final    HDL/Cholesterol Ratio 06/13/2022 26.0  20.0 - 50.0 % Final    Total Cholesterol/HDL Ratio 06/13/2022 3.9  2.0 - 5.0 Final    Non-HDL Cholesterol 06/13/2022 171  mg/dL Final    WBC 06/13/2022 7.86  3.90 - 12.70 K/uL Final    RBC 06/13/2022 4.65  4.00 - 5.40 M/uL Final    Hemoglobin 06/13/2022 15.0  12.0 - 16.0 g/dL Final    Hematocrit 06/13/2022 44.4  37.0 - 48.5 % Final    MCV 06/13/2022 96  82 - 98 fL Final    MCH 06/13/2022 32.3 (H)  27.0 - 31.0 pg Final    MCHC 06/13/2022 33.8  32.0 - 36.0 g/dL Final    RDW 06/13/2022 13.8  11.5 - 14.5 % Final    Platelets 06/13/2022 182  150 - 450 K/uL Final    MPV 06/13/2022 11.2  9.2 - 12.9 fL Final    Immature Granulocytes 06/13/2022 0.4  0.0 - 0.5 % Final    Gran # (ANC) 06/13/2022 4.8  1.8 - 7.7 K/uL Final    Immature Grans (Abs) 06/13/2022 0.03  0.00 - 0.04 K/uL Final    Lymph # 06/13/2022 2.1  1.0 - 4.8 K/uL Final    Mono # 06/13/2022 0.8  0.3 - 1.0 K/uL Final    Eos # 06/13/2022 0.2  0.0 - 0.5 K/uL Final     Baso # 2022 0.08  0.00 - 0.20 K/uL Final    nRBC 2022 0  0 /100 WBC Final    Gran % 2022 60.7  38.0 - 73.0 % Final    Lymph % 2022 26.2  18.0 - 48.0 % Final    Mono % 2022 9.8  4.0 - 15.0 % Final    Eosinophil % 2022 1.9  0.0 - 8.0 % Final    Basophil % 2022 1.0  0.0 - 1.9 % Final    Differential Method 2022 Automated   Final    Sodium 2022 137  136 - 145 mmol/L Final    Potassium 2022 3.9  3.5 - 5.1 mmol/L Final    Chloride 2022 101  95 - 110 mmol/L Final    CO2 2022 29  23 - 29 mmol/L Final    Glucose 2022 113 (H)  70 - 110 mg/dL Final    BUN 2022 11  8 - 23 mg/dL Final    Creatinine 2022 0.4 (L)  0.5 - 1.4 mg/dL Final    Calcium 2022 9.0  8.7 - 10.5 mg/dL Final    Total Protein 2022 6.8  6.0 - 8.4 g/dL Final    Albumin 2022 3.7  3.5 - 5.2 g/dL Final    Total Bilirubin 2022 1.0  0.1 - 1.0 mg/dL Final    Alkaline Phosphatase 2022 105  55 - 135 U/L Final    AST 2022 78 (H)  10 - 40 U/L Final    ALT 2022 74 (H)  10 - 44 U/L Final    Anion Gap 2022 7 (L)  8 - 16 mmol/L Final    eGFR if African American 2022 >60.0  >60 mL/min/1.73 m^2 Final    eGFR if non African American 2022 >60.0  >60 mL/min/1.73 m^2 Final       Past Medical History:   Diagnosis Date    Arthritis     Breast cancer     right    Pain in finger     quentin long finger pain    Wears glasses     WEARS CONTACS    Wears partial dentures     UPPER AND LOWER     Past Surgical History:   Procedure Laterality Date    BREAST RECONSTRUCTION      CARPAL TUNNEL RELEASE       SECTION  1970, , 1976    CHOLECYSTECTOMY  2019        COLONOSCOPY  2017    JOINT REPLACEMENT Right     KNEE    KNEE ARTHROPLASTY Right 6/10/2019    Procedure: ARTHROPLASTY, KNEE;  Surgeon: Jony Marmolejo MD;  Location[facility];  Service: Orthopedics;  Laterality: Right;    KNEE ARTHROPLASTY Left 2021     Procedure: ARTHROPLASTY, KNEE;  Surgeon: Jony Marmolejo MD;  Location: [facility];  Service: Orthopedics;  Laterality: Left;    KNEE ARTHROSCOPY Bilateral 2008    MASTECTOMY Right 2007    PORTACATH PLACEMENT  2007    PORTACATH REMOVAL      SURGICAL REMOVAL OF BONE SPUR Bilateral 2002    TUBAL LIGATION  1976     Family History   Problem Relation Age of Onset    Arthritis Mother     Hyperlipidemia Mother     Stroke Mother     Dementia Mother     Eczema Daughter     Eczema Grandchild     Lupus Neg Hx     Psoriasis Neg Hx     Melanoma Neg Hx        Marital Status:   Alcohol History:  reports current alcohol use.  Tobacco History:  reports that she quit smoking about 42 years ago. Her smoking use included cigarettes. She started smoking about 43 years ago. She smoked an average of .25 packs per day. She has never used smokeless tobacco.  Drug History:  reports no history of drug use.    Review of patient's allergies indicates:  No Known Allergies    Current Outpatient Medications:     alendronate (FOSAMAX) 70 MG tablet, Take 1 tablet (70 mg total) by mouth every 7 days., Disp: 12 tablet, Rfl: 3    ascorbic acid, vitamin C, (VITAMIN C) 1000 MG tablet, Take 1,000 mg by mouth once daily., Disp: , Rfl:     calcium carbonate (CALCIUM 600 ORAL), Take by mouth 2 (two) times daily., Disp: , Rfl:     fluticasone propionate (FLONASE) 50 mcg/actuation nasal spray, 1 spray (50 mcg total) by Each Nostril route once daily., Disp: 9.9 mL, Rfl: 1    ibuprofen (ADVIL,MOTRIN) 800 MG tablet, Take 800 mg by mouth every 8 (eight) hours as needed., Disp: , Rfl:     multivitamin capsule, Take 1 capsule by mouth once daily., Disp: , Rfl:     vitamin D (VITAMIN D3) 1000 units Tab, Take 1,000 Units by mouth once daily., Disp: , Rfl:     zinc gluconate 50 mg tablet, Take 50 mg by mouth once daily., Disp: , Rfl:     Review of Systems   Constitutional:  Positive for fatigue. Negative for activity change, appetite change, chills, fever and  unexpected weight change.   HENT:  Negative for congestion, ear pain, hearing loss, postnasal drip, sinus pain, sneezing, sore throat, tinnitus and trouble swallowing.    Eyes:  Negative for pain, discharge and visual disturbance.   Respiratory:  Negative for cough, choking, shortness of breath and wheezing.    Cardiovascular:  Negative for chest pain, palpitations and leg swelling.   Gastrointestinal:  Negative for abdominal distention, abdominal pain, blood in stool, constipation, diarrhea, nausea and vomiting.        Some spicey food intolerance   Endocrine: Negative for cold intolerance and heat intolerance.   Genitourinary:  Negative for difficulty urinating, dysuria, frequency, pelvic pain and urgency.        Stress incontinence   Musculoskeletal:  Positive for arthralgias. Negative for back pain, joint swelling and neck pain.   Skin:  Negative for pallor and rash.   Allergic/Immunologic: Negative for environmental allergies and food allergies.   Neurological:  Negative for dizziness, tremors, weakness, numbness and headaches.   Hematological:  Does not bruise/bleed easily.   Psychiatric/Behavioral:  Positive for sleep disturbance. Negative for agitation, confusion, dysphoric mood and suicidal ideas. The patient is not nervous/anxious.         Objective:      Vitals    Vitals - 1 value per visit 12/20/2022 1/26/2023 1/26/2023 1/30/2023 1/30/2023   SYSTOLIC 144 - 126 - 126   DIASTOLIC 85 - 80 - 80   Pulse 90 - - - 64   Temp 97.6 - - - 97.6   Resp 16 - - - 14   SPO2 97 - - - 96   Weight (lb) 161 - 163 - 164   Weight (kg) 73.029 - 73.936 - 74.39   Height 58 - 58 - 58   BMI (Calculated) 33.7 - 34.1 - 34.3   VISIT REPORT - - - - -   Pain Score  - 8 - 0 -   Some recent data might be hidden       Physical Exam  Vitals and nursing note reviewed.   Constitutional:       General: She is not in acute distress.     Appearance: She is obese. She is not ill-appearing, toxic-appearing or diaphoretic.   HENT:      Head:  Normocephalic and atraumatic.   Eyes:      Extraocular Movements: Extraocular movements intact.      Pupils: Pupils are equal, round, and reactive to light.   Neck:      Vascular: No carotid bruit.   Cardiovascular:      Rate and Rhythm: Normal rate and regular rhythm.      Pulses: Normal pulses.      Heart sounds: Normal heart sounds.   Pulmonary:      Effort: Pulmonary effort is normal.      Breath sounds: Normal breath sounds.   Abdominal:      General: Bowel sounds are normal.      Palpations: Abdomen is soft.   Musculoskeletal:      Right lower leg: No edema.      Left lower leg: No edema.   Lymphadenopathy:      Cervical: No cervical adenopathy.   Skin:     General: Skin is warm and dry.   Neurological:      General: No focal deficit present.      Mental Status: She is alert and oriented to person, place, and time. Mental status is at baseline.   Psychiatric:         Mood and Affect: Mood normal.         Thought Content: Thought content normal.         Assessment:       1. Elevated ALT measurement    2. Benign hypertension         Plan:       Elevated ALT measurement  -     Ambulatory referral/consult to Gastroenterology; Future; Expected date: 02/06/2023    Benign hypertension        -     controlled    Follow up in about 3 months (around 4/30/2023) for FOLLOW UP LABS, FOLLOW-UP STATUS, FOLLOW UP MEDICATIONS.        1/30/2023 Kasie Arthur M.D.

## 2023-01-30 ENCOUNTER — OFFICE VISIT (OUTPATIENT)
Dept: FAMILY MEDICINE | Facility: CLINIC | Age: 73
End: 2023-01-30
Payer: MEDICARE

## 2023-01-30 VITALS
OXYGEN SATURATION: 96 % | TEMPERATURE: 98 F | HEIGHT: 58 IN | HEART RATE: 64 BPM | DIASTOLIC BLOOD PRESSURE: 80 MMHG | RESPIRATION RATE: 14 BRPM | SYSTOLIC BLOOD PRESSURE: 126 MMHG | WEIGHT: 164 LBS | BODY MASS INDEX: 34.43 KG/M2

## 2023-01-30 DIAGNOSIS — R74.01 ELEVATED ALT MEASUREMENT: Primary | ICD-10-CM

## 2023-01-30 DIAGNOSIS — I10 BENIGN HYPERTENSION: ICD-10-CM

## 2023-01-30 PROCEDURE — 99215 OFFICE O/P EST HI 40 MIN: CPT | Performed by: INTERNAL MEDICINE

## 2023-01-30 PROCEDURE — 99214 OFFICE O/P EST MOD 30 MIN: CPT | Mod: S$PBB,AQ,, | Performed by: INTERNAL MEDICINE

## 2023-01-30 PROCEDURE — 99214 PR OFFICE/OUTPT VISIT, EST, LEVL IV, 30-39 MIN: ICD-10-PCS | Mod: S$PBB,AQ,, | Performed by: INTERNAL MEDICINE

## 2023-02-06 ENCOUNTER — HOSPITAL ENCOUNTER (OUTPATIENT)
Dept: RADIOLOGY | Facility: HOSPITAL | Age: 73
Discharge: HOME | End: 2023-02-06
Attending: ORTHOPAEDIC SURGERY
Payer: MEDICARE

## 2023-02-06 DIAGNOSIS — M75.101 NONTRAUMATIC TEAR OF RIGHT ROTATOR CUFF, UNSPECIFIED TEAR EXTENT: ICD-10-CM

## 2023-02-06 PROCEDURE — 73221 MRI JOINT UPR EXTREM W/O DYE: CPT | Mod: TC,PO,RT

## 2023-02-09 ENCOUNTER — OFFICE VISIT (OUTPATIENT)
Dept: ORTHOPEDICS | Facility: CLINIC | Age: 73
End: 2023-02-09
Payer: MEDICARE

## 2023-02-09 VITALS — HEIGHT: 58 IN | WEIGHT: 164 LBS | BODY MASS INDEX: 34.43 KG/M2

## 2023-02-09 DIAGNOSIS — M75.121 NONTRAUMATIC COMPLETE TEAR OF RIGHT ROTATOR CUFF: Primary | ICD-10-CM

## 2023-02-09 DIAGNOSIS — M19.011 ARTHROPATHY OF RIGHT SHOULDER: ICD-10-CM

## 2023-02-09 PROCEDURE — 99213 PR OFFICE/OUTPT VISIT, EST, LEVL III, 20-29 MIN: ICD-10-PCS | Mod: S$GLB,,, | Performed by: ORTHOPAEDIC SURGERY

## 2023-02-09 PROCEDURE — 99213 OFFICE O/P EST LOW 20 MIN: CPT | Mod: S$GLB,,, | Performed by: ORTHOPAEDIC SURGERY

## 2023-02-09 NOTE — PROGRESS NOTES
[facility] ORTHOPEDICS    Subjective:     Chief Complaint:   Chief Complaint   Patient presents with    Right Shoulder - Pain     Right shoulder pain follow up. Here today for MRI results. Denies any new pain or symptoms       Past Medical History:   Diagnosis Date    Arthritis     Breast cancer     right    Pain in finger     quentin long finger pain    Wears glasses     WEARS CONTACS    Wears partial dentures     UPPER AND LOWER       Past Surgical History:   Procedure Laterality Date    BREAST RECONSTRUCTION      CARPAL TUNNEL RELEASE       SECTION  1970, ,     CHOLECYSTECTOMY  2019        COLONOSCOPY  2017    JOINT REPLACEMENT Right     KNEE    KNEE ARTHROPLASTY Right 6/10/2019    Procedure: ARTHROPLASTY, KNEE;  Surgeon: Jony Marmolejo MD;  Location: [facility] OR;  Service: Orthopedics;  Laterality: Right;    KNEE ARTHROPLASTY Left 2021    Procedure: ARTHROPLASTY, KNEE;  Surgeon: Jony Marmolejo MD;  Location: [facility] OR;  Service: Orthopedics;  Laterality: Left;    KNEE ARTHROSCOPY Bilateral 2008    MASTECTOMY Right 2007    PORTACATH PLACEMENT  2007    PORTACATH REMOVAL      SURGICAL REMOVAL OF BONE SPUR Bilateral     TUBAL LIGATION         Current Outpatient Medications   Medication Sig    alendronate (FOSAMAX) 70 MG tablet Take 1 tablet (70 mg total) by mouth every 7 days.    ascorbic acid, vitamin C, (VITAMIN C) 1000 MG tablet Take 1,000 mg by mouth once daily.    calcium carbonate (CALCIUM 600 ORAL) Take by mouth 2 (two) times daily.    fluticasone propionate (FLONASE) 50 mcg/actuation nasal spray 1 spray (50 mcg total) by Each Nostril route once daily.    ibuprofen (ADVIL,MOTRIN) 800 MG tablet Take 800 mg by mouth every 8 (eight) hours as needed.    multivitamin capsule Take 1 capsule by mouth once daily.    vitamin D (VITAMIN D3) 1000 units Tab Take 1,000 Units by mouth once daily.    zinc gluconate 50 mg tablet Take 50 mg by mouth once daily.     No current  facility-administered medications for this visit.       Review of patient's allergies indicates:  No Known Allergies    Family History   Problem Relation Age of Onset    Arthritis Mother     Hyperlipidemia Mother     Stroke Mother     Dementia Mother     Eczema Daughter     Eczema Grandchild     Lupus Neg Hx     Psoriasis Neg Hx     Melanoma Neg Hx        Social History     Socioeconomic History    Marital status:    Tobacco Use    Smoking status: Former     Packs/day: 0.25     Types: Cigarettes     Start date:      Quit date:      Years since quittin.1    Smokeless tobacco: Never    Tobacco comments:     social smoker - on weekends only - quit 20 yrs ago   Substance and Sexual Activity    Alcohol use: Yes     Alcohol/week: 0.0 standard drinks     Comment: occ    Drug use: No    Sexual activity: Not Currently     Partners: Male       History of present illness: 72-year-old female returns to clinic today to follow-up on her bilateral shoulders.  We have treated both the shoulder before in the past.  We injected both shoulders a few weeks ago.  The left has gotten better, the right still bothers her.  We did an MRI of the right shoulder to evaluate for rotator cuff arthropathy.  She is here today to review those results.    We did a left rotator a few years back.  We have also treated her for subacromial impingement, right shoulder we did an injection in .     She is here today with complaints of pain and limited range of motion, right shoulder.  Similar left but not as severe as the right.      Review of Systems:    Constitution: Negative for chills, fever, and sweats.  Negative for unexplained weight loss.    HENT:  Negative for headaches and blurry vision.    Cardiovascular:Negative for chest pain or irregular heart beat. Negative for hypertension.    Respiratory:  Negative for cough and shortness of breath.    Gastrointestinal: Negative for abdominal pain, heartburn, melena, nausea, and  vomitting.    Genitourinary:  Negative bladder incontinence and dysuria.    Musculoskeletal:  See HPI for details.     Neurological: Negative for numbness.    Psychiatric/Behavioral: Negative for depression.  The patient is not nervous/anxious.      Endocrine: Negative for polyuria    Hematologic/Lymphatic: Negative for bleeding problem.  Does not bruise/bleed easily.    Skin: Negative for poor would healing and rash    Objective:      Physical Examination:    Vital Signs:  There were no vitals filed for this visit.    Body mass index is 34.28 kg/m².    This a well-developed, well nourished patient in no acute distress.  They are alert and oriented and cooperative to examination.        Examination of the right shoulder, skin is dry and intact, no erythema ecchymosis, no signs symptoms of infection.  Range of motion, patient cannot forward flex greater than 90°, extremely painful passively maybe get her a few degrees higher than that.  External rotation is limited to less than 45°, internal rotation to the level Of the right hip.  She has pain with all impingement test, Yobany's test painful and weak.     Examination of the left shoulder, skin is dry and intact, no erythema ecchymosis, no signs symptoms of infection.  Range of motion is slightly better than the right shoulder. She can forward flex to about 100 and 30°, externally rotate to 70°, internally rotate to posterior left hip.  Pain with all impingement test, Yobany's test painful but not grossly weak.    Pertinent New Results:  Narrative & Impression  REASON: Right shoulder pain. Possible rotator cuff tear.     TECHNIQUE: Multiplanar and multi sequential MRI of the right shoulder, without IV contrast.     COMPARISON: Shoulder radiograph January 26, 2023.     FINDINGS:     Focal full-thickness rotator cuff tear noted involving the supraspinatus and infraspinatus tendons at the overlap region with a few thin anterior supraspinatus fibers appear intact. More  inferior infraspinatus tendon fibers are intact. Torn tendon fibers retracted to the humeral head, nearly to the level of the glenohumeral joint. The tear measures approximately 1.4 cm in length by 1.2 cm wide. The subscapularis and teres minor tendons are intact. There is mild atrophy of the infraspinatus muscle with grade 1 fatty streaking. There is mild tendinosis of the biceps long head tendon at the rotator interval. Tendon sits the biceps groove.     The glenoid labrum is intact. The glenohumeral ligaments are intact. There is fluid in the subacromion/subdeltoid bursa.     There is moderate thinning of the humeral head articular cartilage. There is a near full-thickness fissure of the upper glenoid articular cartilage. Moderate humeral head and glenoid osteophytosis observed. There are degenerative changes of the humeral tuberosities with subcortical degenerative cyst. A loose body is noted in the axillary pouch measuring 11 x 8 mm.     The deltoid muscle is intact. No other gross soft tissue abnormality observed. There are mild-to-moderate degenerative changes of the AC joint.     IMPRESSION:     1.  Focal full-thickness rotator cuff tear involving the supraspinatus and infraspinatus tendons at the overlap region with tendon fibers retracted to the level humeral head. This tear measures approximately 1.4 cm in length by 1.2 cm wide.  2.  Associated mild atrophy of the infraspinatus with grade 1 fatty streaking.  3.  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 I AC joint osteoarthropathy.  4.  11 x 8 mm loose body in the axillary pouch.  5.  Mild tendinosis of biceps long head tendon.     Electronically signed by:  Jayy Rizzo DO  2/6/2023 11:17 AM [facility] Workstation: [id number]           Specimen Collected: 02/06/23 10:21 Last Resulted: 02/06/23 11:17           XRAY Report / Interpretation:       Assessment/Plan:      72-year-old female with shoulder arthropathy.  We signed her up today  "for right reverse total shoulder arthroplasty.  Risks and benefits were discussed with the patient in great detail.    Patient was consented for surgery. Risks and benefits of the procedure were discussed in great detail. Complications may include but are not limited to bleeding, infection, damage to nerves, arteries, blood vessels, bones, tendons, ligaments, stiffness, instability, deep vein thrombus (DVT), pulmonary embolus (PE), altered sensation, continued pain, RSD, loss of motion or a need for additional surgery. As well as general anesthetic complications including seizure, stroke, heart attack and even death.    Jadon Flores, Physician Assistant, served in the capacity as a "scribe" for this patient encounter.  A "face-to-face" encounter occurred with Dr. Jony Marmolejo on this date.  The treatment plan and medical decision-making is outlined above. Patient was seen and examined with a chaperone.       This note was created using Dragon voice recognition software that occasionally misinterpreted phrases or words.        "

## 2023-02-10 DIAGNOSIS — M12.811 ROTATOR CUFF ARTHROPATHY, RIGHT: Primary | ICD-10-CM

## 2023-02-10 DIAGNOSIS — M12.811 ROTATOR CUFF ARTHROPATHY OF RIGHT SHOULDER: ICD-10-CM

## 2023-02-10 DIAGNOSIS — Z01.818 PRE-OP TESTING: ICD-10-CM

## 2023-02-14 DIAGNOSIS — K75.81 NONALCOHOLIC STEATOHEPATITIS (NASH): Primary | ICD-10-CM

## 2023-03-02 ENCOUNTER — HOSPITAL ENCOUNTER (OUTPATIENT)
Dept: RADIOLOGY | Facility: HOSPITAL | Age: 73
Discharge: HOME | End: 2023-03-02
Attending: INTERNAL MEDICINE
Payer: MEDICARE

## 2023-03-02 DIAGNOSIS — K75.81 NONALCOHOLIC STEATOHEPATITIS (NASH): ICD-10-CM

## 2023-03-02 PROCEDURE — 76700 US EXAM ABDOM COMPLETE: CPT | Mod: TC,PO

## 2023-04-03 ENCOUNTER — ANESTHESIA EVENT (OUTPATIENT)
Dept: SURGERY | Facility: HOSPITAL | Age: 73
End: 2023-04-03
Payer: MEDICARE

## 2023-04-03 ENCOUNTER — HOSPITAL ENCOUNTER (OUTPATIENT)
Dept: RADIOLOGY | Facility: HOSPITAL | Age: 73
Discharge: HOME | End: 2023-04-03
Attending: ORTHOPAEDIC SURGERY
Payer: MEDICARE

## 2023-04-03 ENCOUNTER — HOSPITAL ENCOUNTER (OUTPATIENT)
Dept: PREADMISSION TESTING | Facility: HOSPITAL | Age: 73
Discharge: HOME | End: 2023-04-03
Attending: ORTHOPAEDIC SURGERY
Payer: MEDICARE

## 2023-04-03 VITALS — HEIGHT: 58 IN | BODY MASS INDEX: 34.43 KG/M2 | WEIGHT: 164 LBS

## 2023-04-03 DIAGNOSIS — Z01.818 PRE-OP TESTING: ICD-10-CM

## 2023-04-03 DIAGNOSIS — M12.811 ROTATOR CUFF ARTHROPATHY, RIGHT: ICD-10-CM

## 2023-04-03 LAB
ABO + RH BLD: NORMAL
ALBUMIN SERPL BCP-MCNC: 3.9 G/DL (ref 3.5–5.2)
ALP SERPL-CCNC: 121 U/L (ref 55–135)
ALT SERPL W/O P-5'-P-CCNC: 57 U/L (ref 10–44)
ANION GAP SERPL CALC-SCNC: 8 MMOL/L (ref 8–16)
AST SERPL-CCNC: 79 U/L (ref 10–40)
BASOPHILS # BLD AUTO: 0.09 K/UL (ref 0–0.2)
BASOPHILS NFR BLD: 1.2 % (ref 0–1.9)
BILIRUB SERPL-MCNC: 0.9 MG/DL (ref 0.1–1)
BLD GP AB SCN CELLS X3 SERPL QL: NORMAL
BUN SERPL-MCNC: 10 MG/DL (ref 8–23)
CALCIUM SERPL-MCNC: 9.6 MG/DL (ref 8.7–10.5)
CHLORIDE SERPL-SCNC: 104 MMOL/L (ref 95–110)
CO2 SERPL-SCNC: 26 MMOL/L (ref 23–29)
CREAT SERPL-MCNC: 0.7 MG/DL (ref 0.5–1.4)
DIFFERENTIAL METHOD: ABNORMAL
EOSINOPHIL # BLD AUTO: 0.2 K/UL (ref 0–0.5)
EOSINOPHIL NFR BLD: 2.8 % (ref 0–8)
ERYTHROCYTE [DISTWIDTH] IN BLOOD BY AUTOMATED COUNT: 13 % (ref 11.5–14.5)
EST. GFR  (NO RACE VARIABLE): >60 ML/MIN/1.73 M^2
GLUCOSE SERPL-MCNC: 130 MG/DL (ref 70–110)
HCT VFR BLD AUTO: 45.1 % (ref 37–48.5)
HGB BLD-MCNC: 15.7 G/DL (ref 12–16)
IMM GRANULOCYTES # BLD AUTO: 0.02 K/UL (ref 0–0.04)
IMM GRANULOCYTES NFR BLD AUTO: 0.3 % (ref 0–0.5)
LYMPHOCYTES # BLD AUTO: 2.8 K/UL (ref 1–4.8)
LYMPHOCYTES NFR BLD: 36.9 % (ref 18–48)
MCH RBC QN AUTO: 33 PG (ref 27–31)
MCHC RBC AUTO-ENTMCNC: 34.8 G/DL (ref 32–36)
MCV RBC AUTO: 95 FL (ref 82–98)
MONOCYTES # BLD AUTO: 0.7 K/UL (ref 0.3–1)
MONOCYTES NFR BLD: 8.8 % (ref 4–15)
NEUTROPHILS # BLD AUTO: 3.8 K/UL (ref 1.8–7.7)
NEUTROPHILS NFR BLD: 50 % (ref 38–73)
NRBC BLD-RTO: 0 /100 WBC
PLATELET # BLD AUTO: 191 K/UL (ref 150–450)
PMV BLD AUTO: 11.1 FL (ref 9.2–12.9)
POTASSIUM SERPL-SCNC: 3.6 MMOL/L (ref 3.5–5.1)
PROT SERPL-MCNC: 7.5 G/DL (ref 6–8.4)
RBC # BLD AUTO: 4.76 M/UL (ref 4–5.4)
SODIUM SERPL-SCNC: 138 MMOL/L (ref 136–145)
SPECIMEN OUTDATE: NORMAL
WBC # BLD AUTO: 7.61 K/UL (ref 3.9–12.7)

## 2023-04-03 PROCEDURE — 86900 BLOOD TYPING SEROLOGIC ABO: CPT | Performed by: ANESTHESIOLOGY

## 2023-04-03 PROCEDURE — 71046 XR CHEST PA AND LATERAL: ICD-10-PCS | Mod: 26,,, | Performed by: RADIOLOGY

## 2023-04-03 PROCEDURE — 99900104 DSU ONLY-NO CHARGE-EA ADD'L HR (STAT)

## 2023-04-03 PROCEDURE — 99900103 DSU ONLY-NO CHARGE-INITIAL HR (STAT)

## 2023-04-03 PROCEDURE — 93010 EKG 12-LEAD: ICD-10-PCS | Mod: ,,, | Performed by: INTERNAL MEDICINE

## 2023-04-03 PROCEDURE — 71046 X-RAY EXAM CHEST 2 VIEWS: CPT | Mod: TC,FY

## 2023-04-03 PROCEDURE — 93010 ELECTROCARDIOGRAM REPORT: CPT | Mod: ,,, | Performed by: INTERNAL MEDICINE

## 2023-04-03 PROCEDURE — 85025 COMPLETE CBC W/AUTO DIFF WBC: CPT | Performed by: ORTHOPAEDIC SURGERY

## 2023-04-03 PROCEDURE — 80053 COMPREHEN METABOLIC PANEL: CPT | Performed by: ORTHOPAEDIC SURGERY

## 2023-04-03 PROCEDURE — 87081 CULTURE SCREEN ONLY: CPT | Performed by: ANESTHESIOLOGY

## 2023-04-03 PROCEDURE — 93005 ELECTROCARDIOGRAM TRACING: CPT

## 2023-04-03 PROCEDURE — 71046 X-RAY EXAM CHEST 2 VIEWS: CPT | Mod: 26,,, | Performed by: RADIOLOGY

## 2023-04-03 NOTE — DISCHARGE INSTRUCTIONS
To confirm, Your doctor has instructed you that surgery is scheduled for: 4/10/2023    Please report to [facility], Registration the morning of surgery. You must check-in and receive a wristband before going to your procedure.    Pre-Op will call the afternoon prior to surgery between 1:00 and 6:00 PM with the final arrival time.  Phone number: [phone]    PLEASE NOTE:  The surgery schedule has many variables which may affect the time of your surgery case.  Family members should be available if your surgery time changes.  Plan to be here the day of your procedure between 4-6 hours.    MEDICATIONS:  TAKE ONLY THESE MEDICATIONS WITH A SMALL SIP OF WATER THE MORNING OF YOUR PROCEDURE:    NO MEDICATIONS      DO NOT TAKE THESE MEDICATIONS 5-7 DAYS PRIOR to your procedure or per your surgeon's request:   ASPIRIN, ALEVE, ADVIL, IBUPROFEN, FISH OIL VITAMIN E, HERBALS  (May take Tylenol)    ONLY if you are prescribed any types of blood thinners such as:  Aspirin, Coumadin, Plavix, Pradaxa, Xarelto, Aggrenox, Effient, Eliquis, Savasya, Brilinta, or any other, ask your surgeon whether you should stop taking them and how long before surgery you should stop.  You may also need to verify with the prescribing physician if it is ok to stop your medication.      INSTRUCTIONS IMPORTANT!!  Do not eat or drink anything between midnight and the time of your procedure- this includes gum, mints, and candy.  Do not smoke or drink alcoholic beverages 24 hours prior to your procedure.  Shower the night before AND the morning of your procedure with a Chlorhexidine wash such as Hibiclens or Dial antibacterial soap from the neck down.  Do not get it on your face or in your eyes.  You may use your own shampoo and face wash. This helps your skin to be as bacteria free as possible.    If you wear contact lenses, dentures, hearing aids or glasses, bring a container to put them in during surgery and give to a family member  for safe keeping.  Please leave all jewelry, piercing's and valuables at home.   DO NOT remove hair from the surgery site.  Do not shave the incision site unless you are given specific instructions to do so.    ONLY if you have been diagnosed with sleep apnea please bring your C-PAP machine.  ONLY if you wear home oxygen please bring your portable oxygen tank the day of your procedure.  ONLY if you have a history of OPEN HEART SURGERY you will need a clearance from your Cardiologist per Anesthesia.      ONLY for patients requiring bowel prep, written instructions will be given by your doctor's office.  ONLY if you have a neuro stimulator, please bring the controller with you the morning of surgery  ONLY if a type and screen test is needed before surgery, please return:  If your doctor has scheduled you for an overnight stay, bring a small overnight bag with any personal items you need.  Make arrangements in advance for transportation home by a responsible adult.  It is not safe to drive a vehicle during the 24 hours after anesthesia.      [facility] Visitor Policy    Effective September 26, 2022    [facility] will resume routine visitation for COVID-19 negative patients, including inpatients, outpatients, and procedural areas, in accordance with local campus procedures.    All [facility] facilities and properties are tobacco free.  Smoking is NOT allowed.   If you have any questions about these instructions, call Pre-Op Admit  Nursing at [phone] or the Pre-Op Day Surgery Unit at [phone].

## 2023-04-05 ENCOUNTER — TELEPHONE (OUTPATIENT)
Dept: ORTHOPEDICS | Facility: CLINIC | Age: 73
End: 2023-04-05

## 2023-04-05 DIAGNOSIS — Z96.611 STATUS POST REVERSE TOTAL SHOULDER REPLACEMENT, RIGHT: Primary | ICD-10-CM

## 2023-04-05 LAB — MRSA SPEC QL CULT: NORMAL

## 2023-04-05 RX ORDER — OXYCODONE AND ACETAMINOPHEN 7.5; 325 MG/1; MG/1
1 TABLET ORAL EVERY 6 HOURS PRN
Qty: 28 TABLET | Refills: 0 | Status: SHIPPED | OUTPATIENT
Start: 2023-04-05 | End: 2023-04-17

## 2023-04-05 RX ORDER — CELECOXIB 200 MG/1
200 CAPSULE ORAL 2 TIMES DAILY
Qty: 60 CAPSULE | Refills: 0 | Status: SHIPPED | OUTPATIENT
Start: 2023-04-05 | End: 2023-05-10

## 2023-04-05 RX ORDER — GABAPENTIN 300 MG/1
300 CAPSULE ORAL 2 TIMES DAILY
Qty: 60 CAPSULE | Refills: 0 | Status: SHIPPED | OUTPATIENT
Start: 2023-04-05 | End: 2023-07-24

## 2023-04-05 RX ORDER — DOCUSATE SODIUM 100 MG/1
100 CAPSULE, LIQUID FILLED ORAL 2 TIMES DAILY
Qty: 60 CAPSULE | Refills: 0 | Status: SHIPPED | OUTPATIENT
Start: 2023-04-05 | End: 2023-05-10

## 2023-04-10 ENCOUNTER — ANESTHESIA (OUTPATIENT)
Dept: SURGERY | Facility: HOSPITAL | Age: 73
End: 2023-04-10
Payer: MEDICARE

## 2023-04-10 ENCOUNTER — HOSPITAL ENCOUNTER (OUTPATIENT)
Facility: HOSPITAL | Age: 73
Discharge: HOME | End: 2023-04-10
Attending: ORTHOPAEDIC SURGERY | Admitting: ORTHOPAEDIC SURGERY
Payer: MEDICARE

## 2023-04-10 DIAGNOSIS — M12.811 ROTATOR CUFF ARTHROPATHY, RIGHT: ICD-10-CM

## 2023-04-10 DIAGNOSIS — M12.811 ROTATOR CUFF ARTHROPATHY OF RIGHT SHOULDER: ICD-10-CM

## 2023-04-10 PROCEDURE — 63600175 PHARM REV CODE 636 W HCPCS: Performed by: ANESTHESIOLOGY

## 2023-04-10 PROCEDURE — 71000033 HC RECOVERY, INTIAL HOUR: Performed by: ORTHOPAEDIC SURGERY

## 2023-04-10 PROCEDURE — 36000710: Performed by: ORTHOPAEDIC SURGERY

## 2023-04-10 PROCEDURE — 25000003 PHARM REV CODE 250: Performed by: ANESTHESIOLOGY

## 2023-04-10 PROCEDURE — C1713 ANCHOR/SCREW BN/BN,TIS/BN: HCPCS | Performed by: ORTHOPAEDIC SURGERY

## 2023-04-10 PROCEDURE — 63600175 PHARM REV CODE 636 W HCPCS

## 2023-04-10 PROCEDURE — 37000009 HC ANESTHESIA EA ADD 15 MINS: Performed by: ORTHOPAEDIC SURGERY

## 2023-04-10 PROCEDURE — D9220A PRA ANESTHESIA: Mod: ANES,,, | Performed by: ANESTHESIOLOGY

## 2023-04-10 PROCEDURE — D9220A PRA ANESTHESIA: ICD-10-PCS | Mod: ANES,,, | Performed by: ANESTHESIOLOGY

## 2023-04-10 PROCEDURE — 36000711: Performed by: ORTHOPAEDIC SURGERY

## 2023-04-10 PROCEDURE — 25000003 PHARM REV CODE 250: Performed by: NURSE ANESTHETIST, CERTIFIED REGISTERED

## 2023-04-10 PROCEDURE — C1769 GUIDE WIRE: HCPCS | Performed by: ORTHOPAEDIC SURGERY

## 2023-04-10 PROCEDURE — C9290 INJ, BUPIVACAINE LIPOSOME: HCPCS | Performed by: ANESTHESIOLOGY

## 2023-04-10 PROCEDURE — 71000039 HC RECOVERY, EACH ADD'L HOUR: Performed by: ORTHOPAEDIC SURGERY

## 2023-04-10 PROCEDURE — C1889 IMPLANT/INSERT DEVICE, NOC: HCPCS | Performed by: ORTHOPAEDIC SURGERY

## 2023-04-10 PROCEDURE — D9220A PRA ANESTHESIA: Mod: CRNA,,, | Performed by: NURSE ANESTHETIST, CERTIFIED REGISTERED

## 2023-04-10 PROCEDURE — 63600175 PHARM REV CODE 636 W HCPCS: Performed by: ORTHOPAEDIC SURGERY

## 2023-04-10 PROCEDURE — 25000003 PHARM REV CODE 250: Performed by: ORTHOPAEDIC SURGERY

## 2023-04-10 PROCEDURE — 71000015 HC POSTOP RECOV 1ST HR: Performed by: ORTHOPAEDIC SURGERY

## 2023-04-10 PROCEDURE — 37000008 HC ANESTHESIA 1ST 15 MINUTES: Performed by: ORTHOPAEDIC SURGERY

## 2023-04-10 PROCEDURE — C1776 JOINT DEVICE (IMPLANTABLE): HCPCS | Performed by: ORTHOPAEDIC SURGERY

## 2023-04-10 PROCEDURE — D9220A PRA ANESTHESIA: ICD-10-PCS | Mod: CRNA,,, | Performed by: NURSE ANESTHETIST, CERTIFIED REGISTERED

## 2023-04-10 PROCEDURE — 99900103 DSU ONLY-NO CHARGE-INITIAL HR (STAT): Performed by: ORTHOPAEDIC SURGERY

## 2023-04-10 PROCEDURE — 27201423 OPTIME MED/SURG SUP & DEVICES STERILE SUPPLY: Performed by: ORTHOPAEDIC SURGERY

## 2023-04-10 PROCEDURE — 99900104 DSU ONLY-NO CHARGE-EA ADD'L HR (STAT): Performed by: ORTHOPAEDIC SURGERY

## 2023-04-10 PROCEDURE — 63600175 PHARM REV CODE 636 W HCPCS: Performed by: NURSE ANESTHETIST, CERTIFIED REGISTERED

## 2023-04-10 PROCEDURE — 94799 UNLISTED PULMONARY SVC/PX: CPT

## 2023-04-10 PROCEDURE — 64415 NJX AA&/STRD BRCH PLXS IMG: CPT | Performed by: ANESTHESIOLOGY

## 2023-04-10 DEVICE — INSERT SOCKET E+ SM STD SZ32: Type: IMPLANTABLE DEVICE | Site: SHOULDER | Status: FUNCTIONAL

## 2023-04-10 DEVICE — HEAD GLENOID RSP 4MM OFFSET: Type: IMPLANTABLE DEVICE | Site: SHOULDER | Status: FUNCTIONAL

## 2023-04-10 DEVICE — STEM ALTIVATE REV SZ8 108MM: Type: IMPLANTABLE DEVICE | Site: SHOULDER | Status: FUNCTIONAL

## 2023-04-10 DEVICE — SCREW BONE RSP 5 X 14M GLENOID: Type: IMPLANTABLE DEVICE | Site: SHOULDER | Status: FUNCTIONAL

## 2023-04-10 DEVICE — BASEPLATE GLENOID REV RSP P2: Type: IMPLANTABLE DEVICE | Site: SHOULDER | Status: FUNCTIONAL

## 2023-04-10 DEVICE — SCREW BONE RSP 6.5X26 GLENOID: Type: IMPLANTABLE DEVICE | Site: SHOULDER | Status: FUNCTIONAL

## 2023-04-10 RX ORDER — PHENYLEPHRINE HYDROCHLORIDE 10 MG/ML
INJECTION INTRAVENOUS
Status: DISCONTINUED | OUTPATIENT
Start: 2023-04-10 | End: 2023-04-10

## 2023-04-10 RX ORDER — TRANEXAMIC ACID 100 MG/ML
INJECTION, SOLUTION INTRAVENOUS
Status: DISCONTINUED | OUTPATIENT
Start: 2023-04-10 | End: 2023-04-10

## 2023-04-10 RX ORDER — NEOSTIGMINE METHYLSULFATE 1 MG/ML
INJECTION, SOLUTION INTRAVENOUS
Status: DISCONTINUED | OUTPATIENT
Start: 2023-04-10 | End: 2023-04-10

## 2023-04-10 RX ORDER — DEXAMETHASONE SODIUM PHOSPHATE 4 MG/ML
INJECTION, SOLUTION INTRA-ARTICULAR; INTRALESIONAL; INTRAMUSCULAR; INTRAVENOUS; SOFT TISSUE
Status: DISCONTINUED | OUTPATIENT
Start: 2023-04-10 | End: 2023-04-10

## 2023-04-10 RX ORDER — OXYCODONE HYDROCHLORIDE 5 MG/1
5 TABLET ORAL ONCE AS NEEDED
Status: DISCONTINUED | OUTPATIENT
Start: 2023-04-10 | End: 2023-04-10 | Stop reason: HOSPADM

## 2023-04-10 RX ORDER — CEFAZOLIN SODIUM 2 G/50ML
2 SOLUTION INTRAVENOUS
Status: COMPLETED | OUTPATIENT
Start: 2023-04-10 | End: 2023-04-10

## 2023-04-10 RX ORDER — LIDOCAINE HYDROCHLORIDE 20 MG/ML
INJECTION INTRAVENOUS
Status: DISCONTINUED | OUTPATIENT
Start: 2023-04-10 | End: 2023-04-10

## 2023-04-10 RX ORDER — ROCURONIUM BROMIDE 10 MG/ML
INJECTION, SOLUTION INTRAVENOUS
Status: DISCONTINUED | OUTPATIENT
Start: 2023-04-10 | End: 2023-04-10

## 2023-04-10 RX ORDER — ACETAMINOPHEN 10 MG/ML
INJECTION, SOLUTION INTRAVENOUS
Status: DISCONTINUED | OUTPATIENT
Start: 2023-04-10 | End: 2023-04-10

## 2023-04-10 RX ORDER — BUPIVACAINE HYDROCHLORIDE AND EPINEPHRINE 2.5; 5 MG/ML; UG/ML
INJECTION, SOLUTION EPIDURAL; INFILTRATION; INTRACAUDAL; PERINEURAL
Status: DISCONTINUED | OUTPATIENT
Start: 2023-04-10 | End: 2023-04-10 | Stop reason: HOSPADM

## 2023-04-10 RX ORDER — PROPOFOL 10 MG/ML
VIAL (ML) INTRAVENOUS
Status: DISCONTINUED | OUTPATIENT
Start: 2023-04-10 | End: 2023-04-10

## 2023-04-10 RX ORDER — ONDANSETRON 2 MG/ML
4 INJECTION INTRAMUSCULAR; INTRAVENOUS ONCE AS NEEDED
Status: DISCONTINUED | OUTPATIENT
Start: 2023-04-10 | End: 2023-04-10 | Stop reason: HOSPADM

## 2023-04-10 RX ORDER — ONDANSETRON 2 MG/ML
INJECTION INTRAMUSCULAR; INTRAVENOUS
Status: DISCONTINUED | OUTPATIENT
Start: 2023-04-10 | End: 2023-04-10

## 2023-04-10 RX ORDER — BUPIVACAINE HYDROCHLORIDE 5 MG/ML
INJECTION, SOLUTION EPIDURAL; INTRACAUDAL
Status: COMPLETED | OUTPATIENT
Start: 2023-04-10 | End: 2023-04-10

## 2023-04-10 RX ORDER — KETOROLAC TROMETHAMINE 30 MG/ML
INJECTION, SOLUTION INTRAMUSCULAR; INTRAVENOUS
Status: DISCONTINUED | OUTPATIENT
Start: 2023-04-10 | End: 2023-04-10

## 2023-04-10 RX ORDER — LIDOCAINE HYDROCHLORIDE 10 MG/ML
1 INJECTION, SOLUTION EPIDURAL; INFILTRATION; INTRACAUDAL; PERINEURAL ONCE
Status: COMPLETED | OUTPATIENT
Start: 2023-04-10 | End: 2023-04-10

## 2023-04-10 RX ORDER — MIDAZOLAM HYDROCHLORIDE 1 MG/ML
INJECTION INTRAMUSCULAR; INTRAVENOUS
Status: DISCONTINUED | OUTPATIENT
Start: 2023-04-10 | End: 2023-04-10

## 2023-04-10 RX ORDER — FENTANYL CITRATE 50 UG/ML
INJECTION, SOLUTION INTRAMUSCULAR; INTRAVENOUS
Status: DISCONTINUED | OUTPATIENT
Start: 2023-04-10 | End: 2023-04-10

## 2023-04-10 RX ORDER — FENTANYL CITRATE 50 UG/ML
25 INJECTION, SOLUTION INTRAMUSCULAR; INTRAVENOUS EVERY 5 MIN PRN
Status: DISCONTINUED | OUTPATIENT
Start: 2023-04-10 | End: 2023-04-10 | Stop reason: HOSPADM

## 2023-04-10 RX ORDER — SUCCINYLCHOLINE CHLORIDE 20 MG/ML
INJECTION INTRAMUSCULAR; INTRAVENOUS
Status: DISCONTINUED | OUTPATIENT
Start: 2023-04-10 | End: 2023-04-10

## 2023-04-10 RX ADMIN — PHENYLEPHRINE HYDROCHLORIDE 100 MCG: 10 INJECTION INTRAVENOUS at 10:04

## 2023-04-10 RX ADMIN — GLYCOPYRROLATE 0.4 MG: 0.2 INJECTION, SOLUTION INTRAMUSCULAR; INTRAVENOUS at 10:04

## 2023-04-10 RX ADMIN — PROPOFOL 150 MG: 10 INJECTION, EMULSION INTRAVENOUS at 08:04

## 2023-04-10 RX ADMIN — PHENYLEPHRINE HYDROCHLORIDE 100 MCG: 10 INJECTION INTRAVENOUS at 09:04

## 2023-04-10 RX ADMIN — MIDAZOLAM HYDROCHLORIDE 1 MG: 1 INJECTION, SOLUTION INTRAMUSCULAR; INTRAVENOUS at 08:04

## 2023-04-10 RX ADMIN — LIDOCAINE HYDROCHLORIDE 50 MG: 20 INJECTION, SOLUTION INTRAVENOUS at 08:04

## 2023-04-10 RX ADMIN — ACETAMINOPHEN 1000 MG: 10 INJECTION, SOLUTION INTRAVENOUS at 08:04

## 2023-04-10 RX ADMIN — FENTANYL CITRATE 50 MCG: 50 INJECTION, SOLUTION INTRAMUSCULAR; INTRAVENOUS at 08:04

## 2023-04-10 RX ADMIN — LIDOCAINE HYDROCHLORIDE 10 MG: 10 INJECTION, SOLUTION EPIDURAL; INFILTRATION; INTRACAUDAL; PERINEURAL at 06:04

## 2023-04-10 RX ADMIN — DEXAMETHASONE SODIUM PHOSPHATE 4 MG: 4 INJECTION, SOLUTION INTRA-ARTICULAR; INTRALESIONAL; INTRAMUSCULAR; INTRAVENOUS; SOFT TISSUE at 08:04

## 2023-04-10 RX ADMIN — ROCURONIUM BROMIDE 25 MG: 10 INJECTION, SOLUTION INTRAVENOUS at 08:04

## 2023-04-10 RX ADMIN — ONDANSETRON 4 MG: 2 INJECTION INTRAMUSCULAR; INTRAVENOUS at 08:04

## 2023-04-10 RX ADMIN — KETOROLAC TROMETHAMINE 30 MG: 30 INJECTION, SOLUTION INTRAMUSCULAR; INTRAVENOUS at 10:04

## 2023-04-10 RX ADMIN — NEOSTIGMINE METHYLSULFATE 4 MG: 1 INJECTION INTRAVENOUS at 10:04

## 2023-04-10 RX ADMIN — SODIUM CHLORIDE, SODIUM GLUCONATE, SODIUM ACETATE, POTASSIUM CHLORIDE AND MAGNESIUM CHLORIDE: 526; 502; 368; 37; 30 INJECTION, SOLUTION INTRAVENOUS at 09:04

## 2023-04-10 RX ADMIN — BUPIVACAINE HYDROCHLORIDE 5 ML: 5 INJECTION, SOLUTION EPIDURAL; INTRACAUDAL; PERINEURAL at 08:04

## 2023-04-10 RX ADMIN — ROCURONIUM BROMIDE 5 MG: 10 INJECTION, SOLUTION INTRAVENOUS at 08:04

## 2023-04-10 RX ADMIN — SUCCINYLCHOLINE CHLORIDE 120 MG: 20 INJECTION, SOLUTION INTRAMUSCULAR; INTRAVENOUS at 08:04

## 2023-04-10 RX ADMIN — TRANEXAMIC ACID 1000 MG: 100 INJECTION, SOLUTION INTRAVENOUS at 08:04

## 2023-04-10 RX ADMIN — CEFAZOLIN SODIUM 2 G: 2 SOLUTION INTRAVENOUS at 08:04

## 2023-04-10 RX ADMIN — BUPIVACAINE 10 ML: 13.3 INJECTION, SUSPENSION, LIPOSOMAL INFILTRATION at 08:04

## 2023-04-10 RX ADMIN — SODIUM CHLORIDE, SODIUM GLUCONATE, SODIUM ACETATE, POTASSIUM CHLORIDE AND MAGNESIUM CHLORIDE: 526; 502; 368; 37; 30 INJECTION, SOLUTION INTRAVENOUS at 06:04

## 2023-04-10 NOTE — ANESTHESIA PROCEDURE NOTES
Intubation    Date/Time: 4/10/2023 8:48 AM  Performed by: Kameron Dillard CRNA  Authorized by: Evelyn Baez MD     Intubation:     Induction:  Intravenous    Intubated:  Postinduction    Mask Ventilation:  Easy mask    Attempts:  1    Attempted By:  CRNA    Method of Intubation:  Video laryngoscopy    Blade:  Siddiqui 3    Laryngeal View Grade: Grade I - full view of cords      Difficult Airway Encountered?: No      Complications:  None    Airway Device:  Oral endotracheal tube    Airway Device Size:  7.0    Style/Cuff Inflation:  Cuffed (inflated to minimal occlusive pressure)    Tube secured:  21    Secured at:  The lips    Placement Verified By:  Capnometry    Complicating Factors:  None    Findings Post-Intubation:  BS equal bilateral and atraumatic/condition of teeth unchanged

## 2023-04-10 NOTE — ANESTHESIA PREPROCEDURE EVALUATION
04/10/2023  Camila Au is a 73 y.o., female.    Pre-op Assessment    I have reviewed the Patient Summary Reports.    I have reviewed the Nursing Notes. I have reviewed the NPO Status.   I have reviewed the Medications.     Review of Systems  Anesthesia Hx:  No problems with previous Anesthesia  Denies Family Hx of Anesthesia complications.   Denies Personal Hx of Anesthesia complications.   Social:  Former Smoker    Hematology/Oncology:         -- Cancer in past history: Breast   Cardiovascular:   Dysrhythmias atrial fibrillation ECG has been reviewed.    Pulmonary:   Asthma asymptomatic and mild    Musculoskeletal:   Arthritis         Physical Exam  General:  Obesity      Airway/Jaw/Neck:  Airway Findings: Mouth Opening: Normal   Tongue: Normal   General Airway Assessment: Adult Oropharynx Findings:  Mallampati: II  Jaw/Neck Findings:  Neck ROM: Normal ROM      Eyes/Ears/Nose:  Eyes/Ears/Nose Findings:    Dental:  Dental Findings:   Chest/Lungs:  Chest/Lungs Findings: Normal Respiratory Rate      Heart/Vascular:  Heart Findings: Rate: Normal  Rhythm: Regular Rhythm        Mental Status:  Mental Status Findings:  Cooperative, Alert and Oriented         Anesthesia Plan  Type of Anesthesia, risks & benefits discussed:  Anesthesia Type:  Gen ETT    Patient's Preference:   Plan Factors:          Intra-op Monitoring Plan: Standard ASA Monitors  Intra-op Monitoring Plan Comments:   Post Op Pain Control Plan: multimodal analgesia, peripheral nerve block and IV/PO Opioids PRN  Post Op Pain Control Plan Comments:     Induction:   IV  Beta Blocker:  Patient is not currently on a Beta-Blocker (No further documentation required).       Informed Consent: Informed consent signed with the Patient and all parties understand the risks and agree with anesthesia plan.  All questions answered.  Anesthesia consent  signed with patient.  ASA Score: 2     Day of Surgery Review of History & Physical:    H&P Update referred to the surgeon/provider.          Ready For Surgery From Anesthesia Perspective.           Physical Exam  General: Obesity    Airway:  Mallampati: II   Mouth Opening: Normal  Tongue: Normal  Neck ROM: Normal ROM    Chest/Lungs:  Normal Respiratory Rate    Heart:  Rate: Normal  Rhythm: Regular Rhythm          Anesthesia Plan  Type of Anesthesia, risks & benefits discussed:    Anesthesia Type: Gen ETT  Intra-op Monitoring Plan: Standard ASA Monitors  Post Op Pain Control Plan: multimodal analgesia, peripheral nerve block and IV/PO Opioids PRN  Induction:  IV  Airway Plan: Video  Informed Consent: Informed consent signed with the Patient and all parties understand the risks and agree with anesthesia plan.  All questions answered.   ASA Score: 2  Day of Surgery Review of History & Physical: H&P Update referred to the surgeon/provider.    Ready For Surgery From Anesthesia Perspective.       .

## 2023-04-10 NOTE — H&P
CC/Indication for Procedure: 73 y.o. female with Rotator cuff arthropathy, right [M12.811]  Rotator cuff arthropathy of right shoulder [M12.811].    Patient scheduled for WA RECONSTR TOTAL SHOULDER IMPLANT [36684] (ARTHROPLASTY, SHOULDER, TOTAL, REVERSE, RIGHT).    Past Medical History:   Diagnosis Date    Arthritis     Breast cancer     right    Pain in finger     quentin long finger pain    Wears glasses     WEARS CONTACS    Wears partial dentures     UPPER AND LOWER     Past Surgical History:   Procedure Laterality Date    BREAST RECONSTRUCTION      CARPAL TUNNEL RELEASE       SECTION  , ,     CHOLECYSTECTOMY  2019        COLONOSCOPY  2017    JOINT REPLACEMENT Right     KNEE    KNEE ARTHROPLASTY Right 6/10/2019    Procedure: ARTHROPLASTY, KNEE;  Surgeon: Jony Marmolejo MD;  Location: [facility] OR;  Service: Orthopedics;  Laterality: Right;    KNEE ARTHROPLASTY Left 2021    Procedure: ARTHROPLASTY, KNEE;  Surgeon: Jony Marmolejo MD;  Location: [facility] OR;  Service: Orthopedics;  Laterality: Left;    KNEE ARTHROSCOPY Bilateral 2008    MASTECTOMY Right 2007    PORTACATH PLACEMENT  2007    PORTACATH REMOVAL      SURGICAL REMOVAL OF BONE SPUR Bilateral     TUBAL LIGATION       Family History   Problem Relation Age of Onset    Arthritis Mother     Hyperlipidemia Mother     Stroke Mother     Dementia Mother     Eczema Daughter     Eczema Grandchild     Lupus Neg Hx     Psoriasis Neg Hx     Melanoma Neg Hx      Social History     Socioeconomic History    Marital status:    Tobacco Use    Smoking status: Former     Packs/day: 0.25     Types: Cigarettes     Start date:      Quit date:      Years since quittin.2    Smokeless tobacco: Never    Tobacco comments:     social smoker - on weekends only - quit 20 yrs ago   Substance and Sexual Activity    Alcohol use: Yes     Alcohol/week: 0.0 standard drinks     Comment: occ    Drug use: No    Sexual activity: Not  Currently     Partners: Male       Review of patient's allergies indicates:  No Known Allergies      Current Facility-Administered Medications:     cefazolin (ANCEF) 2 gram in dextrose 5% 50 mL IVPB (premix), 2 g, Intravenous, On Call Procedure, Jony Marmolejo MD    electrolyte-S (ISOLYTE), , Intravenous, Continuous, Jacinto Molina MD, Last Rate: 10 mL/hr at 04/10/23 0649, New Bag at 04/10/23 0649    tranexamic acid (CYKLOKAPRON) 1,000 mg in sodium chloride 0.9 % 100 mL IVPB (MB+), 1,000 mg, Intravenous, On Call Procedure, Jony Marmolejo MD    ROS:    Denies chest pain or palpitations  Denies shortness of breath  Denies fevers or chills  Denies chest pain  Denies abdominal pain    PE:    General Appearance: Well nourished  Orientation: Oriented to time, place, person  Mental Status: Alert  Heart: RRR  Lungs: CTA  Abdomen: Soft and non-tender    Anesthesia/Surgery risks, benefits and alternative options discussed and understood by patient/family.    This note was created using Dragon voice recognition software that occasionally misinterpreted phrases or words.

## 2023-04-10 NOTE — PLAN OF CARE
Patient and family given discharge instructions. Post-op appointment  April 11th at 1045. Prescriptions for Percocet, Celebrex, Gabapentin and Colace delivered at bedside. Educated on post-anesthesia precautions, dressing care and when to notify MD. Verbalized understanding. Peripheral IV removed with catheter intact. Dressing to shoulder clean, dry and intact. All belongings given back to patient. Patient transferred to car via wheelchair  and left with family to home.

## 2023-04-10 NOTE — DISCHARGE INSTRUCTIONS
General Information:    1.  Do not drink alcoholic beverages including beer for 24 hours or as long as you are on pain medication..  2.  Do not drive a motor vehicle, operate machinery or power tools, or signs legal papers for 24 hours or as long as you are on pain medication.   3.  You may experience light-headedness, dizziness, and sleepiness following surgery. Please do not stay alone. A responsible adult should be with you for this 24 hour period.  4.  Go home and rest.    5. Progress slowly to a normal diet unless instructed.  Otherwise, begin with liquids such as soft drinks, then soup and crackers working up to solid foods. Drink plenty of nonalcoholic fluids.  6.  Certain anesthetics and pain medications produce nausea and vomiting in certain       individuals. If nausea becomes a problem at home, call you doctor.    7. A nurse will be calling you sometime after surgery. Do not be alarmed. This is our way of finding out how you are doing.    8. Several times every hour while you are awake, take 2-3 deep breaths and cough. If you had stomach surgery hold a pillow or rolled towel firmly against your stomach before you cough. This will help with any pain the cough might cause.  9. Several times every hour while you are awake, pump and flex your feet 5-6 times and do foot circles. This will help prevent blood clots.    10.Call your doctor for severe pain, bleeding, fever, or signs or symptoms of infection (pain, swelling, redness, foul odor, drainage).     Post op instructions for prevention of DVT  What is deep vein thrombosis?  Deep vein thrombosis (DVT) is the medical term for blood clots in the deep veins of the leg.  These blood clots can be dangerous.  A DVT can block a blood vessel and keep blood from getting where it needs to go.  Another problem is that the clot can travel to other parts of the body such as the lungs.  A clot that travels to the lungs is called a pulmonary embolus (PE) and can cause  serious problems with breathing which can lead to death.  Am I at risk for DVT/PE?  If you are not very active, you are at risk of DVT.  Anyone confined to bed, sitting for long periods of time, recovering from surgery, etc. increases the risk of DVT.  Other risk factors are cancer diagnosis, certain medications, estrogen replacement in any form,older age, obesity, pregnancy, smoking, history of clotting disorders, and dehydration.  How will I know if I have a DVT?  Swelling in the lower leg  Pain  Warmth, redness, hardness or bulging of the vein  If you have any of these symptoms, call your doctors office right away.  Some people will not have any symptoms until the clot moves to the lungs.  What are the symptoms of a PE?  Panting, shortness of breath, or trouble breathing  Sharp, knife-like chest pain when you breathe  Coughing or coughing up blood  Rapid heartbeat  If you have any of these symptoms or get worse quickly, call 911 for emergency treatment.  How can I prevent a DVT?  Avoid long periods of inactivity and dont cross your legs--get up and walk around every hour or so.  Stay active--walking after surgery is highly encouraged.  This means you should get out of the house and walk in the neighborhood.  Going up and down stairs will not impair healing (unless advised against such activity by your doctor).    Drink plenty of noncaffeinated, nonalcoholic fluids each day to prevent dehydration.  Wear special support stockings, if they have been advised by your doctor.  If you travel, stop at least once an hour and walk around.  Avoid smoking (assistance with stopping is available through your healthcare provider)    Always notify your doctor if you are not able to follow the post operative instructions that are given to you at the time of discharge.  It may be necessary to prescribe one of the medications available to prevent DVT. We hope your stay was comfortable as you heal now, mend and rest.    For we  have enjoyed taking care of you by giving your our best.    And as you get better, by regaining your health and strength;   We count it as a privilege to have served you and hope your time at [facility] was well spent.      Thank  You!!!

## 2023-04-10 NOTE — ANESTHESIA PROCEDURE NOTES
Peripheral Block    Patient location during procedure: pre-op   Block not for primary anesthetic.  Reason for block: at surgeon's request and post-op pain management   Post-op Pain Location: right shoulder   Start time: 4/10/2023 8:26 AM  Timeout: 4/10/2023 8:25 AM   End time: 4/10/2023 8:30 AM    Staffing  Authorizing Provider: Evelyn Baez MD  Performing Provider: Evelyn Baez MD    Preanesthetic Checklist  Completed: patient identified, IV checked, site marked, risks and benefits discussed, surgical consent, monitors and equipment checked, pre-op evaluation and timeout performed  Peripheral Block  Patient position: sitting  Prep: ChloraPrep  Patient monitoring: heart rate, cardiac monitor, continuous pulse ox, continuous capnometry and frequent blood pressure checks  Block type: interscalene  Laterality: right  Injection technique: single shot  Needle  Needle type: Stimuplex   Needle gauge: 22 G  Needle length: 2 in  Needle localization: anatomical landmarks and ultrasound guidance   -ultrasound image captured on disc.  Assessment  Injection assessment: negative aspiration, negative parasthesia and local visualized surrounding nerve  Paresthesia pain: none  Heart rate change: no  Slow fractionated injection: yes  Pain Tolerance: comfortable throughout block and no complaints  Medications:    Medications: bupivacaine (pf) (MARCAINE) injection 0.5% - Perineural   5 mL - 4/10/2023 8:30:00 AM    Additional Notes  VSS.  [facility] RN monitoring vitals throughout procedure.  Patient tolerated procedure well.     Exparel 10mL

## 2023-04-10 NOTE — PLAN OF CARE
Patient ready for surgery. Surgery and anesthesia consents signed. Educated on incentive spirometer use. Belongings in pre-op locker. Brittney set up with text message notifications.

## 2023-04-10 NOTE — OP NOTE
[facility]  Surgery Department  Operative Note    SUMMARY     Date of Procedure: 4/10/2023     Procedure:  Right shoulder reverse total shoulder.  Right shoulder open biceps tenodesis    Surgeon(s) and Role:     * Jony Marmolejo MD - Primary    Assisting Surgeon:  Jessica    Pre-Operative Diagnosis: Rotator cuff arthropathy, right [M12.811]    Post-Operative Diagnosis: Post-Op Diagnosis Codes:     * Rotator cuff arthropathy, right [M12.811]    Anesthesia: Spinal    Intraoperative Findings:  Severe shoulder arthropathy with bicipital tendinitis    Description of the Findings of the Procedure:  Patient was placed on the operative table in supine position.  She was then placed in the beach chair position or.  She was prepped and draped in the usual sterile manner for surgery.  A deltopectoral approach was undertaken to the shoulder and carried down sharply through the skin.  The groove was identified.  The vein was protected.  The subscapularis was essentially non-existent but the small remnant was taken down and the shoulder was subluxed anteriorly.  A preliminary head cut was made at 30° of retroversion and the head was subluxed posteriorly with glenoid retractors.  The biceps tendon was extremely synovitic and flattened and it was released and tagged for later tenodesis.  We now used a guidewire and placed a drill hole into the glenoid.  It measured 28 mm.  We tapped the glenoid.  We now placed a base plate into position.  We had an outstanding fit.  Three locking screws were placed in addition.  We now tapped this fear into position.  The set screw was placed and we obtained 2 clicks.  We copiously irrigated.  We turned our attention back to the humerus.  I tapped my stem into position and then reamed the cup.  I placed a standard liner into position the construct trialed beautifully with excellent stability.  I removed the liner and removed the trial.  I pulsed and irrigated and tapped the actual  implant into position in the construct trialed beautifully.  I pulsed and irrigated again.  We closed the subscapular remnant with 2-0 FiberWire.  We tenodesed the biceps tendon in its groove with 2-0 FiberWire.  We irrigated again.  The subcu was closed with a running subcu Strattice fixed.  The skin was closed with 4-0 Monocryl.  Sterile dressings were applied and the patient was noted to be in stable condition pending an x-ray and neurovascular check    Complications: No    Estimated Blood Loss (EBL): * No values recorded between 4/10/2023  9:36 AM and 4/10/2023 10:42 AM *           Implants:   Implant Name Type Inv. Item Serial No.  Lot No. LRB No. Used Action   BASEPLATE GLENOID REV RSP P2 - [id number]  BASEPLATE GLENOID REV RSP P2  DJO [id number] Right 1 Implanted   SCREW BONE RSP 5 X 14M GLENOID - [id number]  SCREW BONE RSP 5 X 14M GLENOID  DJO [id number] Right 2 Implanted   SCREW BONE RSP 6.5X26 GLENOID - [id number]  SCREW BONE RSP 6.5X26 GLENOID  DJO [id number] Right 1 Implanted   HEAD GLENOID RSP 4MM OFFSET - [id number]  HEAD GLENOID RSP 4MM OFFSET  DJO [id number] Right 1 Implanted   STEM ALTIVATE REV SZ8 108MM - [id number]  STEM ALTIVATE REV SZ8 108MM  DJO [id number] Right 1 Implanted   INSERT SOCKET E+ SM STD SZ32 - [id number]  INSERT SOCKET E+ SM STD SZ32  DJO [id number] Right 1 Implanted       Specimens:   Specimen (24h ago, onward)      None                    Condition: Good    Disposition: PACU - hemodynamically stable.              Discharge Note    SUMMARY     Admit Date: 4/10/2023    Discharge Date and Time:  04/10/2023 10:42 AM    Hospital Course (synopsis of major diagnoses, care, treatment, and services provided during the course of the hospital stay): Uneventful      Final Diagnosis: Post-Op Diagnosis Codes:     * Rotator cuff arthropathy, right [M12.811]    Disposition: Home or Self Care    Follow Up/Patient Instructions:     Medications:  Reconciled Home Medications:   Current Discharge  Medication List        CONTINUE these medications which have NOT CHANGED    Details   alendronate (FOSAMAX) 70 MG tablet Take 1 tablet (70 mg total) by mouth every 7 days.  Qty: 12 tablet, Refills: 3    Associated Diagnoses: Osteopenia of multiple sites      calcium carbonate (CALCIUM 600 ORAL) Take by mouth 2 (two) times daily.      ibuprofen (ADVIL,MOTRIN) 800 MG tablet Take 800 mg by mouth every 8 (eight) hours as needed.      ascorbic acid, vitamin C, (VITAMIN C) 1000 MG tablet Take 1,000 mg by mouth once daily.      celecoxib (CELEBREX) 200 MG capsule Take 1 capsule (200 mg total) by mouth 2 (two) times daily. Take as directed twice daily for inflammation postoperatively after joint replacement surgery.  Qty: 60 capsule, Refills: 0    Associated Diagnoses: Status post reverse total shoulder replacement, right      docusate sodium (COLACE) 100 MG capsule Take 1 capsule (100 mg total) by mouth 2 (two) times daily.  Qty: 60 capsule, Refills: 0    Associated Diagnoses: Status post reverse total shoulder replacement, right      fluticasone propionate (FLONASE) 50 mcg/actuation nasal spray 1 spray (50 mcg total) by Each Nostril route once daily.  Qty: 9.9 mL, Refills: 1      gabapentin (NEURONTIN) 300 MG capsule Take 1 capsule (300 mg total) by mouth 2 (two) times daily. Take as directed twice daily after joint replacement surgery as an adjunct to pain medication.  Qty: 60 capsule, Refills: 0    Associated Diagnoses: Status post reverse total shoulder replacement, right      multivitamin capsule Take 1 capsule by mouth once daily.      oxyCODONE-acetaminophen (PERCOCET) 7.5-325 mg per tablet Take 1 tablet by mouth every 6 (six) hours as needed for Pain. Take as directed for postoperative pain after joint replacement surgery. Date of surgery:  April 10th  Qty: 28 tablet, Refills: 0    Comments: Quantity prescribed more than 7 day supply? NO  Associated Diagnoses: Status post reverse total shoulder replacement, right       vitamin D (VITAMIN D3) 1000 units Tab Take 1,000 Units by mouth once daily.      zinc gluconate 50 mg tablet Take 50 mg by mouth once daily.           Discharge Procedure Orders   Diet Adult Regular     Leave dressing on - Keep it clean, dry, and intact until clinic visit     Activity as tolerated

## 2023-04-10 NOTE — TRANSFER OF CARE
"Anesthesia Transfer of Care Note    Patient: Camila Au    Procedure(s) Performed: Procedure(s) (LRB):  ARTHROPLASTY, SHOULDER, TOTAL, REVERSE, RIGHT (Right)    Patient location: PACU    Anesthesia Type: general    Transport from OR: Transported from OR on 6-10 L/min O2 by face mask with adequate spontaneous ventilation    Post pain: adequate analgesia    Post assessment: no apparent anesthetic complications    Post vital signs: stable    Level of consciousness: sedated    Nausea/Vomiting: no nausea/vomiting    Complications: none    Transfer of care protocol was followed      Last vitals:   Visit Vitals  /75 (BP Location: Left arm, Patient Position: Lying)   Pulse 69   Temp 36.6 °C (97.8 °F) (Oral)   Resp 16   Ht 4' 10" (1.473 m)   Wt 74.4 kg (164 lb)   SpO2 96%   Breastfeeding No   BMI 34.28 kg/m²     "

## 2023-04-10 NOTE — ANESTHESIA POSTPROCEDURE EVALUATION
Anesthesia Post Evaluation    Patient: Camila Au    Procedure(s) Performed: Procedure(s) (LRB):  ARTHROPLASTY, SHOULDER, TOTAL, REVERSE, RIGHT (Right)    Final Anesthesia Type: general      Patient location during evaluation: PACU  Patient participation: Yes- Able to Participate  Level of consciousness: awake and alert  Post-procedure vital signs: reviewed and stable  Pain management: adequate  Airway patency: patent    PONV status at discharge: No PONV  Anesthetic complications: no      Cardiovascular status: blood pressure returned to baseline  Respiratory status: unassisted and room air  Hydration status: euvolemic  Follow-up not needed.          Vitals Value Taken Time   /72 04/10/23 1121   Temp 36.4 °C (97.5 °F) 04/10/23 1057   Pulse 74 04/10/23 1124   Resp 18 04/10/23 1124   SpO2 90 % 04/10/23 1123   Vitals shown include unvalidated device data.      No case tracking events are documented in the log.      Pain/Savana Score: Savana Score: 9 (4/10/2023 11:15 AM)

## 2023-04-10 NOTE — PROGRESS NOTES
Criteria met per anesthesia for transfer to post op tolerating po fluids. Denies pain . Xray done as ordered. Transferred to phase 2 per stretcher. Report given to Mayra

## 2023-04-11 ENCOUNTER — OFFICE VISIT (OUTPATIENT)
Dept: ORTHOPEDICS | Facility: CLINIC | Age: 73
End: 2023-04-11
Payer: MEDICARE

## 2023-04-11 VITALS
SYSTOLIC BLOOD PRESSURE: 118 MMHG | TEMPERATURE: 98 F | DIASTOLIC BLOOD PRESSURE: 61 MMHG | HEART RATE: 81 BPM | BODY MASS INDEX: 34.43 KG/M2 | HEIGHT: 58 IN | WEIGHT: 164 LBS | OXYGEN SATURATION: 95 % | RESPIRATION RATE: 16 BRPM

## 2023-04-11 VITALS
HEIGHT: 58 IN | WEIGHT: 164 LBS | BODY MASS INDEX: 34.43 KG/M2 | SYSTOLIC BLOOD PRESSURE: 114 MMHG | DIASTOLIC BLOOD PRESSURE: 74 MMHG

## 2023-04-11 DIAGNOSIS — Z96.611 STATUS POST REVERSE TOTAL SHOULDER REPLACEMENT, RIGHT: Primary | ICD-10-CM

## 2023-04-11 PROCEDURE — 99024 POSTOP FOLLOW-UP VISIT: CPT | Mod: S$GLB,POP,, | Performed by: PHYSICIAN ASSISTANT

## 2023-04-11 PROCEDURE — 99024 PR POST-OP FOLLOW-UP VISIT: ICD-10-PCS | Mod: S$GLB,POP,, | Performed by: PHYSICIAN ASSISTANT

## 2023-04-11 NOTE — PROGRESS NOTES
Very pleased with care given. Very pleased with all staff,.  States she understands all discharge instructions. Will see Dr. Marmolejo today as scheduled.

## 2023-04-11 NOTE — PROGRESS NOTES
[facility]  [address]  [place] [address]  Phone: [phone]   [phone]    Patient's PCP:Kasie Arthur MD  Referring Provider: No ref. provider found    POST-OP Note:    Subjective:        Chief Complaint:   Chief Complaint   Patient presents with    Right Shoulder - Post-op Evaluation     POD 1 S/P R REV TSA 4/10, doing ok       Past Medical History:   Diagnosis Date    Arthritis     Breast cancer     right    Pain in finger     quentin long finger pain    Wears glasses     WEARS CONTACS    Wears partial dentures     UPPER AND LOWER       Past Surgical History:   Procedure Laterality Date    BREAST RECONSTRUCTION      CARPAL TUNNEL RELEASE       SECTION  1970, ,     CHOLECYSTECTOMY  2019        COLONOSCOPY  2017    JOINT REPLACEMENT Right     KNEE    KNEE ARTHROPLASTY Right 6/10/2019    Procedure: ARTHROPLASTY, KNEE;  Surgeon: Jony Marmolejo MD;  Location: [facility] OR;  Service: Orthopedics;  Laterality: Right;    KNEE ARTHROPLASTY Left 2021    Procedure: ARTHROPLASTY, KNEE;  Surgeon: Jony Marmolejo MD;  Location: [facility] OR;  Service: Orthopedics;  Laterality: Left;    KNEE ARTHROSCOPY Bilateral 2008    MASTECTOMY Right 2007    PORTACATH PLACEMENT  2007    PORTACATH REMOVAL      SURGICAL REMOVAL OF BONE SPUR Bilateral     TUBAL LIGATION         Current Outpatient Medications   Medication Sig    alendronate (FOSAMAX) 70 MG tablet Take 1 tablet (70 mg total) by mouth every 7 days.    ascorbic acid, vitamin C, (VITAMIN C) 1000 MG tablet Take 1,000 mg by mouth once daily.    calcium carbonate (CALCIUM 600 ORAL) Take by mouth 2 (two) times daily.    celecoxib (CELEBREX) 200 MG capsule Take 1 capsule (200 mg total) by mouth 2 (two) times daily. Take as directed twice daily for inflammation postoperatively after joint replacement surgery.    docusate sodium (COLACE) 100 MG capsule Take 1 capsule (100 mg total) by mouth 2 (two) times daily.     fluticasone propionate (FLONASE) 50 mcg/actuation nasal spray 1 spray (50 mcg total) by Each Nostril route once daily.    gabapentin (NEURONTIN) 300 MG capsule Take 1 capsule (300 mg total) by mouth 2 (two) times daily. Take as directed twice daily after joint replacement surgery as an adjunct to pain medication.    ibuprofen (ADVIL,MOTRIN) 800 MG tablet Take 800 mg by mouth every 8 (eight) hours as needed.    multivitamin capsule Take 1 capsule by mouth once daily.    oxyCODONE-acetaminophen (PERCOCET) 7.5-325 mg per tablet Take 1 tablet by mouth every 6 (six) hours as needed for Pain. Take as directed for postoperative pain after joint replacement surgery. Date of surgery:      vitamin D (VITAMIN D3) 1000 units Tab Take 1,000 Units by mouth once daily.    zinc gluconate 50 mg tablet Take 50 mg by mouth once daily.     No current facility-administered medications for this visit.       Review of patient's allergies indicates:  No Known Allergies    Family History   Problem Relation Age of Onset    Arthritis Mother     Hyperlipidemia Mother     Stroke Mother     Dementia Mother     Eczema Daughter     Eczema Grandchild     Lupus Neg Hx     Psoriasis Neg Hx     Melanoma Neg Hx        Social History     Socioeconomic History    Marital status:    Tobacco Use    Smoking status: Former     Packs/day: 0.25     Types: Cigarettes     Start date:      Quit date:      Years since quittin.3    Smokeless tobacco: Never    Tobacco comments:     social smoker - on weekends only - quit 20 yrs ago   Substance and Sexual Activity    Alcohol use: Yes     Alcohol/week: 0.0 standard drinks     Comment: occ    Drug use: No    Sexual activity: Not Currently     Partners: Male       History of present illness:  73-year-old female, returns to clinic today postop day 1.  Status post right reverse total shoulder arthroplasty done yesterday April 10, 2023.  She is here today for wound check.      Review of  Systems:    Musculoskeletal:  See HPI       Objective:        Physical Examination:    Vital Signs:   Vitals:    04/11/23 1049   BP: 114/74       Body mass index is 34.28 kg/m².    This a well-developed, well nourished patient in no acute distress.  They are alert and oriented and cooperative to examination.        Examination of the right shoulder, the patient's surgical dressing was removed today.  No significant drainage was noted on the dressing.  The Dermabond tape is intact.  She does have some ecchymosis noted to the medial biceps area.  Otherwise no erythema is noted.  No evidence of wound failure dehiscence.  Unfortunately her block has worn off, she does have range of motion in the fingers hand wrist and elbow.  She had some pain with gentle abduction of the shoulder.    Pertinent New Results:     XRAY Report / Interpretation:        Assessment:       1. Status post reverse total shoulder replacement, right      Plan:     Status post reverse total shoulder replacement, right        Follow up for POD1.    73-year-old female 1 day after a right reverse total shoulder arthroplasty, generally she is doing well.  Even though her block has begun to wear off her pain is well managed.  She is taking her Percocet as prescribed.  I answered a lot of questions about the gabapentin, Celebrex and ducosate  Her sister was also present.  We discussed wound care, she can shower on Thursday.  Gentle scrubbing mild soap water pat dry apply clean bandage.  She can come out of her sling during the day, wear it at night.  We will see her back in 2 weeks..  Start physical therapy at that time.    @RM@  LUIZ Romano PA-C    This note was created using [employer] voice recognition software that occasionally misinterprets words or phrases.

## 2023-04-12 DIAGNOSIS — Z96.611 STATUS POST REVERSE TOTAL SHOULDER REPLACEMENT, RIGHT: Primary | ICD-10-CM

## 2023-04-12 RX ORDER — ONDANSETRON 4 MG/1
4 TABLET, FILM COATED ORAL EVERY 6 HOURS PRN
Qty: 28 TABLET | Refills: 1 | Status: SHIPPED | OUTPATIENT
Start: 2023-04-12 | End: 2023-07-24

## 2023-04-19 ENCOUNTER — OFFICE VISIT (OUTPATIENT)
Dept: FAMILY MEDICINE | Facility: CLINIC | Age: 73
End: 2023-04-19
Payer: MEDICARE

## 2023-04-19 ENCOUNTER — EXTERNAL HOME HEALTH (OUTPATIENT)
Dept: HOME HEALTH SERVICES | Facility: HOSPITAL | Age: 73
End: 2023-04-19
Payer: MEDICARE

## 2023-04-19 VITALS
RESPIRATION RATE: 16 BRPM | HEIGHT: 58 IN | SYSTOLIC BLOOD PRESSURE: 136 MMHG | BODY MASS INDEX: 35.68 KG/M2 | OXYGEN SATURATION: 93 % | TEMPERATURE: 98 F | HEART RATE: 82 BPM | WEIGHT: 170 LBS | DIASTOLIC BLOOD PRESSURE: 84 MMHG

## 2023-04-19 DIAGNOSIS — J18.9 PNEUMONIA OF BOTH LOWER LOBES DUE TO INFECTIOUS ORGANISM: Primary | ICD-10-CM

## 2023-04-19 LAB
CTP QC/QA: YES
CTP QC/QA: YES
FLUAV AG NPH QL: NEGATIVE
FLUBV AG NPH QL: NEGATIVE
SARS-COV-2 RDRP RESP QL NAA+PROBE: NEGATIVE

## 2023-04-19 PROCEDURE — 99213 PR OFFICE/OUTPT VISIT, EST, LEVL III, 20-29 MIN: ICD-10-PCS | Mod: S$PBB,AQ,, | Performed by: INTERNAL MEDICINE

## 2023-04-19 PROCEDURE — 99215 OFFICE O/P EST HI 40 MIN: CPT | Performed by: INTERNAL MEDICINE

## 2023-04-19 PROCEDURE — 87635 SARS-COV-2 COVID-19 AMP PRB: CPT | Mod: PBBFAC | Performed by: INTERNAL MEDICINE

## 2023-04-19 PROCEDURE — 99213 OFFICE O/P EST LOW 20 MIN: CPT | Mod: S$PBB,AQ,, | Performed by: INTERNAL MEDICINE

## 2023-04-19 PROCEDURE — 87804 INFLUENZA ASSAY W/OPTIC: CPT | Mod: 59,PBBFAC | Performed by: INTERNAL MEDICINE

## 2023-04-19 PROCEDURE — 96372 THER/PROPH/DIAG INJ SC/IM: CPT | Mod: PBBFAC | Performed by: INTERNAL MEDICINE

## 2023-04-19 RX ORDER — BENZONATATE 200 MG/1
200 CAPSULE ORAL 3 TIMES DAILY PRN
Qty: 30 CAPSULE | Refills: 0 | Status: SHIPPED | OUTPATIENT
Start: 2023-04-19 | End: 2023-04-29

## 2023-04-19 RX ORDER — AMOXICILLIN AND CLAVULANATE POTASSIUM 875; 125 MG/1; MG/1
1 TABLET, FILM COATED ORAL 2 TIMES DAILY
Qty: 20 TABLET | Refills: 0 | Status: SHIPPED | OUTPATIENT
Start: 2023-04-19 | End: 2023-05-05 | Stop reason: ALTCHOICE

## 2023-04-19 RX ORDER — ALBUTEROL SULFATE 90 UG/1
2 AEROSOL, METERED RESPIRATORY (INHALATION) EVERY 6 HOURS PRN
Qty: 6.7 G | Refills: 0 | Status: SHIPPED | OUTPATIENT
Start: 2023-04-19 | End: 2023-05-11

## 2023-04-19 RX ORDER — CEFTRIAXONE 1 G/1
1 INJECTION, POWDER, FOR SOLUTION INTRAMUSCULAR; INTRAVENOUS
Status: COMPLETED | OUTPATIENT
Start: 2023-04-19 | End: 2023-04-19

## 2023-04-19 RX ORDER — OXYCODONE AND ACETAMINOPHEN 7.5; 325 MG/1; MG/1
TABLET ORAL
COMMUNITY
Start: 2023-04-10 | End: 2023-04-24

## 2023-04-19 RX ADMIN — CEFTRIAXONE SODIUM 1 G: 1 INJECTION, POWDER, FOR SOLUTION INTRAMUSCULAR; INTRAVENOUS at 06:04

## 2023-04-19 NOTE — PROGRESS NOTES
SUBJECTIVE:    Patient ID: Camila Au is a 73 y.o. female.    Chief Complaint: Cough (With chest congestion. Patient did have surgery on 4-, started this soon after)    Cough  Associated symptoms include a fever and a sore throat. Pertinent negatives include no chest pain, chills, ear pain, headaches, postnasal drip, rash, shortness of breath or wheezing. There is no history of environmental allergies.     Patient states she had a shoulder revision 4-10-two days later she began with cough that was productive of green mucous and associated with a runny nose-she heard wheezing-she has had some some sinus congestion-no pain with deep breath- some shortness of breath-fever to 100.3 COVID not tested post op for COVID    Office Visit on 04/19/2023   Component Date Value Ref Range Status    POC Rapid COVID 04/19/2023 Negative  Negative Final     Acceptable 04/19/2023 Yes   Final    Rapid Influenza A Ag 04/19/2023 Negative  Negative Final    Rapid Influenza B Ag 04/19/2023 Negative  Negative Final     Acceptable 04/19/2023 Yes   Final   Hospital Outpatient Visit on 04/03/2023   Component Date Value Ref Range Status    Sodium 04/03/2023 138  136 - 145 mmol/L Final    Potassium 04/03/2023 3.6  3.5 - 5.1 mmol/L Final    Chloride 04/03/2023 104  95 - 110 mmol/L Final    CO2 04/03/2023 26  23 - 29 mmol/L Final    Glucose 04/03/2023 130 (H)  70 - 110 mg/dL Final    BUN 04/03/2023 10  8 - 23 mg/dL Final    Creatinine 04/03/2023 0.7  0.5 - 1.4 mg/dL Final    Calcium 04/03/2023 9.6  8.7 - 10.5 mg/dL Final    Total Protein 04/03/2023 7.5  6.0 - 8.4 g/dL Final    Albumin 04/03/2023 3.9  3.5 - 5.2 g/dL Final    Total Bilirubin 04/03/2023 0.9  0.1 - 1.0 mg/dL Final    Alkaline Phosphatase 04/03/2023 121  55 - 135 U/L Final    AST 04/03/2023 79 (H)  10 - 40 U/L Final    ALT 04/03/2023 57 (H)  10 - 44 U/L Final    Anion Gap 04/03/2023 8  8 - 16 mmol/L Final    eGFR 04/03/2023 >60  >60  mL/min/1.73 m^2 Final    WBC 04/03/2023 7.61  3.90 - 12.70 K/uL Final    RBC 04/03/2023 4.76  4.00 - 5.40 M/uL Final    Hemoglobin 04/03/2023 15.7  12.0 - 16.0 g/dL Final    Hematocrit 04/03/2023 45.1  37.0 - 48.5 % Final    MCV 04/03/2023 95  82 - 98 fL Final    MCH 04/03/2023 33.0 (H)  27.0 - 31.0 pg Final    MCHC 04/03/2023 34.8  32.0 - 36.0 g/dL Final    RDW 04/03/2023 13.0  11.5 - 14.5 % Final    Platelets 04/03/2023 191  150 - 450 K/uL Final    MPV 04/03/2023 11.1  9.2 - 12.9 fL Final    Immature Granulocytes 04/03/2023 0.3  0.0 - 0.5 % Final    Gran # (ANC) 04/03/2023 3.8  1.8 - 7.7 K/uL Final    Immature Grans (Abs) 04/03/2023 0.02  0.00 - 0.04 K/uL Final    Lymph # 04/03/2023 2.8  1.0 - 4.8 K/uL Final    Mono # 04/03/2023 0.7  0.3 - 1.0 K/uL Final    Eos # 04/03/2023 0.2  0.0 - 0.5 K/uL Final    Baso # 04/03/2023 0.09  0.00 - 0.20 K/uL Final    nRBC 04/03/2023 0  0 /100 WBC Final    Gran % 04/03/2023 50.0  38.0 - 73.0 % Final    Lymph % 04/03/2023 36.9  18.0 - 48.0 % Final    Mono % 04/03/2023 8.8  4.0 - 15.0 % Final    Eosinophil % 04/03/2023 2.8  0.0 - 8.0 % Final    Basophil % 04/03/2023 1.2  0.0 - 1.9 % Final    Differential Method 04/03/2023 Automated   Final    MRSA Surveillance Screen 04/03/2023 No MRSA isolated   Final    Group & Rh 04/03/2023 O POS   Final    Indirect Arthur 04/03/2023 NEG   Final    Specimen Outdate 04/03/2023 04/17/2023 23:59   Final   Lab Visit on 12/22/2022   Component Date Value Ref Range Status    BNP 12/22/2022 23  0 - 99 pg/mL Final    WBC 12/22/2022 7.42  3.90 - 12.70 K/uL Final    RBC 12/22/2022 4.83  4.00 - 5.40 M/uL Final    Hemoglobin 12/22/2022 15.7  12.0 - 16.0 g/dL Final    Hematocrit 12/22/2022 47.2  37.0 - 48.5 % Final    MCV 12/22/2022 98  82 - 98 fL Final    MCH 12/22/2022 32.5 (H)  27.0 - 31.0 pg Final    MCHC 12/22/2022 33.3  32.0 - 36.0 g/dL Final    RDW 12/22/2022 13.7  11.5 - 14.5 % Final    Platelets 12/22/2022 222  150 - 450 K/uL Final    MPV 12/22/2022  11.1  9.2 - 12.9 fL Final    Immature Granulocytes 12/22/2022 0.4  0.0 - 0.5 % Final    Gran # (ANC) 12/22/2022 3.4  1.8 - 7.7 K/uL Final    Immature Grans (Abs) 12/22/2022 0.03  0.00 - 0.04 K/uL Final    Lymph # 12/22/2022 2.9  1.0 - 4.8 K/uL Final    Mono # 12/22/2022 0.7  0.3 - 1.0 K/uL Final    Eos # 12/22/2022 0.3  0.0 - 0.5 K/uL Final    Baso # 12/22/2022 0.09  0.00 - 0.20 K/uL Final    nRBC 12/22/2022 0  0 /100 WBC Final    Gran % 12/22/2022 45.9  38.0 - 73.0 % Final    Lymph % 12/22/2022 39.5  18.0 - 48.0 % Final    Mono % 12/22/2022 9.6  4.0 - 15.0 % Final    Eosinophil % 12/22/2022 3.4  0.0 - 8.0 % Final    Basophil % 12/22/2022 1.2  0.0 - 1.9 % Final    Differential Method 12/22/2022 Automated   Final   Hospital Outpatient Visit on 10/21/2022   Component Date Value Ref Range Status    Left Atrium Major Axis 10/21/2022 4.10  cm Final    LA size 10/21/2022 4.20  cm Final    AORTIC VALVE CUSP SEPERATION 10/21/2022 1.50  cm Final    AV mean gradient 10/21/2022 5  mmHg Final    Ao VTI 10/21/2022 29.50  cm Final    Ao peak austen 10/21/2022 1.45  m/s Final    AV peak gradient 10/21/2022 8  mmHg Final    Ao root annulus 10/21/2022 3.50  cm Final    IVRT 10/21/2022 95.00  ms Final    IVS 10/21/2022 1.22 (A)  0.6 - 1.1 cm Final    LVIDd 10/21/2022 4.11  3.5 - 6.0 cm Final    LVIDs 10/21/2022 2.71  2.1 - 4.0 cm Final    LVOT diameter 10/21/2022 2.00  cm Final    LVOT peak VTI 10/21/2022 27.20  cm Final    LVOT peak austen 10/21/2022 1.05  m/s Final    Posterior Wall 10/21/2022 1.13 (A)  0.6 - 1.1 cm Final    E wave deceleration time 10/21/2022 190.00  ms Final    MV Peak A [place] 10/21/2022 0.30  m/s Final    MV Peak E [place] 10/21/2022 0.97  m/s Final    PV mean gradient 10/21/2022 1.00  mmHg Final    PV PEAK VELOCITY 10/21/2022 0.87  m/s Final    RVDD 10/21/2022 2.28  cm Final    BSA 10/21/2022 1.75  m2 Final    TDI SEPTAL 10/21/2022 0.10  m/s Final    LV LATERAL E/E' RATIO 10/21/2022 12.13  m/s Final    LV SEPTAL E/E' RATIO  10/21/2022 9.70  m/s Final    TDI LATERAL 10/21/2022 0.08  m/s Final    FS 10/21/2022 34  28 - 44 % Final    LV mass 10/21/2022 167.13  g Final    Left Ventricle Relative Wall Thick* 10/21/2022 0.55  cm Final    AV valve area 10/21/2022 2.90  cm2 Final    AV Velocity Ratio 10/21/2022 0.72   Final    AV index (prosthetic) 10/21/2022 0.92   Final    E/A ratio 10/21/2022 3.23   Final    Mean e' 10/21/2022 0.09  m/s Final    LVOT area 10/21/2022 3.1  cm2 Final    LVOT stroke volume 10/21/2022 85.41  cm3 Final    E/E' ratio 10/21/2022 10.78  m/s Final    LV Mass Index 10/21/2022 99  g/m2 Final    Right Atrial Pressure (from IVC) 10/21/2022 3  mmHg Final    EF 10/21/2022 57  % Final   Lab Visit on 10/21/2022   Component Date Value Ref Range Status    DANIEL 10/21/2022 Positive (A)   Final    CRP 10/21/2022 0.41  <0.76 mg/dL Final    Sodium 10/21/2022 137  136 - 145 mmol/L Final    Potassium 10/21/2022 3.8  3.5 - 5.1 mmol/L Final    Chloride 10/21/2022 101  95 - 110 mmol/L Final    CO2 10/21/2022 29  23 - 29 mmol/L Final    Glucose 10/21/2022 98  70 - 110 mg/dL Final    BUN 10/21/2022 16  8 - 23 mg/dL Final    Creatinine 10/21/2022 0.5  0.5 - 1.4 mg/dL Final    Calcium 10/21/2022 9.4  8.7 - 10.5 mg/dL Final    Total Protein 10/21/2022 7.8  6.0 - 8.4 g/dL Final    Albumin 10/21/2022 4.1  3.5 - 5.2 g/dL Final    Total Bilirubin 10/21/2022 1.1 (H)  0.1 - 1.0 mg/dL Final    Alkaline Phosphatase 10/21/2022 98  55 - 135 U/L Final    AST 10/21/2022 85 (H)  10 - 40 U/L Final    ALT 10/21/2022 65 (H)  10 - 44 U/L Final    Anion Gap 10/21/2022 7 (L)  8 - 16 mmol/L Final    eGFR 10/21/2022 >60.0  >60 mL/min/1.73 m^2 Final    CPK 10/21/2022 151  20 - 180 U/L Final    WBC 10/21/2022 6.81  3.90 - 12.70 K/uL Final    RBC 10/21/2022 5.23  4.00 - 5.40 M/uL Final    Hemoglobin 10/21/2022 17.3 (H)  12.0 - 16.0 g/dL Final    Hematocrit 10/21/2022 48.9 (H)  37.0 - 48.5 % Final    MCV 10/21/2022 94  82 - 98 fL Final    MCH 10/21/2022 33.1 (H)   27.0 - 31.0 pg Final    MCHC 10/21/2022 35.4  32.0 - 36.0 g/dL Final    RDW 10/21/2022 13.2  11.5 - 14.5 % Final    Platelets 10/21/2022 205  150 - 450 K/uL Final    MPV 10/21/2022 11.1  9.2 - 12.9 fL Final    Immature Granulocytes 10/21/2022 0.3  0.0 - 0.5 % Final    Gran # (ANC) 10/21/2022 3.3  1.8 - 7.7 K/uL Final    Immature Grans (Abs) 10/21/2022 0.02  0.00 - 0.04 K/uL Final    Lymph # 10/21/2022 2.8  1.0 - 4.8 K/uL Final    Mono # 10/21/2022 0.5  0.3 - 1.0 K/uL Final    Eos # 10/21/2022 0.1  0.0 - 0.5 K/uL Final    Baso # 10/21/2022 0.06  0.00 - 0.20 K/uL Final    nRBC 10/21/2022 0  0 /100 WBC Final    Gran % 10/21/2022 48.5  38.0 - 73.0 % Final    Lymph % 10/21/2022 40.8  18.0 - 48.0 % Final    Mono % 10/21/2022 7.9  4.0 - 15.0 % Final    Eosinophil % 10/21/2022 1.6  0.0 - 8.0 % Final    Basophil % 10/21/2022 0.9  0.0 - 1.9 % Final    Differential Method 10/21/2022 Automated   Final    Speckled Pattern 10/21/2022 1:160 (H)   Final    DANIEL Note 10/21/2022 Comment   Final   Lab Visit on 06/13/2022   Component Date Value Ref Range Status    Sodium 06/13/2022 137  136 - 145 mmol/L Final    Potassium 06/13/2022 3.9  3.5 - 5.1 mmol/L Final    Chloride 06/13/2022 101  95 - 110 mmol/L Final    CO2 06/13/2022 29  23 - 29 mmol/L Final    Glucose 06/13/2022 113 (H)  70 - 110 mg/dL Final    BUN 06/13/2022 11  8 - 23 mg/dL Final    Creatinine 06/13/2022 0.4 (L)  0.5 - 1.4 mg/dL Final    Calcium 06/13/2022 9.0  8.7 - 10.5 mg/dL Final    Anion Gap 06/13/2022 7 (L)  8 - 16 mmol/L Final    eGFR if African American 06/13/2022 >60.0  >60 mL/min/1.73 m^2 Final    eGFR if non African American 06/13/2022 >60.0  >60 mL/min/1.73 m^2 Final    Cholesterol 06/13/2022 231 (H)  120 - 199 mg/dL Final    Triglycerides 06/13/2022 89  30 - 150 mg/dL Final    HDL 06/13/2022 60  40 - 75 mg/dL Final    LDL Cholesterol 06/13/2022 153.2  63.0 - 159.0 mg/dL Final    HDL/Cholesterol Ratio 06/13/2022 26.0  20.0 - 50.0 % Final    Total Cholesterol/HDL  Ratio 06/13/2022 3.9  2.0 - 5.0 Final    Non-HDL Cholesterol 06/13/2022 171  mg/dL Final    WBC 06/13/2022 7.86  3.90 - 12.70 K/uL Final    RBC 06/13/2022 4.65  4.00 - 5.40 M/uL Final    Hemoglobin 06/13/2022 15.0  12.0 - 16.0 g/dL Final    Hematocrit 06/13/2022 44.4  37.0 - 48.5 % Final    MCV 06/13/2022 96  82 - 98 fL Final    MCH 06/13/2022 32.3 (H)  27.0 - 31.0 pg Final    MCHC 06/13/2022 33.8  32.0 - 36.0 g/dL Final    RDW 06/13/2022 13.8  11.5 - 14.5 % Final    Platelets 06/13/2022 182  150 - 450 K/uL Final    MPV 06/13/2022 11.2  9.2 - 12.9 fL Final    Immature Granulocytes 06/13/2022 0.4  0.0 - 0.5 % Final    Gran # (ANC) 06/13/2022 4.8  1.8 - 7.7 K/uL Final    Immature Grans (Abs) 06/13/2022 0.03  0.00 - 0.04 K/uL Final    Lymph # 06/13/2022 2.1  1.0 - 4.8 K/uL Final    Mono # 06/13/2022 0.8  0.3 - 1.0 K/uL Final    Eos # 06/13/2022 0.2  0.0 - 0.5 K/uL Final    Baso # 06/13/2022 0.08  0.00 - 0.20 K/uL Final    nRBC 06/13/2022 0  0 /100 WBC Final    Gran % 06/13/2022 60.7  38.0 - 73.0 % Final    Lymph % 06/13/2022 26.2  18.0 - 48.0 % Final    Mono % 06/13/2022 9.8  4.0 - 15.0 % Final    Eosinophil % 06/13/2022 1.9  0.0 - 8.0 % Final    Basophil % 06/13/2022 1.0  0.0 - 1.9 % Final    Differential Method 06/13/2022 Automated   Final    Sodium 06/13/2022 137  136 - 145 mmol/L Final    Potassium 06/13/2022 3.9  3.5 - 5.1 mmol/L Final    Chloride 06/13/2022 101  95 - 110 mmol/L Final    CO2 06/13/2022 29  23 - 29 mmol/L Final    Glucose 06/13/2022 113 (H)  70 - 110 mg/dL Final    BUN 06/13/2022 11  8 - 23 mg/dL Final    Creatinine 06/13/2022 0.4 (L)  0.5 - 1.4 mg/dL Final    Calcium 06/13/2022 9.0  8.7 - 10.5 mg/dL Final    Total Protein 06/13/2022 6.8  6.0 - 8.4 g/dL Final    Albumin 06/13/2022 3.7  3.5 - 5.2 g/dL Final    Total Bilirubin 06/13/2022 1.0  0.1 - 1.0 mg/dL Final    Alkaline Phosphatase 06/13/2022 105  55 - 135 U/L Final    AST 06/13/2022 78 (H)  10 - 40 U/L Final    ALT 06/13/2022 74 (H)  10 - 44  U/L Final    Anion Gap 2022 7 (L)  8 - 16 mmol/L Final    eGFR if African American 2022 >60.0  >60 mL/min/1.73 m^2 Final    eGFR if non African American 2022 >60.0  >60 mL/min/1.73 m^2 Final       Past Medical History:   Diagnosis Date    Arthritis     Breast cancer     right    Pain in finger     quentin long finger pain    Wears glasses     WEARS CONTACS    Wears partial dentures     UPPER AND LOWER     Past Surgical History:   Procedure Laterality Date    BREAST RECONSTRUCTION      CARPAL TUNNEL RELEASE       SECTION  1970, ,     CHOLECYSTECTOMY  2019        COLONOSCOPY  2017    JOINT REPLACEMENT Right     KNEE    KNEE ARTHROPLASTY Right 6/10/2019    Procedure: ARTHROPLASTY, KNEE;  Surgeon: Jony Marmolejo MD;  Location: [facility] OR;  Service: Orthopedics;  Laterality: Right;    KNEE ARTHROPLASTY Left 2021    Procedure: ARTHROPLASTY, KNEE;  Surgeon: Jony Marmolejo MD;  Location: [facility] OR;  Service: Orthopedics;  Laterality: Left;    KNEE ARTHROSCOPY Bilateral     MASTECTOMY Right 2007    PORTACATH PLACEMENT  2007    PORTACATH REMOVAL      REVERSE TOTAL SHOULDER ARTHROPLASTY Right 4/10/2023    Procedure: ARTHROPLASTY, SHOULDER, TOTAL, REVERSE, RIGHT;  Surgeon: Jony Marmolejo MD;  Location: [facility] OR;  Service: Orthopedics;  Laterality: Right;    SURGICAL REMOVAL OF BONE SPUR Bilateral     TUBAL LIGATION       Family History   Problem Relation Age of Onset    Arthritis Mother     Hyperlipidemia Mother     Stroke Mother     Dementia Mother     Eczema Daughter     Eczema Grandchild     Lupus Neg Hx     Psoriasis Neg Hx     Melanoma Neg Hx        Marital Status:   Alcohol History:  reports current alcohol use.  Tobacco History:  reports that she quit smoking about 42 years ago. Her smoking use included cigarettes. She started smoking about 43 years ago. She smoked an average of .25 packs per day. She has never used smokeless tobacco.  Drug History:   reports no history of drug use.    Review of patient's allergies indicates:  No Known Allergies    Current Outpatient Medications:     alendronate (FOSAMAX) 70 MG tablet, Take 1 tablet (70 mg total) by mouth every 7 days., Disp: 12 tablet, Rfl: 3    ascorbic acid, vitamin C, (VITAMIN C) 1000 MG tablet, Take 1,000 mg by mouth once daily., Disp: , Rfl:     calcium carbonate (CALCIUM 600 ORAL), Take by mouth 2 (two) times daily., Disp: , Rfl:     celecoxib (CELEBREX) 200 MG capsule, Take 1 capsule (200 mg total) by mouth 2 (two) times daily. Take as directed twice daily for inflammation postoperatively after joint replacement surgery., Disp: 60 capsule, Rfl: 0    docusate sodium (COLACE) 100 MG capsule, Take 1 capsule (100 mg total) by mouth 2 (two) times daily., Disp: 60 capsule, Rfl: 0    gabapentin (NEURONTIN) 300 MG capsule, Take 1 capsule (300 mg total) by mouth 2 (two) times daily. Take as directed twice daily after joint replacement surgery as an adjunct to pain medication., Disp: 60 capsule, Rfl: 0    multivitamin capsule, Take 1 capsule by mouth once daily., Disp: , Rfl:     ondansetron (ZOFRAN) 4 MG tablet, Take 1 tablet (4 mg total) by mouth every 6 (six) hours as needed for Nausea., Disp: 28 tablet, Rfl: 1    oxyCODONE-acetaminophen (PERCOCET) 7.5-325 mg per tablet, 1 tablet EVERY 6 HOURS (route: oral), Disp: , Rfl:     vitamin D (VITAMIN D3) 1000 units Tab, Take 1,000 Units by mouth once daily., Disp: , Rfl:     zinc gluconate 50 mg tablet, Take 50 mg by mouth once daily., Disp: , Rfl:     albuterol (VENTOLIN HFA) 90 mcg/actuation inhaler, Inhale 2 puffs into the lungs every 6 (six) hours as needed for Wheezing. Rescue, Disp: 6.7 g, Rfl: 0    amoxicillin-clavulanate 875-125mg (AUGMENTIN) 875-125 mg per tablet, Take 1 tablet by mouth 2 (two) times daily., Disp: 20 tablet, Rfl: 0    benzonatate (TESSALON) 200 MG capsule, Take 1 capsule (200 mg total) by mouth 3 (three) times daily as needed., Disp: 30  capsule, Rfl: 0    ibuprofen (ADVIL,MOTRIN) 800 MG tablet, Take 800 mg by mouth every 8 (eight) hours as needed., Disp: , Rfl:   No current facility-administered medications for this visit.    Review of Systems   Constitutional:  Positive for activity change (post op), fatigue and fever. Negative for appetite change, chills and unexpected weight change.   HENT:  Positive for congestion, sinus pressure, sore throat and voice change. Negative for ear pain, hearing loss, postnasal drip, sinus pain, sneezing, tinnitus and trouble swallowing.    Eyes:  Negative for pain, discharge and visual disturbance.   Respiratory:  Positive for cough. Negative for choking, shortness of breath and wheezing.    Cardiovascular:  Negative for chest pain, palpitations and leg swelling.   Gastrointestinal:  Negative for abdominal distention, abdominal pain, blood in stool, constipation, diarrhea, nausea and vomiting.   Endocrine: Negative for cold intolerance and heat intolerance.   Genitourinary:  Negative for difficulty urinating, dysuria, frequency, pelvic pain and urgency.   Musculoskeletal:  Negative for back pain, joint swelling and neck pain.   Skin:  Negative for pallor and rash.   Allergic/Immunologic: Negative for environmental allergies and food allergies.   Neurological:  Negative for dizziness, tremors, weakness, numbness and headaches.   Hematological:  Does not bruise/bleed easily.   Psychiatric/Behavioral:  Negative for agitation, confusion, dysphoric mood, sleep disturbance and suicidal ideas. The patient is not nervous/anxious.         Objective:      Vitals:    04/19/23 1604   BP: 136/84   Pulse: 82   Resp: 16   Temp: 98.3 °F (36.8 °C)       Physical Exam  HENT:      Head: Normocephalic and atraumatic.   Cardiovascular:      Rate and Rhythm: Normal rate and regular rhythm.   Pulmonary:      Breath sounds: Rhonchi and rales (wet medium rales posterior lung fields) present.      Comments: Some resp effort noted that is  slightly increased-loose cough during exam  Musculoskeletal:      Right lower leg: No edema.      Left lower leg: No edema.         Assessment:       1. Pneumonia of both lower lobes due to infectious organism         Plan:       Pneumonia of both lower lobes due to infectious organism  -     X-Ray Chest PA And Lateral; Future; Expected date: 04/19/2023  -     cefTRIAXone injection 1 g  -     amoxicillin-clavulanate 875-125mg (AUGMENTIN) 875-125 mg per tablet; Take 1 tablet by mouth 2 (two) times daily.  Dispense: 20 tablet; Refill: 0  -     benzonatate (TESSALON) 200 MG capsule; Take 1 capsule (200 mg total) by mouth 3 (three) times daily as needed.  Dispense: 30 capsule; Refill: 0  -     albuterol (VENTOLIN HFA) 90 mcg/actuation inhaler; Inhale 2 puffs into the lungs every 6 (six) hours as needed for Wheezing. Rescue  Dispense: 6.7 g; Refill: 0  -     POCT COVID-19 Rapid Screening  -     POCT Influenza A/B      Follow up in about 6 days (around 4/25/2023) for follow-up pneumonia.        4/19/2023 Kasie Arthur M.D.

## 2023-04-20 ENCOUNTER — HOSPITAL ENCOUNTER (OUTPATIENT)
Dept: RADIOLOGY | Facility: HOSPITAL | Age: 73
Discharge: HOME | End: 2023-04-20
Attending: INTERNAL MEDICINE
Payer: MEDICARE

## 2023-04-20 DIAGNOSIS — J18.9 PNEUMONIA OF BOTH LOWER LOBES DUE TO INFECTIOUS ORGANISM: ICD-10-CM

## 2023-04-20 PROCEDURE — 71045 X-RAY EXAM CHEST 1 VIEW: CPT | Mod: TC,PO

## 2023-04-24 ENCOUNTER — OFFICE VISIT (OUTPATIENT)
Dept: ORTHOPEDICS | Facility: CLINIC | Age: 73
End: 2023-04-24
Payer: MEDICARE

## 2023-04-24 VITALS
HEIGHT: 58 IN | WEIGHT: 164 LBS | SYSTOLIC BLOOD PRESSURE: 110 MMHG | BODY MASS INDEX: 34.43 KG/M2 | DIASTOLIC BLOOD PRESSURE: 80 MMHG

## 2023-04-24 DIAGNOSIS — Z96.611 STATUS POST REVERSE TOTAL SHOULDER REPLACEMENT, RIGHT: Primary | ICD-10-CM

## 2023-04-24 PROCEDURE — 99024 POSTOP FOLLOW-UP VISIT: CPT | Mod: S$GLB,POP,, | Performed by: PHYSICIAN ASSISTANT

## 2023-04-24 PROCEDURE — 99024 PR POST-OP FOLLOW-UP VISIT: ICD-10-PCS | Mod: S$GLB,POP,, | Performed by: PHYSICIAN ASSISTANT

## 2023-04-24 RX ORDER — OXYCODONE AND ACETAMINOPHEN 7.5; 325 MG/1; MG/1
1 TABLET ORAL EVERY 8 HOURS PRN
Qty: 21 TABLET | Refills: 0 | Status: SHIPPED | OUTPATIENT
Start: 2023-04-24 | End: 2023-07-24

## 2023-04-24 NOTE — PROGRESS NOTES
[facility] ORTHOPEDICS  [facility] [address]  [place] [address]  Phone: [phone]   [phone]    Patient's PCP:Kasie Arthur MD  Referring Provider: No ref. provider found    POST-OP Note:    Subjective:        Chief Complaint:   Chief Complaint   Patient presents with    Right Shoulder - Post-op Evaluation     Sutures S/P R REV TSA 4/10/23, getting better, pain is off and of, PT at home with Home Health       Past Medical History:   Diagnosis Date    Arthritis     Breast cancer     right    Pain in finger     quentin long finger pain    Wears glasses     WEARS CONTACS    Wears partial dentures     UPPER AND LOWER       Past Surgical History:   Procedure Laterality Date    BREAST RECONSTRUCTION      CARPAL TUNNEL RELEASE       SECTION  , ,     CHOLECYSTECTOMY  2019        COLONOSCOPY  2017    JOINT REPLACEMENT Right     KNEE    KNEE ARTHROPLASTY Right 6/10/2019    Procedure: ARTHROPLASTY, KNEE;  Surgeon: Jony Marmolejo MD;  Location: [facility] OR;  Service: Orthopedics;  Laterality: Right;    KNEE ARTHROPLASTY Left 2021    Procedure: ARTHROPLASTY, KNEE;  Surgeon: Jony Marmolejo MD;  Location: [facility] OR;  Service: Orthopedics;  Laterality: Left;    KNEE ARTHROSCOPY Bilateral 2008    MASTECTOMY Right 2007    PORTACATH PLACEMENT  2007    PORTACATH REMOVAL      REVERSE TOTAL SHOULDER ARTHROPLASTY Right 4/10/2023    Procedure: ARTHROPLASTY, SHOULDER, TOTAL, REVERSE, RIGHT;  Surgeon: Jony Marmolejo MD;  Location: [facility] OR;  Service: Orthopedics;  Laterality: Right;    SURGICAL REMOVAL OF BONE SPUR Bilateral     TUBAL LIGATION         Current Outpatient Medications   Medication Sig    albuterol (VENTOLIN HFA) 90 mcg/actuation inhaler Inhale 2 puffs into the lungs every 6 (six) hours as needed for Wheezing. Rescue    alendronate (FOSAMAX) 70 MG tablet Take 1 tablet (70 mg total) by mouth every 7 days.    amoxicillin-clavulanate 875-125mg (AUGMENTIN) 875-125 mg  per tablet Take 1 tablet by mouth 2 (two) times daily.    ascorbic acid, vitamin C, (VITAMIN C) 1000 MG tablet Take 1,000 mg by mouth once daily.    benzonatate (TESSALON) 200 MG capsule Take 1 capsule (200 mg total) by mouth 3 (three) times daily as needed.    calcium carbonate (CALCIUM 600 ORAL) Take by mouth 2 (two) times daily.    celecoxib (CELEBREX) 200 MG capsule Take 1 capsule (200 mg total) by mouth 2 (two) times daily. Take as directed twice daily for inflammation postoperatively after joint replacement surgery.    docusate sodium (COLACE) 100 MG capsule Take 1 capsule (100 mg total) by mouth 2 (two) times daily.    gabapentin (NEURONTIN) 300 MG capsule Take 1 capsule (300 mg total) by mouth 2 (two) times daily. Take as directed twice daily after joint replacement surgery as an adjunct to pain medication.    ibuprofen (ADVIL,MOTRIN) 800 MG tablet Take 800 mg by mouth every 8 (eight) hours as needed.    multivitamin capsule Take 1 capsule by mouth once daily.    ondansetron (ZOFRAN) 4 MG tablet Take 1 tablet (4 mg total) by mouth every 6 (six) hours as needed for Nausea.    vitamin D (VITAMIN D3) 1000 units Tab Take 1,000 Units by mouth once daily.    zinc gluconate 50 mg tablet Take 50 mg by mouth once daily.    oxyCODONE-acetaminophen (PERCOCET) 7.5-325 mg per tablet Take 1 tablet by mouth every 8 (eight) hours as needed for Pain.     No current facility-administered medications for this visit.       Review of patient's allergies indicates:  No Known Allergies    Family History   Problem Relation Age of Onset    Arthritis Mother     Hyperlipidemia Mother     Stroke Mother     Dementia Mother     Eczema Daughter     Eczema Grandchild     Lupus Neg Hx     Psoriasis Neg Hx     Melanoma Neg Hx        Social History     Socioeconomic History    Marital status:    Tobacco Use    Smoking status: Former     Packs/day: 0.25     Types: Cigarettes     Start date: 1980     Quit date: 1981     Years since  quittin.3    Smokeless tobacco: Never    Tobacco comments:     social smoker - on weekends only - quit 20 yrs ago   Substance and Sexual Activity    Alcohol use: Yes     Alcohol/week: 0.0 standard drinks     Comment: occ    Drug use: No    Sexual activity: Not Currently     Partners: Male       History of present illness: Ms. Jensen comes in today 2 weeks status post right reverse total shoulder arthroplasty.  Comes in today for wound check and suture removal.  She is been working with home health physical therapy.  She is doing quite well.  She is ready to transition to outpatient physical therapy.    Review of Systems:    Musculoskeletal:  See HPI       Objective:        Physical Examination:    Vital Signs:   Vitals:    23 0918   BP: 110/80       Body mass index is 34.28 kg/m².    This a well-developed, well nourished patient in no acute distress.  They are alert and oriented and cooperative to examination.        Right shoulder: Skin to the right shoulder is clean and intact.  There is no erythema or ecchymosis.  Right anterior shoulder incision is healing well without wound dehiscence or drainage.  There are no signs or symptoms of infection.  Patient is neurovascularly intact throughout the right upper extremity.  Range of motion is as follows:  Forward flexion to about 80°.    Pertinent New Results:     XRAY Report / Interpretation:  Two views were taken of the right shoulder today: AP and Y-view.  They reveal no acute fractures or dislocations.  Visualized soft tissues appear unremarkable.  Patient has an anatomically placed reverse total shoulder arthroplasty without evidence of hardware failure or subsidence.     Assessment:       1. Status post reverse total shoulder replacement, right      Plan:     Status post reverse total shoulder replacement, right  -     X-ray Shoulder 2 or More Views Right  -     oxyCODONE-acetaminophen (PERCOCET) 7.5-325 mg per tablet; Take 1 tablet by mouth every 8  (eight) hours as needed for Pain.  Dispense: 21 tablet; Refill: 0  -     Ambulatory referral/consult to Physical/Occupational Therapy; Future; Expected date: 05/01/2023        Follow up in about 4 weeks (around 5/22/2023) for S/P R REV TSA 4/10/23.    Sutures removed from her right shoulder today.  She tolerated this well.  She is advised she could clean the operative site once a day with warm soapy water and not to apply any topical creams ointments.  She should not submerge operative site underwater in a pool or bathtub type environment for least 1 more week.  She was given a prescription to transition to outpatient physical therapy once home health has commenced.  We will check her back in the office in 4 weeks to see how she is progressing with her physical therapy.  She will continue to sleep in her sling, swath and abduction pillow 2 more weeks for a total of 1 month postoperatively.  I provided a refill prescription of Percocet 7.5/325 with instructions take 1 tab by mouth every 8 hours as needed for pain.  I prescribed quantity 21.       Jeronimo Boss, MPAS, PA-C    This note was created using [employer] voice recognition software that occasionally misinterprets words or phrases.

## 2023-05-05 ENCOUNTER — OFFICE VISIT (OUTPATIENT)
Dept: FAMILY MEDICINE | Facility: CLINIC | Age: 73
End: 2023-05-05
Payer: MEDICARE

## 2023-05-05 VITALS
OXYGEN SATURATION: 95 % | SYSTOLIC BLOOD PRESSURE: 116 MMHG | HEIGHT: 58 IN | DIASTOLIC BLOOD PRESSURE: 68 MMHG | WEIGHT: 163 LBS | BODY MASS INDEX: 34.22 KG/M2 | HEART RATE: 77 BPM | TEMPERATURE: 98 F

## 2023-05-05 DIAGNOSIS — J18.9 PNEUMONIA OF BOTH LOWER LOBES DUE TO INFECTIOUS ORGANISM: Primary | ICD-10-CM

## 2023-05-05 DIAGNOSIS — R05.9 COUGH, UNSPECIFIED TYPE: ICD-10-CM

## 2023-05-05 PROCEDURE — 99213 OFFICE O/P EST LOW 20 MIN: CPT | Mod: S$PBB,,, | Performed by: NURSE PRACTITIONER

## 2023-05-05 PROCEDURE — 99214 OFFICE O/P EST MOD 30 MIN: CPT | Performed by: NURSE PRACTITIONER

## 2023-05-05 PROCEDURE — 99213 PR OFFICE/OUTPT VISIT, EST, LEVL III, 20-29 MIN: ICD-10-PCS | Mod: S$PBB,,, | Performed by: NURSE PRACTITIONER

## 2023-05-05 NOTE — PROGRESS NOTES
SUBJECTIVE:      Patient ID: Camila Au is a 73 y.o. female.    Chief Complaint: Follow-up    73-year-old female presents to the clinic for pneumonia follow-up.  Patient was seen by her PCP on  with complaints of cough and congestion.  Patient was diagnosed with pneumonia and started on Augmentin x10 days, Tessalon 200 mg, and given a injection of Rocephin 1 g. She is doing well. Symptoms for the most part have resoled.  The cough is seldom and trigger by deep breathing.  Denies fever, chest pain, and SOB.        Family History   Problem Relation Age of Onset    Arthritis Mother     Hyperlipidemia Mother     Stroke Mother     Dementia Mother     Eczema Daughter     Eczema Grandchild     Lupus Neg Hx     Psoriasis Neg Hx     Melanoma Neg Hx       Social History     Socioeconomic History    Marital status:    Tobacco Use    Smoking status: Former     Packs/day: 0.25     Types: Cigarettes     Start date:      Quit date:      Years since quittin.3    Smokeless tobacco: Never    Tobacco comments:     social smoker - on weekends only - quit 20 yrs ago   Substance and Sexual Activity    Alcohol use: Yes     Alcohol/week: 0.0 standard drinks     Comment: occ    Drug use: No    Sexual activity: Not Currently     Partners: Male     Current Outpatient Medications   Medication Sig Dispense Refill    albuterol (VENTOLIN HFA) 90 mcg/actuation inhaler Inhale 2 puffs into the lungs every 6 (six) hours as needed for Wheezing. Rescue 6.7 g 0    alendronate (FOSAMAX) 70 MG tablet Take 1 tablet (70 mg total) by mouth every 7 days. 12 tablet 3    ascorbic acid, vitamin C, (VITAMIN C) 1000 MG tablet Take 1,000 mg by mouth once daily.      calcium carbonate (CALCIUM 600 ORAL) Take by mouth 2 (two) times daily.      celecoxib (CELEBREX) 200 MG capsule Take 1 capsule (200 mg total) by mouth 2 (two) times daily. Take as directed twice daily for inflammation postoperatively after joint replacement  surgery. 60 capsule 0    docusate sodium (COLACE) 100 MG capsule Take 1 capsule (100 mg total) by mouth 2 (two) times daily. 60 capsule 0    gabapentin (NEURONTIN) 300 MG capsule Take 1 capsule (300 mg total) by mouth 2 (two) times daily. Take as directed twice daily after joint replacement surgery as an adjunct to pain medication. 60 capsule 0    ibuprofen (ADVIL,MOTRIN) 800 MG tablet Take 800 mg by mouth every 8 (eight) hours as needed.      multivitamin capsule Take 1 capsule by mouth once daily.      ondansetron (ZOFRAN) 4 MG tablet Take 1 tablet (4 mg total) by mouth every 6 (six) hours as needed for Nausea. 28 tablet 1    oxyCODONE-acetaminophen (PERCOCET) 7.5-325 mg per tablet Take 1 tablet by mouth every 8 (eight) hours as needed for Pain. 21 tablet 0    vitamin D (VITAMIN D3) 1000 units Tab Take 1,000 Units by mouth once daily.      zinc gluconate 50 mg tablet Take 50 mg by mouth once daily.       No current facility-administered medications for this visit.     Review of patient's allergies indicates:  No Known Allergies   Past Medical History:   Diagnosis Date    Arthritis     Breast cancer     right    Pain in finger     quentin long finger pain    Wears glasses     WEARS CONTACS    Wears partial dentures     UPPER AND LOWER     Past Surgical History:   Procedure Laterality Date    BREAST RECONSTRUCTION      CARPAL TUNNEL RELEASE       SECTION  , ,     CHOLECYSTECTOMY  2019        COLONOSCOPY  2017    JOINT REPLACEMENT Right     KNEE    KNEE ARTHROPLASTY Right 6/10/2019    Procedure: ARTHROPLASTY, KNEE;  Surgeon: Jony Marmolejo MD;  Location: [facility] OR;  Service: Orthopedics;  Laterality: Right;    KNEE ARTHROPLASTY Left 2021    Procedure: ARTHROPLASTY, KNEE;  Surgeon: Jony Marmolejo MD;  Location: [facility] OR;  Service: Orthopedics;  Laterality: Left;    KNEE ARTHROSCOPY Bilateral 2008    MASTECTOMY Right 2007    PORTACATH PLACEMENT      PORTACATH REMOVAL       "REVERSE TOTAL SHOULDER ARTHROPLASTY Right 4/10/2023    Procedure: ARTHROPLASTY, SHOULDER, TOTAL, REVERSE, RIGHT;  Surgeon: Jony Marmolejo MD;  Location: [facility];  Service: Orthopedics;  Laterality: Right;    SURGICAL REMOVAL OF BONE SPUR Bilateral 2002    TUBAL LIGATION  1976       Review of Systems   Constitutional:  Negative for activity change, appetite change, chills, diaphoresis, fatigue, fever and unexpected weight change.   HENT:  Negative for congestion, ear pain, sinus pressure, sore throat, trouble swallowing and voice change.    Eyes:  Negative for pain, discharge and visual disturbance.   Respiratory:  Positive for cough. Negative for chest tightness, shortness of breath and wheezing.    Cardiovascular:  Negative for chest pain and palpitations.   Gastrointestinal:  Negative for abdominal pain, constipation, diarrhea, nausea and vomiting.   Genitourinary:  Negative for difficulty urinating, flank pain, frequency and urgency.   Musculoskeletal:  Negative for back pain and joint swelling.   Skin:  Negative for color change and rash.   Neurological:  Negative for dizziness, seizures, syncope, weakness, numbness and headaches.   Hematological:  Negative for adenopathy.   Psychiatric/Behavioral:  Negative for dysphoric mood and sleep disturbance. The patient is not nervous/anxious.     OBJECTIVE:      Vitals:    05/05/23 0929   BP: 116/68   BP Location: Left arm   Patient Position: Sitting   BP Method: Large (Automatic)   Pulse: 77   Temp: 98.4 °F (36.9 °C)   TempSrc: Oral   SpO2: 95%   Weight: 73.9 kg (163 lb)   Height: 4' 10" (1.473 m)     Physical Exam  Vitals and nursing note reviewed.   Constitutional:       General: She is awake. She is not in acute distress.     Appearance: Normal appearance. She is obese. She is not ill-appearing, toxic-appearing or diaphoretic.   HENT:      Head: Normocephalic and atraumatic.      Right Ear: Tympanic membrane, ear canal and external ear normal.      Left Ear: Tympanic " membrane, ear canal and external ear normal.      Nose: Nose normal. No congestion.      Right Sinus: No maxillary sinus tenderness or frontal sinus tenderness.      Left Sinus: No maxillary sinus tenderness or frontal sinus tenderness.      Mouth/Throat:      Lips: Pink.      Mouth: Mucous membranes are moist.      Pharynx: Oropharynx is clear. Uvula midline. No oropharyngeal exudate or posterior oropharyngeal erythema.   Eyes:      General: Lids are normal. Gaze aligned appropriately.      Conjunctiva/sclera: Conjunctivae normal.      Right eye: Right conjunctiva is not injected.      Left eye: Left conjunctiva is not injected.      Pupils: Pupils are equal, round, and reactive to light.   Cardiovascular:      Rate and Rhythm: Normal rate and regular rhythm.      Pulses: Normal pulses.      Heart sounds: Normal heart sounds, S1 normal and S2 normal. No murmur heard.    No friction rub. No gallop.   Pulmonary:      Effort: Pulmonary effort is normal. No respiratory distress.      Breath sounds: Normal breath sounds. No stridor. No decreased breath sounds, wheezing, rhonchi or rales.   Chest:      Chest wall: No tenderness.   Musculoskeletal:      Cervical back: Neck supple.      Right lower leg: No edema.      Left lower leg: No edema.   Lymphadenopathy:      Cervical: No cervical adenopathy.   Skin:     General: Skin is warm and dry.      Capillary Refill: Capillary refill takes less than 2 seconds.      Findings: No erythema or rash.   Neurological:      Mental Status: She is alert and oriented to person, place, and time. Mental status is at baseline.   Psychiatric:         Attention and Perception: Attention normal.         Mood and Affect: Mood normal.         Speech: Speech normal.         Behavior: Behavior normal. Behavior is cooperative.         Thought Content: Thought content normal.         Judgment: Judgment normal.      Assessment:       1. Pneumonia of both lower lobes due to infectious organism    2.  Cough, unspecified type        Plan:       Pneumonia of both lower lobes due to infectious organism  Resolved. She completed the full course of Augmentin. Lungs are clear. She is feeling well. Denies SOB.      Cough, unspecified type  She did cough with deep breathing only, but the cough is infrequent and seldom.  Continue Tessalon prn if needed.     This note was created using [employer] voice recognition software that occasionally misinterprets phrases or words.     I spent a total of 16 minutes on the day of the visit.This includes face to face time and non-face to face time preparing to see the patient (eg, review of tests), obtaining and/or reviewing separately obtained history, documenting clinical information in the electronic or other health record, independently interpreting results and communicating results to the patient/family/caregiver, or care coordinator.    Follow up if symptoms worsen or fail to improve.      5/5/2023 CARLOS Guerrero, FNP

## 2023-05-09 ENCOUNTER — OFFICE VISIT (OUTPATIENT)
Dept: HEMATOLOGY/ONCOLOGY | Facility: CLINIC | Age: 73
End: 2023-05-09
Payer: MEDICARE

## 2023-05-09 VITALS
HEIGHT: 58 IN | SYSTOLIC BLOOD PRESSURE: 120 MMHG | BODY MASS INDEX: 34.63 KG/M2 | RESPIRATION RATE: 18 BRPM | WEIGHT: 165 LBS | TEMPERATURE: 99 F | DIASTOLIC BLOOD PRESSURE: 70 MMHG | HEART RATE: 91 BPM

## 2023-05-09 DIAGNOSIS — Z17.0 MALIGNANT NEOPLASM OF CENTRAL PORTION OF RIGHT BREAST IN FEMALE, ESTROGEN RECEPTOR POSITIVE: ICD-10-CM

## 2023-05-09 DIAGNOSIS — C50.111 MALIGNANT NEOPLASM OF CENTRAL PORTION OF RIGHT BREAST IN FEMALE, ESTROGEN RECEPTOR POSITIVE: ICD-10-CM

## 2023-05-09 PROCEDURE — 99213 PR OFFICE/OUTPT VISIT, EST, LEVL III, 20-29 MIN: ICD-10-PCS | Mod: S$GLB,,, | Performed by: INTERNAL MEDICINE

## 2023-05-09 PROCEDURE — 99213 OFFICE O/P EST LOW 20 MIN: CPT | Mod: S$GLB,,, | Performed by: INTERNAL MEDICINE

## 2023-05-09 NOTE — PROGRESS NOTES
PROGRESS NOTE    Subjective:       Patient ID: Camila Au is a 73 y.o. female.    Chief Complaint:  No chief complaint on file.    Right mastectomy 5/24/2007  [id number], 2cm tumor  Lobular carcinoma  Completed AC 10/2007  Completed XRT for close margins  Began AI 9/2007-11/2018      History of Present Illness:   Camila Au is a 73 y.o. female who presents with a history of breast cancer here for yearly  follow up.      No new complaints this visit. She is feeling well.      Mammogram negative May,2021      Family and Social history reviewed and is unchanged from 11/25/2014      ROS:  Review of Systems   Constitutional:  Negative for fever.   Respiratory:  Negative for shortness of breath.    Cardiovascular:  Negative for chest pain and leg swelling.   Gastrointestinal:  Negative for abdominal pain and blood in stool.   Genitourinary:  Negative for hematuria.   Skin:  Negative for rash.        Current Outpatient Medications:     alendronate (FOSAMAX) 70 MG tablet, Take 1 tablet (70 mg total) by mouth every 7 days., Disp: 12 tablet, Rfl: 3    ascorbic acid, vitamin C, (VITAMIN C) 1000 MG tablet, Take 1,000 mg by mouth once daily., Disp: , Rfl:     calcium carbonate (CALCIUM 600 ORAL), Take by mouth 2 (two) times daily., Disp: , Rfl:     celecoxib (CELEBREX) 200 MG capsule, Take 1 capsule (200 mg total) by mouth 2 (two) times daily. Take as directed twice daily for inflammation postoperatively after joint replacement surgery., Disp: 60 capsule, Rfl: 0    gabapentin (NEURONTIN) 300 MG capsule, Take 1 capsule (300 mg total) by mouth 2 (two) times daily. Take as directed twice daily after joint replacement surgery as an adjunct to pain medication., Disp: 60 capsule, Rfl: 0    multivitamin capsule, Take 1 capsule by mouth once daily., Disp: , Rfl:     oxyCODONE-acetaminophen (PERCOCET) 7.5-325 mg per tablet, Take 1 tablet by mouth every 8  "(eight) hours as needed for Pain., Disp: 21 tablet, Rfl: 0    vitamin D (VITAMIN D3) 1000 units Tab, Take 1,000 Units by mouth once daily., Disp: , Rfl:     zinc gluconate 50 mg tablet, Take 50 mg by mouth once daily., Disp: , Rfl:     albuterol (VENTOLIN HFA) 90 mcg/actuation inhaler, Inhale 2 puffs into the lungs every 6 (six) hours as needed for Wheezing. Rescue (Patient not taking: Reported on 5/9/2023), Disp: 6.7 g, Rfl: 0    docusate sodium (COLACE) 100 MG capsule, Take 1 capsule (100 mg total) by mouth 2 (two) times daily. (Patient not taking: Reported on 5/9/2023), Disp: 60 capsule, Rfl: 0    ibuprofen (ADVIL,MOTRIN) 800 MG tablet, Take 800 mg by mouth every 8 (eight) hours as needed., Disp: , Rfl:     ondansetron (ZOFRAN) 4 MG tablet, Take 1 tablet (4 mg total) by mouth every 6 (six) hours as needed for Nausea. (Patient not taking: Reported on 5/9/2023), Disp: 28 tablet, Rfl: 1        Objective:       Physical Examination:     /70   Pulse 91   Temp 98.6 °F (37 °C)   Resp 18   Ht 4' 10" (1.473 m)   Wt 74.8 kg (165 lb)   BMI 34.49 kg/m²     Physical Exam  Constitutional:       Appearance: She is well-developed.   HENT:      Head: Normocephalic and atraumatic.      Right Ear: External ear normal.      Left Ear: External ear normal.   Eyes:      Conjunctiva/sclera: Conjunctivae normal.      Pupils: Pupils are equal, round, and reactive to light.   Neck:      Thyroid: No thyromegaly.      Trachea: No tracheal deviation.   Cardiovascular:      Rate and Rhythm: Normal rate and regular rhythm.      Heart sounds: Normal heart sounds.   Pulmonary:      Effort: Pulmonary effort is normal.      Breath sounds: Normal breath sounds.   Chest:       Abdominal:      General: Bowel sounds are normal. There is no distension.      Palpations: Abdomen is soft. There is no mass.      Tenderness: There is no abdominal tenderness.   Skin:     Findings: No rash.   Neurological:      Comments: Neuro intact througout "   Psychiatric:         Behavior: Behavior normal.         Thought Content: Thought content normal.         Judgment: Judgment normal.       Labs:   No results found for this or any previous visit (from the past 336 hour(s)).  CMP  Sodium   Date Value Ref Range Status   04/03/2023 138 136 - 145 mmol/L Final   03/26/2019 137 134 - 144 mmol/L      Potassium   Date Value Ref Range Status   04/03/2023 3.6 3.5 - 5.1 mmol/L Final     Chloride   Date Value Ref Range Status   04/03/2023 104 95 - 110 mmol/L Final   03/26/2019 101 98 - 110 mmol/L      CO2   Date Value Ref Range Status   04/03/2023 26 23 - 29 mmol/L Final     Glucose   Date Value Ref Range Status   04/03/2023 130 (H) 70 - 110 mg/dL Final   03/26/2019 160 (H) 70 - 99 mg/dL      BUN   Date Value Ref Range Status   04/03/2023 10 8 - 23 mg/dL Final     Creatinine   Date Value Ref Range Status   04/03/2023 0.7 0.5 - 1.4 mg/dL Final   03/26/2019 0.50 (L) 0.60 - 1.40 mg/dL    08/08/2012 0.6 0.2 - 1.4 mg/dl Final     Calcium   Date Value Ref Range Status   04/03/2023 9.6 8.7 - 10.5 mg/dL Final   08/08/2012 9.5 8.6 - 10.2 mg/dl Final     Total Protein   Date Value Ref Range Status   04/03/2023 7.5 6.0 - 8.4 g/dL Final     Albumin   Date Value Ref Range Status   04/03/2023 3.9 3.5 - 5.2 g/dL Final   03/26/2019 4.0 3.1 - 4.7 g/dL      Total Bilirubin   Date Value Ref Range Status   04/03/2023 0.9 0.1 - 1.0 mg/dL Final     Comment:     For infants and newborns, interpretation of results should be based  on gestational age, weight and in agreement with clinical  observations.    Premature Infant recommended reference ranges:  Up to 24 hours.............<8.0 mg/dL  Up to 48 hours............<12.0 mg/dL  3-5 days..................<15.0 mg/dL  6-29 days.................<15.0 mg/dL       Alkaline Phosphatase   Date Value Ref Range Status   04/03/2023 121 55 - 135 U/L Final   08/08/2012 100 23 - 119 UNIT/L Final     AST   Date Value Ref Range Status   04/03/2023 79 (H) 10 - 40  U/L Final   08/08/2012 35 (H) 10 - 30 UNIT/L Final     ALT   Date Value Ref Range Status   04/03/2023 57 (H) 10 - 44 U/L Final     Anion Gap   Date Value Ref Range Status   04/03/2023 8 8 - 16 mmol/L Final   08/08/2012 9 5 - 15 meq/L Final     eGFR if    Date Value Ref Range Status   06/13/2022 >60.0 >60 mL/min/1.73 m^2 Final   06/13/2022 >60.0 >60 mL/min/1.73 m^2 Final     eGFR if non    Date Value Ref Range Status   06/13/2022 >60.0 >60 mL/min/1.73 m^2 Final     Comment:     Calculation used to obtain the estimated glomerular filtration  rate (eGFR) is the CKD-EPI equation.      06/13/2022 >60.0 >60 mL/min/1.73 m^2 Final     Comment:     Calculation used to obtain the estimated glomerular filtration  rate (eGFR) is the CKD-EPI equation.        No results found for: CEA  No results found for: PSA        Assessment/Plan:     Problem List Items Addressed This Visit       Malignant neoplasm of central portion of right breast in female, estrogen receptor positive     Patient is doing well and appears TARAS.  Exam is negative and will continue to follow with yearly visits/exams.  Discussed this today.  Will have mammogram this month per Dr. Brandon.             Discussion:     Follow up in about 1 year (around 5/9/2024).      Electronically signed by Jeffrey Ramos

## 2023-05-09 NOTE — ASSESSMENT & PLAN NOTE
Patient is doing well and appears TARAS.  Exam is negative and will continue to follow with yearly visits/exams.  Discussed this today.  Will have mammogram this month per Dr. Brandon.

## 2023-05-11 DIAGNOSIS — J18.9 PNEUMONIA OF BOTH LOWER LOBES DUE TO INFECTIOUS ORGANISM: ICD-10-CM

## 2023-05-11 RX ORDER — ALBUTEROL SULFATE 90 UG/1
AEROSOL, METERED RESPIRATORY (INHALATION)
Qty: 20.1 G | Refills: 2 | Status: SHIPPED | OUTPATIENT
Start: 2023-05-11 | End: 2023-07-24

## 2023-05-16 ENCOUNTER — OFFICE VISIT (OUTPATIENT)
Dept: ORTHOPEDICS | Facility: CLINIC | Age: 73
End: 2023-05-16
Payer: MEDICARE

## 2023-05-16 VITALS — HEIGHT: 58 IN | WEIGHT: 165 LBS | BODY MASS INDEX: 34.63 KG/M2

## 2023-05-16 DIAGNOSIS — Z96.611 STATUS POST REVERSE TOTAL SHOULDER REPLACEMENT, RIGHT: Primary | ICD-10-CM

## 2023-05-16 PROCEDURE — 99024 PR POST-OP FOLLOW-UP VISIT: ICD-10-PCS | Mod: S$GLB,POP,, | Performed by: ORTHOPAEDIC SURGERY

## 2023-05-16 PROCEDURE — 99024 POSTOP FOLLOW-UP VISIT: CPT | Mod: S$GLB,POP,, | Performed by: ORTHOPAEDIC SURGERY

## 2023-05-16 NOTE — PROGRESS NOTES
[facility]    Subjective:     Chief Complaint:   Chief Complaint   Patient presents with    Right Shoulder - Post-op Evaluation     Right Reverse TSA 04/10/23, patient states that she is doing much better.       Past Medical History:   Diagnosis Date    Arthritis     Breast cancer     right    Pain in finger     quentin long finger pain    Wears glasses     WEARS CONTACS    Wears partial dentures     UPPER AND LOWER       Past Surgical History:   Procedure Laterality Date    BREAST RECONSTRUCTION      CARPAL TUNNEL RELEASE       SECTION  1970, ,     CHOLECYSTECTOMY  2019        COLONOSCOPY  2017    JOINT REPLACEMENT Right     KNEE    KNEE ARTHROPLASTY Right 6/10/2019    Procedure: ARTHROPLASTY, KNEE;  Surgeon: Jony Marmolejo MD;  Location: [facility] OR;  Service: Orthopedics;  Laterality: Right;    KNEE ARTHROPLASTY Left 2021    Procedure: ARTHROPLASTY, KNEE;  Surgeon: Jony Marmolejo MD;  Location: [facility] OR;  Service: Orthopedics;  Laterality: Left;    KNEE ARTHROSCOPY Bilateral 2008    MASTECTOMY Right 2007    PORTACATH PLACEMENT  2007    PORTACATH REMOVAL      REVERSE TOTAL SHOULDER ARTHROPLASTY Right 4/10/2023    Procedure: ARTHROPLASTY, SHOULDER, TOTAL, REVERSE, RIGHT;  Surgeon: Jony Marmolejo MD;  Location: [facility] OR;  Service: Orthopedics;  Laterality: Right;    SURGICAL REMOVAL OF BONE SPUR Bilateral     TUBAL LIGATION         Current Outpatient Medications   Medication Sig    albuterol (PROVENTIL/VENTOLIN HFA) 90 mcg/actuation inhaler INHALE 2 PUFFS INTO THE LUNGS EVERY 6 HOURS FOR WHEEZING    alendronate (FOSAMAX) 70 MG tablet Take 1 tablet (70 mg total) by mouth every 7 days.    ascorbic acid, vitamin C, (VITAMIN C) 1000 MG tablet Take 1,000 mg by mouth once daily.    calcium carbonate (CALCIUM 600 ORAL) Take by mouth 2 (two) times daily.    gabapentin (NEURONTIN) 300 MG capsule Take 1 capsule (300 mg total) by mouth 2 (two) times daily. Take as directed  twice daily after joint replacement surgery as an adjunct to pain medication.    ibuprofen (ADVIL,MOTRIN) 800 MG tablet Take 800 mg by mouth every 8 (eight) hours as needed.    multivitamin capsule Take 1 capsule by mouth once daily.    ondansetron (ZOFRAN) 4 MG tablet Take 1 tablet (4 mg total) by mouth every 6 (six) hours as needed for Nausea.    oxyCODONE-acetaminophen (PERCOCET) 7.5-325 mg per tablet Take 1 tablet by mouth every 8 (eight) hours as needed for Pain.    vitamin D (VITAMIN D3) 1000 units Tab Take 1,000 Units by mouth once daily.    zinc gluconate 50 mg tablet Take 50 mg by mouth once daily.     No current facility-administered medications for this visit.       Review of patient's allergies indicates:  No Known Allergies    Family History   Problem Relation Age of Onset    Arthritis Mother     Hyperlipidemia Mother     Stroke Mother     Dementia Mother     Eczema Daughter     Eczema Grandchild     Lupus Neg Hx     Psoriasis Neg Hx     Melanoma Neg Hx        Social History     Socioeconomic History    Marital status:    Tobacco Use    Smoking status: Former     Packs/day: 0.25     Types: Cigarettes     Start date:      Quit date:      Years since quittin.3    Smokeless tobacco: Never    Tobacco comments:     social smoker - on weekends only - quit 20 yrs ago   Substance and Sexual Activity    Alcohol use: Yes     Alcohol/week: 0.0 standard drinks     Comment: occ    Drug use: No    Sexual activity: Not Currently     Partners: Male       History of present illness:  71-year-old female, returns to clinic today for her right shoulder, status post reverse total shoulder arthroplasty done on .  Overall she is doing extremely well.  She has excellent range of motion, little late started physical therapy but she is seeing Nadir next door.      Review of Systems:    Constitution: Negative for chills, fever, and sweats.  Negative for unexplained weight loss.    HENT:  Negative  for headaches and blurry vision.    Cardiovascular:Negative for chest pain or irregular heart beat. Negative for hypertension.    Respiratory:  Negative for cough and shortness of breath.    Gastrointestinal: Negative for abdominal pain, heartburn, melena, nausea, and vomitting.    Genitourinary:  Negative bladder incontinence and dysuria.    Musculoskeletal:  See HPI for details.     Neurological: Negative for numbness.    Psychiatric/Behavioral: Negative for depression.  The patient is not nervous/anxious.      Endocrine: Negative for polyuria    Hematologic/Lymphatic: Negative for bleeding problem.  Does not bruise/bleed easily.    Skin: Negative for poor would healing and rash    Objective:      Physical Examination:    Vital Signs:  There were no vitals filed for this visit.    Body mass index is 34.49 kg/m².    This a well-developed, well nourished patient in no acute distress.  They are alert and oriented and cooperative to examination.        Examination of the right shoulder, well-healed anterior surgical incision.  She has a little small suture abscess 1 the proximal aspect of the incision.  No erythema or ecchymosis, no signs and symptoms of infection, no evidence of wound failure dehiscence.  Range of motion she actually has really good range of motion with 130 140° of forward flexion 110° abduction, 50° of external rotation.    I used a sterile technique, I used an 18 gauge needle and the bevel in on roofed the small suture abscess.  I was able to palpate and used a pair of pickups to hold the subcuticular suture.  This was clipped at the skin.    Pertinent New Results:    XRAY Report / Interpretation:   AP lateral views of the right shoulder taken today in the office demonstrate a reverse total shoulder arthroplasty to be in appropriate position without evidence of hardware failure or loosening.    Assessment/Plan:      Stable status post right reverse total shoulder arthroplasty.  Patient is doing  "extremely well in terms of range of motion.  We discussed the salvage nature of the procedure, pain control, and lifetime lifting restrictions.  She is doing well, I think she is able to return to her job as a  at this time.  Will check her back 1 more time In 2 months.    Jadon Flores, Physician Assistant, served in the capacity as a "scribe" for this patient encounter.  A "face-to-face" encounter occurred with Dr. Jony Marmolejo on this date.  The treatment plan and medical decision-making is outlined above. Patient was seen and examined with a chaperone.       This note was created using Dragon voice recognition software that occasionally misinterpreted phrases or words.          "

## 2023-05-16 NOTE — LETTER
May 16, 2023      [facility]  [address]  [place] [address]  Phone: [phone]  Fax: [phone]       Patient: Camila Au   YOB: 1950  Date of Visit: 05/16/2023    To Whom It May Concern:    Darrin Au was seen in our office 05/16/2023. At this time, she may return to work as a  with no restrictions.      Sincerely,    DALTON Justin

## 2023-06-30 ENCOUNTER — OCCUPATIONAL HEALTH (OUTPATIENT)
Dept: URGENT CARE | Facility: CLINIC | Age: 73
End: 2023-06-30

## 2023-06-30 DIAGNOSIS — Z00.00 ENCOUNTER FOR PHYSICAL EXAMINATION: Primary | ICD-10-CM

## 2023-06-30 PROCEDURE — 99499 DOT PHYSICAL: ICD-10-PCS | Mod: S$GLB,,, | Performed by: NURSE PRACTITIONER

## 2023-06-30 PROCEDURE — 99499 UNLISTED E&M SERVICE: CPT | Mod: S$GLB,,, | Performed by: NURSE PRACTITIONER

## 2023-07-18 ENCOUNTER — OFFICE VISIT (OUTPATIENT)
Dept: ORTHOPEDICS | Facility: CLINIC | Age: 73
End: 2023-07-18
Payer: MEDICARE

## 2023-07-18 VITALS — HEIGHT: 58 IN | WEIGHT: 165 LBS | BODY MASS INDEX: 34.63 KG/M2

## 2023-07-18 DIAGNOSIS — Z96.611 HISTORY OF REVERSE TOTAL REPLACEMENT OF RIGHT SHOULDER JOINT: Primary | ICD-10-CM

## 2023-07-18 PROCEDURE — 99213 PR OFFICE/OUTPT VISIT, EST, LEVL III, 20-29 MIN: ICD-10-PCS | Mod: S$GLB,,, | Performed by: ORTHOPAEDIC SURGERY

## 2023-07-18 PROCEDURE — 99213 OFFICE O/P EST LOW 20 MIN: CPT | Mod: S$GLB,,, | Performed by: ORTHOPAEDIC SURGERY

## 2023-07-18 NOTE — PROGRESS NOTES
[facility]    Subjective:     Chief Complaint:   Chief Complaint   Patient presents with    Right Shoulder - Post-op Evaluation     S/P Right Reverse TSA 04/10/23. States that she is improving       Past Medical History:   Diagnosis Date    Arthritis     Breast cancer     right    Pain in finger     quentin long finger pain    Wears glasses     WEARS CONTACS    Wears partial dentures     UPPER AND LOWER       Past Surgical History:   Procedure Laterality Date    BREAST RECONSTRUCTION Left 2007    CARPAL TUNNEL RELEASE       SECTION  1970, 1972, 1976    CHOLECYSTECTOMY  2019        COLONOSCOPY  2017    JOINT REPLACEMENT Right     KNEE    KNEE ARTHROPLASTY Right 06/10/2019    Procedure: ARTHROPLASTY, KNEE;  Surgeon: Jony Marmolejo MD;  Location: [facility] OR;  Service: Orthopedics;  Laterality: Right;    KNEE ARTHROPLASTY Left 2021    Procedure: ARTHROPLASTY, KNEE;  Surgeon: Jony Marmolejo MD;  Location: [facility] OR;  Service: Orthopedics;  Laterality: Left;    KNEE ARTHROSCOPY Bilateral 2008    MASTECTOMY Right 2007    PORTACATH PLACEMENT  2007    PORTACATH REMOVAL      REVERSE TOTAL SHOULDER ARTHROPLASTY Right 04/10/2023    Procedure: ARTHROPLASTY, SHOULDER, TOTAL, REVERSE, RIGHT;  Surgeon: Jony Marmolejo MD;  Location: [facility] OR;  Service: Orthopedics;  Laterality: Right;    SURGICAL REMOVAL OF BONE SPUR Bilateral     TUBAL LIGATION         Current Outpatient Medications   Medication Sig    albuterol (PROVENTIL/VENTOLIN HFA) 90 mcg/actuation inhaler INHALE 2 PUFFS INTO THE LUNGS EVERY 6 HOURS FOR WHEEZING    alendronate (FOSAMAX) 70 MG tablet Take 1 tablet (70 mg total) by mouth every 7 days.    ascorbic acid, vitamin C, (VITAMIN C) 1000 MG tablet Take 1,000 mg by mouth once daily.    calcium carbonate (CALCIUM 600 ORAL) Take by mouth 2 (two) times daily.    ibuprofen (ADVIL,MOTRIN) 800 MG tablet Take 800 mg by mouth every 8 (eight) hours as needed.    multivitamin capsule  Take 1 capsule by mouth once daily.    ondansetron (ZOFRAN) 4 MG tablet Take 1 tablet (4 mg total) by mouth every 6 (six) hours as needed for Nausea.    oxyCODONE-acetaminophen (PERCOCET) 7.5-325 mg per tablet Take 1 tablet by mouth every 8 (eight) hours as needed for Pain.    vitamin D (VITAMIN D3) 1000 units Tab Take 1,000 Units by mouth once daily.    zinc gluconate 50 mg tablet Take 50 mg by mouth once daily.    gabapentin (NEURONTIN) 300 MG capsule Take 1 capsule (300 mg total) by mouth 2 (two) times daily. Take as directed twice daily after joint replacement surgery as an adjunct to pain medication.     No current facility-administered medications for this visit.       Review of patient's allergies indicates:  No Known Allergies    Family History   Problem Relation Age of Onset    Arthritis Mother     Hyperlipidemia Mother     Stroke Mother     Dementia Mother     Eczema Daughter     Eczema Grandchild     Lupus Neg Hx     Psoriasis Neg Hx     Melanoma Neg Hx        Social History     Socioeconomic History    Marital status:    Tobacco Use    Smoking status: Former     Packs/day: 0.25     Types: Cigarettes     Start date:      Quit date:      Years since quittin.5    Smokeless tobacco: Never    Tobacco comments:     social smoker - on weekends only - quit 20 yrs ago   Substance and Sexual Activity    Alcohol use: Yes     Alcohol/week: 0.0 standard drinks     Comment: occ    Drug use: No    Sexual activity: Not Currently     Partners: Male       History of present illness:  Patient returns today status post right reverse total shoulder.  She is generally doing well.      Review of Systems:    Constitution: Negative for chills, fever, and sweats.  Negative for unexplained weight loss.    HENT:  Negative for headaches and blurry vision.    Cardiovascular:Negative for chest pain or irregular heart beat. Negative for hypertension.    Respiratory:  Negative for cough and shortness of  breath.    Gastrointestinal: Negative for abdominal pain, heartburn, melena, nausea, and vomitting.    Genitourinary:  Negative bladder incontinence and dysuria.    Musculoskeletal:  See HPI for details.     Neurological: Negative for numbness.    Psychiatric/Behavioral: Negative for depression.  The patient is not nervous/anxious.      Endocrine: Negative for polyuria    Hematologic/Lymphatic: Negative for bleeding problem.  Does not bruise/bleed easily.    Skin: Negative for poor would healing and rash    Objective:      Physical Examination:    Vital Signs:  There were no vitals filed for this visit.    Body mass index is 34.49 kg/m².    This a well-developed, well nourished patient in no acute distress.  They are alert and oriented and cooperative to examination.        Wounds clean dry and intact.  She can get her hand way up overhead  Pertinent New Results:    XRAY Report / Interpretation:   AP and lateral of the right shoulder demonstrates a reverse total shoulder to be in acceptable position no change in position compared prior films    Assessment/Plan:      Stable following reverse total shoulder continue strengthening follow-up in 3 months      This note was created using Dragon voice recognition software that occasionally misinterpreted phrases or words.

## 2023-07-23 NOTE — PROGRESS NOTES
SUBJECTIVE:    Patient ID: Camila Au is a 73 y.o. female.    Chief Complaint: Pain (Not having pain anymore) and Follow-up    HPI    Patient comes for recheck-she called at first re some pleuritic type pain in the back of the lungs-the pain is gone-there was no associated cough-all pain is resolved at this time    Patient did have a pneumonia diagnosed in the earlier part of the year with xray revealing:  Bilateral lung base parenchymal opacity similar to that on 4/3/2023 either reflecting scarring, atelectasis, parenchymal consolidation, or some combination thereof.     She continues to have pains in the operated shoulder on the right-she is also having some pain in the left deltoid seemingly going from the elbow up med anterior deltoid, medial aspect -    Office Visit on 04/19/2023   Component Date Value Ref Range Status    POC Rapid COVID 04/19/2023 Negative  Negative Final     Acceptable 04/19/2023 Yes   Final    Rapid Influenza A Ag 04/19/2023 Negative  Negative Final    Rapid Influenza B Ag 04/19/2023 Negative  Negative Final     Acceptable 04/19/2023 Yes   Final   Hospital Outpatient Visit on 04/03/2023   Component Date Value Ref Range Status    Sodium 04/03/2023 138  136 - 145 mmol/L Final    Potassium 04/03/2023 3.6  3.5 - 5.1 mmol/L Final    Chloride 04/03/2023 104  95 - 110 mmol/L Final    CO2 04/03/2023 26  23 - 29 mmol/L Final    Glucose 04/03/2023 130 (H)  70 - 110 mg/dL Final    BUN 04/03/2023 10  8 - 23 mg/dL Final    Creatinine 04/03/2023 0.7  0.5 - 1.4 mg/dL Final    Calcium 04/03/2023 9.6  8.7 - 10.5 mg/dL Final    Total Protein 04/03/2023 7.5  6.0 - 8.4 g/dL Final    Albumin 04/03/2023 3.9  3.5 - 5.2 g/dL Final    Total Bilirubin 04/03/2023 0.9  0.1 - 1.0 mg/dL Final    Alkaline Phosphatase 04/03/2023 121  55 - 135 U/L Final    AST 04/03/2023 79 (H)  10 - 40 U/L Final    ALT 04/03/2023 57 (H)  10 - 44 U/L Final    Anion Gap 04/03/2023 8  8 - 16  mmol/L Final    eGFR 04/03/2023 >60  >60 mL/min/1.73 m^2 Final    WBC 04/03/2023 7.61  3.90 - 12.70 K/uL Final    RBC 04/03/2023 4.76  4.00 - 5.40 M/uL Final    Hemoglobin 04/03/2023 15.7  12.0 - 16.0 g/dL Final    Hematocrit 04/03/2023 45.1  37.0 - 48.5 % Final    MCV 04/03/2023 95  82 - 98 fL Final    MCH 04/03/2023 33.0 (H)  27.0 - 31.0 pg Final    MCHC 04/03/2023 34.8  32.0 - 36.0 g/dL Final    RDW 04/03/2023 13.0  11.5 - 14.5 % Final    Platelets 04/03/2023 191  150 - 450 K/uL Final    MPV 04/03/2023 11.1  9.2 - 12.9 fL Final    Immature Granulocytes 04/03/2023 0.3  0.0 - 0.5 % Final    Gran # (ANC) 04/03/2023 3.8  1.8 - 7.7 K/uL Final    Immature Grans (Abs) 04/03/2023 0.02  0.00 - 0.04 K/uL Final    Lymph # 04/03/2023 2.8  1.0 - 4.8 K/uL Final    Mono # 04/03/2023 0.7  0.3 - 1.0 K/uL Final    Eos # 04/03/2023 0.2  0.0 - 0.5 K/uL Final    Baso # 04/03/2023 0.09  0.00 - 0.20 K/uL Final    nRBC 04/03/2023 0  0 /100 WBC Final    Gran % 04/03/2023 50.0  38.0 - 73.0 % Final    Lymph % 04/03/2023 36.9  18.0 - 48.0 % Final    Mono % 04/03/2023 8.8  4.0 - 15.0 % Final    Eosinophil % 04/03/2023 2.8  0.0 - 8.0 % Final    Basophil % 04/03/2023 1.2  0.0 - 1.9 % Final    Differential Method 04/03/2023 Automated   Final    MRSA Surveillance Screen 04/03/2023 No MRSA isolated   Final    Group & Rh 04/03/2023 O POS   Final    Indirect Arthur 04/03/2023 NEG   Final    Specimen Outdate 04/03/2023 04/17/2023 23:59   Final   Lab Visit on 12/22/2022   Component Date Value Ref Range Status    BNP 12/22/2022 23  0 - 99 pg/mL Final    WBC 12/22/2022 7.42  3.90 - 12.70 K/uL Final    RBC 12/22/2022 4.83  4.00 - 5.40 M/uL Final    Hemoglobin 12/22/2022 15.7  12.0 - 16.0 g/dL Final    Hematocrit 12/22/2022 47.2  37.0 - 48.5 % Final    MCV 12/22/2022 98  82 - 98 fL Final    MCH 12/22/2022 32.5 (H)  27.0 - 31.0 pg Final    MCHC 12/22/2022 33.3  32.0 - 36.0 g/dL Final    RDW 12/22/2022 13.7  11.5 - 14.5 % Final    Platelets 12/22/2022 222   150 - 450 K/uL Final    MPV 12/22/2022 11.1  9.2 - 12.9 fL Final    Immature Granulocytes 12/22/2022 0.4  0.0 - 0.5 % Final    Gran # (ANC) 12/22/2022 3.4  1.8 - 7.7 K/uL Final    Immature Grans (Abs) 12/22/2022 0.03  0.00 - 0.04 K/uL Final    Lymph # 12/22/2022 2.9  1.0 - 4.8 K/uL Final    Mono # 12/22/2022 0.7  0.3 - 1.0 K/uL Final    Eos # 12/22/2022 0.3  0.0 - 0.5 K/uL Final    Baso # 12/22/2022 0.09  0.00 - 0.20 K/uL Final    nRBC 12/22/2022 0  0 /100 WBC Final    Gran % 12/22/2022 45.9  38.0 - 73.0 % Final    Lymph % 12/22/2022 39.5  18.0 - 48.0 % Final    Mono % 12/22/2022 9.6  4.0 - 15.0 % Final    Eosinophil % 12/22/2022 3.4  0.0 - 8.0 % Final    Basophil % 12/22/2022 1.2  0.0 - 1.9 % Final    Differential Method 12/22/2022 Automated   Final   Hospital Outpatient Visit on 10/21/2022   Component Date Value Ref Range Status    Left Atrium Major Axis 10/21/2022 4.10  cm Final    LA size 10/21/2022 4.20  cm Final    AORTIC VALVE CUSP SEPERATION 10/21/2022 1.50  cm Final    AV mean gradient 10/21/2022 5  mmHg Final    Ao VTI 10/21/2022 29.50  cm Final    Ao peak austen 10/21/2022 1.45  m/s Final    AV peak gradient 10/21/2022 8  mmHg Final    Ao root annulus 10/21/2022 3.50  cm Final    IVRT 10/21/2022 95.00  ms Final    IVS 10/21/2022 1.22 (A)  0.6 - 1.1 cm Final    LVIDd 10/21/2022 4.11  3.5 - 6.0 cm Final    LVIDs 10/21/2022 2.71  2.1 - 4.0 cm Final    LVOT diameter 10/21/2022 2.00  cm Final    LVOT peak VTI 10/21/2022 27.20  cm Final    LVOT peak austen 10/21/2022 1.05  m/s Final    Posterior Wall 10/21/2022 1.13 (A)  0.6 - 1.1 cm Final    E wave deceleration time 10/21/2022 190.00  ms Final    MV Peak A Austen 10/21/2022 0.30  m/s Final    MV Peak E Austen 10/21/2022 0.97  m/s Final    PV mean gradient 10/21/2022 1.00  mmHg Final    PV PEAK VELOCITY 10/21/2022 0.87  m/s Final    RVDD 10/21/2022 2.28  cm Final    BSA 10/21/2022 1.75  m2 Final    TDI SEPTAL 10/21/2022 0.10  m/s Final    LV LATERAL E/E' RATIO 10/21/2022  12.13  m/s Final    LV SEPTAL E/E' RATIO 10/21/2022 9.70  m/s Final    TDI LATERAL 10/21/2022 0.08  m/s Final    FS 10/21/2022 34  28 - 44 % Final    LV mass 10/21/2022 167.13  g Final    Left Ventricle Relative Wall Thick* 10/21/2022 0.55  cm Final    AV valve area 10/21/2022 2.90  cm2 Final    AV Velocity Ratio 10/21/2022 0.72   Final    AV index (prosthetic) 10/21/2022 0.92   Final    E/A ratio 10/21/2022 3.23   Final    Mean e' 10/21/2022 0.09  m/s Final    LVOT area 10/21/2022 3.1  cm2 Final    LVOT stroke volume 10/21/2022 85.41  cm3 Final    E/E' ratio 10/21/2022 10.78  m/s Final    LV Mass Index 10/21/2022 99  g/m2 Final    Right Atrial Pressure (from IVC) 10/21/2022 3  mmHg Final    EF 10/21/2022 57  % Final   Lab Visit on 10/21/2022   Component Date Value Ref Range Status    DANIEL 10/21/2022 Positive (A)   Final    CRP 10/21/2022 0.41  <0.76 mg/dL Final    Sodium 10/21/2022 137  136 - 145 mmol/L Final    Potassium 10/21/2022 3.8  3.5 - 5.1 mmol/L Final    Chloride 10/21/2022 101  95 - 110 mmol/L Final    CO2 10/21/2022 29  23 - 29 mmol/L Final    Glucose 10/21/2022 98  70 - 110 mg/dL Final    BUN 10/21/2022 16  8 - 23 mg/dL Final    Creatinine 10/21/2022 0.5  0.5 - 1.4 mg/dL Final    Calcium 10/21/2022 9.4  8.7 - 10.5 mg/dL Final    Total Protein 10/21/2022 7.8  6.0 - 8.4 g/dL Final    Albumin 10/21/2022 4.1  3.5 - 5.2 g/dL Final    Total Bilirubin 10/21/2022 1.1 (H)  0.1 - 1.0 mg/dL Final    Alkaline Phosphatase 10/21/2022 98  55 - 135 U/L Final    AST 10/21/2022 85 (H)  10 - 40 U/L Final    ALT 10/21/2022 65 (H)  10 - 44 U/L Final    Anion Gap 10/21/2022 7 (L)  8 - 16 mmol/L Final    eGFR 10/21/2022 >60.0  >60 mL/min/1.73 m^2 Final    CPK 10/21/2022 151  20 - 180 U/L Final    WBC 10/21/2022 6.81  3.90 - 12.70 K/uL Final    RBC 10/21/2022 5.23  4.00 - 5.40 M/uL Final    Hemoglobin 10/21/2022 17.3 (H)  12.0 - 16.0 g/dL Final    Hematocrit 10/21/2022 48.9 (H)  37.0 - 48.5 % Final    MCV 10/21/2022 94  82 - 98  fL Final    MCH 10/21/2022 33.1 (H)  27.0 - 31.0 pg Final    MCHC 10/21/2022 35.4  32.0 - 36.0 g/dL Final    RDW 10/21/2022 13.2  11.5 - 14.5 % Final    Platelets 10/21/2022 205  150 - 450 K/uL Final    MPV 10/21/2022 11.1  9.2 - 12.9 fL Final    Immature Granulocytes 10/21/2022 0.3  0.0 - 0.5 % Final    Gran # (ANC) 10/21/2022 3.3  1.8 - 7.7 K/uL Final    Immature Grans (Abs) 10/21/2022 0.02  0.00 - 0.04 K/uL Final    Lymph # 10/21/2022 2.8  1.0 - 4.8 K/uL Final    Mono # 10/21/2022 0.5  0.3 - 1.0 K/uL Final    Eos # 10/21/2022 0.1  0.0 - 0.5 K/uL Final    Baso # 10/21/2022 0.06  0.00 - 0.20 K/uL Final    nRBC 10/21/2022 0  0 /100 WBC Final    Gran % 10/21/2022 48.5  38.0 - 73.0 % Final    Lymph % 10/21/2022 40.8  18.0 - 48.0 % Final    Mono % 10/21/2022 7.9  4.0 - 15.0 % Final    Eosinophil % 10/21/2022 1.6  0.0 - 8.0 % Final    Basophil % 10/21/2022 0.9  0.0 - 1.9 % Final    Differential Method 10/21/2022 Automated   Final    Speckled Pattern 10/21/2022 1:160 (H)   Final    DANIEL Note 10/21/2022 Comment   Final       Past Medical History:   Diagnosis Date    Arthritis     Breast cancer     right    Pain in finger     quentin long finger pain    Wears glasses     WEARS CONTACS    Wears partial dentures     UPPER AND LOWER     Past Surgical History:   Procedure Laterality Date    BREAST RECONSTRUCTION Left 2007    CARPAL TUNNEL RELEASE       SECTION  1970, 1972, 1976    CHOLECYSTECTOMY  2019        COLONOSCOPY  2017    JOINT REPLACEMENT Right     KNEE    KNEE ARTHROPLASTY Right 06/10/2019    Procedure: ARTHROPLASTY, KNEE;  Surgeon: Jony Marmolejo MD;  Location: [facility] OR;  Service: Orthopedics;  Laterality: Right;    KNEE ARTHROPLASTY Left 2021    Procedure: ARTHROPLASTY, KNEE;  Surgeon: Jony Marmolejo MD;  Location: [facility] OR;  Service: Orthopedics;  Laterality: Left;    KNEE ARTHROSCOPY Bilateral 2008    MASTECTOMY Right 2007    PORTACATH PLACEMENT  2007    PORTACATH REMOVAL       REVERSE TOTAL SHOULDER ARTHROPLASTY Right 04/10/2023    Procedure: ARTHROPLASTY, SHOULDER, TOTAL, REVERSE, RIGHT;  Surgeon: Jony Marmolejo MD;  Location: [facility];  Service: Orthopedics;  Laterality: Right;    SURGICAL REMOVAL OF BONE SPUR Bilateral 2002    TUBAL LIGATION  1976     Family History   Problem Relation Age of Onset    Arthritis Mother     Hyperlipidemia Mother     Stroke Mother     Dementia Mother     Eczema Daughter     Eczema Grandchild     Lupus Neg Hx     Psoriasis Neg Hx     Melanoma Neg Hx        Marital Status:   Alcohol History:  reports current alcohol use.  Tobacco History:  reports that she quit smoking about 42 years ago. Her smoking use included cigarettes. She started smoking about 43 years ago. She smoked an average of .25 packs per day. She has never used smokeless tobacco.  Drug History:  reports no history of drug use.    Review of patient's allergies indicates:  No Known Allergies    Current Outpatient Medications:     alendronate (FOSAMAX) 70 MG tablet, Take 1 tablet (70 mg total) by mouth every 7 days. (Patient not taking: Reported on 7/24/2023), Disp: 12 tablet, Rfl: 3    ascorbic acid, vitamin C, (VITAMIN C) 1000 MG tablet, Take 1,000 mg by mouth once daily., Disp: , Rfl:     calcium carbonate (CALCIUM 600 ORAL), Take by mouth 2 (two) times daily., Disp: , Rfl:     ibuprofen (ADVIL,MOTRIN) 800 MG tablet, Take 800 mg by mouth every 8 (eight) hours as needed., Disp: , Rfl:     vitamin D (VITAMIN D3) 1000 units Tab, Take 1,000 Units by mouth once daily., Disp: , Rfl:     zinc gluconate 50 mg tablet, Take 50 mg by mouth once daily., Disp: , Rfl:     Review of Systems   Constitutional:  Negative for appetite change, chills, diaphoresis, fatigue, fever and unexpected weight change.   HENT:  Negative for congestion, ear pain, hearing loss, nosebleeds, postnasal drip, sinus pressure, sinus pain, sneezing, sore throat, tinnitus, trouble swallowing and voice change.    Eyes:  Negative  for photophobia, pain, itching and visual disturbance.   Respiratory:  Negative for apnea, cough, chest tightness, shortness of breath, wheezing and stridor.    Cardiovascular:  Negative for chest pain, palpitations and leg swelling.   Gastrointestinal:  Negative for abdominal distention, abdominal pain, blood in stool, constipation, diarrhea, nausea and vomiting.   Endocrine: Negative for cold intolerance, heat intolerance, polydipsia and polyuria.   Genitourinary:  Negative for difficulty urinating, dyspareunia, dysuria, flank pain, frequency, hematuria, menstrual problem, pelvic pain, urgency, vaginal discharge and vaginal pain.   Musculoskeletal:  Positive for arthralgias (HPI). Negative for back pain, joint swelling, myalgias, neck pain and neck stiffness.   Skin:  Negative for pallor.   Allergic/Immunologic: Negative for environmental allergies and food allergies.   Neurological:  Negative for dizziness, tremors, speech difficulty, weakness, light-headedness and numbness.   Hematological:  Does not bruise/bleed easily.   Psychiatric/Behavioral:  Negative for agitation, confusion, decreased concentration, sleep disturbance and suicidal ideas. The patient is not nervous/anxious.         Objective:      Vitals - 1 value per visit 5/16/2023 5/30/2023 7/18/2023 7/18/2023 7/24/2023 7/24/2023   SYSTOLIC - - - - - 124   DIASTOLIC - - - - - 82   Pulse - - - - - 80   Temp - - - - - 98.1   Resp - - - - - 16   SPO2 - - - - - 95   Weight (lb) 165 165 - 165 - 167   Weight (kg) 74.844 74.844 - 74.844 - 75.751   Height 58 58 - 58 - 58   BMI (Calculated) 34.5 34.5 - 34.5 - 34.9   VISIT REPORT - - - - - -   Pain Score  - - 2 - 0 -   Some recent data might be hidden   (    Physical Exam  Vitals and nursing note reviewed.   Constitutional:       General: She is not in acute distress.     Appearance: She is obese. She is not ill-appearing.   HENT:      Head: Normocephalic and atraumatic.   Eyes:      Extraocular Movements:  Extraocular movements intact.      Conjunctiva/sclera: Conjunctivae normal.      Pupils: Pupils are equal, round, and reactive to light.   Neck:      Vascular: No carotid bruit.   Cardiovascular:      Rate and Rhythm: Normal rate and regular rhythm.      Pulses: Normal pulses.      Heart sounds: Normal heart sounds.   Pulmonary:      Effort: Pulmonary effort is normal.      Breath sounds: Normal breath sounds.   Abdominal:      General: Bowel sounds are normal.      Palpations: Abdomen is soft.   Musculoskeletal:      Cervical back: Normal range of motion and neck supple.      Right lower leg: No edema.      Left lower leg: No edema.   Lymphadenopathy:      Cervical: No cervical adenopathy.   Skin:     General: Skin is warm and dry.   Neurological:      General: No focal deficit present.      Mental Status: She is alert and oriented to person, place, and time.   Psychiatric:         Mood and Affect: Mood normal.         Behavior: Behavior normal.         Thought Content: Thought content normal.         Assessment:       1. Elevated glucose    2. Elevated liver function tests    3. Abnormal chest x-ray    4. BMI 34.0-34.9,adult         Plan:       Elevated glucose  -     Comprehensive Metabolic Panel; Future; Expected date: 07/25/2023  -     Hemoglobin A1C; Future; Expected date: 07/25/2023    Elevated liver function tests  -     Comprehensive Metabolic Panel; Future; Expected date: 07/25/2023    Abnormal chest x-ray  -     X-Ray Chest PA And Lateral; Future; Expected date: 07/24/2023    BMI 34.0-34.9,adult        -     we discussed intermittent fasting for now-      Follow up in about 6 months (around 1/24/2024).        7/24/2023 Kasie Arthur M.D.

## 2023-07-24 ENCOUNTER — OFFICE VISIT (OUTPATIENT)
Dept: FAMILY MEDICINE | Facility: CLINIC | Age: 73
End: 2023-07-24
Payer: MEDICARE

## 2023-07-24 VITALS
TEMPERATURE: 98 F | DIASTOLIC BLOOD PRESSURE: 82 MMHG | HEART RATE: 80 BPM | SYSTOLIC BLOOD PRESSURE: 124 MMHG | RESPIRATION RATE: 16 BRPM | OXYGEN SATURATION: 95 % | WEIGHT: 167 LBS | HEIGHT: 58 IN | BODY MASS INDEX: 35.05 KG/M2

## 2023-07-24 DIAGNOSIS — R73.09 ELEVATED GLUCOSE: Primary | ICD-10-CM

## 2023-07-24 DIAGNOSIS — R93.89 ABNORMAL CHEST X-RAY: ICD-10-CM

## 2023-07-24 DIAGNOSIS — R79.89 ELEVATED LIVER FUNCTION TESTS: ICD-10-CM

## 2023-07-24 PROCEDURE — 99214 PR OFFICE/OUTPT VISIT, EST, LEVL IV, 30-39 MIN: ICD-10-PCS | Mod: S$PBB,AQ,, | Performed by: INTERNAL MEDICINE

## 2023-07-24 PROCEDURE — 99214 OFFICE O/P EST MOD 30 MIN: CPT | Mod: S$PBB,AQ,, | Performed by: INTERNAL MEDICINE

## 2023-07-24 PROCEDURE — 99213 OFFICE O/P EST LOW 20 MIN: CPT | Performed by: INTERNAL MEDICINE

## 2023-07-28 ENCOUNTER — OFFICE VISIT (OUTPATIENT)
Dept: ORTHOPEDICS | Facility: CLINIC | Age: 73
End: 2023-07-28
Payer: MEDICARE

## 2023-07-28 VITALS
BODY MASS INDEX: 35.05 KG/M2 | SYSTOLIC BLOOD PRESSURE: 124 MMHG | WEIGHT: 167 LBS | DIASTOLIC BLOOD PRESSURE: 78 MMHG | HEIGHT: 58 IN

## 2023-07-28 DIAGNOSIS — M12.812 ROTATOR CUFF ARTHROPATHY, LEFT: ICD-10-CM

## 2023-07-28 DIAGNOSIS — Z98.890 HISTORY OF REPAIR OF LEFT ROTATOR CUFF: Primary | ICD-10-CM

## 2023-07-28 DIAGNOSIS — M19.012 GLENOHUMERAL ARTHRITIS, LEFT: ICD-10-CM

## 2023-07-28 PROCEDURE — 20610 DRAIN/INJ JOINT/BURSA W/O US: CPT | Mod: LT,S$GLB,, | Performed by: PHYSICIAN ASSISTANT

## 2023-07-28 PROCEDURE — 99213 PR OFFICE/OUTPT VISIT, EST, LEVL III, 20-29 MIN: ICD-10-PCS | Mod: 25,S$GLB,, | Performed by: PHYSICIAN ASSISTANT

## 2023-07-28 PROCEDURE — 20610 LARGE JOINT ASPIRATION/INJECTION: L SUBACROMIAL BURSA: ICD-10-PCS | Mod: LT,S$GLB,, | Performed by: PHYSICIAN ASSISTANT

## 2023-07-28 PROCEDURE — 99213 OFFICE O/P EST LOW 20 MIN: CPT | Mod: 25,S$GLB,, | Performed by: PHYSICIAN ASSISTANT

## 2023-07-28 RX ORDER — METHYLPREDNISOLONE ACETATE 40 MG/ML
40 INJECTION, SUSPENSION INTRA-ARTICULAR; INTRALESIONAL; INTRAMUSCULAR; SOFT TISSUE
Status: DISCONTINUED | OUTPATIENT
Start: 2023-07-28 | End: 2023-07-28 | Stop reason: HOSPADM

## 2023-07-28 RX ADMIN — METHYLPREDNISOLONE ACETATE 40 MG: 40 INJECTION, SUSPENSION INTRA-ARTICULAR; INTRALESIONAL; INTRAMUSCULAR; SOFT TISSUE at 10:07

## 2023-07-28 NOTE — PROGRESS NOTES
[facility]  [address]  [place] [address]  Phone: [phone]   [phone]    Patient's PCP: Kasie Arthur MD  Referring Provider: No ref. provider found    Subjective:      Chief Complaint:   Chief Complaint   Patient presents with    Left Shoulder - Pain     LT arm pain, she has pain from shoulder into bicep/elbow. She has weakness and painful ROM. Different pain from 2023 when she had an inj. Pain wakes her from sleep       Past Medical History:   Diagnosis Date    Arthritis     Breast cancer 2007    right    Pain in finger     quentin long finger pain    Wears glasses     WEARS CONTACS    Wears partial dentures     UPPER AND LOWER       Past Surgical History:   Procedure Laterality Date    BREAST RECONSTRUCTION Left 2007    CARPAL TUNNEL RELEASE       SECTION  1970, ,     CHOLECYSTECTOMY  2019        COLONOSCOPY  2017    JOINT REPLACEMENT Right     KNEE    KNEE ARTHROPLASTY Right 06/10/2019    Procedure: ARTHROPLASTY, KNEE;  Surgeon: Jony Marmolejo MD;  Location: [facility] OR;  Service: Orthopedics;  Laterality: Right;    KNEE ARTHROPLASTY Left 2021    Procedure: ARTHROPLASTY, KNEE;  Surgeon: Jony Marmolejo MD;  Location: [facility] OR;  Service: Orthopedics;  Laterality: Left;    KNEE ARTHROSCOPY Bilateral 2008    MASTECTOMY Right 2007    PORTACATH PLACEMENT  2007    PORTACATH REMOVAL      REVERSE TOTAL SHOULDER ARTHROPLASTY Right 04/10/2023    Procedure: ARTHROPLASTY, SHOULDER, TOTAL, REVERSE, RIGHT;  Surgeon: Jony Marmolejo MD;  Location: [facility] OR;  Service: Orthopedics;  Laterality: Right;    SURGICAL REMOVAL OF BONE SPUR Bilateral     TUBAL LIGATION         Current Outpatient Medications   Medication Sig    ascorbic acid, vitamin C, (VITAMIN C) 1000 MG tablet Take 1,000 mg by mouth once daily.    calcium carbonate (CALCIUM 600 ORAL) Take by mouth 2 (two) times daily.    ibuprofen (ADVIL,MOTRIN) 800 MG tablet Take 800 mg by mouth every  8 (eight) hours as needed.    vitamin D (VITAMIN D3) 1000 units Tab Take 1,000 Units by mouth once daily.    zinc gluconate 50 mg tablet Take 50 mg by mouth once daily.    alendronate (FOSAMAX) 70 MG tablet Take 1 tablet (70 mg total) by mouth every 7 days. (Patient not taking: Reported on 2023)     No current facility-administered medications for this visit.       Review of patient's allergies indicates:  No Known Allergies    Family History   Problem Relation Age of Onset    Arthritis Mother     Hyperlipidemia Mother     Stroke Mother     Dementia Mother     Eczema Daughter     Eczema Grandchild     Lupus Neg Hx     Psoriasis Neg Hx     Melanoma Neg Hx        Social History     Socioeconomic History    Marital status:    Tobacco Use    Smoking status: Former     Packs/day: 0.25     Types: Cigarettes     Start date:      Quit date:      Years since quittin.5    Smokeless tobacco: Never    Tobacco comments:     social smoker - on weekends only - quit 20 yrs ago   Substance and Sexual Activity    Alcohol use: Yes     Alcohol/week: 0.0 standard drinks     Comment: occ    Drug use: No    Sexual activity: Not Currently     Partners: Male       History of present illness:  Patient comes in today for follow-up for her left shoulder.  She was last seen and injected in the shoulder about 6 months ago with relief of her symptoms.  She does have a previous surgical history of a rotator cuff repair about 8 years ago.  Of note, she is about 3 months status post right reverse total shoulder arthroplasty.  She denies any new injury or trauma to the left shoulder.  She localizes pain about the entirety of the left shoulder extending down to just proximal to the elbow.  It is painful with any overhead activity.    Review of Systems:    Constitutional: Negative for chills, fever and weight loss.   HENT: Negative for congestion.    Eyes: Negative for discharge and redness.   Respiratory: Negative for cough  and shortness of breath.    Cardiovascular: Negative for chest pain.   Gastrointestinal: Negative for nausea and vomiting.   Musculoskeletal: See HPI.   Skin: Negative for rash.   Neurological: Negative for headaches.   Endo/Heme/Allergies: Does not bruise/bleed easily.   Psychiatric/Behavioral: The patient is not nervous/anxious.    All other systems reviewed and are negative.       Objective:      Physical Examination:    Vital Signs:    Vitals:    07/28/23 1043   BP: 124/78       Body mass index is 34.9 kg/m².    This a well-developed, well nourished patient in no acute distress.  They are alert and oriented and cooperative to examination.     Left shoulder/elbow exam:  Skin to left shoulder is clean dry and intact.  There is no erythema or ecchymosis.  There are no signs or symptoms of infection.  She is neurovascularly intact throughout the left upper extremity.  She can forward flex to 160° and externally rotate to 60°.  She has full internal rotation.  Her left rotator cuff tendon is weak and painful to resisted muscle testing.  She has a positive Neer impingement sign as well as a positive Landa test.  She has increased pain and associated weakness with resisted external and internal rotation maneuvers of the left shoulder.  She has full flexion/extension of the left elbow.  She can fully pronate/supinate the left forearm.  She is nontender over the left medial epicondyle and left lateral epicondyle.    Pertinent New Results:        XRAY Report / Interpretation:   Two views were taken of the left shoulder today: AP and Y-view.  They reveal no acute fractures or dislocations.  She does have a retained metallic anchor in her proximal humerus consistent with a previous rotator cuff tendon repair.  She has severe arthrosis in the superior aspect of the glenohumeral joint with bone-on-bone deformity and subchondral sclerosis in both the glenoid and humerus.  She has a subchondral cyst in the humeral head as  well.  She has decreased subacromial space between the acromion and humeral head consistent with rotator cuff arthropathy.    Three views were taken of the left elbow today: AP, lateral, oblique views.  They reveal no acute fractures or dislocations.  Visualized soft tissues appear unremarkable.      Assessment:       1. History of repair of left rotator cuff    2. Glenohumeral arthritis, left    3. Rotator cuff arthropathy, left      Plan:     History of repair of left rotator cuff  -     X-Ray Shoulder 2 or More Views Left  -     X-Ray Elbow Complete Left    Glenohumeral arthritis, left  -     Large Joint Aspiration/Injection: L subacromial bursa    Rotator cuff arthropathy, left  -     Large Joint Aspiration/Injection: L subacromial bursa        Follow up in about 3 months (around 10/28/2023) for Injec. f/up.    I injected her left shoulder today via a posterior lateral approach in the subacromial space with 40 mg of Depo-Medrol and lidocaine.  She tolerated this well.  I did discuss with her that she will likely need a reverse total shoulder arthroplasty as definitive treatment for the arthritic change in her left shoulder.  It appears her left rotator cuff repair is ultimately failed or she has a recurrent tear.  We will see her back in 3 months to see how she responds to this injection.  We can further discuss possible surgical intervention if she so desires.        Jeronimo Boss, ORQUIDEAS, PA-C    This note was created using WiWide voice recognition software that occasionally misinterprets words or phrases.

## 2023-07-28 NOTE — PROCEDURES
Large Joint Aspiration/Injection: L subacromial bursa    Date/Time: 7/28/2023 10:15 AM  Performed by: Jeronimo Boss PA-C  Authorized by: Jeronimo Boss PA-C     Consent Done?:  Yes (Verbal)  Indications:  Pain  Site marked: the procedure site was marked    Timeout: prior to procedure the correct patient, procedure, and site was verified    Prep: patient was prepped and draped in usual sterile fashion      Local anesthesia used?: Yes    Local anesthetic:  Lidocaine 2% with epinephrine    Details:  Needle Size:  25 G  Ultrasonic Guidance for needle placement?: No    Location:  Shoulder  Site:  L subacromial bursa  Medications:  40 mg methylPREDNISolone acetate 40 mg/mL  Patient tolerance:  Patient tolerated the procedure well with no immediate complications

## 2023-07-31 ENCOUNTER — OFFICE VISIT (OUTPATIENT)
Dept: ORTHOPEDICS | Facility: CLINIC | Age: 73
End: 2023-07-31
Payer: MEDICARE

## 2023-07-31 VITALS — BODY MASS INDEX: 35.05 KG/M2 | HEIGHT: 58 IN | WEIGHT: 167 LBS

## 2023-07-31 DIAGNOSIS — Z96.651 HISTORY OF TOTAL KNEE ARTHROPLASTY, RIGHT: Primary | ICD-10-CM
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
## 2023-07-31 PROCEDURE — 99213 PR OFFICE/OUTPT VISIT, EST, LEVL III, 20-29 MIN: ICD-10-PCS | Mod: S$GLB,,, | Performed by: PHYSICIAN ASSISTANT

## 2023-07-31 PROCEDURE — 99213 OFFICE O/P EST LOW 20 MIN: CPT | Mod: S$GLB,,, | Performed by: PHYSICIAN ASSISTANT

## 2023-07-31 RX ORDER — MELOXICAM 15 MG/1
15 TABLET ORAL DAILY
Qty: 30 TABLET | Refills: 2 | Status: SHIPPED | OUTPATIENT
Start: 2023-07-31 | End: 2023-12-27

## 2023-07-31 NOTE — PROGRESS NOTES
[facility] ORTHOPEDICS  [facility] [address]  [place] [address]  Phone: [phone]   [phone]    Patient's PCP: Kasie Arthur MD  Referring Provider: No ref. provider found    Subjective:      Chief Complaint:   Chief Complaint   Patient presents with    Right Knee - Pain     Patient is here with complaints of Right knee pain, had a fall on Saturday, chair collapsed under her causing her to fall, HX Right TKA 2021       Past Medical History:   Diagnosis Date    Arthritis     Breast cancer     right    Pain in finger     quentin long finger pain    Wears glasses     WEARS CONTACS    Wears partial dentures     UPPER AND LOWER       Past Surgical History:   Procedure Laterality Date    BREAST RECONSTRUCTION Left 2007    CARPAL TUNNEL RELEASE       SECTION  1970, ,     CHOLECYSTECTOMY  2019        COLONOSCOPY  2017    JOINT REPLACEMENT Right     KNEE    KNEE ARTHROPLASTY Right 06/10/2019    Procedure: ARTHROPLASTY, KNEE;  Surgeon: Jony Marmolejo MD;  Location: [facility] OR;  Service: Orthopedics;  Laterality: Right;    KNEE ARTHROPLASTY Left 2021    Procedure: ARTHROPLASTY, KNEE;  Surgeon: Jony Marmolejo MD;  Location: [facility] OR;  Service: Orthopedics;  Laterality: Left;    KNEE ARTHROSCOPY Bilateral 2008    MASTECTOMY Right 2007    PORTACATH PLACEMENT  2007    PORTACATH REMOVAL      REVERSE TOTAL SHOULDER ARTHROPLASTY Right 04/10/2023    Procedure: ARTHROPLASTY, SHOULDER, TOTAL, REVERSE, RIGHT;  Surgeon: Jony Marmolejo MD;  Location: [facility] OR;  Service: Orthopedics;  Laterality: Right;    SURGICAL REMOVAL OF BONE SPUR Bilateral     TUBAL LIGATION         Current Outpatient Medications   Medication Sig    ascorbic acid, vitamin C, (VITAMIN C) 1000 MG tablet Take 1,000 mg by mouth once daily.    alendronate (FOSAMAX) 70 MG tablet Take 1 tablet (70 mg total) by mouth every 7 days. (Patient not taking: Reported on 2023)    calcium carbonate (CALCIUM 600  ORAL) Take by mouth 2 (two) times daily.    meloxicam (MOBIC) 15 MG tablet Take 1 tablet (15 mg total) by mouth once daily. With Food    vitamin D (VITAMIN D3) 1000 units Tab Take 1,000 Units by mouth once daily.    zinc gluconate 50 mg tablet Take 50 mg by mouth once daily.     No current facility-administered medications for this visit.       Review of patient's allergies indicates:  No Known Allergies    Family History   Problem Relation Age of Onset    Arthritis Mother     Hyperlipidemia Mother     Stroke Mother     Dementia Mother     Eczema Daughter     Eczema Grandchild     Lupus Neg Hx     Psoriasis Neg Hx     Melanoma Neg Hx        Social History     Socioeconomic History    Marital status:    Tobacco Use    Smoking status: Former     Current packs/day: 0.00     Average packs/day: 0.3 packs/day for 1 year (0.3 ttl pk-yrs)     Types: Cigarettes     Start date:      Quit date:      Years since quittin.6    Smokeless tobacco: Never    Tobacco comments:     social smoker - on weekends only - quit 20 yrs ago   Substance and Sexual Activity    Alcohol use: Yes     Alcohol/week: 0.0 standard drinks of alcohol     Comment: occ    Drug use: No    Sexual activity: Not Currently     Partners: Male       History of present illness: Ms. Jensen comes in today for her right knee.  Unfortunately, she had a fall on her right knee about 2 days ago when her chair went out from under her.  She has a history of a right total knee arthroplasty just over 2 years ago that has done well for her.  Had pain in her knee since the fall and she would simply like to have this evaluated.    Review of Systems:    Constitutional: Negative for chills, fever and weight loss.   HENT: Negative for congestion.    Eyes: Negative for discharge and redness.   Respiratory: Negative for cough and shortness of breath.    Cardiovascular: Negative for chest pain.   Gastrointestinal: Negative for nausea and vomiting.    Musculoskeletal: See HPI.   Skin: Negative for rash.   Neurological: Negative for headaches.   Endo/Heme/Allergies: Does not bruise/bleed easily.   Psychiatric/Behavioral: The patient is not nervous/anxious.    All other systems reviewed and are negative.       Objective:      Physical Examination:    Vital Signs:  There were no vitals filed for this visit.    Body mass index is 34.9 kg/m².    This a well-developed, well nourished patient in no acute distress.  They are alert and oriented and cooperative to examination.     Right knee exam: Skin to the right knee is clean dry and intact.  There is no erythema or ecchymosis.  There are no signs or symptoms of infection.  Right knee anterior midline incision is well healed without wound dehiscence or drainage.  Right calf is soft and nontender.  Right knee range of motion is well-preserved at 0-110 degrees.  Right knee is stable varus and valgus stresses.  She can weightbear as tolerated on the right lower extremity.  There is no effusion.    Pertinent New Results:        XRAY Report / Interpretation:   Three views were taken of the right knee today: AP, lateral, and sunrise views.  They reveal no acute fractures or dislocations.  Visualized soft tissues appear unremarkable.  Patient has an anatomically placed right total knee arthroplasty without evidence of hardware failure or subsidence.      Assessment:       1. History of total knee arthroplasty, right    2. Fall, initial encounter      Plan:     History of total knee arthroplasty, right  -     X-Ray Knee 3 View Right  -     meloxicam (MOBIC) 15 MG tablet; Take 1 tablet (15 mg total) by mouth once daily. With Food  Dispense: 30 tablet; Refill: 2    Fall, initial encounter  -     meloxicam (MOBIC) 15 MG tablet; Take 1 tablet (15 mg total) by mouth once daily. With Food  Dispense: 30 tablet; Refill: 2        Follow up if symptoms worsen or fail to improve.    Reassurance was provided to the patient today.  We  will start her on Mobic 15 mg by mouth once a day as an anti-inflammatory.  She will take this with food.  She can follow up with us on an as-needed basis if she regresses or worsens in any way.        Jeronimo Boss, ORQUIDEAS, PA-C    This note was created using [employer] voice recognition software that occasionally misinterprets words or phrases.

## 2023-08-24 ENCOUNTER — HOSPITAL ENCOUNTER (OUTPATIENT)
Dept: RADIOLOGY | Facility: HOSPITAL | Age: 73
Discharge: HOME | End: 2023-08-24
Attending: INTERNAL MEDICINE
Payer: MEDICARE

## 2023-08-24 DIAGNOSIS — R93.89 ABNORMAL CHEST X-RAY: ICD-10-CM

## 2023-08-24 PROCEDURE — 71046 X-RAY EXAM CHEST 2 VIEWS: CPT | Mod: TC,PO
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
## 2023-10-09 ENCOUNTER — OFFICE VISIT (OUTPATIENT)
Dept: FAMILY MEDICINE | Facility: CLINIC | Age: 73
End: 2023-10-09
Payer: MEDICARE

## 2023-10-09 VITALS
SYSTOLIC BLOOD PRESSURE: 138 MMHG | WEIGHT: 169 LBS | TEMPERATURE: 98 F | HEART RATE: 76 BPM | DIASTOLIC BLOOD PRESSURE: 86 MMHG | BODY MASS INDEX: 35.48 KG/M2 | HEIGHT: 58 IN | OXYGEN SATURATION: 96 % | RESPIRATION RATE: 16 BRPM

## 2023-10-09 DIAGNOSIS — R00.2 PALPITATIONS: Primary | ICD-10-CM

## 2023-10-09 DIAGNOSIS — F43.9 SITUATIONAL STRESS: ICD-10-CM

## 2023-10-09 PROCEDURE — 99214 PR OFFICE/OUTPT VISIT, EST, LEVL IV, 30-39 MIN: ICD-10-PCS | Mod: S$PBB,AQ,, | Performed by: INTERNAL MEDICINE

## 2023-10-09 PROCEDURE — 99213 OFFICE O/P EST LOW 20 MIN: CPT | Performed by: INTERNAL MEDICINE

## 2023-10-09 PROCEDURE — 99214 OFFICE O/P EST MOD 30 MIN: CPT | Mod: S$PBB,AQ,, | Performed by: INTERNAL MEDICINE

## 2023-10-26 ENCOUNTER — OFFICE VISIT (OUTPATIENT)
Dept: ORTHOPEDICS | Facility: CLINIC | Age: 73
End: 2023-10-26
Payer: MEDICARE

## 2023-10-26 VITALS — WEIGHT: 169 LBS | BODY MASS INDEX: 35.48 KG/M2 | HEIGHT: 58 IN

## 2023-10-26 DIAGNOSIS — Z96.651 HISTORY OF TOTAL KNEE ARTHROPLASTY, RIGHT: Primary | ICD-10-CM

## 2023-10-26 DIAGNOSIS — Z96.611 PRESENCE OF RIGHT ARTIFICIAL SHOULDER JOINT: ICD-10-CM

## 2023-10-26 DIAGNOSIS — M19.012 GLENOHUMERAL ARTHRITIS, LEFT: ICD-10-CM

## 2023-10-26 DIAGNOSIS — Z96.611 HISTORY OF REVERSE TOTAL REPLACEMENT OF RIGHT SHOULDER JOINT: ICD-10-CM
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
## 2023-10-26 PROCEDURE — 99213 PR OFFICE/OUTPT VISIT, EST, LEVL III, 20-29 MIN: ICD-10-PCS | Mod: 25,S$GLB,, | Performed by: ORTHOPAEDIC SURGERY

## 2023-10-26 PROCEDURE — 20610 DRAIN/INJ JOINT/BURSA W/O US: CPT | Mod: LT,S$GLB,, | Performed by: ORTHOPAEDIC SURGERY

## 2023-10-26 PROCEDURE — 99213 OFFICE O/P EST LOW 20 MIN: CPT | Mod: 25,S$GLB,, | Performed by: ORTHOPAEDIC SURGERY

## 2023-10-26 PROCEDURE — 20610 LARGE JOINT ASPIRATION/INJECTION: L SUBACROMIAL BURSA: ICD-10-PCS | Mod: LT,S$GLB,, | Performed by: ORTHOPAEDIC SURGERY

## 2023-10-26 RX ORDER — TRIAMCINOLONE ACETONIDE 40 MG/ML
40 INJECTION, SUSPENSION INTRA-ARTICULAR; INTRAMUSCULAR
Status: DISCONTINUED | OUTPATIENT
Start: 2023-10-26 | End: 2023-10-26 | Stop reason: HOSPADM

## 2023-10-26 RX ADMIN — TRIAMCINOLONE ACETONIDE 40 MG: 40 INJECTION, SUSPENSION INTRA-ARTICULAR; INTRAMUSCULAR at 10:10

## 2023-10-26 NOTE — PROGRESS NOTES
[facility]    Subjective:     Chief Complaint:   Chief Complaint   Patient presents with    Left Shoulder - Pain     Left shoulder pain follow up. Received inj 23 which offered great relief. Requesting inj today.    Right Shoulder - Pain     S/P right TSA 04/10/23. States that she was doing well until about a month ago. States that she is now experiencing sharp pain with certain movements. Does state she fell about a month ago, but did not injure her shoulder.    Right Knee - Pain     Right knee pain that started after a fall about a month ago. Hx of TKA 2021. Was doing very well until fall       Past Medical History:   Diagnosis Date    Arthritis     Breast cancer     right    Pain in finger     quentin long finger pain    Wears glasses     WEARS CONTACS    Wears partial dentures     UPPER AND LOWER       Past Surgical History:   Procedure Laterality Date    BREAST RECONSTRUCTION Left     CARPAL TUNNEL RELEASE       SECTION  1970, 1972, 1976    CHOLECYSTECTOMY  2019        COLONOSCOPY  2017    JOINT REPLACEMENT Right     KNEE    KNEE ARTHROPLASTY Right 06/10/2019    Procedure: ARTHROPLASTY, KNEE;  Surgeon: Jony Marmolejo MD;  Location: [facility] OR;  Service: Orthopedics;  Laterality: Right;    KNEE ARTHROPLASTY Left 2021    Procedure: ARTHROPLASTY, KNEE;  Surgeon: Jony Marmolejo MD;  Location: [facility] OR;  Service: Orthopedics;  Laterality: Left;    KNEE ARTHROSCOPY Bilateral 2008    MASTECTOMY Right 2007    PORTACATH PLACEMENT  2007    PORTACATH REMOVAL      REVERSE TOTAL SHOULDER ARTHROPLASTY Right 04/10/2023    Procedure: ARTHROPLASTY, SHOULDER, TOTAL, REVERSE, RIGHT;  Surgeon: Jony Marmolejo MD;  Location: [facility] OR;  Service: Orthopedics;  Laterality: Right;    SURGICAL REMOVAL OF BONE SPUR Bilateral     TUBAL LIGATION         Current Outpatient Medications   Medication Sig    alendronate (FOSAMAX) 70 MG tablet Take 1 tablet (70 mg total) by mouth every  7 days. (Patient not taking: Reported on 2023)    meloxicam (MOBIC) 15 MG tablet Take 1 tablet (15 mg total) by mouth once daily. With Food (Patient not taking: Reported on 10/26/2023)     No current facility-administered medications for this visit.       Review of patient's allergies indicates:  No Known Allergies    Family History   Problem Relation Age of Onset    Arthritis Mother     Hyperlipidemia Mother     Stroke Mother     Dementia Mother     Eczema Daughter     Eczema Grandchild     Lupus Neg Hx     Psoriasis Neg Hx     Melanoma Neg Hx        Social History     Socioeconomic History    Marital status:    Tobacco Use    Smoking status: Former     Current packs/day: 0.00     Average packs/day: 0.3 packs/day for 1 year (0.3 ttl pk-yrs)     Types: Cigarettes     Start date:      Quit date:      Years since quittin.8    Smokeless tobacco: Never    Tobacco comments:     social smoker - on weekends only - quit 20 yrs ago   Substance and Sexual Activity    Alcohol use: Yes     Alcohol/week: 0.0 standard drinks of alcohol     Comment: occ    Drug use: No    Sexual activity: Not Currently     Partners: Male     Social Determinants of Health     Stress: No Stress Concern Present (7/10/2019)    [facility] [place] of Occupational Health - Occupational Stress Questionnaire     Feeling of Stress : Not at all       History of present illness:  73-year-old female, returns to clinic today to follow-up on multiple musculoskeletal complaints.  Unfortunately she fall about a month or so ago.  She has a history of a right total knee and a right reverse total shoulder and is having some right knee pain and some right shoulder pain.    The right shoulder, she complains of painful range of motion.  Not significantly diminished her decreased in terms of range of motion but she has pain now on almost a daily basis.  She was doing well from her surgery back in April of this year.    The right knee, she has  primarily anterior right knee pain.  She states that she tripped and fell down onto her knees primarily.  The right knee continues to ache or bother her.  She ambulates with a nonantalgic gait.  Weightbears as tolerated.    Right knee total arthroplasty 2019, left total knee arthroplasty 2021, right reverse total shoulder arthroplasty April 2023.      Review of Systems:    Constitution: Negative for chills, fever, and sweats.  Negative for unexplained weight loss.    HENT:  Negative for headaches and blurry vision.    Cardiovascular:Negative for chest pain or irregular heart beat. Negative for hypertension.    Respiratory:  Negative for cough and shortness of breath.    Gastrointestinal: Negative for abdominal pain, heartburn, melena, nausea, and vomitting.    Genitourinary:  Negative bladder incontinence and dysuria.    Musculoskeletal:  See HPI for details.     Neurological: Negative for numbness.    Psychiatric/Behavioral: Negative for depression.  The patient is not nervous/anxious.      Endocrine: Negative for polyuria    Hematologic/Lymphatic: Negative for bleeding problem.  Does not bruise/bleed easily.    Skin: Negative for poor would healing and rash    Objective:      Physical Examination:    Vital Signs:  There were no vitals filed for this visit.    Body mass index is 35.32 kg/m².    This a well-developed, well nourished patient in no acute distress.  They are alert and oriented and cooperative to examination.        Examination of the right shoulder, the patient has painful range of motion, she has approximately 160° of forward flexion, 80° of abduction, internal rotation to the posterior right hip.    Examination of the left shoulder, again the patient has painful range of motion, with similar forward flexion of 160°, external rotation is only about 60°.  Internal rotation to the posterior left hip.    Examination of the right knee, skin is dry and intact, no erythema or ecchymosis no signs symptoms of  "infection range of motion 0-100 degrees.  Stable in both flexion and extension.  Calf soft nontender, straight leg raise negative.    Pertinent New Results:    XRAY Report / Interpretation:   AP and lateral views of the right shoulder taken today in the office demonstrate a reverse total shoulder arthroplasty to be in appropriate position without evidence of hardware failure or loosening.    AP lateral sunrise views of the right knee taken today in the office demonstrate a right total knee arthroplasty to be in appropriate position without evidence of hardware failure or loosening.  Visualized soft tissues appear normal.  No fracture dislocation.    Assessment/Plan:      Patient returns to clinic today to follow-up after a fall.  She has multiple joint replacement surgeries.  She is got some right shoulder pain status post reverse total shoulder arthroplasty.  Her range of motion is to be expected.  She does have some pain.  I suspect that she is just injured some scar tissue or torn a little scar tissue.  But we will get a CT scan evaluate the components to see if there is any loosening.  No evidence from her x-ray.  In terms of the right knee, it has been 5 years since her knee replacement.  Her x-rays are normal.  Certainly does not bother her near as much much as the shoulder.  We will just observe.  The left shoulder, she has advanced glenohumeral arthritis she needs a reverse total shoulder on the left.  We injected her back in July, we reinjected her today for some pain relief.  She is just not ready to proceed with another surgery yet.    Jadon Flores, Physician Assistant, served in the capacity as a "scribe" for this patient encounter.  A "face-to-face" encounter occurred with Dr. Jony Marmolejo on this date.  The treatment plan and medical decision-making is outlined above. Patient was seen and examined with a chaperone.       This note was created using Dragon voice recognition software that occasionally " misinterpreted phrases or words.

## 2023-10-26 NOTE — PROCEDURES
Large Joint Aspiration/Injection: L subacromial bursa    Date/Time: 10/26/2023 10:00 AM    Performed by: Jony Marmolejo MD  Authorized by: Jony Marmolejo MD    Consent Done?:  Yes (Verbal)  Indications:  Arthritis and pain  Site marked: the procedure site was marked    Timeout: prior to procedure the correct patient, procedure, and site was verified    Prep: patient was prepped and draped in usual sterile fashion      Local anesthesia used?: Yes    Local anesthetic:  Lidocaine 1% without epinephrine    Details:  Needle Size:  22 G  Ultrasonic Guidance for needle placement?: No    Location:  Shoulder  Site:  L subacromial bursa  Medications:  40 mg triamcinolone acetonide 40 mg/mL  Patient tolerance:  Patient tolerated the procedure well with no immediate complications

## 2023-11-02 ENCOUNTER — HOSPITAL ENCOUNTER (OUTPATIENT)
Dept: RADIOLOGY | Facility: HOSPITAL | Age: 73
Discharge: HOME | End: 2023-11-02
Attending: ORTHOPAEDIC SURGERY
Payer: MEDICARE

## 2023-11-02 DIAGNOSIS — Z96.611 HISTORY OF REVERSE TOTAL REPLACEMENT OF RIGHT SHOULDER JOINT: ICD-10-CM

## 2023-11-02 DIAGNOSIS — Z96.611 PRESENCE OF RIGHT ARTIFICIAL SHOULDER JOINT: ICD-10-CM
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
## 2023-11-02 PROCEDURE — 73200 CT UPPER EXTREMITY W/O DYE: CPT | Mod: TC,PO,RT

## 2023-11-07 ENCOUNTER — OFFICE VISIT (OUTPATIENT)
Dept: ORTHOPEDICS | Facility: CLINIC | Age: 73
End: 2023-11-07
Payer: MEDICARE

## 2023-11-07 VITALS — WEIGHT: 169 LBS | HEIGHT: 58 IN | BODY MASS INDEX: 35.48 KG/M2

## 2023-11-07 DIAGNOSIS — Z96.611 HISTORY OF REVERSE TOTAL REPLACEMENT OF RIGHT SHOULDER JOINT: Primary | ICD-10-CM

## 2023-11-07 PROCEDURE — 99213 OFFICE O/P EST LOW 20 MIN: CPT | Mod: S$GLB,,, | Performed by: ORTHOPAEDIC SURGERY

## 2023-11-07 PROCEDURE — 99213 PR OFFICE/OUTPT VISIT, EST, LEVL III, 20-29 MIN: ICD-10-PCS | Mod: S$GLB,,, | Performed by: ORTHOPAEDIC SURGERY

## 2023-11-07 NOTE — PROGRESS NOTES
[facility]    Subjective:     Chief Complaint:   Chief Complaint   Patient presents with    Right Shoulder - Pain     Right shoulder pain follow up. Here for CT results       Past Medical History:   Diagnosis Date    Arthritis     Breast cancer     right    Pain in finger     quentin long finger pain    Wears glasses     WEARS CONTACS    Wears partial dentures     UPPER AND LOWER       Past Surgical History:   Procedure Laterality Date    BREAST RECONSTRUCTION Left 2007    CARPAL TUNNEL RELEASE       SECTION  1970, 1972, 1976    CHOLECYSTECTOMY  2019        COLONOSCOPY  2017    JOINT REPLACEMENT Right     KNEE    KNEE ARTHROPLASTY Right 06/10/2019    Procedure: ARTHROPLASTY, KNEE;  Surgeon: Jony Marmolejo MD;  Location: [facility] OR;  Service: Orthopedics;  Laterality: Right;    KNEE ARTHROPLASTY Left 2021    Procedure: ARTHROPLASTY, KNEE;  Surgeon: Jony Marmolejo MD;  Location: [facility] OR;  Service: Orthopedics;  Laterality: Left;    KNEE ARTHROSCOPY Bilateral 2008    MASTECTOMY Right 2007    PORTACATH PLACEMENT  2007    PORTACATH REMOVAL      REVERSE TOTAL SHOULDER ARTHROPLASTY Right 04/10/2023    Procedure: ARTHROPLASTY, SHOULDER, TOTAL, REVERSE, RIGHT;  Surgeon: Jony Marmolejo MD;  Location: [facility] OR;  Service: Orthopedics;  Laterality: Right;    SURGICAL REMOVAL OF BONE SPUR Bilateral     TUBAL LIGATION         Current Outpatient Medications   Medication Sig    alendronate (FOSAMAX) 70 MG tablet Take 1 tablet (70 mg total) by mouth every 7 days. (Patient not taking: Reported on 2023)    meloxicam (MOBIC) 15 MG tablet Take 1 tablet (15 mg total) by mouth once daily. With Food (Patient not taking: Reported on 10/26/2023)     No current facility-administered medications for this visit.       Review of patient's allergies indicates:  No Known Allergies    Family History   Problem Relation Age of Onset    Arthritis Mother     Hyperlipidemia Mother     Stroke Mother      Dementia Mother     Eczema Daughter     Eczema Grandchild     Lupus Neg Hx     Psoriasis Neg Hx     Melanoma Neg Hx        Social History     Socioeconomic History    Marital status:    Tobacco Use    Smoking status: Former     Current packs/day: 0.00     Average packs/day: 0.3 packs/day for 1 year (0.3 ttl pk-yrs)     Types: Cigarettes     Start date:      Quit date:      Years since quittin.8    Smokeless tobacco: Never    Tobacco comments:     social smoker - on weekends only - quit 20 yrs ago   Substance and Sexual Activity    Alcohol use: Yes     Alcohol/week: 0.0 standard drinks of alcohol     Comment: occ    Drug use: No    Sexual activity: Not Currently     Partners: Male     Social Determinants of Health     Stress: No Stress Concern Present (7/10/2019)    [employer] [place] of [employer]ational Health - Occupational Stress Questionnaire     Feeling of Stress : Not at all       History of present illness: 73-year-old female, returns to clinic today to follow-up on multiple musculoskeletal complaints.  We last saw her late October.  We ordered a CT scan of the right shoulder taken look at the reverse shoulder arthroplasty, we injected the left shoulder for the glenohumeral arthritis and we observe the knees.  Everything is kind of feeling better today.        Unfortunately she fall about a month or so prior to that visit.  She has a history of a right total knee and a right reverse total shoulder and is having some right knee pain and some right shoulder pain.     The right shoulder, she complains of painful range of motion.  Not significantly diminished her decreased in terms of range of motion but she has pain now on almost a daily basis.  She was doing well from her surgery back in April of this year.     The right knee, she has primarily anterior right knee pain.  She states that she tripped and fell down onto her knees primarily.  The right knee continues to ache or bother her.  She  ambulates with a nonantalgic gait.  Weightbears as tolerated.     Right knee total arthroplasty 2019, left total knee arthroplasty 2021, right reverse total shoulder arthroplasty April 2023.      Review of Systems:    Constitution: Negative for chills, fever, and sweats.  Negative for unexplained weight loss.    HENT:  Negative for headaches and blurry vision.    Cardiovascular:Negative for chest pain or irregular heart beat. Negative for hypertension.    Respiratory:  Negative for cough and shortness of breath.    Gastrointestinal: Negative for abdominal pain, heartburn, melena, nausea, and vomitting.    Genitourinary:  Negative bladder incontinence and dysuria.    Musculoskeletal:  See HPI for details.     Neurological: Negative for numbness.    Psychiatric/Behavioral: Negative for depression.  The patient is not nervous/anxious.      Endocrine: Negative for polyuria    Hematologic/Lymphatic: Negative for bleeding problem.  Does not bruise/bleed easily.    Skin: Negative for poor would healing and rash    Objective:      Physical Examination:    Vital Signs:  There were no vitals filed for this visit.    Body mass index is 35.32 kg/m².    This a well-developed, well nourished patient in no acute distress.  They are alert and oriented and cooperative to examination.        Examination of the right shoulder, the patient has painful range of motion, she has approximately 160° of forward flexion, 80° of abduction, internal rotation to the posterior right hip.     Pertinent New Results:  Narrative & Impression  CMS MANDATED QUALITY DATA - CT RADIATION - 436     All CT scans at this facility utilize dose modulation, iterative reconstruction, and/or weight based dosing when appropriate to reduce radiation dose to as low as reasonably achievable.        Reason: Shoulder replacement, loosening suspected, fall     TECHNIQUE: Right shoulder CT without IV contrast.     COMPARISON: Radiograph dated 10/26/2023    "  FINDINGS:  Axial images of the right shoulder were obtained with sagittal and coronal reformatted images.  There are degenerative changes of the acromioclavicular joint with joint space narrowing and inferior spurring.  There is a 11 mm calcified loose body inferior to the acromion.     There is a reverse right shoulder arthroplasty. There is no periprosthetic lucency to suggest loosening or infection.  The mid fixating screw within the glenoid extensor the medial cortex of the glenoid approximately 1 cm.     There are no fractures or acute osseous abnormalities.     The musculature is normal. There is a small joint effusion.  There is no axillary adenopathy.  The visualized portion of the right lung is clear.     IMPRESSION: Reverse right shoulder arthroplasty without complication.     Degenerative changes of the AC joint with a 11 mm calcified loose body inferior to the acromium     Small joint effusion     No acute osseous abnormality     Electronically signed by:  Angela Sargent MD  11/02/2023 10:29 AM CDT Workstation: [id number]           Specimen Collected: 11/02/23 09:05 Last Resulted: 11/02/23 10:29           XRAY Report / Interpretation:       Assessment/Plan:      73-year-old female, multiple musculoskeletal complaint follow-up.  The right shoulder which was her primary complaint at the last visit she is status post reverse total shoulder arthroplasty.  We did a CT scan, nothing significantly grossly abnormal.  And her symptoms are improving.  The left glenohumeral joint injection or shoulder injection has improved the pain in her left shoulder.  And we will just continuing to observe her knees.  She will follow up with us on an as-needed basis.    Jadon Flores, Physician Assistant, served in the capacity as a "scribe" for this patient encounter.  A "face-to-face" encounter occurred with Dr. Jony Marmolejo on this date.  The treatment plan and medical decision-making is outlined above. Patient was " seen and examined with a chaperone.       This note was created using Dragon voice recognition software that occasionally misinterpreted phrases or words.

## 2023-11-13 RX ORDER — METOPROLOL SUCCINATE 50 MG/1
50 TABLET, EXTENDED RELEASE ORAL DAILY
Qty: 30 TABLET | Refills: 4 | Status: SHIPPED | OUTPATIENT
Start: 2023-11-13 | End: 2023-12-04

## 2023-11-15 ENCOUNTER — TELEPHONE (OUTPATIENT)
Dept: CARDIOLOGY | Facility: CLINIC | Age: 73
End: 2023-11-15
Payer: MEDICARE

## 2023-11-15 NOTE — TELEPHONE ENCOUNTER
----- Message from Josias Duke sent at 11/15/2023  4:40 PM CST -----  Regarding: Return Call  Contact: patient  Type:  Patient Returning Call    Who Called:patient  Who Left Message for Patient:office nurse  Does the patient know what this is regarding?:Results  Would the patient rather a call back or a response via [facility]? Please call  Best Call Back Number:[phone]  Additional Information:

## 2023-11-16 ENCOUNTER — TELEPHONE (OUTPATIENT)
Dept: FAMILY MEDICINE | Facility: CLINIC | Age: 73
End: 2023-11-16

## 2023-11-16 ENCOUNTER — PATIENT MESSAGE (OUTPATIENT)
Dept: FAMILY MEDICINE | Facility: CLINIC | Age: 73
End: 2023-11-16

## 2023-11-20 ENCOUNTER — TELEPHONE (OUTPATIENT)
Dept: FAMILY MEDICINE | Facility: CLINIC | Age: 73
End: 2023-11-20

## 2023-11-20 NOTE — TELEPHONE ENCOUNTER
Pt asks for a sleep aid to be sent to [facility]. She is unable to sleep through the night without waking up.

## 2023-12-04 ENCOUNTER — OFFICE VISIT (OUTPATIENT)
Dept: CARDIOLOGY | Facility: CLINIC | Age: 73
End: 2023-12-04
Payer: MEDICARE

## 2023-12-04 ENCOUNTER — LAB VISIT (OUTPATIENT)
Dept: LAB | Facility: HOSPITAL | Age: 73
End: 2023-12-04
Attending: INTERNAL MEDICINE
Payer: MEDICARE

## 2023-12-04 VITALS
DIASTOLIC BLOOD PRESSURE: 80 MMHG | SYSTOLIC BLOOD PRESSURE: 132 MMHG | HEIGHT: 58 IN | BODY MASS INDEX: 34.72 KG/M2 | HEART RATE: 80 BPM | WEIGHT: 165.38 LBS

## 2023-12-04 DIAGNOSIS — E78.5 DYSLIPIDEMIA: ICD-10-CM

## 2023-12-04 DIAGNOSIS — R06.83 SNORING: ICD-10-CM

## 2023-12-04 DIAGNOSIS — R00.2 PALPITATIONS: Primary | ICD-10-CM

## 2023-12-04 DIAGNOSIS — R07.9 CHEST PAIN, UNSPECIFIED TYPE: ICD-10-CM

## 2023-12-04 DIAGNOSIS — R79.89 ELEVATED LIVER FUNCTION TESTS: ICD-10-CM

## 2023-12-04 DIAGNOSIS — R73.09 ELEVATED GLUCOSE: ICD-10-CM

## 2023-12-04 DIAGNOSIS — R00.2 PALPITATIONS: ICD-10-CM

## 2023-12-04 DIAGNOSIS — I48.0 PAROXYSMAL ATRIAL FIBRILLATION: ICD-10-CM

## 2023-12-04 LAB
ALBUMIN SERPL BCP-MCNC: 4.2 G/DL (ref 3.5–5.2)
ALP SERPL-CCNC: 95 U/L (ref 55–135)
ALT SERPL W/O P-5'-P-CCNC: 41 U/L (ref 10–44)
ANION GAP SERPL CALC-SCNC: 6 MMOL/L (ref 8–16)
AST SERPL-CCNC: 63 U/L (ref 10–40)
BASOPHILS # BLD AUTO: 0.08 K/UL (ref 0–0.2)
BASOPHILS NFR BLD: 1.1 % (ref 0–1.9)
BILIRUB SERPL-MCNC: 1 MG/DL (ref 0.1–1)
BUN SERPL-MCNC: 14 MG/DL (ref 8–23)
CALCIUM SERPL-MCNC: 9.5 MG/DL (ref 8.7–10.5)
CHLORIDE SERPL-SCNC: 103 MMOL/L (ref 95–110)
CO2 SERPL-SCNC: 31 MMOL/L (ref 23–29)
CREAT SERPL-MCNC: 0.5 MG/DL (ref 0.5–1.4)
DIFFERENTIAL METHOD: ABNORMAL
EOSINOPHIL # BLD AUTO: 0.1 K/UL (ref 0–0.5)
EOSINOPHIL NFR BLD: 1.6 % (ref 0–8)
ERYTHROCYTE [DISTWIDTH] IN BLOOD BY AUTOMATED COUNT: 13.5 % (ref 11.5–14.5)
EST. GFR  (NO RACE VARIABLE): >60 ML/MIN/1.73 M^2
GLUCOSE SERPL-MCNC: 88 MG/DL (ref 70–110)
HCT VFR BLD AUTO: 48.4 % (ref 37–48.5)
HGB BLD-MCNC: 16.4 G/DL (ref 12–16)
IMM GRANULOCYTES # BLD AUTO: 0.02 K/UL (ref 0–0.04)
IMM GRANULOCYTES NFR BLD AUTO: 0.3 % (ref 0–0.5)
LYMPHOCYTES # BLD AUTO: 2.4 K/UL (ref 1–4.8)
LYMPHOCYTES NFR BLD: 33.8 % (ref 18–48)
MCH RBC QN AUTO: 32.9 PG (ref 27–31)
MCHC RBC AUTO-ENTMCNC: 33.9 G/DL (ref 32–36)
MCV RBC AUTO: 97 FL (ref 82–98)
MONOCYTES # BLD AUTO: 0.6 K/UL (ref 0.3–1)
MONOCYTES NFR BLD: 7.9 % (ref 4–15)
NEUTROPHILS # BLD AUTO: 3.9 K/UL (ref 1.8–7.7)
NEUTROPHILS NFR BLD: 55.3 % (ref 38–73)
NRBC BLD-RTO: 0 /100 WBC
PLATELET # BLD AUTO: 202 K/UL (ref 150–450)
PMV BLD AUTO: 11.3 FL (ref 9.2–12.9)
POTASSIUM SERPL-SCNC: 3.6 MMOL/L (ref 3.5–5.1)
PROT SERPL-MCNC: 7.6 G/DL (ref 6–8.4)
RBC # BLD AUTO: 4.99 M/UL (ref 4–5.4)
SODIUM SERPL-SCNC: 140 MMOL/L (ref 136–145)
TSH SERPL DL<=0.005 MIU/L-ACNC: 2.5 UIU/ML (ref 0.34–5.6)
WBC # BLD AUTO: 7.07 K/UL (ref 3.9–12.7)

## 2023-12-04 PROCEDURE — 84443 ASSAY THYROID STIM HORMONE: CPT | Performed by: INTERNAL MEDICINE

## 2023-12-04 PROCEDURE — 99999 PR PBB SHADOW E&M-EST. PATIENT-LVL IV: ICD-10-PCS | Mod: PBBFAC,,, | Performed by: INTERNAL MEDICINE

## 2023-12-04 PROCEDURE — 93005 ELECTROCARDIOGRAM TRACING: CPT | Mod: PBBFAC,PN | Performed by: GENERAL PRACTICE

## 2023-12-04 PROCEDURE — 36415 COLL VENOUS BLD VENIPUNCTURE: CPT | Performed by: INTERNAL MEDICINE

## 2023-12-04 PROCEDURE — 80053 COMPREHEN METABOLIC PANEL: CPT | Performed by: INTERNAL MEDICINE

## 2023-12-04 PROCEDURE — 99214 OFFICE O/P EST MOD 30 MIN: CPT | Mod: PBBFAC,PN | Performed by: INTERNAL MEDICINE

## 2023-12-04 PROCEDURE — 99204 PR OFFICE/OUTPT VISIT, NEW, LEVL IV, 45-59 MIN: ICD-10-PCS | Mod: S$PBB,,, | Performed by: INTERNAL MEDICINE

## 2023-12-04 PROCEDURE — 93010 ELECTROCARDIOGRAM REPORT: CPT | Mod: S$PBB,,, | Performed by: GENERAL PRACTICE

## 2023-12-04 PROCEDURE — 99204 OFFICE O/P NEW MOD 45 MIN: CPT | Mod: S$PBB,,, | Performed by: INTERNAL MEDICINE

## 2023-12-04 PROCEDURE — 85025 COMPLETE CBC W/AUTO DIFF WBC: CPT | Performed by: INTERNAL MEDICINE

## 2023-12-04 PROCEDURE — 99999 PR PBB SHADOW E&M-EST. PATIENT-LVL IV: CPT | Mod: PBBFAC,,, | Performed by: INTERNAL MEDICINE

## 2023-12-04 PROCEDURE — 83036 HEMOGLOBIN GLYCOSYLATED A1C: CPT | Performed by: INTERNAL MEDICINE

## 2023-12-04 PROCEDURE — 93010 EKG 12-LEAD: ICD-10-PCS | Mod: S$PBB,,, | Performed by: GENERAL PRACTICE

## 2023-12-04 RX ORDER — ATORVASTATIN CALCIUM 10 MG/1
10 TABLET, FILM COATED ORAL DAILY
Qty: 90 TABLET | Refills: 3 | Status: SHIPPED | OUTPATIENT
Start: 2023-12-04 | End: 2024-12-03

## 2023-12-04 NOTE — PROGRESS NOTES
[facility]    Subjective:     Patient ID:  Camila Au is a 73 y.o. female patient here for evaluation Palpitations      HPI: Palpitations with atypical sharp pain and shortness of breath with going upstairs.  Had an event monitor last year where she reported similar palpitations but did not correlate to any atrial fibrillation or other arrhythmias.  Echocardiogram was normal last year.  Does not report excessive caffeine intake.    Also reports excessive snoring and symptoms consistent with sleep apnea at night.    Review of Systems   All other systems reviewed and are negative.       Past Medical History:   Diagnosis Date    Arthritis     Breast cancer 2007    right    Pain in finger     quentin long finger pain    Wears glasses     WEARS CONTACS    Wears partial dentures     UPPER AND LOWER       Past Surgical History:   Procedure Laterality Date    BREAST RECONSTRUCTION Left     CARPAL TUNNEL RELEASE       SECTION  , ,     CHOLECYSTECTOMY  2019        COLONOSCOPY  2017    JOINT REPLACEMENT Right     KNEE    KNEE ARTHROPLASTY Right 06/10/2019    Procedure: ARTHROPLASTY, KNEE;  Surgeon: Jony Marmolejo MD;  Location: [facility] OR;  Service: Orthopedics;  Laterality: Right;    KNEE ARTHROPLASTY Left 2021    Procedure: ARTHROPLASTY, KNEE;  Surgeon: Jony Marmolejo MD;  Location: [facility] OR;  Service: Orthopedics;  Laterality: Left;    KNEE ARTHROSCOPY Bilateral 2008    MASTECTOMY Right 2007    PORTACATH PLACEMENT  2007    PORTACATH REMOVAL      REVERSE TOTAL SHOULDER ARTHROPLASTY Right 04/10/2023    Procedure: ARTHROPLASTY, SHOULDER, TOTAL, REVERSE, RIGHT;  Surgeon: Jony Marmolejo MD;  Location: [facility] OR;  Service: Orthopedics;  Laterality: Right;    SURGICAL REMOVAL OF BONE SPUR Bilateral     TUBAL LIGATION         Family History   Problem Relation Age of Onset    Arthritis Mother     Hyperlipidemia  Mother     Stroke Mother     Dementia Mother     Eczema Daughter     Eczema Grandchild     Lupus Neg Hx     Psoriasis Neg Hx     Melanoma Neg Hx        Social History     Socioeconomic History    Marital status:    Tobacco Use    Smoking status: Former     Current packs/day: 0.00     Average packs/day: 0.3 packs/day for 1 year (0.3 ttl pk-yrs)     Types: Cigarettes     Start date:      Quit date:      Years since quittin.9    Smokeless tobacco: Never    Tobacco comments:     social smoker - on weekends only - quit 20 yrs ago   Substance and Sexual Activity    Alcohol use: Yes     Alcohol/week: 0.0 standard drinks of alcohol     Comment: occ    Drug use: No    Sexual activity: Not Currently     Partners: Male     Social Determinants of Health     Stress: No Stress Concern Present (7/10/2019)    [employer] [place] [employer] - Occupational Stress Questionnaire     Feeling of Stress : Not at all       Current Outpatient Medications   Medication Sig Dispense Refill    alendronate (FOSAMAX) 70 MG tablet Take 1 tablet (70 mg total) by mouth every 7 days. (Patient not taking: Reported on 2023) 12 tablet 3    atorvastatin (LIPITOR) 10 MG tablet Take 1 tablet (10 mg total) by mouth once daily. 90 tablet 3    meloxicam (MOBIC) 15 MG tablet Take 1 tablet (15 mg total) by mouth once daily. With Food (Patient not taking: Reported on 10/26/2023) 30 tablet 2     No current facility-administered medications for this visit.       Review of patient's allergies indicates:  No Known Allergies      Objective:        Vitals:    23 0939   BP: 132/80   Pulse: 80       Physical Exam  Vitals reviewed.   Constitutional:       Appearance: Normal appearance.   HENT:      Mouth/Throat:      Mouth: Mucous membranes are moist.   Eyes:      Extraocular Movements: Extraocular movements intact.      Pupils: Pupils are equal, round, and reactive to light.   Cardiovascular:      Rate and Rhythm: Normal rate  and regular rhythm.      Pulses: Normal pulses.      Heart sounds: Normal heart sounds. No murmur heard.     No gallop.   Pulmonary:      Effort: Pulmonary effort is normal.      Breath sounds: Normal breath sounds.   Abdominal:      General: Bowel sounds are normal.      Palpations: Abdomen is soft.   Musculoskeletal:         General: Normal range of motion.   Skin:     General: Skin is warm and dry.   Neurological:      General: No focal deficit present.      Mental Status: She is alert and oriented to person, place, and time.   Psychiatric:         Mood and Affect: Mood normal.         LIPIDS - LAST 2   Lab Results   Component Value Date    CHOL 231 (H) 06/13/2022    CHOL 239 (H) 04/20/2012    HDL 60 06/13/2022    HDL 53 04/20/2012    LDLCALC 153.2 06/13/2022    LDLCALC 172.0 (H) 04/20/2012    TRIG 89 06/13/2022    TRIG 69 04/20/2012    CHOLHDL 26.0 06/13/2022    CHOLHDL 22.2 04/20/2012       CBC - LAST 2  Lab Results   Component Value Date    WBC 7.61 04/03/2023    WBC 7.42 12/22/2022    RBC 4.76 04/03/2023    RBC 4.83 12/22/2022    HGB 15.7 04/03/2023    HGB 15.7 12/22/2022    HCT 45.1 04/03/2023    HCT 47.2 12/22/2022    MCV 95 04/03/2023    MCV 98 12/22/2022    MCH 33.0 (H) 04/03/2023    MCH 32.5 (H) 12/22/2022    MCHC 34.8 04/03/2023    MCHC 33.3 12/22/2022    RDW 13.0 04/03/2023    RDW 13.7 12/22/2022     04/03/2023     12/22/2022    MPV 11.1 04/03/2023    MPV 11.1 12/22/2022    GRAN 3.8 04/03/2023    GRAN 50.0 04/03/2023    LYMPH 2.8 04/03/2023    LYMPH 36.9 04/03/2023    MONO 0.7 04/03/2023    MONO 8.8 04/03/2023    BASO 0.09 04/03/2023    BASO 0.09 12/22/2022    NRBC 0 04/03/2023    NRBC 0 12/22/2022       CHEMISTRY & LIVER FUNCTION - LAST 2  Lab Results   Component Value Date     04/03/2023     10/21/2022    K 3.6 04/03/2023    K 3.8 10/21/2022     04/03/2023     10/21/2022    CO2 26 04/03/2023    CO2 29 10/21/2022    ANIONGAP 8 04/03/2023    ANIONGAP 7 (L)  10/21/2022    BUN 10 04/03/2023    BUN 16 10/21/2022    CREATININE 0.7 04/03/2023    CREATININE 0.5 10/21/2022     (H) 04/03/2023    GLU 98 10/21/2022    CALCIUM 9.6 04/03/2023    CALCIUM 9.4 10/21/2022    ALBUMIN 3.9 04/03/2023    ALBUMIN 4.1 10/21/2022    PROT 7.5 04/03/2023    PROT 7.8 10/21/2022    ALKPHOS 121 04/03/2023    ALKPHOS 98 10/21/2022    ALT 57 (H) 04/03/2023    ALT 65 (H) 10/21/2022    AST 79 (H) 04/03/2023    AST 85 (H) 10/21/2022    BILITOT 0.9 04/03/2023    BILITOT 1.1 (H) 10/21/2022        CARDIAC PROFILE - LAST 2  Lab Results   Component Value Date    BNP 23 12/22/2022    BNP 96 08/14/2012     10/21/2022    CPK 60 08/14/2012    CPKMB 0.7 08/14/2012    CPKMB 0.8 08/14/2012    TROPONINI 0.011 08/14/2012    TROPONINI 0.012 08/14/2012        COAGULATION - LAST 2  Lab Results   Component Value Date    INR 1.0 08/14/2012    INR 1.05 08/08/2012    APTT 22.3 08/14/2012    APTT 25.1 08/08/2012       ENDOCRINE & PSA - LAST 2  Lab Results   Component Value Date    TSH 2.9 09/28/2005    TSH 2.0 11/19/2004        ECHOCARDIOGRAM RESULTS  Results for orders placed during the hospital encounter of 10/21/22    Echo Saline Bubble? No    Interpretation Summary  · The left ventricle is normal in size with concentric remodeling and normal systolic function.  · The estimated ejection fraction is 57%.  · Normal left ventricular diastolic function.  · Atrial fibrillation not observed.  · Normal right ventricular size with normal right ventricular systolic function.  · Mild tricuspid regurgitation.  · Normal central venous pressure (3 mmHg).      CURRENT/PREVIOUS VISIT EKG  Results for orders placed or performed during the hospital encounter of 04/03/23   EKG 12-lead    Collection Time: 04/03/23 10:47 AM    Narrative    Test Reason : Z01.818    Vent. Rate : 082 BPM     Atrial Rate : 082 BPM     P-R Int : 210 ms          QRS Dur : 090 ms      QT Int : 418 ms       P-R-T Axes : 056 002 017 degrees     QTc  Int : 488 ms    Sinus rhythm with 1st degree A-V block  Cannot rule out Anterior infarct ,age undetermined  Abnormal ECG  When compared with ECG of 13-DEC-2021 13:25,  No significant change was found  Confirmed by Dayo Sparks MD ([id number]) on 4/11/2023 9:42:38 AM    Referred By:  FINGER           Confirmed By:Dayo Sparks MD     No valid procedures specified.   No results found for this or any previous visit.    No valid procedures specified.        Assessment:       1. Palpitations    2. Chest pain, unspecified type    3. Dyslipidemia    4. Paroxysmal atrial fibrillation    5. Snoring           Plan:       Palpitations  -     Ambulatory referral/consult to Cardiology  -     IN OFFICE EKG 12-LEAD (to Muse)  -     Nuclear Stress - Cardiology Interpreted; Future  -     Ambulatory referral/consult to Electrophysiology; Future; Expected date: 12/11/2023    Chest pain, unspecified type  -     Nuclear Stress - Cardiology Interpreted; Future    Dyslipidemia  -     atorvastatin (LIPITOR) 10 MG tablet; Take 1 tablet (10 mg total) by mouth once daily.  Dispense: 90 tablet; Refill: 3    Paroxysmal atrial fibrillation  -     Ambulatory referral/consult to Electrophysiology; Future; Expected date: 12/11/2023    Snoring  -     Ambulatory referral/consult to Pulmonology; Future; Expected date: 12/11/2023    Unsure of the etiology of the palpitations.  Unsure if it is an anginal equivalent, given risk factors, will evaluate further with a stress test.  Low-dose statin for elevated LDL of 153, patient instructed to stop it if she has myalgias or muscle cramps.  There is a remote history of atrial fibrillation in the chart with no definitive diagnosis, given palpitations, will refer the patient to electrophysiology for further evaluation and management and to consider if it is worth putting a loop recorder in.    Follow up in about 4 weeks (around 1/1/2024) for f/u stress test.          Rome Paiz MD  [facility]  [facility] [place] [facility]

## 2023-12-05 ENCOUNTER — TELEPHONE (OUTPATIENT)
Dept: CARDIOLOGY | Facility: HOSPITAL | Age: 73
End: 2023-12-05

## 2023-12-05 LAB
ESTIMATED AVG GLUCOSE: 114 MG/DL (ref 68–131)
HBA1C MFR BLD: 5.6 % (ref 4.5–6.2)

## 2023-12-05 NOTE — TELEPHONE ENCOUNTER
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  I will have scheduling staff reschedule her.

## 2023-12-27 ENCOUNTER — HOSPITAL ENCOUNTER (OUTPATIENT)
Dept: RADIOLOGY | Facility: HOSPITAL | Age: 73
Discharge: HOME | End: 2023-12-27
Attending: NURSE PRACTITIONER
Payer: MEDICARE

## 2023-12-27 ENCOUNTER — OFFICE VISIT (OUTPATIENT)
Dept: PULMONOLOGY | Facility: CLINIC | Age: 73
End: 2023-12-27
Payer: MEDICARE

## 2023-12-27 VITALS
SYSTOLIC BLOOD PRESSURE: 132 MMHG | OXYGEN SATURATION: 96 % | BODY MASS INDEX: 34.72 KG/M2 | WEIGHT: 165.38 LBS | DIASTOLIC BLOOD PRESSURE: 80 MMHG | HEIGHT: 58 IN | HEART RATE: 75 BPM

## 2023-12-27 DIAGNOSIS — Z85.3 HISTORY OF BREAST CANCER: ICD-10-CM

## 2023-12-27 DIAGNOSIS — R06.83 SNORING: ICD-10-CM

## 2023-12-27 DIAGNOSIS — J98.11 ATELECTASIS OF LEFT LUNG: ICD-10-CM

## 2023-12-27 DIAGNOSIS — R53.83 FATIGUE, UNSPECIFIED TYPE: ICD-10-CM

## 2023-12-27 DIAGNOSIS — R91.1 LUNG NODULE: ICD-10-CM

## 2023-12-27 DIAGNOSIS — G47.30 SLEEP APNEA, UNSPECIFIED TYPE: Primary | ICD-10-CM

## 2023-12-27 PROCEDURE — 71250 CT THORAX DX C-: CPT | Mod: 26,,, | Performed by: RADIOLOGY

## 2023-12-27 PROCEDURE — 99204 PR OFFICE/OUTPT VISIT, NEW, LEVL IV, 45-59 MIN: ICD-10-PCS | Mod: S$PBB,,, | Performed by: NURSE PRACTITIONER

## 2023-12-27 PROCEDURE — 71250 CT CHEST WITHOUT CONTRAST: ICD-10-PCS | Mod: 26,,, | Performed by: RADIOLOGY

## 2023-12-27 PROCEDURE — 99213 OFFICE O/P EST LOW 20 MIN: CPT | Mod: PBBFAC,PO | Performed by: NURSE PRACTITIONER

## 2023-12-27 PROCEDURE — 99999 PR PBB SHADOW E&M-EST. PATIENT-LVL III: ICD-10-PCS | Mod: PBBFAC,,, | Performed by: NURSE PRACTITIONER

## 2023-12-27 PROCEDURE — 71250 CT THORAX DX C-: CPT | Mod: TC

## 2023-12-27 PROCEDURE — 99204 OFFICE O/P NEW MOD 45 MIN: CPT | Mod: S$PBB,,, | Performed by: NURSE PRACTITIONER

## 2023-12-27 PROCEDURE — 99999 PR PBB SHADOW E&M-EST. PATIENT-LVL III: CPT | Mod: PBBFAC,,, | Performed by: NURSE PRACTITIONER

## 2023-12-27 NOTE — PATIENT INSTRUCTIONS
I have ordered an at home sleep study to evaluate for sleep apnea. If test is positive, I will order a CPAP machine. Please notify my clinic when you receive the machine to set up a 31 day compliance appointment. You must use the machine for a least 4 hours every night to avoid insurance denial.     You had a CT scan chest done in October 2020 to which revealed concern for a possible lung nodule versus atelectasis.  Recommend repeat CT at this time for further monitoring of suspicious area.    Will reach out to cardiology in regards to getting your stress test scheduled.    Continue current prescription medication regiment. Keep follow up appointment as scheduled. Please call the office if you have any questions or concerns.

## 2023-12-27 NOTE — PROGRESS NOTES
2023    Camila Fletcher Roe  New Patient Consult    Chief Complaint   Patient presents with    Shortness of Breath       HPI:  2023:  In office today alone.  Denies being seen by prior Pulmonologist. Denies history of lung disease. Denies current use of inhaler therapy, supplemental oxygen, CPAP use. Denies personal history of PE, current anticoagulation use.  Diagnosed with breast cancer in , underwent right mastectomy with subsequent chemo and radiation.  Has been in remission ever since.  Denies cough, Mucus production, wheezing, chest tightness.  Recently developed heart palpitations with associated shortness of breath.  Was seen by Dr. Paiz on 2023 and which is stress test was ordered and a referral was placed to electrophysiology.  Patient states she had a stress test scheduled however the only time they could do it was at 6:00 a.m..  It appears as if stress test has been canceled, however patient is interested in rescheduling at this time.  Patient endorses daytime fatigue, nocturnal arousals, mild depression.  Denies morning headaches.  [place] Sleepiness Scale from 2023 REVIEWED: 18  Reviewed Dr. Paiz's note from .         Social Hx: Lives alone - one dog in the home. Works as . No Asbestosis exposure, Smoking Hx: Former smoker - socially in her 20's.  Family Hx: No Lung Cancer, No COPD, Daughter with Asthma  Medical Hx: Previous pneumonia approx 8 years ago; Previous R shoulder surgery in  - R mastectomy in .            The Chief Complaint is: New to me      PFSH:  Past Medical History:   Diagnosis Date    Arthritis     Breast cancer     right    Pain in finger     quentin long finger pain    Wears glasses     WEARS CONTACS    Wears partial dentures     UPPER AND LOWER         Past Surgical History:   Procedure Laterality Date    BREAST RECONSTRUCTION Left 2007    CARPAL TUNNEL RELEASE       SECTION  , ,     CHOLECYSTECTOMY   2019        COLONOSCOPY  2017    JOINT REPLACEMENT Right     KNEE    KNEE ARTHROPLASTY Right 06/10/2019    Procedure: ARTHROPLASTY, KNEE;  Surgeon: Jony Marmolejo MD;  Location: [facility] OR;  Service: Orthopedics;  Laterality: Right;    KNEE ARTHROPLASTY Left 2021    Procedure: ARTHROPLASTY, KNEE;  Surgeon: Jony Marmolejo MD;  Location: [facility] OR;  Service: Orthopedics;  Laterality: Left;    KNEE ARTHROSCOPY Bilateral 2008    MASTECTOMY Right 2007    PORTACATH PLACEMENT  2007    PORTACATH REMOVAL      REVERSE TOTAL SHOULDER ARTHROPLASTY Right 04/10/2023    Procedure: ARTHROPLASTY, SHOULDER, TOTAL, REVERSE, RIGHT;  Surgeon: Jony Marmolejo MD;  Location: [facility] OR;  Service: Orthopedics;  Laterality: Right;    SURGICAL REMOVAL OF BONE SPUR Bilateral     TUBAL LIGATION       Social History     Tobacco Use    Smoking status: Former     Current packs/day: 0.00     Average packs/day: 0.3 packs/day for 1 year (0.3 ttl pk-yrs)     Types: Cigarettes     Start date:      Quit date:      Years since quittin.0    Smokeless tobacco: Never    Tobacco comments:     social smoker - on weekends only - quit 20 yrs ago   Substance Use Topics    Alcohol use: Yes     Alcohol/week: 0.0 standard drinks of alcohol     Comment: occ    Drug use: No     Family History   Problem Relation Age of Onset    Arthritis Mother     Hyperlipidemia Mother     Stroke Mother     Dementia Mother     Eczema Daughter     Eczema Grandchild     Lupus Neg Hx     Psoriasis Neg Hx     Melanoma Neg Hx      Review of patient's allergies indicates:  No Known Allergies      I have reviewed past medical, family, and social history.     Performance Status:The patient's activity level is functions out of house.        Review of Systems   Constitutional:  Positive for diaphoresis and fatigue. Negative for activity change, appetite change, chills, fever and unexpected weight change.   HENT:  Negative for congestion, postnasal drip,  "rhinorrhea, sinus pressure, sinus pain, sore throat and trouble swallowing.    Eyes:  Negative for photophobia and visual disturbance.   Respiratory:  Positive for shortness of breath. Negative for cough, choking, chest tightness and wheezing.    Cardiovascular:  Positive for palpitations. Negative for chest pain and leg swelling.   Gastrointestinal:  Positive for abdominal pain. Negative for abdominal distention, blood in stool, constipation, diarrhea, nausea and vomiting.   Genitourinary:  Negative for difficulty urinating, dysuria, flank pain and hematuria.   Musculoskeletal:  Negative for back pain, gait problem, joint swelling and neck pain.   Skin:  Negative for rash and wound.   Allergic/Immunologic: Negative for environmental allergies and food allergies.   Neurological:  Negative for dizziness, seizures, syncope, weakness, light-headedness, numbness and headaches.   Hematological:  Does not bruise/bleed easily.   Psychiatric/Behavioral:  Positive for sleep disturbance. Negative for confusion. The patient is not nervous/anxious.          Exam:Comprehensive exam done. /80 (BP Location: Left arm, Patient Position: Sitting, BP Method: Medium (Automatic))   Pulse 75   Ht 4' 10" (1.473 m)   Wt 75 kg (165 lb 5.5 oz)   SpO2 96%   BMI 34.56 kg/m²   Exam included Vitals as listed  Constitutional: She is oriented to person, place, and time. She appears well-developed. No distress.   Nose: Nose normal.   Mouth/Throat: Uvula is midline, oropharynx is clear and moist and mucous membranes are normal. No dental caries. No oropharyngeal exudate, posterior oropharyngeal edema, posterior oropharyngeal erythema or tonsillar abscesses.    Eyes: Pupils are equal, round, and reactive to light.   Neck: No JVD present. No thyromegaly present.   Cardiovascular: Normal rate, regular rhythm and normal heart sounds. Exam reveals no gallop and no friction rub.   Murmur heard.  Pulmonary/Chest: Effort normal and breath sounds " normal. No accessory muscle usage or stridor. No apnea and no tachypnea. No respiratory distress, decreased breath sounds, wheezes, rhonchi, rales, or tenderness. Clear breath sounds throughout, on room air, in no acute distress.   Abdominal: Soft. She exhibits no mass. There is no tenderness. No hepatosplenomegaly, hernias and normoactive bowel sounds  Musculoskeletal: Normal range of motion. exhibits no edema.   Neurological:  alert and oriented to person, place, and time. not disoriented.   Skin: Skin is warm and dry. Capillary refill takes less 2 sec. No cyanosis or erythema. No pallor. Nails show no clubbing.   Psychiatric: normal mood and affect. behavior is normal. Judgment and thought content normal.       Radiographs (ct chest and cxr) reviewed: was done by direct view  Patient imaging studies were reviewed and interpreted independently. My personal interpretation of most recent images include:  CT Chest - 10/31/2022 - atelectasis right lower lung      Labs Patient's labs were reviewed including CBC and CMP  Lab Results   Component Value Date    WBC 7.07 12/04/2023    RBC 4.99 12/04/2023    HGB 16.4 (H) 12/04/2023    HCT 48.4 12/04/2023    MCV 97 12/04/2023    MCH 32.9 (H) 12/04/2023    MCHC 33.9 12/04/2023    RDW 13.5 12/04/2023     12/04/2023    MPV 11.3 12/04/2023    GRAN 3.9 12/04/2023    GRAN 55.3 12/04/2023    LYMPH 2.4 12/04/2023    LYMPH 33.8 12/04/2023    MONO 0.6 12/04/2023    MONO 7.9 12/04/2023    EOS 0.1 12/04/2023    BASO 0.08 12/04/2023    EOSINOPHIL 1.6 12/04/2023    BASOPHIL 1.1 12/04/2023       PFT was not done  Pulmonary Functions Testing Results:        Plan:  Clinical impression is resonably certain and repeated evaluation prn +/- follow up will be needed as below.    Camila was seen today for shortness of breath.    Diagnoses and all orders for this visit:    Lung nodule  -     CT Chest Without Contrast; Future    Snoring  -     Ambulatory referral/consult to  Pulmonology    Atelectasis of left lung    Sleep apnea, unspecified type    Fatigue, unspecified type        Follow up in about 4 months (around 4/27/2024), or if symptoms worsen or fail to improve.    Discussed with patient above for education the following:      Patient Instructions   I have ordered an at home sleep study to evaluate for sleep apnea. If test is positive, I will order a CPAP machine. Please notify my clinic when you receive the machine to set up a 31 day compliance appointment. You must use the machine for a least 4 hours every night to avoid insurance denial.     You had a CT scan chest done in October 2020 to which revealed concern for a possible lung nodule versus atelectasis.  Recommend repeat CT at this time for further monitoring of suspicious area.    Will reach out to cardiology in regards to getting your stress test scheduled.    Continue current prescription medication regiment. Keep follow up appointment as scheduled. Please call the office if you have any questions or concerns.

## 2024-01-04 ENCOUNTER — TELEPHONE (OUTPATIENT)
Dept: CARDIOLOGY | Facility: HOSPITAL | Age: 74
End: 2024-01-04

## 2024-01-04 NOTE — TELEPHONE ENCOUNTER
[facility]   Will need to register on the first floor at the main entrance.   Patient advised that arrival time is 6:30am.  Patient advised that she may be here about 3.5-4 hours, and may want to bring something to occupy their time, as there will be periods of waiting.    Patient advised, may take her medications prior to testing if you need to.  Advised if she needs to eat to take her medications, please keep it light, like toast and juice.    Patient advised to avoid all caffeine 12 hours prior to testing.  This includes decaf tea and coffee.    Will provide peanut butter crackers for a snack after stress test.  If patient would prefer something else, please bring a snack from home.    Wear comfortable clothing.   No lotions, oils, or powders to the upper chest area. May wear deodorant.    No metal jewelry, buttons, or zippers to the upper body.  Patient verbalizes understanding of instructions.

## 2024-01-05 ENCOUNTER — HOSPITAL ENCOUNTER (OUTPATIENT)
Dept: CARDIOLOGY | Facility: HOSPITAL | Age: 74
Discharge: HOME | End: 2024-01-05
Attending: INTERNAL MEDICINE
Payer: MEDICARE

## 2024-01-05 ENCOUNTER — HOSPITAL ENCOUNTER (OUTPATIENT)
Dept: RADIOLOGY | Facility: HOSPITAL | Age: 74
Discharge: HOME | End: 2024-01-05
Attending: INTERNAL MEDICINE
Payer: MEDICARE

## 2024-01-05 DIAGNOSIS — R07.9 CHEST PAIN, UNSPECIFIED TYPE: ICD-10-CM

## 2024-01-05 DIAGNOSIS — R00.2 PALPITATIONS: ICD-10-CM

## 2024-01-05 PROCEDURE — 78452 HT MUSCLE IMAGE SPECT MULT: CPT | Mod: 26,,, | Performed by: INTERNAL MEDICINE

## 2024-01-05 PROCEDURE — A9502 TC99M TETROFOSMIN: HCPCS

## 2024-01-05 PROCEDURE — 93018 CV STRESS TEST I&R ONLY: CPT | Mod: ,,, | Performed by: INTERNAL MEDICINE

## 2024-01-05 PROCEDURE — 93016 CV STRESS TEST SUPVJ ONLY: CPT | Mod: ,,, | Performed by: NURSE PRACTITIONER

## 2024-01-08 LAB
CV STRESS BASE HR: 70 BPM
DIASTOLIC BLOOD PRESSURE: 69 MMHG
EJECTION FRACTION- HIGH: 65 %
END DIASTOLIC INDEX-HIGH: 153 ML/M2
END DIASTOLIC INDEX-LOW: 93 ML/M2
END SYSTOLIC INDEX-HIGH: 71 ML/M2
END SYSTOLIC INDEX-LOW: 31 ML/M2
NUC REST DIASTOLIC VOLUME INDEX: 80
NUC REST EJECTION FRACTION: 70
NUC REST SYSTOLIC VOLUME INDEX: 24
NUC STRESS DIASTOLIC VOLUME INDEX: 73
NUC STRESS EJECTION FRACTION: 73 %
NUC STRESS SYSTOLIC VOLUME INDEX: 20
OHS CV CPX 1 MINUTE RECOVERY HEART RATE: 123 BPM
OHS CV CPX 85 PERCENT MAX PREDICTED HEART RATE MALE: 126
OHS CV CPX ESTIMATED METS: 6
OHS CV CPX MAX PREDICTED HEART RATE: 148
OHS CV CPX PATIENT IS FEMALE: 1
OHS CV CPX PATIENT IS MALE: 0
OHS CV CPX PEAK DIASTOLIC BLOOD PRESSURE: 78 MMHG
OHS CV CPX PEAK HEAR RATE: 142 BPM
OHS CV CPX PEAK RATE PRESSURE PRODUCT: NORMAL
OHS CV CPX PEAK SYSTOLIC BLOOD PRESSURE: 134 MMHG
OHS CV CPX PERCENT MAX PREDICTED HEART RATE ACHIEVED: 100
OHS CV CPX RATE PRESSURE PRODUCT PRESENTING: 7070
RETIRED EF AND QEF - SEE NOTES: 53 %
STRESS ECHO POST EXERCISE DUR MIN: 4 MINUTES
STRESS ECHO POST EXERCISE DUR SEC: 5 SECONDS
SYSTOLIC BLOOD PRESSURE: 101 MMHG

## 2024-01-12 ENCOUNTER — TELEPHONE (OUTPATIENT)
Dept: PULMONOLOGY | Facility: CLINIC | Age: 74
End: 2024-01-12
Payer: MEDICARE

## 2024-01-12 ENCOUNTER — LAB VISIT (OUTPATIENT)
Dept: LAB | Facility: HOSPITAL | Age: 74
End: 2024-01-12
Attending: INTERNAL MEDICINE
Payer: MEDICARE

## 2024-01-12 ENCOUNTER — OFFICE VISIT (OUTPATIENT)
Dept: CARDIOLOGY | Facility: CLINIC | Age: 74
End: 2024-01-12
Payer: MEDICARE

## 2024-01-12 VITALS
HEART RATE: 80 BPM | SYSTOLIC BLOOD PRESSURE: 108 MMHG | DIASTOLIC BLOOD PRESSURE: 68 MMHG | HEIGHT: 58 IN | BODY MASS INDEX: 33.97 KG/M2 | OXYGEN SATURATION: 96 % | WEIGHT: 161.81 LBS

## 2024-01-12 DIAGNOSIS — E78.5 DYSLIPIDEMIA: Primary | ICD-10-CM

## 2024-01-12 DIAGNOSIS — M79.10 MYALGIA DUE TO STATIN: ICD-10-CM

## 2024-01-12 DIAGNOSIS — T46.6X5A MYALGIA DUE TO STATIN: ICD-10-CM

## 2024-01-12 LAB — CK SERPL-CCNC: 108 U/L (ref 20–180)

## 2024-01-12 PROCEDURE — 36415 COLL VENOUS BLD VENIPUNCTURE: CPT | Performed by: INTERNAL MEDICINE

## 2024-01-12 PROCEDURE — 99213 OFFICE O/P EST LOW 20 MIN: CPT | Mod: PBBFAC,PN | Performed by: INTERNAL MEDICINE

## 2024-01-12 PROCEDURE — 82550 ASSAY OF CK (CPK): CPT | Performed by: INTERNAL MEDICINE

## 2024-01-12 PROCEDURE — 99999 PR PBB SHADOW E&M-EST. PATIENT-LVL III: CPT | Mod: PBBFAC,,, | Performed by: INTERNAL MEDICINE

## 2024-01-12 PROCEDURE — 99214 OFFICE O/P EST MOD 30 MIN: CPT | Mod: S$PBB,,, | Performed by: INTERNAL MEDICINE

## 2024-01-12 RX ORDER — EPINEPHRINE 0.22MG
200 AEROSOL WITH ADAPTER (ML) INHALATION DAILY
Qty: 180 CAPSULE | Refills: 3 | Status: SHIPPED | OUTPATIENT
Start: 2024-01-12 | End: 2025-01-11

## 2024-01-12 NOTE — TELEPHONE ENCOUNTER
Received notice from [employer] that they have been unsuccessful in contacting pt to complete the registration to process the Home Sleep Study test we ordered.   Attending Attestation (For Attendings USE Only)...

## 2024-01-12 NOTE — PROGRESS NOTES
[facility]    Subjective:     Patient ID:  Camila Au is a 73 y.o. female patient here for evaluation Results (Stress test)      HPI:  73-year-old female here for follow-up.  Stress test was negative for ischemia.  Reports having myalgia with statin but continues to take it.  Has mild tenderness in the muscles but reports being tender in the muscles before initiating statin.    Review of Systems   All other systems reviewed and are negative.       Past Medical History:   Diagnosis Date    Arthritis     Breast cancer 2007    right    Pain in finger     quentin long finger pain    Wears glasses     WEARS CONTACS    Wears partial dentures     UPPER AND LOWER       Past Surgical History:   Procedure Laterality Date    BREAST RECONSTRUCTION Left 2007    CARPAL TUNNEL RELEASE       SECTION  1970, ,     CHOLECYSTECTOMY  2019        COLONOSCOPY  2017    JOINT REPLACEMENT Right     KNEE    KNEE ARTHROPLASTY Right 06/10/2019    Procedure: ARTHROPLASTY, KNEE;  Surgeon: Jony Marmolejo MD;  Location: [facility] OR;  Service: Orthopedics;  Laterality: Right;    KNEE ARTHROPLASTY Left 2021    Procedure: ARTHROPLASTY, KNEE;  Surgeon: Jony Marmolejo MD;  Location: [facility] OR;  Service: Orthopedics;  Laterality: Left;    KNEE ARTHROSCOPY Bilateral 2008    MASTECTOMY Right 2007    PORTACATH PLACEMENT  2007    PORTACATH REMOVAL      REVERSE TOTAL SHOULDER ARTHROPLASTY Right 04/10/2023    Procedure: ARTHROPLASTY, SHOULDER, TOTAL, REVERSE, RIGHT;  Surgeon: Jony Marmolejo MD;  Location: [facility] OR;  Service: Orthopedics;  Laterality: Right;    SURGICAL REMOVAL OF BONE SPUR Bilateral     TUBAL LIGATION         Family History   Problem Relation Age of Onset    Arthritis Mother     Hyperlipidemia Mother     Stroke Mother     Dementia Mother     Eczema Daughter     Eczema Grandchild     Lupus Neg Hx     Psoriasis Neg Hx     Melanoma Neg  Hx        Social History     Socioeconomic History    Marital status:    Tobacco Use    Smoking status: Former     Current packs/day: 0.00     Average packs/day: 0.3 packs/day for 1 year (0.3 ttl pk-yrs)     Types: Cigarettes     Start date:      Quit date:      Years since quittin.0    Smokeless tobacco: Never    Tobacco comments:     social smoker - on weekends only - quit 20 yrs ago   Substance and Sexual Activity    Alcohol use: Yes     Alcohol/week: 0.0 standard drinks of alcohol     Comment: occ    Drug use: No    Sexual activity: Not Currently     Partners: Male     Social Determinants of Health     Stress: No Stress Concern Present (7/10/2019)    [employer] [place] [employer] - Occupational Stress Questionnaire     Feeling of Stress : Not at all       Current Outpatient Medications   Medication Sig Dispense Refill    alendronate (FOSAMAX) 70 MG tablet Take 1 tablet (70 mg total) by mouth every 7 days. 12 tablet 3    atorvastatin (LIPITOR) 10 MG tablet Take 1 tablet (10 mg total) by mouth once daily. 90 tablet 3    coenzyme Q10 100 mg capsule Take 2 capsules (200 mg total) by mouth once daily. 180 capsule 3     No current facility-administered medications for this visit.       Review of patient's allergies indicates:  No Known Allergies      Objective:        Vitals:    24 0901   BP: 108/68   Pulse: 80       Physical Exam  Vitals reviewed.   Constitutional:       Appearance: Normal appearance.   HENT:      Mouth/Throat:      Mouth: Mucous membranes are moist.   Eyes:      Extraocular Movements: Extraocular movements intact.      Pupils: Pupils are equal, round, and reactive to light.   Cardiovascular:      Rate and Rhythm: Normal rate and regular rhythm.      Pulses: Normal pulses.      Heart sounds: Normal heart sounds. No murmur heard.     No gallop.   Pulmonary:      Effort: Pulmonary effort is normal.      Breath sounds: Normal breath sounds.   Abdominal:       General: Bowel sounds are normal.      Palpations: Abdomen is soft.   Musculoskeletal:         General: Normal range of motion.   Skin:     General: Skin is warm and dry.   Neurological:      General: No focal deficit present.      Mental Status: She is alert and oriented to person, place, and time.   Psychiatric:         Mood and Affect: Mood normal.         LIPIDS - LAST 2   Lab Results   Component Value Date    CHOL 231 (H) 06/13/2022    CHOL 239 (H) 04/20/2012    HDL 60 06/13/2022    HDL 53 04/20/2012    LDLCALC 153.2 06/13/2022    LDLCALC 172.0 (H) 04/20/2012    TRIG 89 06/13/2022    TRIG 69 04/20/2012    CHOLHDL 26.0 06/13/2022    CHOLHDL 22.2 04/20/2012       CBC - LAST 2  Lab Results   Component Value Date    WBC 7.07 12/04/2023    WBC 7.61 04/03/2023    RBC 4.99 12/04/2023    RBC 4.76 04/03/2023    HGB 16.4 (H) 12/04/2023    HGB 15.7 04/03/2023    HCT 48.4 12/04/2023    HCT 45.1 04/03/2023    MCV 97 12/04/2023    MCV 95 04/03/2023    MCH 32.9 (H) 12/04/2023    MCH 33.0 (H) 04/03/2023    MCHC 33.9 12/04/2023    MCHC 34.8 04/03/2023    RDW 13.5 12/04/2023    RDW 13.0 04/03/2023     12/04/2023     04/03/2023    MPV 11.3 12/04/2023    MPV 11.1 04/03/2023    GRAN 3.9 12/04/2023    GRAN 55.3 12/04/2023    LYMPH 2.4 12/04/2023    LYMPH 33.8 12/04/2023    MONO 0.6 12/04/2023    MONO 7.9 12/04/2023    BASO 0.08 12/04/2023    BASO 0.09 04/03/2023    NRBC 0 12/04/2023    NRBC 0 04/03/2023       CHEMISTRY & LIVER FUNCTION - LAST 2  Lab Results   Component Value Date     12/04/2023     04/03/2023    K 3.6 12/04/2023    K 3.6 04/03/2023     12/04/2023     04/03/2023    CO2 31 (H) 12/04/2023    CO2 26 04/03/2023    ANIONGAP 6 (L) 12/04/2023    ANIONGAP 8 04/03/2023    BUN 14 12/04/2023    BUN 10 04/03/2023    CREATININE 0.5 12/04/2023    CREATININE 0.7 04/03/2023    GLU 88 12/04/2023     (H) 04/03/2023    CALCIUM 9.5 12/04/2023    CALCIUM 9.6 04/03/2023    ALBUMIN 4.2  12/04/2023    ALBUMIN 3.9 04/03/2023    PROT 7.6 12/04/2023    PROT 7.5 04/03/2023    ALKPHOS 95 12/04/2023    ALKPHOS 121 04/03/2023    ALT 41 12/04/2023    ALT 57 (H) 04/03/2023    AST 63 (H) 12/04/2023    AST 79 (H) 04/03/2023    BILITOT 1.0 12/04/2023    BILITOT 0.9 04/03/2023        CARDIAC PROFILE - LAST 2  Lab Results   Component Value Date    BNP 23 12/22/2022    BNP 96 08/14/2012     10/21/2022    CPK 60 08/14/2012    CPKMB 0.7 08/14/2012    CPKMB 0.8 08/14/2012    TROPONINI 0.011 08/14/2012    TROPONINI 0.012 08/14/2012        COAGULATION - LAST 2  Lab Results   Component Value Date    INR 1.0 08/14/2012    INR 1.05 08/08/2012    APTT 22.3 08/14/2012    APTT 25.1 08/08/2012       ENDOCRINE & PSA - LAST 2  Lab Results   Component Value Date    HGBA1C 5.6 12/04/2023    TSH 2.496 12/04/2023    TSH 2.9 09/28/2005        ECHOCARDIOGRAM RESULTS  Results for orders placed during the hospital encounter of 10/21/22    Echo Saline Bubble? No    Interpretation Summary  · The left ventricle is normal in size with concentric remodeling and normal systolic function.  · The estimated ejection fraction is 57%.  · Normal left ventricular diastolic function.  · Atrial fibrillation not observed.  · Normal right ventricular size with normal right ventricular systolic function.  · Mild tricuspid regurgitation.  · Normal central venous pressure (3 mmHg).      CURRENT/PREVIOUS VISIT EKG  Results for orders placed or performed in visit on 12/04/23   IN OFFICE EKG 12-LEAD (to [place])    Collection Time: 12/04/23  9:48 AM    Narrative    Test Reason : R00.2,    Vent. Rate : 079 BPM     Atrial Rate : 079 BPM     P-R Int : 194 ms          QRS Dur : 088 ms      QT Int : 400 ms       P-R-T Axes : 057 -12 025 degrees     QTc Int : 458 ms    Normal sinus rhythm  Possible Anterior infarct (cited on or before 03-APR-2023)  Abnormal ECG  When compared with ECG of 03-APR-2023 10:47,  No significant change was found  Confirmed by Shagufta  Jeronimo CAMARGO ([id number]) on 12/17/2023 1:34:57 PM    Referred By: IGNACIO MENESES           Confirmed By:Jeronimo Eagle MD     No valid procedures specified.   Results for orders placed during the hospital encounter of 01/05/24    Nuclear Stress - Cardiology Interpreted    Interpretation Summary    Normal myocardial perfusion scan. There is no evidence of myocardial ischemia or infarction.    The gated perfusion images showed an ejection fraction of 70% at rest. The gated perfusion images showed an ejection fraction of 73% post stress. Normal ejection fraction is greater than 53%.    The ECG portion of the study is negative for ischemia.    The patient reported no chest pain during the stress test.    There were no arrhythmias during stress.    The patient exercised for 4 minutes 5 seconds on a Pedrito protocol, corresponding to a functional capacity of 6 METS, achieving a peak heart rate of 142 bpm, which is 100 % of the age predicted maximum heart rate.    No valid procedures specified.        Assessment:       1. Dyslipidemia    2. Myalgia due to statin           Plan:       Dyslipidemia  -     coenzyme Q10 100 mg capsule; Take 2 capsules (200 mg total) by mouth once daily.  Dispense: 180 capsule; Refill: 3  -     Lipid Panel; Future; Expected date: 01/12/2024    Myalgia due to statin  -     coenzyme Q10 100 mg capsule; Take 2 capsules (200 mg total) by mouth once daily.  Dispense: 180 capsule; Refill: 3  -     CK; Future; Expected date: 01/12/2024    Add Co Q10 if CK is negative.  Increase Co Q10 to 400 mg if needed.  If no relief with that stop statin.  Check lipid panel prior to next appointment under fasting conditions.  Advised Mediterranean diet and moderate physical exertion 30 minutes 5 days a week for optimal cardiovascular outcomes.    Follow up in about 3 months (around 4/12/2024) for f/u Lipid panel.          MD [facility] [place]  [place]

## 2024-01-22 ENCOUNTER — TELEPHONE (OUTPATIENT)
Dept: PULMONOLOGY | Facility: CLINIC | Age: 74
End: 2024-01-22
Payer: MEDICARE

## 2024-01-22 NOTE — TELEPHONE ENCOUNTER
Received notification from [employer] that the equipment was shipped on 1/19.   (Referred for Initial sleep apnea test.)

## 2024-02-12 DIAGNOSIS — G47.33 OSA (OBSTRUCTIVE SLEEP APNEA): Primary | ICD-10-CM

## 2024-02-14 ENCOUNTER — PATIENT MESSAGE (OUTPATIENT)
Dept: PULMONOLOGY | Facility: CLINIC | Age: 74
End: 2024-02-14
Payer: MEDICARE

## 2024-02-14 ENCOUNTER — TELEPHONE (OUTPATIENT)
Dept: PULMONOLOGY | Facility: CLINIC | Age: 74
End: 2024-02-14
Payer: MEDICARE

## 2024-02-14 NOTE — TELEPHONE ENCOUNTER
----- Message from Liset Adnrade NP sent at 2/12/2024  1:29 PM CST -----  Regarding: Sleep Study  Please call the patient and inform them that the sleep study results have been reviewed. The sleep study reveals an AHI of 6.1 which is consistent with very mild obstructive sleep apnea. I have ordered a CPAP machine, hopefully it will be approved per insurance. Please notify my clinic when the patient receives the machine to ensure a compliance follow up visit is scheduled. Remember, the CPAP machine must be used for at least four hours nightly to avoid insurance denial.

## 2024-02-20 ENCOUNTER — OFFICE VISIT (OUTPATIENT)
Dept: CARDIOLOGY | Facility: CLINIC | Age: 74
End: 2024-02-20
Payer: MEDICARE

## 2024-02-20 VITALS
HEART RATE: 68 BPM | OXYGEN SATURATION: 98 % | DIASTOLIC BLOOD PRESSURE: 70 MMHG | BODY MASS INDEX: 33.18 KG/M2 | SYSTOLIC BLOOD PRESSURE: 122 MMHG | WEIGHT: 158.06 LBS | HEIGHT: 58 IN

## 2024-02-20 DIAGNOSIS — G47.33 OBSTRUCTIVE SLEEP APNEA SYNDROME: ICD-10-CM

## 2024-02-20 DIAGNOSIS — J45.20 MILD INTERMITTENT ASTHMA WITHOUT COMPLICATION: ICD-10-CM

## 2024-02-20 DIAGNOSIS — M81.8 OSTEOPOROSIS DUE TO AROMATASE INHIBITOR: ICD-10-CM

## 2024-02-20 DIAGNOSIS — T38.6X5A OSTEOPOROSIS DUE TO AROMATASE INHIBITOR: ICD-10-CM

## 2024-02-20 DIAGNOSIS — Z96.651 S/P TOTAL KNEE ARTHROPLASTY, RIGHT: ICD-10-CM

## 2024-02-20 DIAGNOSIS — I47.19 ATRIAL TACHYCARDIA: ICD-10-CM

## 2024-02-20 DIAGNOSIS — C50.111 MALIGNANT NEOPLASM OF CENTRAL PORTION OF RIGHT BREAST IN FEMALE, ESTROGEN RECEPTOR POSITIVE: ICD-10-CM

## 2024-02-20 DIAGNOSIS — Z17.0 MALIGNANT NEOPLASM OF CENTRAL PORTION OF RIGHT BREAST IN FEMALE, ESTROGEN RECEPTOR POSITIVE: ICD-10-CM

## 2024-02-20 DIAGNOSIS — M15.9 OSTEOARTHRITIS OF MULTIPLE JOINTS, UNSPECIFIED OSTEOARTHRITIS TYPE: ICD-10-CM

## 2024-02-20 DIAGNOSIS — E66.9 CLASS 1 OBESITY WITH BODY MASS INDEX (BMI) OF 33.0 TO 33.9 IN ADULT, UNSPECIFIED OBESITY TYPE, UNSPECIFIED WHETHER SERIOUS COMORBIDITY PRESENT: ICD-10-CM

## 2024-02-20 DIAGNOSIS — I70.0 AORTIC ATHEROSCLEROSIS: ICD-10-CM

## 2024-02-20 DIAGNOSIS — Z85.3 HISTORY OF BREAST CANCER: ICD-10-CM

## 2024-02-20 DIAGNOSIS — R00.2 PALPITATIONS: Primary | ICD-10-CM

## 2024-02-20 PROCEDURE — 99213 OFFICE O/P EST LOW 20 MIN: CPT | Mod: PBBFAC,PN | Performed by: STUDENT IN AN ORGANIZED HEALTH CARE EDUCATION/TRAINING PROGRAM

## 2024-02-20 PROCEDURE — 99205 OFFICE O/P NEW HI 60 MIN: CPT | Mod: S$PBB,,, | Performed by: STUDENT IN AN ORGANIZED HEALTH CARE EDUCATION/TRAINING PROGRAM

## 2024-02-20 PROCEDURE — 99999 PR PBB SHADOW E&M-EST. PATIENT-LVL III: CPT | Mod: PBBFAC,,, | Performed by: STUDENT IN AN ORGANIZED HEALTH CARE EDUCATION/TRAINING PROGRAM

## 2024-02-20 NOTE — PROGRESS NOTES
"Electrophysiology Clinic Note    Reason for new patient visit: Evaluation and recommendations regarding symptoms of palpitations.     PRESENTING HISTORY:     History of Present Illness:  Ms. Camila Au is a oscar 73-year-old woman who presents today for evaluation and recommendations regarding symptoms of palpitations. She has a past medical history significant for palpitations, nonsustained atrial tachycardia noted on a recent ambulatory event monitor, a history of right breast cancer, mild intermittent asthma, aortic atherosclerosis, osteoporosis, osteoarthritis, prior right knee replacement, TEVIN, and obesity.     In review of her palpitations, these events are described as originating about four months ago, and may be secondary to increased stress with her stepmother's recent death and her father's ongoing dementia. She reports intermittent palpitations lasting for a matter of seconds prior to complete resolution with no associated symptoms. She reports that she has gained weight recently, and is now experiencing increased shortness of breath with climbing the stairs. Her palpitations are described as "hard" heart beats and occasional "skipped" beats. She denies any chest pain or discomfort, nor lightheadedness or dizziness. She denies nay palpitations with exercise, and has not been awoken from sleep with her palpitations. She has tried to identify potential triggers, including caffeine - she is only drinking one cup of coffee in the morning, or potentially alcohol - she very rarely drinks social alcohol. She recently underwent evaluation with an ambulatory event monitor that did not reveal any sustained atrial or ventricular arrhythmias, and a recent pharmacologic nuclear cardiac stress test that was unremarkable for ischemia or prior infarction.      In discussion with Ms. Au today, she tells me that she is overall feeling quite well. She denies any dizziness, lightheadedness, syncope or " presyncope, current palpitations, chest pain or discomfort, shortness of breath, dyspnea on exertion, nausea, vomiting, lower extremity edema, orthopnea, or PND. She occasionally gets a little lightheaded when changing position suddenly from lying to standing or with quick head movements, but has not experienced these symptoms recently. She reports baseline mild shortness of breath and dyspnea with exertion that has remained stable. She can climb one flight of stairs prior to needing to take a break and continues to attempt to increase her level of physical activity.     Review of Systems:  Review of Systems   Constitutional:  Negative for activity change.   HENT:  Negative for nasal congestion, nosebleeds, postnasal drip, rhinorrhea, sinus pressure/congestion, sneezing and sore throat.    Respiratory:  Positive for shortness of breath. Negative for apnea, cough, chest tightness and wheezing.    Cardiovascular:  Positive for palpitations. Negative for chest pain, leg swelling and claudication.   Gastrointestinal:  Negative for abdominal distention, abdominal pain, blood in stool, change in bowel habit, constipation, diarrhea, nausea and vomiting.   Genitourinary:  Negative for dysuria and hematuria.   Musculoskeletal:  Positive for arthralgias and back pain. Negative for gait problem.   Neurological:  Negative for dizziness, seizures, syncope, weakness, light-headedness, headaches and coordination difficulties.        PAST HISTORY:     Past Medical History:   Diagnosis Date    Arthritis     Breast cancer     right    Pain in finger     quentin long finger pain    Wears glasses     WEARS CONTACS    Wears partial dentures     UPPER AND LOWER       Past Surgical History:   Procedure Laterality Date    BREAST RECONSTRUCTION Left 2007    CARPAL TUNNEL RELEASE       SECTION  1970, 1972, 1976    CHOLECYSTECTOMY  2019        COLONOSCOPY  2017    JOINT REPLACEMENT Right     KNEE    KNEE  ARTHROPLASTY Right 06/10/2019    Procedure: ARTHROPLASTY, KNEE;  Surgeon: Jony Marmolejo MD;  Location: [facility] OR;  Service: Orthopedics;  Laterality: Right;    KNEE ARTHROPLASTY Left 05/31/2021    Procedure: ARTHROPLASTY, KNEE;  Surgeon: Jony Marmolejo MD;  Location: [facility] OR;  Service: Orthopedics;  Laterality: Left;    KNEE ARTHROSCOPY Bilateral 2008    MASTECTOMY Right 2007    PORTACATH PLACEMENT  2007    PORTACATH REMOVAL      REVERSE TOTAL SHOULDER ARTHROPLASTY Right 04/10/2023    Procedure: ARTHROPLASTY, SHOULDER, TOTAL, REVERSE, RIGHT;  Surgeon: Jony Marmolejo MD;  Location: [facility] OR;  Service: Orthopedics;  Laterality: Right;    SURGICAL REMOVAL OF BONE SPUR Bilateral 2002    TUBAL LIGATION  1976       Family History:  Family History   Problem Relation Age of Onset    Arthritis Mother     Hyperlipidemia Mother     Stroke Mother     Dementia Mother     Eczema Daughter     Eczema Grandchild     Lupus Neg Hx     Psoriasis Neg Hx     Melanoma Neg Hx    She reports that her older brother has coronary artery disease and has had a prior MI.    Social History:  She  reports that she quit smoking about 43 years ago. Her smoking use included cigarettes. She started smoking about 44 years ago. She has a 0.3 pack-year smoking history. She has never used smokeless tobacco. She reports current alcohol use. She reports that she does not use drugs. She lives in [place] and is not . She has three healthy adult children. She is a .     MEDICATIONS & ALLERGIES:     Review of patient's allergies indicates:  No Known Allergies    Current Outpatient Medications on File Prior to Visit   Medication Sig Dispense Refill    alendronate (FOSAMAX) 70 MG tablet Take 1 tablet (70 mg total) by mouth every 7 days. 12 tablet 3    atorvastatin (LIPITOR) 10 MG tablet Take 1 tablet (10 mg total) by mouth once daily. 90 tablet 3    coenzyme Q10 100 mg capsule Take 2 capsules (200 mg total) by mouth once daily. 180 capsule 3  "    No current facility-administered medications on file prior to visit.        OBJECTIVE:     Vital Signs:  /70 (BP Location: Left arm, Patient Position: Sitting, BP Method: Medium (Manual))   Pulse 68   Ht 4' 10" (1.473 m)   Wt 71.7 kg (158 lb 1.1 oz)   SpO2 98%   BMI 33.04 kg/m²     Physical Exam:  Physical Exam  Constitutional:       General: She is not in acute distress.     Appearance: Normal appearance. She is obese. She is not ill-appearing or diaphoretic.      Comments: Well-appearing woman in NAD.   HENT:      Head: Normocephalic and atraumatic.      Nose: Nose normal.      Mouth/Throat:      Mouth: Mucous membranes are moist.      Pharynx: Oropharynx is clear.   Eyes:      Pupils: Pupils are equal, round, and reactive to light.   Cardiovascular:      Rate and Rhythm: Normal rate and regular rhythm.      Pulses: Normal pulses.      Heart sounds: Normal heart sounds. No murmur heard.     No friction rub. No gallop.   Pulmonary:      Effort: Pulmonary effort is normal. No respiratory distress.      Breath sounds: Normal breath sounds. No wheezing, rhonchi or rales.   Chest:      Chest wall: No tenderness.   Abdominal:      General: There is no distension.      Palpations: Abdomen is soft.      Tenderness: There is no abdominal tenderness.   Musculoskeletal:         General: No swelling or tenderness.      Cervical back: Normal range of motion.      Right lower leg: No edema.      Left lower leg: No edema.   Skin:     General: Skin is warm and dry.      Findings: No erythema, lesion or rash.   Neurological:      General: No focal deficit present.      Mental Status: She is alert and oriented to person, place, and time. Mental status is at baseline.      Motor: No weakness.      Gait: Gait normal.   Psychiatric:         Mood and Affect: Mood normal.         Behavior: Behavior normal.          Laboratory Data:  Lab Results   Component Value Date    WBC 7.07 12/04/2023    HGB 16.4 (H) 12/04/2023    " HCT 48.4 12/04/2023    MCV 97 12/04/2023     12/04/2023     Lab Results   Component Value Date    GLU 88 12/04/2023     12/04/2023    K 3.6 12/04/2023     12/04/2023    CO2 31 (H) 12/04/2023    BUN 14 12/04/2023    CREATININE 0.5 12/04/2023    CALCIUM 9.5 12/04/2023     Lab Results   Component Value Date    INR 1.0 08/14/2012    INR 1.05 08/08/2012    INR 1.0 05/06/2004       Pertinent Cardiac Data:  ECG: Normal sinus rhythm with rate of 79 bpm,  ms, QRS 88 ms, QT/QTc 400/458 ms, nonspecific STT changes.     Resting 2D Transthoracic Echocardiogram - 10/21/2022:  The left ventricle is normal in size with concentric remodeling and normal systolic function.  The estimated ejection fraction is 57%.  Normal left ventricular diastolic function.  Atrial fibrillation not observed.  Normal right ventricular size with normal right ventricular systolic function.  Mild tricuspid regurgitation.  Normal central venous pressure (3 mmHg).    14-Day Ambulatory Event Monitor - 10/12/2023:    Predominant underlying rhythm was Sinus Rhythm.    Patient had a min HR of 60 bpm, max HR of 171 bpm, and avg HR of 91 bpm.    1 run of Supraventricular Tachycardia occurred lasting 10 beats with a max rate of 171 bpm (avg 151 bpm). Isolated SVEs were rare (<1.0%, 32), SVE Triplets were rare (<1.0%, 2), and no SVE Couplets were present.    There was 1 run of non-sustained SVT occurring more frequently in the wake hours and lasting for 10 beats. The rate varied between 115 and 151 bpm. There was AV Block noted in the form of AV Wenckebach associated intermittent. One episode marked VT on 10/13/23 at 08:46:37 pm appears to be possible Wenckebach pattern.    Isolated VEs were rare (<1.0%, 200), VE Couplets were rare (<1.0%, 1), and VE Triplets were rare (<1.0%, 1).    The patient complained of 2 episodes. The symptom(s) included palpitations, shortness of breath and heart racing. The corresponding rhythm to the patient  reported event was normal sinus rhythm with a normal MI interval with rates varying 105 bpm-114 bpm.    There were 2 auto-triggered events corresponding with sinus tachycardia rhythm with a rates varying 109 bpm-114 bpm.    Pharmacologic Nuclear Cardiac Stress Test - SPECT - 1/5/2024:    Normal myocardial perfusion scan. There is no evidence of myocardial ischemia or infarction.    The gated perfusion images showed an ejection fraction of 70% at rest. The gated perfusion images showed an ejection fraction of 73% post stress. Normal ejection fraction is greater than 53%.    The ECG portion of the study is negative for ischemia.    The patient reported no chest pain during the stress test.    There were no arrhythmias during stress.    The patient exercised for 4 minutes 5 seconds on a Pedrito protocol, corresponding to a functional capacity of 6 METS, achieving a peak heart rate of 142 bpm, which is 100 % of the age predicted maximum heart rate.      ASSESSMENT & PLAN:   Ms. Camila Au is a oscar 73-year-old woman who presents today for evaluation and recommendations regarding symptoms of palpitations. She has a past medical history significant for palpitations, nonsustained atrial tachycardia noted on a recent ambulatory event monitor, a history of right breast cancer, mild intermittent asthma, aortic atherosclerosis, osteoporosis, osteoarthritis, prior right knee replacement, TEVIN, and obesity. She has no abnormalities appreciated on physical exam or on in-office ECG.      - We reviewed the results of her resting 2D transthoracic echocardiogram that did not reveal any underlying congenital or structural heart disease, with preserved systolic function, LVEF 57%.   - We reviewed the results of her recent pharmacologic nuclear cardiac stress test that did not reveal any evidence of ischemia or prior infarction.   - We reviewed the results of her prior ambulatory event monitor that did not reveal any  sustained atrial or ventricular arrhythmias, with a 10-beat run of nonsustained atrial tachycardia and a 4-beat run of nonsustained ventricular tachycardia. These rhythms are not concerning and do not require specific intervention. Her burden of ectopy remains very low with <1% PACs and <1% PVCs.  - We discussed lifestyle modification to mitigate her palpitation symptoms, including ensuring adequate hydration, stress management, appropriate sleep hygiene and encouragement for improved sleep, and avoidance of potential triggers such as excessive alcohol or caffeine.     This patient will return to clinic with me in one year for ongoing surveillance. All questions and concerns were addressed in this visit.     Signing Physician:       ANUP Becerril MD  Electrophysiology Attending

## 2024-02-26 PROBLEM — J45.20 MILD INTERMITTENT ASTHMA WITHOUT COMPLICATION: Status: ACTIVE | Noted: 2024-02-26

## 2024-02-26 PROBLEM — I47.19 ATRIAL TACHYCARDIA: Status: ACTIVE | Noted: 2024-02-26

## 2024-02-26 PROBLEM — R00.2 PALPITATIONS: Status: ACTIVE | Noted: 2024-02-26

## 2024-02-26 PROBLEM — M15.9 OSTEOARTHRITIS OF MULTIPLE JOINTS: Status: ACTIVE | Noted: 2024-02-26

## 2024-02-26 PROBLEM — I70.0 AORTIC ATHEROSCLEROSIS: Status: ACTIVE | Noted: 2024-02-26

## 2024-02-27 DIAGNOSIS — Z00.00 ENCOUNTER FOR MEDICARE ANNUAL WELLNESS EXAM: ICD-10-CM

## 2024-04-11 ENCOUNTER — HOSPITAL ENCOUNTER (EMERGENCY)
Facility: HOSPITAL | Age: 74
Discharge: LEFT BEFORE DISCHARGE | End: 2024-04-11
Payer: MEDICARE

## 2024-04-11 VITALS
DIASTOLIC BLOOD PRESSURE: 84 MMHG | HEART RATE: 93 BPM | HEIGHT: 58 IN | OXYGEN SATURATION: 95 % | RESPIRATION RATE: 18 BRPM | TEMPERATURE: 98 F | SYSTOLIC BLOOD PRESSURE: 140 MMHG | BODY MASS INDEX: 33.37 KG/M2 | WEIGHT: 159 LBS

## 2024-04-11 DIAGNOSIS — S99.911A RIGHT ANKLE INJURY: ICD-10-CM

## 2024-04-11 DIAGNOSIS — M25.579 ANKLE PAIN: ICD-10-CM

## 2024-04-11 PROCEDURE — 25000003 PHARM REV CODE 250: Performed by: PHYSICIAN ASSISTANT

## 2024-04-11 PROCEDURE — 99284 EMERGENCY DEPT VISIT MOD MDM: CPT | Mod: 25

## 2024-04-11 RX ORDER — ACETAMINOPHEN 325 MG/1
650 TABLET ORAL
Status: COMPLETED | OUTPATIENT
Start: 2024-04-11 | End: 2024-04-11

## 2024-04-11 RX ADMIN — ACETAMINOPHEN 650 MG: 325 TABLET ORAL at 06:04

## 2024-04-11 NOTE — FIRST PROVIDER EVALUATION
Emergency Department TeleTriage Encounter Note      CHIEF COMPLAINT    Chief Complaint   Patient presents with    Ankle Pain    Back Pain     Pt slipped down 5 steps yesterday and is c/o ankle pain today right is worse than the left and lower back and left thigh is bruised        VITAL SIGNS   Initial Vitals [04/11/24 1656]   BP Pulse Resp Temp SpO2   (!) 140/84 93 18 98 °F (36.7 °C) 95 %      MAP       --            ALLERGIES    Review of patient's allergies indicates:  No Known Allergies    PROVIDER TRIAGE NOTE  Patient fell down 5 steps yesterday and is having pain in the right ankle, low back and bruising to the upper leg.       ORDERS  Labs Reviewed - No data to display    ED Orders (720h ago, onward)      None              Virtual Visit Note: The provider triage portion of this emergency department evaluation and documentation was performed via [employer], a HIPAA-compliant telemedicine application, in concert with a tele-presenter in the room. A face to face patient evaluation with one of my colleagues will occur once the patient is placed in an emergency department room.      DISCLAIMER: This note was prepared with [employer] voice recognition transcription software. Garbled syntax, mangled pronouns, and other bizarre constructions may be attributed to that software system.

## 2024-04-12 ENCOUNTER — OFFICE VISIT (OUTPATIENT)
Dept: ORTHOPEDICS | Facility: CLINIC | Age: 74
End: 2024-04-12
Payer: MEDICARE

## 2024-04-12 VITALS — HEIGHT: 58 IN | BODY MASS INDEX: 33.37 KG/M2 | WEIGHT: 159 LBS

## 2024-04-12 DIAGNOSIS — S93.492A SPRAIN OF POSTERIOR TALOFIBULAR LIGAMENT OF LEFT ANKLE, INITIAL ENCOUNTER: ICD-10-CM

## 2024-04-12 DIAGNOSIS — S92.154A NONDISP AVULS FRACTURE (CHIP FRACTURE) OF RIGHT TALUS, INIT: Primary | ICD-10-CM

## 2024-04-12 DIAGNOSIS — M51.36 LUMBAR DEGENERATIVE DISC DISEASE: ICD-10-CM

## 2024-04-12 DIAGNOSIS — M43.16 SPONDYLOLISTHESIS OF LUMBAR REGION: ICD-10-CM

## 2024-04-12 PROCEDURE — 28430 CLTX TALUS FRACTURE W/O MNPJ: CPT | Mod: RT,S$GLB,, | Performed by: PHYSICIAN ASSISTANT

## 2024-04-12 PROCEDURE — 99213 OFFICE O/P EST LOW 20 MIN: CPT | Mod: 57,S$GLB,, | Performed by: PHYSICIAN ASSISTANT

## 2024-04-12 NOTE — PROGRESS NOTES
[facility]  [address]  [place] [address]  Phone: [phone]   [phone]    Patient's PCP: Kasie Arthur MD (Inactive)  Referring Provider: No ref. provider found    Subjective:      Chief Complaint:   Chief Complaint   Patient presents with    Lumbar Spine - Pain     She fell down steps day before yesterday, went to ED-[facility], pain is little better not as bad as yesterday, swelling in ankles, no radiating pain down legs,     Left Ankle - Pain    Right Ankle - Pain       Past Medical History:   Diagnosis Date    Arthritis     Breast cancer     right    Pain in finger     quentin long finger pain    Wears glasses     WEARS CONTACS    Wears partial dentures     UPPER AND LOWER       Past Surgical History:   Procedure Laterality Date    BREAST RECONSTRUCTION Left     CARPAL TUNNEL RELEASE       SECTION  , ,     CHOLECYSTECTOMY  2019        COLONOSCOPY  2017    JOINT REPLACEMENT Right     KNEE    KNEE ARTHROPLASTY Right 06/10/2019    Procedure: ARTHROPLASTY, KNEE;  Surgeon: Jony Marmolejo MD;  Location: [facility] OR;  Service: Orthopedics;  Laterality: Right;    KNEE ARTHROPLASTY Left 2021    Procedure: ARTHROPLASTY, KNEE;  Surgeon: Jony Marmolejo MD;  Location: [facility] OR;  Service: Orthopedics;  Laterality: Left;    KNEE ARTHROSCOPY Bilateral 2008    MASTECTOMY Right 2007    PORTACATH PLACEMENT  2007    PORTACATH REMOVAL      REVERSE TOTAL SHOULDER ARTHROPLASTY Right 04/10/2023    Procedure: ARTHROPLASTY, SHOULDER, TOTAL, REVERSE, RIGHT;  Surgeon: Jony Marmolejo MD;  Location: [facility] OR;  Service: Orthopedics;  Laterality: Right;    SURGICAL REMOVAL OF BONE SPUR Bilateral     TUBAL LIGATION         Current Outpatient Medications   Medication Sig    alendronate (FOSAMAX) 70 MG tablet Take 1 tablet (70 mg total) by mouth every 7 days.    atorvastatin (LIPITOR) 10 MG tablet Take 1 tablet (10 mg total) by mouth once daily. (Patient not  taking: Reported on 2024)     No current facility-administered medications for this visit.       Review of patient's allergies indicates:  No Known Allergies    Family History   Problem Relation Age of Onset    Arthritis Mother     Hyperlipidemia Mother     Stroke Mother     Dementia Mother     Eczema Daughter     Eczema Grandchild     Lupus Neg Hx     Psoriasis Neg Hx     Melanoma Neg Hx        Social History     Socioeconomic History    Marital status:    Tobacco Use    Smoking status: Former     Current packs/day: 0.00     Average packs/day: 0.3 packs/day for 1 year (0.3 ttl pk-yrs)     Types: Cigarettes     Start date:      Quit date:      Years since quittin.3    Smokeless tobacco: Never    Tobacco comments:     social smoker - on weekends only - quit 20 yrs ago   Substance and Sexual Activity    Alcohol use: Yes     Alcohol/week: 0.0 standard drinks of alcohol     Comment: occ    Drug use: No    Sexual activity: Not Currently     Partners: Male     Social Determinants of Health     Stress: No Stress Concern Present (7/10/2019)    [employer] - Occupational Stress Questionnaire     Feeling of Stress : Not at all       History of present illness: Ms. Jensen comes in today for an acute injury after a fall down some stairs yesterday.  She hurt both ankles in her lower back.  Her right ankle is more painful than the left.  She was seen in the local ED where she would plain film radiographs and referred to Orthopedics for further evaluation and treatment.    Review of Systems:    Constitutional: Negative for chills, fever and weight loss.   HENT: Negative for congestion.    Eyes: Negative for discharge and redness.   Respiratory: Negative for cough and shortness of breath.    Cardiovascular: Negative for chest pain.   Gastrointestinal: Negative for nausea and vomiting.   Musculoskeletal: See HPI.   Skin: Negative for rash.   Neurological: Negative for headaches.    Endo/Heme/Allergies: Does not bruise/bleed easily.   Psychiatric/Behavioral: The patient is not nervous/anxious.    All other systems reviewed and are negative.       Objective:      Physical Examination:    Vital Signs:  There were no vitals filed for this visit.    Body mass index is 33.23 kg/m².    This a well-developed, well nourished patient in no acute distress.  They are alert and oriented and cooperative to examination.     Bilateral ankle exam: Skin to bilateral ankle is clean dry and intact.  There is no erythema or ecchymosis bilaterally.  No signs or symptoms of infection bilaterally.  She is neurovascularly intact throughout bilateral lower extremities.  Bilateral calves soft and nontender.  Bilateral Marsh's test negative for Achilles rupture.  For the right ankle, she has some tenderness over the medial deltoids but more so anterior laterally just anterior to the ATFL itself.  She is tender along the ATFL and CFL ligaments.  For the left ankle, she is nontender over the medial deltoids.  She has some mild tenderness over the ATFL.  No tenderness over the CFL or PTFL.  She can weightbear as tolerated on bilateral lower extremities.  She can wiggle all toes bilaterally.  She can dorsiflex and plantar flex bilateral ankles without difficulty.  Bilateral EHLs are intact.    Lumbar exam:  Skin throughout the lower back clean dry and intact.  No erythema or ecchymosis.  No signs or symptoms of infection.  She is neurovascularly intact throughout bilateral lower extremities.  Negative straight leg raise bilaterally.  Some mild tenderness to palpation about bilateral lumbar paravertebrals extending into the lumbosacral junction.  She does stand erect.  Mildly antalgic sit to stand.    Pertinent New Results:        XRAY Report / Interpretation:   No new radiographs taken on today's clinic visit.  Did review images taken in the ED yesterday.  For the lumbar spine, no acute fractures or dislocations seen.   She has multilevel degenerative disc disease worse at L5-S1.  She has diffuse facet sclerosis in the lower lumbar spine as well.  For the bilateral ankles, no acute fractures or dislocations seen on the left ankle.  Visualized soft tissues appear unremarkable.  For the right ankle, she appears to have an avulsion fracture off of the anterior talus.  No dislocations seen.      Assessment:       1. Nondisp avuls fracture (chip fracture) of right talus, init    2. Sprain of posterior talofibular ligament of left ankle, initial encounter    3. Lumbar degenerative disc disease    4. Spondylolisthesis of lumbar region      Plan:     Nondisp avuls fracture (chip fracture) of right talus, init    Sprain of posterior talofibular ligament of left ankle, initial encounter    Lumbar degenerative disc disease    Spondylolisthesis of lumbar region        Follow up for 6 weekf/u, FRACTURE CARE, //XR// right ankle frac f/u b/l ankle f/u, lumbar f/u.    Patient has finding relief with Motrin and Tylenol.  She politely declined any need for pain medication.  I did place her in bilateral ankle corset to provide support/stability while she is up and about for the next several weeks.  She does not need to sleep with these.  Of course she can remove for personal hygiene purposes.  All of these injury should resolve with a tincture of time.  We will check her back in 6 weeks to see how her ankles and back are doing.  I did advise her she we would like to initiate formal physical therapy at any point in time, she can simply call and we will be more than glad to place an order for her.        Jeronimo Boss, MPAS, PA-C    This note was created using [employer] voice recognition software that occasionally misinterprets words or phrases.

## 2024-04-18 ENCOUNTER — TELEPHONE (OUTPATIENT)
Dept: PULMONOLOGY | Facility: CLINIC | Age: 74
End: 2024-04-18
Payer: MEDICARE

## 2024-05-01 ENCOUNTER — TELEPHONE (OUTPATIENT)
Dept: PULMONOLOGY | Facility: CLINIC | Age: 74
End: 2024-05-01
Payer: MEDICARE

## 2024-05-01 NOTE — TELEPHONE ENCOUNTER
Pt needed to be rescheduled to see Liset Andrade NP from 5/6/24 to 8/27/24. Pt verbalized understanding.

## 2024-05-01 NOTE — TELEPHONE ENCOUNTER
----- Message from Lesly King sent at 5/1/2024 12:45 PM CDT -----  Contact: patient  Type:  Patient Returning Call    Who Called:patient     Who Left Message for Patient:    Does the patient know what this is regarding?:yes     Would the patient rather a call back or a response via [employer]? Call     Best Call Back Number: [phone] (home)      Additional Information:

## 2024-05-09 ENCOUNTER — OFFICE VISIT (OUTPATIENT)
Dept: HEMATOLOGY/ONCOLOGY | Facility: CLINIC | Age: 74
End: 2024-05-09
Payer: MEDICARE

## 2024-05-09 VITALS
BODY MASS INDEX: 33.82 KG/M2 | DIASTOLIC BLOOD PRESSURE: 62 MMHG | WEIGHT: 161.81 LBS | HEART RATE: 77 BPM | SYSTOLIC BLOOD PRESSURE: 130 MMHG | RESPIRATION RATE: 17 BRPM | TEMPERATURE: 98 F

## 2024-05-09 DIAGNOSIS — Z17.0 MALIGNANT NEOPLASM OF CENTRAL PORTION OF RIGHT BREAST IN FEMALE, ESTROGEN RECEPTOR POSITIVE: Primary | ICD-10-CM

## 2024-05-09 DIAGNOSIS — C50.111 MALIGNANT NEOPLASM OF CENTRAL PORTION OF RIGHT BREAST IN FEMALE, ESTROGEN RECEPTOR POSITIVE: Primary | ICD-10-CM

## 2024-05-09 PROCEDURE — 99213 OFFICE O/P EST LOW 20 MIN: CPT | Mod: S$PBB,,, | Performed by: INTERNAL MEDICINE

## 2024-05-09 NOTE — PROGRESS NOTES
PROGRESS NOTE    Subjective:       Patient ID: Camila Au is a 74 y.o. female.    Chief Complaint:  No chief complaint on file.    Right mastectomy 5/24/2007  [id number], 2cm tumor  Lobular carcinoma  Completed AC 10/2007  Completed XRT for close margins  Began AI 9/2007-11/2018      History of Present Illness:   Camila Au is a 74 y.o. female who presents with a history of breast cancer here for yearly  follow up.      No new complaints this visit. She is feeling well.      Mammogram negative May,2021      Family and Social history reviewed and is unchanged from 11/25/2014      ROS:  Review of Systems   Constitutional:  Negative for fever.   Respiratory:  Negative for shortness of breath.    Cardiovascular:  Negative for chest pain and leg swelling.   Gastrointestinal:  Negative for abdominal pain and blood in stool.   Genitourinary:  Negative for hematuria.   Skin:  Negative for rash.        No current outpatient medications on file.        Objective:       Physical Examination:     /62   Pulse 77   Temp 98.4 °F (36.9 °C)   Resp 17   Wt 73.4 kg (161 lb 12.8 oz)   BMI 33.82 kg/m²     Physical Exam  Constitutional:       Appearance: She is well-developed.   HENT:      Head: Normocephalic and atraumatic.      Right Ear: External ear normal.      Left Ear: External ear normal.   Eyes:      Conjunctiva/sclera: Conjunctivae normal.      Pupils: Pupils are equal, round, and reactive to light.   Neck:      Thyroid: No thyromegaly.      Trachea: No tracheal deviation.   Cardiovascular:      Rate and Rhythm: Normal rate and regular rhythm.      Heart sounds: Normal heart sounds.   Pulmonary:      Effort: Pulmonary effort is normal.      Breath sounds: Normal breath sounds.   Chest:       Abdominal:      General: Bowel sounds are normal. There is no distension.      Palpations: Abdomen is soft. There is no mass.      Tenderness:  There is no abdominal tenderness.   Skin:     Findings: No rash.   Neurological:      Comments: Neuro intact througout   Psychiatric:         Behavior: Behavior normal.         Thought Content: Thought content normal.         Judgment: Judgment normal.         Labs:   No results found for this or any previous visit (from the past 336 hour(s)).  CMP  Sodium   Date Value Ref Range Status   12/04/2023 140 136 - 145 mmol/L Final   03/26/2019 137 134 - 144 mmol/L      Potassium   Date Value Ref Range Status   12/04/2023 3.6 3.5 - 5.1 mmol/L Final     Chloride   Date Value Ref Range Status   12/04/2023 103 95 - 110 mmol/L Final   03/26/2019 101 98 - 110 mmol/L      CO2   Date Value Ref Range Status   12/04/2023 31 (H) 23 - 29 mmol/L Final     Glucose   Date Value Ref Range Status   12/04/2023 88 70 - 110 mg/dL Final   03/26/2019 160 (H) 70 - 99 mg/dL      BUN   Date Value Ref Range Status   12/04/2023 14 8 - 23 mg/dL Final     Creatinine   Date Value Ref Range Status   12/04/2023 0.5 0.5 - 1.4 mg/dL Final   03/26/2019 0.50 (L) 0.60 - 1.40 mg/dL    08/08/2012 0.6 0.2 - 1.4 mg/dl Final     Calcium   Date Value Ref Range Status   12/04/2023 9.5 8.7 - 10.5 mg/dL Final   08/08/2012 9.5 8.6 - 10.2 mg/dl Final     Total Protein   Date Value Ref Range Status   12/04/2023 7.6 6.0 - 8.4 g/dL Final     Albumin   Date Value Ref Range Status   12/04/2023 4.2 3.5 - 5.2 g/dL Final   03/26/2019 4.0 3.1 - 4.7 g/dL      Total Bilirubin   Date Value Ref Range Status   12/04/2023 1.0 0.1 - 1.0 mg/dL Final     Comment:     For infants and newborns, interpretation of results should be based  on gestational age, weight and in agreement with clinical  observations.    Premature Infant recommended reference ranges:  Up to 24 hours.............<8.0 mg/dL  Up to 48 hours............<12.0 mg/dL  3-5 days..................<15.0 mg/dL  6-29 days.................<15.0 mg/dL       Alkaline Phosphatase   Date Value Ref Range Status   12/04/2023 95 55 -  "135 U/L Final   08/08/2012 100 23 - 119 UNIT/L Final     AST   Date Value Ref Range Status   12/04/2023 63 (H) 10 - 40 U/L Final   08/08/2012 35 (H) 10 - 30 UNIT/L Final     ALT   Date Value Ref Range Status   12/04/2023 41 10 - 44 U/L Final     Anion Gap   Date Value Ref Range Status   12/04/2023 6 (L) 8 - 16 mmol/L Final   08/08/2012 9 5 - 15 meq/L Final     eGFR if    Date Value Ref Range Status   06/13/2022 >60.0 >60 mL/min/1.73 m^2 Final   06/13/2022 >60.0 >60 mL/min/1.73 m^2 Final     eGFR if non    Date Value Ref Range Status   06/13/2022 >60.0 >60 mL/min/1.73 m^2 Final     Comment:     Calculation used to obtain the estimated glomerular filtration  rate (eGFR) is the CKD-EPI equation.      06/13/2022 >60.0 >60 mL/min/1.73 m^2 Final     Comment:     Calculation used to obtain the estimated glomerular filtration  rate (eGFR) is the CKD-EPI equation.        No results found for: "CEA"  No results found for: "PSA"        Assessment/Plan:     Problem List Items Addressed This Visit       Malignant neoplasm of central portion of right breast in female, estrogen receptor positive - Primary     Patient is doing well and appears TARAS at this time.  Exam is negative and will order left mammogram which is due this month.  Can continue yearly follow up as planned.          Relevant Orders    Mammo Digital Screening Left with Brian       Discussion:     Follow up in about 1 year (around 5/9/2025).      Electronically signed by Jeffrey Ramos          "

## 2024-05-09 NOTE — ASSESSMENT & PLAN NOTE
Patient is doing well and appears TARAS at this time.  Exam is negative and will order left mammogram which is due this month.  Can continue yearly follow up as planned.

## 2024-05-20 ENCOUNTER — OFFICE VISIT (OUTPATIENT)
Dept: ORTHOPEDICS | Facility: CLINIC | Age: 74
End: 2024-05-20
Payer: MEDICARE

## 2024-05-20 VITALS
WEIGHT: 159 LBS | DIASTOLIC BLOOD PRESSURE: 82 MMHG | BODY MASS INDEX: 33.37 KG/M2 | HEIGHT: 58 IN | SYSTOLIC BLOOD PRESSURE: 130 MMHG

## 2024-05-20 DIAGNOSIS — M47.816 FACET ARTHRITIS OF LUMBAR REGION: ICD-10-CM

## 2024-05-20 DIAGNOSIS — M51.36 LUMBAR DEGENERATIVE DISC DISEASE: Primary | ICD-10-CM

## 2024-05-20 DIAGNOSIS — M43.16 SPONDYLOLISTHESIS OF LUMBAR REGION: ICD-10-CM

## 2024-05-20 PROCEDURE — 99213 OFFICE O/P EST LOW 20 MIN: CPT | Mod: 24,25,S$GLB, | Performed by: ORTHOPAEDIC SURGERY

## 2024-05-20 PROCEDURE — 96372 THER/PROPH/DIAG INJ SC/IM: CPT | Mod: S$GLB,,, | Performed by: ORTHOPAEDIC SURGERY

## 2024-05-20 RX ORDER — TRIAMCINOLONE ACETONIDE 40 MG/ML
40 INJECTION, SUSPENSION INTRA-ARTICULAR; INTRAMUSCULAR
Status: COMPLETED | OUTPATIENT
Start: 2024-05-20 | End: 2024-05-20

## 2024-05-20 RX ADMIN — TRIAMCINOLONE ACETONIDE 40 MG: 40 INJECTION, SUSPENSION INTRA-ARTICULAR; INTRAMUSCULAR at 09:05

## 2024-05-20 NOTE — PROGRESS NOTES
Subjective:       Patient ID: Camila Au is a 74 y.o. female.    Chief Complaint: Pain of the Lumbar Spine (Lumbar pain x years, last seen Dr Youssef  3/4/2020, pain to left side of the back, no leg symptoms, flinted waking, bending, standing, )      History of Present Illness    Prior to meeting with the patient I reviewed the medical chart in [facility]. This included reviewing the previous progress notes from our office, review of the patient's last appointment with their primary care provider, review of any visits to the emergency room, and review of any pain management appointments or procedures.   74-year-old female, returns to clinic today for follow-up on her low back.  She has had some episodic low back pain that we have treated off and on for years.  Last office visit was in 2020.  She complains of generalized low back, lumbar sacral and paraspinous tenderness with painful range of motion.  She denies any weakness or radicular symptoms.    Current Medications  No current outpatient medications on file.     No current facility-administered medications for this visit.       Allergies  Review of patient's allergies indicates:  No Known Allergies    Past Medical History  Past Medical History:   Diagnosis Date    Arthritis     Breast cancer     right    Pain in finger     quentin long finger pain    Wears glasses     WEARS CONTACS    Wears partial dentures     UPPER AND LOWER       Surgical History  Past Surgical History:   Procedure Laterality Date    BREAST RECONSTRUCTION Left     CARPAL TUNNEL RELEASE       SECTION  1970, ,     CHOLECYSTECTOMY  2019        COLONOSCOPY  2017    JOINT REPLACEMENT Right     KNEE    KNEE ARTHROPLASTY Right 06/10/2019    Procedure: ARTHROPLASTY, KNEE;  Surgeon: Jony Marmolejo MD;  Location: [facility];  Service: Orthopedics;  Laterality: Right;    KNEE ARTHROPLASTY Left 2021    Procedure: ARTHROPLASTY, KNEE;  Surgeon:  Jony Marmolejo MD;  Location: [facility] OR;  Service: Orthopedics;  Laterality: Left;    KNEE ARTHROSCOPY Bilateral 2008    MASTECTOMY Right 2007    PORTACATH PLACEMENT  2007    PORTACATH REMOVAL      REVERSE TOTAL SHOULDER ARTHROPLASTY Right 04/10/2023    Procedure: ARTHROPLASTY, SHOULDER, TOTAL, REVERSE, RIGHT;  Surgeon: Jony Marmolejo MD;  Location: [facility] OR;  Service: Orthopedics;  Laterality: Right;    SURGICAL REMOVAL OF BONE SPUR Bilateral     TUBAL LIGATION  1976       Family History:   Family History   Problem Relation Name Age of Onset    Arthritis Mother      Hyperlipidemia Mother      Stroke Mother      Dementia Mother      Eczema Daughter      Eczema Grandchild      Lupus Neg Hx      Psoriasis Neg Hx      Melanoma Neg Hx         Social History:   Social History     Socioeconomic History    Marital status:    Tobacco Use    Smoking status: Former     Current packs/day: 0.00     Average packs/day: 0.3 packs/day for 1 year (0.3 ttl pk-yrs)     Types: Cigarettes     Start date:      Quit date:      Years since quittin.4    Smokeless tobacco: Never    Tobacco comments:     social smoker - on weekends only - quit 20 yrs ago   Substance and Sexual Activity    Alcohol use: Yes     Alcohol/week: 0.0 standard drinks of alcohol     Comment: occ    Drug use: No    Sexual activity: Not Currently     Partners: Male     Social Determinants of Health     Stress: No Stress Concern Present (7/10/2019)    [employer] - Occupational Stress Questionnaire     Feeling of Stress : Not at all       Hospitalization/Major Diagnostic Procedure:     Review of Systems     General/Constitutional:  Chills denies. Fatigue denies. Fever denies. Weight gain denies. Weight loss denies.    Respiratory:  Shortness of breath denies.    Cardiovascular:  Chest pain denies.    Gastrointestinal:  Constipation denies. Diarrhea denies. Nausea denies. Vomiting denies.     Hematology:  Easy bruising  denies. Prolonged bleeding denies.     Genitourinary:  Frequent urination denies. Pain in lower back denies. Painful urination denies.     Musculoskeletal:  See HPI for details    Skin:  Rash denies.    Neurologic:  Dizziness denies. Gait abnormalities denies. Seizures denies. Tingling/Numbess denies.    Psychiatric:  Anxiety denies. Depressed mood denies.     Objective:   Vital Signs:   Vitals:    05/20/24 0921   BP: 130/82        Physical Exam      General Examination:     Constitutional: The patient is alert and oriented to lace person and time. Mood is pleasant.     Head/Face: Normal facial features normal eyebrows    Eyes: Normal extraocular motion bilaterally    Lungs: Respirations are equal and unlabored    Gait is coordinated.    Cardiovascular: There are no swelling or varicosities present.    Lymphatic: Negative for adenopathy    Skin: Normal    Neurological: Level of consciousness normal. Oriented to place person and time and situation    Psychiatric: Oriented to time place person and situation    Examination of the lumbar spine, the patient stands erect, she ambulates with a nonantalgic gait.  She does have a mild antalgic sit to stand.  She has some generalized lumbar sacral pain without midline vertebral tenderness.  Bilateral straight leg raises are negative.    XRAY Report/ Interpretation :    Narrative & Impression  EXAMINATION:  XR LUMBAR SPINE COMPLETE 5 VIEW     CLINICAL HISTORY:  fall, low back pain;     TECHNIQUE:  AP, lateral, spot and bilateral oblique views of the lumbar spine were performed.     COMPARISON:  Lumbar spine radiographs-03/04/2020     FINDINGS:  The bones are osteopenic.  There is a suggestion of deformity of the S2 segment with possible cortical step-off appreciated.  It is uncertain whether this relates to suboptimal patient positioning.  If there is a clinical concern of acute sacral fracture, consideration should be given to performing CT of the bony pelvis.  There is  prominent degenerative facet arthropathy observed at L4-L5.  There is disc space narrowing and marginal osteophyte formation observed at L4-L5 and L5-S1.  There is a grade I anterolisthesis of L3 on L4.  There is narrowing of the interspinous distances and sclerosis/cystic change noted at the opposing spinous processes at L1-L5.  Please correlate for symptoms of Baastrup's disease.  No radiographically evident acute lumbar compression fracture or osseous destructive process.  There are surgical clips present in the left lower quadrant of the abdomen.  There are probable phleboliths in the right hemipelvis..     Impression:     Possible deformity of the anterior cortex of the S2 segment.  Consider additional evaluation with CT of the bony pelvis if there is a clinical concern of acute sacral fracture.        Electronically signed by:Bart Gardner MD  Date:                                            04/11/2024  Time:                                           18:42           Exam Ended: 04/11/24 18:22 CDT Last Resulted: 04/11/24 18:42 CDT           Multiple images of the lumbar spine reviewed and compared to images from 2020.  Patient with some interval change with increased degeneration L4-5 L5-S1 and facet changes L3-S1.  Low clinical suspicion for sacral fracture as reported in the x-ray report.      Assessment:       1. Lumbar degenerative disc disease    2. Spondylolisthesis of lumbar region    3. Facet arthritis of lumbar region        Plan:       Camila was seen today for pain.    Diagnoses and all orders for this visit:    Lumbar degenerative disc disease  -     triamcinolone acetonide injection 40 mg    Spondylolisthesis of lumbar region  -     triamcinolone acetonide injection 40 mg    Facet arthritis of lumbar region         Follow up if symptoms worsen or fail to improve.  This is to attest that the physician's assistant Jadon Flores served in the capacity as a scribe for this patient's encounter.  This is  also to verify that I have reviewed the patient's history and helped formulate the treatment plan as discussed in the report this treatment plan was discussed with the physician assistant.  It is outlined below  74-year-old female with primarily axial back pain symptoms on exam and by history.  Multilevel degenerative changes I have been documented now for many years.  MRI from 2018 as well as plain images from 2018 2020 compared to today's images.  She still does not have any significant radicular or lower extremity symptoms.  She is previously responded well to intramuscular steroid injection.  She requests this today.  We administered 1 cc triamcinolone 40 mg today in the office.  If not improved, we discussed updated MRI, pain management and or even physical therapy.  She will follow up if symptoms do not improve.    Treatment options were discussed with regards to the nature of the medical condition. Conservative pain intervention and surgical options were discussed in detail. The probability of success of each separate treatment option was discussed. The patient expressed a clear understanding of the treatment options. With regards to surgery, the procedure risk, benefits, complications, and outcomes were discussed. No guarantees were given with regards to surgical outcome.   The risk of complications, morbidity, and mortality of patient management decisions have been made at the time of this visit. These are associated with the patient's problems, diagnostic procedures and treatment options. This includes the possible management options selected and those considered but not selected by the patient after shared medical decision making we discussed with the patient.   This note was created using Dragon voice recognition software that occasionally misinterpreted phrases or words.

## 2024-05-31 ENCOUNTER — OFFICE VISIT (OUTPATIENT)
Dept: ORTHOPEDICS | Facility: CLINIC | Age: 74
End: 2024-05-31
Payer: MEDICARE

## 2024-05-31 VITALS — BODY MASS INDEX: 33.37 KG/M2 | HEIGHT: 58 IN | WEIGHT: 159 LBS

## 2024-05-31 DIAGNOSIS — S92.154D NONDISPLACED AVULSION FRACTURE (CHIP FRACTURE) OF RIGHT TALUS, SUBSEQUENT ENCOUNTER FOR FRACTURE WITH ROUTINE HEALING: Primary | ICD-10-CM

## 2024-05-31 PROCEDURE — 99024 POSTOP FOLLOW-UP VISIT: CPT | Mod: S$GLB,POP,, | Performed by: PHYSICIAN ASSISTANT

## 2024-05-31 NOTE — PROGRESS NOTES
[facility]  [facility]  [place] [address]  Phone: [phone]   [phone]    Patient's PCP: Kasie Arthur MD  Referring Provider: No ref. provider found    Subjective:      Chief Complaint:   Chief Complaint   Patient presents with    Right Ankle - Pain     F/u from 24, right ankle is feeling better, swelling has gone down a lot, no pain       Past Medical History:   Diagnosis Date    Arthritis     Breast cancer     right    Pain in finger     quentin long finger pain    Wears glasses     WEARS CONTACS    Wears partial dentures     UPPER AND LOWER       Past Surgical History:   Procedure Laterality Date    BREAST RECONSTRUCTION Left 2007    CARPAL TUNNEL RELEASE       SECTION  1970, ,     CHOLECYSTECTOMY  2019        COLONOSCOPY  2017    JOINT REPLACEMENT Right     KNEE    KNEE ARTHROPLASTY Right 06/10/2019    Procedure: ARTHROPLASTY, KNEE;  Surgeon: Jony Marmolejo MD;  Location: [facility] OR;  Service: Orthopedics;  Laterality: Right;    KNEE ARTHROPLASTY Left 2021    Procedure: ARTHROPLASTY, KNEE;  Surgeon: Jony Marmolejo MD;  Location: [facility] OR;  Service: Orthopedics;  Laterality: Left;    KNEE ARTHROSCOPY Bilateral 2008    MASTECTOMY Right 2007    PORTACATH PLACEMENT  2007    PORTACATH REMOVAL      REVERSE TOTAL SHOULDER ARTHROPLASTY Right 04/10/2023    Procedure: ARTHROPLASTY, SHOULDER, TOTAL, REVERSE, RIGHT;  Surgeon: Jony Marmolejo MD;  Location: [facility] OR;  Service: Orthopedics;  Laterality: Right;    SURGICAL REMOVAL OF BONE SPUR Bilateral     TUBAL LIGATION         No current outpatient medications on file.     No current facility-administered medications for this visit.       Review of patient's allergies indicates:  No Known Allergies    Family History   Problem Relation Name Age of Onset    Arthritis Mother      Hyperlipidemia Mother      Stroke Mother      Dementia Mother      Eczema Daughter      Eczema Grandchild       Lupus Neg Hx      Psoriasis Neg Hx      Melanoma Neg Hx         Social History     Socioeconomic History    Marital status:    Tobacco Use    Smoking status: Former     Current packs/day: 0.00     Average packs/day: 0.3 packs/day for 1 year (0.3 ttl pk-yrs)     Types: Cigarettes     Start date:      Quit date:      Years since quittin.4    Smokeless tobacco: Never    Tobacco comments:     social smoker - on weekends only - quit 20 yrs ago   Substance and Sexual Activity    Alcohol use: Yes     Alcohol/week: 0.0 standard drinks of alcohol     Comment: occ    Drug use: No    Sexual activity: Not Currently     Partners: Male     Social Determinants of Health     Stress: No Stress Concern Present (7/10/2019)    [employer] - Occupational Stress Questionnaire     Feeling of Stress : Not at all       History of present illness:  Ms. Jensen comes in today for follow-up for her right ankle sprain/talar avulsion fracture.  She is about 6 weeks out from the initial injury.  She is doing very well.  She reports she has no pain.  She has resumed her regular activities of daily living without restriction.    Review of Systems:    Constitutional: Negative for chills, fever and weight loss.   HENT: Negative for congestion.    Eyes: Negative for discharge and redness.   Respiratory: Negative for cough and shortness of breath.    Cardiovascular: Negative for chest pain.   Gastrointestinal: Negative for nausea and vomiting.   Musculoskeletal: See HPI.   Skin: Negative for rash.   Neurological: Negative for headaches.   Endo/Heme/Allergies: Does not bruise/bleed easily.   Psychiatric/Behavioral: The patient is not nervous/anxious.    All other systems reviewed and are negative.       Objective:      Physical Examination:    Vital Signs:  There were no vitals filed for this visit.    Body mass index is 33.23 kg/m².    This a well-developed, well nourished patient in no acute distress.   They are alert and oriented and cooperative to examination.     Right ankle exam:  Skin to right ankle clean dry and intact.  No erythema or ecchymosis.  No signs or symptoms of infection.  She is neurovascularly intact throughout the right lower extremity.  Right calf is soft and nontender.  She can weightbear as tolerated on the right lower extremity.  She can dorsiflex plantar flex at the right ankle without difficulty.  No tenderness to palpation throughout the right foot and ankle.  She can wiggle all toes of the right foot.  Right EHL is intact.  Capillary refill is brisk to all digits in the right foot.    Pertinent New Results:        XRAY Report / Interpretation:   No new radiographs taken on today's clinic visit.      Assessment:       1. Nondisplaced avulsion fracture (chip fracture) of right talus, subsequent encounter for fracture with routine healing      Plan:     Nondisplaced avulsion fracture (chip fracture) of right talus, subsequent encounter for fracture with routine healing        Follow up if symptoms worsen or fail to improve.    Her injury has resolved.  She was doing well.  She can resume her regular activities of daily living without restriction.  She can follow up with us on an as-needed basis for any issues or concerns that arise.        Jeronimo Boss, MPAS, PA-C    This note was created using [employer] voice recognition software that occasionally misinterprets words or phrases.

## 2024-06-07 ENCOUNTER — HOSPITAL ENCOUNTER (OUTPATIENT)
Dept: RADIOLOGY | Facility: HOSPITAL | Age: 74
Discharge: HOME | End: 2024-06-07
Attending: INTERNAL MEDICINE
Payer: MEDICARE

## 2024-06-07 DIAGNOSIS — Z17.0 MALIGNANT NEOPLASM OF CENTRAL PORTION OF RIGHT BREAST IN FEMALE, ESTROGEN RECEPTOR POSITIVE: ICD-10-CM

## 2024-06-07 DIAGNOSIS — C50.111 MALIGNANT NEOPLASM OF CENTRAL PORTION OF RIGHT BREAST IN FEMALE, ESTROGEN RECEPTOR POSITIVE: ICD-10-CM

## 2024-06-07 PROCEDURE — 77063 BREAST TOMOSYNTHESIS BI: CPT | Mod: TC,52,PO

## 2024-06-07 PROCEDURE — 77067 SCR MAMMO BI INCL CAD: CPT | Mod: TC,52,PO

## 2024-06-07 PROCEDURE — 77067 SCR MAMMO BI INCL CAD: CPT | Mod: 26,52,, | Performed by: RADIOLOGY

## 2024-06-07 PROCEDURE — 77063 BREAST TOMOSYNTHESIS BI: CPT | Mod: 26,52,, | Performed by: RADIOLOGY

## 2024-06-25 ENCOUNTER — OCCUPATIONAL HEALTH (OUTPATIENT)
Dept: URGENT CARE | Facility: CLINIC | Age: 74
End: 2024-06-25

## 2024-06-25 PROCEDURE — 99499 UNLISTED E&M SERVICE: CPT | Mod: S$GLB,,,

## 2024-08-27 ENCOUNTER — OFFICE VISIT (OUTPATIENT)
Dept: PULMONOLOGY | Facility: CLINIC | Age: 74
End: 2024-08-27
Payer: MEDICARE

## 2024-08-27 VITALS
SYSTOLIC BLOOD PRESSURE: 141 MMHG | BODY MASS INDEX: 35.26 KG/M2 | HEIGHT: 58 IN | OXYGEN SATURATION: 96 % | DIASTOLIC BLOOD PRESSURE: 84 MMHG | WEIGHT: 168 LBS | RESPIRATION RATE: 17 BRPM | HEART RATE: 65 BPM

## 2024-08-27 DIAGNOSIS — R06.02 SOB (SHORTNESS OF BREATH): Primary | ICD-10-CM

## 2024-08-27 DIAGNOSIS — R53.83 FATIGUE, UNSPECIFIED TYPE: ICD-10-CM

## 2024-08-27 DIAGNOSIS — G47.33 OSA (OBSTRUCTIVE SLEEP APNEA): ICD-10-CM

## 2024-08-27 DIAGNOSIS — R91.1 LUNG NODULE: ICD-10-CM

## 2024-08-27 DIAGNOSIS — J98.11 ATELECTASIS OF LEFT LUNG: ICD-10-CM

## 2024-08-27 DIAGNOSIS — Z85.3 HISTORY OF BREAST CANCER: ICD-10-CM

## 2024-08-27 PROCEDURE — 99999 PR PBB SHADOW E&M-EST. PATIENT-LVL III: CPT | Mod: PBBFAC,,, | Performed by: NURSE PRACTITIONER

## 2024-08-27 PROCEDURE — 99213 OFFICE O/P EST LOW 20 MIN: CPT | Mod: PBBFAC,PO | Performed by: NURSE PRACTITIONER

## 2024-08-27 NOTE — PROGRESS NOTES
8/27/2024    Camila Au  In office visit     Chief Complaint   Patient presents with    Follow-up     4 month follow up       HPI:  08/27/2024: Hx: Breast Cancer status post mastectomy and chemo/radiation, Former LIGHT smoker.   Since prior office visit underwent at home sleep study through [facility] which revealed an AHI 4% of 3.3 in an AHI 3% of 6.1.  Test revealed desaturation throughout the night to 85% on room air.  A CPAP machine was ordered in February 20, 2024 however patient has not received machine as of yet.  Insurance denial?  Patient still endorses difficulty with sleep at times.  Endorses frequent nocturnal arousals, difficulty staying asleep, daytime fatigue.  Recent development of a cough approximately 1 month ago.  Cough is described as nonproductive and intermittent.  No time correlation identified.  Cough is not associated with wheezing, chest tightness.  Cough is associated with shortness of breath.  Shortness of breath occurs with exertion but also can occur at rest with basic conversation.  Shortness of breath does affect ADLs.  Underwent stress test in January 20, 2024 with results unrevealing.  Has since been evaluated by electrophysiology at [facility] in which no acute intervention was recommended.  Electrophysiology note reviewed from February 20, 2024.  Dr. Paiz's note reviewed from January 20, 2024.  Not currently on medication therapy - states she was prescribed a medication to help lower cholesterol but does not trust the medication so doesn't take.   Denies recent antibiotic or steroid use.  Denies recent ER or urgent care visit for respiratory complaints.        12/27/2023:  In office today alone.  Denies being seen by prior Pulmonologist. Denies history of lung disease. Denies current use of inhaler therapy, supplemental oxygen, CPAP use. Denies personal history of PE, current anticoagulation use.  Diagnosed with breast cancer in 2007, underwent right  mastectomy with subsequent chemo and radiation.  Has been in remission ever since.  Denies cough, Mucus production, wheezing, chest tightness.  Recently developed heart palpitations with associated shortness of breath.  Was seen by Dr. Paiz on 12/04/2023 and which is stress test was ordered and a referral was placed to electrophysiology.  Patient states she had a stress test scheduled however the only time they could do it was at 6:00 a.m..  It appears as if stress test has been canceled, however patient is interested in rescheduling at this time.  Patient endorses daytime fatigue, nocturnal arousals, mild depression.  Denies morning headaches.  [place] Sleepiness Scale from 12/27/2023 REVIEWED: 18  Reviewed Dr. Paiz's note from 12/4.   Patient Instructions   I have ordered an at home sleep study to evaluate for sleep apnea. If test is positive, I will order a CPAP machine. Please notify my clinic when you receive the machine to set up a 31 day compliance appointment. You must use the machine for a least 4 hours every night to avoid insurance denial.   You had a CT scan chest done in October 2020 to which revealed concern for a possible lung nodule versus atelectasis.  Recommend repeat CT at this time for further monitoring of suspicious area.  Will reach out to cardiology in regards to getting your stress test scheduled.  Continue current prescription medication regiment. Keep follow up appointment as scheduled. Please call the office if you have any questions or concerns.         Social Hx: Lives alone - one dog in the home. Works as . No Asbestosis exposure, Smoking Hx: Former smoker - socially in her 20's.  Family Hx: No Lung Cancer, No COPD, Daughter with Asthma  Medical Hx: Previous pneumonia approx 8 years ago; Previous R shoulder surgery in 2022 - R mastectomy in 2007.            The Chief Complaint varies with instability at times.       PFSH:  Past Medical History:   Diagnosis Date    Arthritis      Breast cancer 2007    right    Pain in finger     quentin long finger pain    Wears glasses     WEARS CONTACS    Wears partial dentures     UPPER AND LOWER         Past Surgical History:   Procedure Laterality Date    BREAST RECONSTRUCTION Left 2007    CARPAL TUNNEL RELEASE       SECTION  1970, 1972, 1976    CHOLECYSTECTOMY  2019        COLONOSCOPY  2017    JOINT REPLACEMENT Right     KNEE    KNEE ARTHROPLASTY Right 06/10/2019    Procedure: ARTHROPLASTY, KNEE;  Surgeon: Jony Marmolejo MD;  Location: [facility] OR;  Service: Orthopedics;  Laterality: Right;    KNEE ARTHROPLASTY Left 2021    Procedure: ARTHROPLASTY, KNEE;  Surgeon: Jony Marmolejo MD;  Location: [facility] OR;  Service: Orthopedics;  Laterality: Left;    KNEE ARTHROSCOPY Bilateral 2008    MASTECTOMY Right 2007    PORTACATH PLACEMENT  2007    PORTACATH REMOVAL      REVERSE TOTAL SHOULDER ARTHROPLASTY Right 04/10/2023    Procedure: ARTHROPLASTY, SHOULDER, TOTAL, REVERSE, RIGHT;  Surgeon: Jony Marmolejo MD;  Location: [facility] OR;  Service: Orthopedics;  Laterality: Right;    SURGICAL REMOVAL OF BONE SPUR Bilateral     TUBAL LIGATION       Social History     Tobacco Use    Smoking status: Former     Current packs/day: 0.00     Average packs/day: 0.3 packs/day for 1 year (0.3 ttl pk-yrs)     Types: Cigarettes     Start date:      Quit date:      Years since quittin.6    Smokeless tobacco: Never    Tobacco comments:     social smoker - on weekends only - quit 20 yrs ago   Substance Use Topics    Alcohol use: Yes     Alcohol/week: 0.0 standard drinks of alcohol     Comment: occ    Drug use: No     Family History   Problem Relation Name Age of Onset    Arthritis Mother      Hyperlipidemia Mother      Stroke Mother      Dementia Mother      Eczema Daughter      Eczema Grandchild      Lupus Neg Hx      Psoriasis Neg Hx      Melanoma Neg Hx       Review of patient's allergies indicates:  No Known Allergies      I have  "reviewed past medical, family, and social history.     Performance Status:The patient's activity level is functions out of house.      Review of Systems:  a review of eleven systems covering constitutional, Eye, HEENT, Psych, Respiratory, Cardiac, GI, , Musculoskeletal, Endocrine, Dermatologic was negative except for pertinent findings as listed ABOVE and below: pertinent positive as above, rest is good        Exam:Comprehensive exam done. BP (!) 141/84 (BP Location: Left arm, Patient Position: Sitting, BP Method: Large (Automatic))   Pulse 65   Resp 17   Ht 4' 10" (1.473 m)   Wt 76.2 kg (167 lb 15.9 oz)   SpO2 96%   BMI 35.11 kg/m²   Exam included Vitals as listed  Constitutional: She is oriented to person, place, and time. She appears well-developed. No distress.   Nose: Nose normal.   Mouth/Throat: Uvula is midline, oropharynx is clear and moist and mucous membranes are normal. No dental caries. No oropharyngeal exudate, posterior oropharyngeal edema, posterior oropharyngeal erythema or tonsillar abscesses.    Eyes: Pupils are equal, round, and reactive to light.   Neck: No JVD present. No thyromegaly present.   Cardiovascular: Normal rate, regular rhythm and normal heart sounds. Exam reveals no gallop and no friction rub.   Murmur heard.  Pulmonary/Chest: Effort normal and breath sounds normal. No accessory muscle usage or stridor. No apnea and no tachypnea. No respiratory distress, decreased breath sounds, wheezes, rhonchi, rales, or tenderness. Clear breath sounds throughout, on room air, in no acute distress.   Abdominal: Soft. She exhibits no mass. There is no tenderness. No hepatosplenomegaly, hernias and normoactive bowel sounds  Musculoskeletal: Normal range of motion. exhibits no edema.   Neurological:  alert and oriented to person, place, and time. not disoriented.   Skin: Skin is warm and dry. Capillary refill takes less 2 sec. No cyanosis or erythema. No pallor. Nails show no clubbing. "   Psychiatric: normal mood and affect. behavior is normal. Judgment and thought content normal.           Radiographs (ct chest and cxr) reviewed: was done by direct view  Patient imaging studies were reviewed and interpreted independently. My personal interpretation of most recent images include:  CT Chest Without Contrast 12/27/2023 - Small bands of atelectasis throughout - 1 cm nodular appearance to RLL, unchanged.  CT Chest - 10/31/2022 - atelectasis right lower lung      Labs Patient's labs were reviewed including CBC and CMP  Lab Results   Component Value Date    WBC 7.07 12/04/2023    RBC 4.99 12/04/2023    HGB 16.4 (H) 12/04/2023    HCT 48.4 12/04/2023    MCV 97 12/04/2023    MCH 32.9 (H) 12/04/2023    MCHC 33.9 12/04/2023    RDW 13.5 12/04/2023     12/04/2023    MPV 11.3 12/04/2023    GRAN 3.9 12/04/2023    GRAN 55.3 12/04/2023    LYMPH 2.4 12/04/2023    LYMPH 33.8 12/04/2023    MONO 0.6 12/04/2023    MONO 7.9 12/04/2023    EOS 0.1 12/04/2023    BASO 0.08 12/04/2023    EOSINOPHIL 1.6 12/04/2023    BASOPHIL 1.1 12/04/2023   CMP  Sodium   Date Value Ref Range Status   12/04/2023 140 136 - 145 mmol/L Final   03/26/2019 137 134 - 144 mmol/L      Potassium   Date Value Ref Range Status   12/04/2023 3.6 3.5 - 5.1 mmol/L Final     Chloride   Date Value Ref Range Status   12/04/2023 103 95 - 110 mmol/L Final   03/26/2019 101 98 - 110 mmol/L      CO2   Date Value Ref Range Status   12/04/2023 31 (H) 23 - 29 mmol/L Final     Glucose   Date Value Ref Range Status   12/04/2023 88 70 - 110 mg/dL Final   03/26/2019 160 (H) 70 - 99 mg/dL      BUN   Date Value Ref Range Status   12/04/2023 14 8 - 23 mg/dL Final     Creatinine   Date Value Ref Range Status   12/04/2023 0.5 0.5 - 1.4 mg/dL Final   03/26/2019 0.50 (L) 0.60 - 1.40 mg/dL    08/08/2012 0.6 0.2 - 1.4 mg/dl Final     Calcium   Date Value Ref Range Status   12/04/2023 9.5 8.7 - 10.5 mg/dL Final   08/08/2012 9.5 8.6 - 10.2 mg/dl Final     Total Protein   Date  Value Ref Range Status   12/04/2023 7.6 6.0 - 8.4 g/dL Final     Albumin   Date Value Ref Range Status   12/04/2023 4.2 3.5 - 5.2 g/dL Final   03/26/2019 4.0 3.1 - 4.7 g/dL      Total Bilirubin   Date Value Ref Range Status   12/04/2023 1.0 0.1 - 1.0 mg/dL Final     Comment:     For infants and newborns, interpretation of results should be based  on gestational age, weight and in agreement with clinical  observations.    Premature Infant recommended reference ranges:  Up to 24 hours.............<8.0 mg/dL  Up to 48 hours............<12.0 mg/dL  3-5 days..................<15.0 mg/dL  6-29 days.................<15.0 mg/dL       Alkaline Phosphatase   Date Value Ref Range Status   12/04/2023 95 55 - 135 U/L Final   08/08/2012 100 23 - 119 UNIT/L Final     AST   Date Value Ref Range Status   12/04/2023 63 (H) 10 - 40 U/L Final   08/08/2012 35 (H) 10 - 30 UNIT/L Final     ALT   Date Value Ref Range Status   12/04/2023 41 10 - 44 U/L Final     Anion Gap   Date Value Ref Range Status   12/04/2023 6 (L) 8 - 16 mmol/L Final   08/08/2012 9 5 - 15 meq/L Final     eGFR   Date Value Ref Range Status   12/04/2023 >60.0 >60 mL/min/1.73 m^2 Final         PFT will be done and results to be reviewed   Pulmonary Functions Testing Results:        Plan:  Clinical impression is resonably certain and repeated evaluation prn +/- follow up will be needed as below.    Camila was seen today for follow-up.    Diagnoses and all orders for this visit:    SOB (shortness of breath)  -     Complete PFT with bronchodilator; Future    TEVIN (obstructive sleep apnea)    Lung nodule    Atelectasis of left lung    Fatigue, unspecified type    History of breast cancer          Follow up in about 3 months (around 11/27/2024), or if symptoms worsen or fail to improve.    Discussed with patient above for education the following:      Patient Instructions   Overall your sleep study from January 20, 2024 reveals mild obstructive sleep apnea based on the AHI 3%  with desaturation to 85% on room air.  I ordered you a CPAP machine back in February 20, 2024.  Recommend getting set up with CPAP therapy if your insurance approves at this time.  Recommend you use the CPAP machine nightly, remember you must use the machine for at least 4 hours nightly to avoid insurance denial.    If insurance denies CPAP - will attempt to order another home sleep study through [facility].     Your CT chest from January 20, 2024 was reviewed.  Show stability overall of prior noted right lower lobe nodular appearance.  Does show atelectasis to the lower lung bases which is collapsed lung tissue/scarring.    Etiology of shortness of breath/cough is unknown at this time.  You have a family history of asthma.    I ordered a Lung Function Test to evaluate lung strength. Please complete prior to next appointment.     Continue current prescription medication regiment. Keep follow up appointment as scheduled. Please call the office if you have any questions or concerns.

## 2024-08-28 ENCOUNTER — OFFICE VISIT (OUTPATIENT)
Dept: ORTHOPEDICS | Facility: CLINIC | Age: 74
End: 2024-08-28
Payer: MEDICARE

## 2024-08-28 ENCOUNTER — HOSPITAL ENCOUNTER (OUTPATIENT)
Dept: RADIOLOGY | Facility: HOSPITAL | Age: 74
Discharge: HOME | End: 2024-08-28
Attending: PHYSICIAN ASSISTANT
Payer: MEDICARE

## 2024-08-28 VITALS — BODY MASS INDEX: 34.63 KG/M2 | HEIGHT: 58 IN | WEIGHT: 165 LBS

## 2024-08-28 DIAGNOSIS — M47.22 CERVICAL SPONDYLOSIS WITH RADICULOPATHY: Primary | ICD-10-CM

## 2024-08-28 DIAGNOSIS — M50.30 DDD (DEGENERATIVE DISC DISEASE), CERVICAL: ICD-10-CM

## 2024-08-28 DIAGNOSIS — M54.12 CERVICAL RADICULOPATHY: ICD-10-CM

## 2024-08-28 PROCEDURE — 99213 OFFICE O/P EST LOW 20 MIN: CPT | Mod: PBBFAC,25,PN | Performed by: PHYSICIAN ASSISTANT

## 2024-08-28 PROCEDURE — 72040 X-RAY EXAM NECK SPINE 2-3 VW: CPT | Mod: 26,,, | Performed by: RADIOLOGY

## 2024-08-28 PROCEDURE — 99999 PR PBB SHADOW E&M-EST. PATIENT-LVL III: CPT | Mod: PBBFAC,,, | Performed by: PHYSICIAN ASSISTANT

## 2024-08-28 PROCEDURE — 72040 X-RAY EXAM NECK SPINE 2-3 VW: CPT | Mod: TC,PN

## 2024-08-28 PROCEDURE — 99213 OFFICE O/P EST LOW 20 MIN: CPT | Mod: S$PBB,,, | Performed by: PHYSICIAN ASSISTANT

## 2024-08-28 RX ORDER — METHYLPREDNISOLONE 4 MG/1
TABLET ORAL
Qty: 21 EACH | Refills: 0 | Status: SHIPPED | OUTPATIENT
Start: 2024-08-28

## 2024-08-28 NOTE — PROGRESS NOTES
[facility]  [address]  [place] [address]  Phone: [phone]   [phone]    Patient's PCP: Kasie Arthur MD  Referring Provider: No ref. provider found    Subjective:      Chief Complaint:   Chief Complaint   Patient presents with    Left Arm - Pain     The pain radiates down left arm to about the mid forearm, denies cervical pain, numbness and tingling to left arm, weakness, no headaches, no incontinence, the shoulder is good, the ROM is good and no pain at shoulder       Past Medical History:   Diagnosis Date    Arthritis     Breast cancer 2007    right    Pain in finger     quentin long finger pain    Wears glasses     WEARS CONTACS    Wears partial dentures     UPPER AND LOWER       Past Surgical History:   Procedure Laterality Date    BREAST RECONSTRUCTION Left     CARPAL TUNNEL RELEASE       SECTION  , ,     CHOLECYSTECTOMY  2019        COLONOSCOPY  2017    JOINT REPLACEMENT Right     KNEE    KNEE ARTHROPLASTY Right 06/10/2019    Procedure: ARTHROPLASTY, KNEE;  Surgeon: Jony Marmolejo MD;  Location: [facility] OR;  Service: Orthopedics;  Laterality: Right;    KNEE ARTHROPLASTY Left 2021    Procedure: ARTHROPLASTY, KNEE;  Surgeon: Jony Marmolejo MD;  Location: [facility] OR;  Service: Orthopedics;  Laterality: Left;    KNEE ARTHROSCOPY Bilateral 2008    MASTECTOMY Right 2007    PORTACATH PLACEMENT  2007    PORTACATH REMOVAL      REVERSE TOTAL SHOULDER ARTHROPLASTY Right 04/10/2023    Procedure: ARTHROPLASTY, SHOULDER, TOTAL, REVERSE, RIGHT;  Surgeon: Jony Marmolejo MD;  Location: [facility] OR;  Service: Orthopedics;  Laterality: Right;    SURGICAL REMOVAL OF BONE SPUR Bilateral     TUBAL LIGATION         Current Outpatient Medications   Medication Sig    methylPREDNISolone (MEDROL DOSEPACK) 4 mg tablet use as directed     No current facility-administered medications for this visit.       Review of patient's allergies indicates:  No  Known Allergies    Family History   Problem Relation Name Age of Onset    Arthritis Mother      Hyperlipidemia Mother      Stroke Mother      Dementia Mother      Eczema Daughter      Eczema Grandchild      Lupus Neg Hx      Psoriasis Neg Hx      Melanoma Neg Hx         Social History     Socioeconomic History    Marital status:    Tobacco Use    Smoking status: Former     Current packs/day: 0.00     Average packs/day: 0.3 packs/day for 1 year (0.3 ttl pk-yrs)     Types: Cigarettes     Start date:      Quit date:      Years since quittin.6    Smokeless tobacco: Never    Tobacco comments:     social smoker - on weekends only - quit 20 yrs ago   Substance and Sexual Activity    Alcohol use: Yes     Alcohol/week: 0.0 standard drinks of alcohol     Comment: occ    Drug use: No    Sexual activity: Not Currently     Partners: Male     Social Determinants of Health     Stress: No Stress Concern Present (7/10/2019)    Citizen of [place] [employer] - Occupational Stress Questionnaire     Feeling of Stress : Not at all       Prior to meeting with the patient I reviewed the medical chart in [employer]. This included reviewing the previous progress notes from our office, review of the patient's last appointment with their primary care provider, review of any visits to the emergency room, and review of any pain management appointments or procedures.    History of present illness: 74 y.o. female,    Presents to clinic today with a 1-2 month history of left upper extremity pain.  She complains of pain along the dorsal aspect of the left arm from the mid humerus to the forearm and hand with occasional numbness and tingling.  Not in a glove-like distribution.  She denies neck or upper back pain, she denies shoulder pain.       Review of Systems:    Constitutional: Negative for chills, fever and weight loss.   HENT: Negative for congestion.    Eyes: Negative for discharge and redness.   Respiratory: Negative  for cough and shortness of breath.    Cardiovascular: Negative for chest pain.   Gastrointestinal: Negative for nausea and vomiting.   Musculoskeletal: See HPI.   Skin: Negative for rash.   Neurological: Negative for headaches.   Endo/Heme/Allergies: Does not bruise/bleed easily.   Psychiatric/Behavioral: The patient is not nervous/anxious.    All other systems reviewed and are negative.       Objective:      Physical Examination:    Vital Signs:  There were no vitals filed for this visit.    Body mass index is 34.49 kg/m².    This a well-developed, well nourished patient in no acute distress.  They are alert and oriented and cooperative to examination.     Examination of the cervical spine, no midline vertebral tenderness or paravertebral spasm.  No significant limitations with range of motion to include flexion extension rotation lateral bending.  She has got subjective radicular symptoms left upper extremity distal to the insertion of the deltoid and dorsal aspect of the left forearm and hand.  Denies numbness and tingling in the fingers.      Pertinent New Results:          XRAY Report / Interpretation:   AP lateral views of cervical spine taken today in the office demonstrate multilevel degenerative changes and cervical spondylosis.  She has got advanced degeneration at C5-6 lesser extent C6-7.  She has got an exaggerated cervical lordosis.          Assessment:       1. Cervical spondylosis with radiculopathy    2. DDD (degenerative disc disease), cervical    3. Cervical radiculopathy      Plan:     Cervical spondylosis with radiculopathy  -     methylPREDNISolone (MEDROL DOSEPACK) 4 mg tablet; use as directed  Dispense: 21 each; Refill: 0    DDD (degenerative disc disease), cervical  -     Cancel: X-Ray Shoulder 2 or More Views Left  -     X-Ray Cervical Spine AP And Lateral  -     Ambulatory referral/consult to Physical/Occupational Therapy; Future; Expected date: 09/04/2024  -     methylPREDNISolone (MEDROL  DOSEPACK) 4 mg tablet; use as directed  Dispense: 21 each; Refill: 0    Cervical radiculopathy  -     Ambulatory referral/consult to Physical/Occupational Therapy; Future; Expected date: 09/04/2024  -     methylPREDNISolone (MEDROL DOSEPACK) 4 mg tablet; use as directed  Dispense: 21 each; Refill: 0        Follow up for 6 wk f/u, cervical PT f/u.    Left arm pain numbness and tingling.  Degenerative changes in the cervical spine consistent with cervical spondylosis and C6 radiculopathy.  We placed her on a Medrol Dosepak we are going to do some physical therapy we will check her back in 6 weeks.  If she has not had any significant improvement, consider MRI for possible pain management evaluation.        LUIZ Romano, PATahirC        This note was created using [employer] voice recognition software that occasionally misinterprets words or phrases.

## 2024-09-16 ENCOUNTER — OFFICE VISIT (OUTPATIENT)
Dept: ORTHOPEDICS | Facility: CLINIC | Age: 74
End: 2024-09-16
Payer: MEDICARE

## 2024-09-16 ENCOUNTER — HOSPITAL ENCOUNTER (OUTPATIENT)
Dept: RADIOLOGY | Facility: HOSPITAL | Age: 74
Discharge: HOME | End: 2024-09-16
Attending: PHYSICIAN ASSISTANT
Payer: MEDICARE

## 2024-09-16 VITALS — HEIGHT: 58 IN | WEIGHT: 164.88 LBS | BODY MASS INDEX: 34.61 KG/M2

## 2024-09-16 DIAGNOSIS — M50.30 DDD (DEGENERATIVE DISC DISEASE), CERVICAL: ICD-10-CM

## 2024-09-16 DIAGNOSIS — Z98.890 HISTORY OF REPAIR OF LEFT ROTATOR CUFF: ICD-10-CM

## 2024-09-16 DIAGNOSIS — M47.22 CERVICAL SPONDYLOSIS WITH RADICULOPATHY: ICD-10-CM

## 2024-09-16 DIAGNOSIS — M19.212 SECONDARY OSTEOARTHRITIS OF LEFT SHOULDER DUE TO ROTATOR CUFF ARTHROPATHY: Primary | ICD-10-CM

## 2024-09-16 PROCEDURE — 73030 X-RAY EXAM OF SHOULDER: CPT | Mod: 26,LT,, | Performed by: RADIOLOGY

## 2024-09-16 PROCEDURE — 73030 X-RAY EXAM OF SHOULDER: CPT | Mod: TC,PN,LT

## 2024-09-16 PROCEDURE — 20610 DRAIN/INJ JOINT/BURSA W/O US: CPT | Mod: PBBFAC,PN | Performed by: PHYSICIAN ASSISTANT

## 2024-09-16 PROCEDURE — 99999 PR PBB SHADOW E&M-EST. PATIENT-LVL III: CPT | Mod: PBBFAC,,, | Performed by: PHYSICIAN ASSISTANT

## 2024-09-16 PROCEDURE — 99213 OFFICE O/P EST LOW 20 MIN: CPT | Mod: PBBFAC,25,PN | Performed by: PHYSICIAN ASSISTANT

## 2024-09-16 PROCEDURE — 99214 OFFICE O/P EST MOD 30 MIN: CPT | Mod: S$PBB,25,, | Performed by: PHYSICIAN ASSISTANT

## 2024-09-16 PROCEDURE — 20610 DRAIN/INJ JOINT/BURSA W/O US: CPT | Mod: S$PBB,LT,, | Performed by: PHYSICIAN ASSISTANT

## 2024-09-16 PROCEDURE — 99999PBSHW PR PBB SHADOW TECHNICAL ONLY FILED TO HB: Mod: PBBFAC,,,

## 2024-09-16 RX ORDER — TRIAMCINOLONE ACETONIDE 40 MG/ML
40 INJECTION, SUSPENSION INTRA-ARTICULAR; INTRAMUSCULAR
Status: SHIPPED | OUTPATIENT
Start: 2024-09-16

## 2024-09-16 RX ADMIN — TRIAMCINOLONE ACETONIDE 40 MG: 40 INJECTION, SUSPENSION INTRA-ARTICULAR; INTRAMUSCULAR at 08:09

## 2024-09-16 NOTE — PROGRESS NOTES
[facility]  [address]  [place] [address]  Phone: [phone]   [phone]    Patient's PCP: Kasie Arthur MD  Referring Provider: No ref. provider found    Subjective:      Chief Complaint:   Chief Complaint   Patient presents with    Left Shoulder - Pain     Patient is here with complaints of left shoulder and arm pain for a couple of months, over the last 2 days the pain has gotten progressively worse. Just started PT. Painful and limited ROM as well as occasional numbness. Also has burning and shooting pain        Past Medical History:   Diagnosis Date    Arthritis     Breast cancer 2007    right    Pain in finger     quentin long finger pain    Wears glasses     WEARS CONTACS    Wears partial dentures     UPPER AND LOWER       Past Surgical History:   Procedure Laterality Date    BREAST RECONSTRUCTION Left     CARPAL TUNNEL RELEASE       SECTION  , ,     CHOLECYSTECTOMY  2019        COLONOSCOPY  2017    JOINT REPLACEMENT Right     KNEE    KNEE ARTHROPLASTY Right 06/10/2019    Procedure: ARTHROPLASTY, KNEE;  Surgeon: Jony Marmolejo MD;  Location: [facility] OR;  Service: Orthopedics;  Laterality: Right;    KNEE ARTHROPLASTY Left 2021    Procedure: ARTHROPLASTY, KNEE;  Surgeon: Jony Marmolejo MD;  Location: [facility] OR;  Service: Orthopedics;  Laterality: Left;    KNEE ARTHROSCOPY Bilateral 2008    MASTECTOMY Right 2007    PORTACATH PLACEMENT  2007    PORTACATH REMOVAL      REVERSE TOTAL SHOULDER ARTHROPLASTY Right 04/10/2023    Procedure: ARTHROPLASTY, SHOULDER, TOTAL, REVERSE, RIGHT;  Surgeon: Jony Marmolejo MD;  Location: [facility] OR;  Service: Orthopedics;  Laterality: Right;    SURGICAL REMOVAL OF BONE SPUR Bilateral     TUBAL LIGATION         Current Outpatient Medications   Medication Sig    methylPREDNISolone (MEDROL DOSEPACK) 4 mg tablet use as directed (Patient not taking: Reported on 2024)     No current  facility-administered medications for this visit.       Review of patient's allergies indicates:  No Known Allergies    Family History   Problem Relation Name Age of Onset    Arthritis Mother      Hyperlipidemia Mother      Stroke Mother      Dementia Mother      Eczema Daughter      Eczema Grandchild      Lupus Neg Hx      Psoriasis Neg Hx      Melanoma Neg Hx         Social History     Socioeconomic History    Marital status:    Tobacco Use    Smoking status: Former     Current packs/day: 0.00     Average packs/day: 0.3 packs/day for 1 year (0.3 ttl pk-yrs)     Types: Cigarettes     Start date:      Quit date:      Years since quittin.7    Smokeless tobacco: Never    Tobacco comments:     social smoker - on weekends only - quit 20 yrs ago   Substance and Sexual Activity    Alcohol use: Yes     Alcohol/week: 0.0 standard drinks of alcohol     Comment: occ    Drug use: No    Sexual activity: Not Currently     Partners: Male     Social Determinants of Health     Stress: No Stress Concern Present (7/10/2019)    [facility] of Occupational Health - Occupational Stress Questionnaire     Feeling of Stress : Not at all       Prior to meeting with the patient I reviewed the medical chart in [facility]. This included reviewing the previous progress notes from our office, review of the patient's last appointment with their primary care provider, review of any visits to the emergency room, and review of any pain management appointments or procedures.    History of present illness: 74 y.o. female,  returns to clinic today for evaluation of complaints of acute left shoulder pain.  She states Saturday, she did something at home she developed acute pain and decreased range motion in the left shoulder.  She has a history of right shoulder arthroplasty, left shoulder rotator cuff repair.    We just saw her a couple of days ago prior to this most recent visit for some neck pain with numbness and tingling  symptoms in the left upper extremity.  We were concerned about a cervical radiculopathy and extensive degenerative changes on her x-ray.  We put her on a Medrol Dosepak and ordered physical therapy but no real improvement in her symptoms.  We did verify however she was not having shoulder pain when we saw her 2 weeks ago.       Review of Systems:    Constitutional: Negative for chills, fever and weight loss.   HENT: Negative for congestion.    Eyes: Negative for discharge and redness.   Respiratory: Negative for cough and shortness of breath.    Cardiovascular: Negative for chest pain.   Gastrointestinal: Negative for nausea and vomiting.   Musculoskeletal: See HPI.   Skin: Negative for rash.   Neurological: Negative for headaches.   Endo/Heme/Allergies: Does not bruise/bleed easily.   Psychiatric/Behavioral: The patient is not nervous/anxious.    All other systems reviewed and are negative.       Objective:      Physical Examination:    Vital Signs:  There were no vitals filed for this visit.    Body mass index is 34.47 kg/m².    This a well-developed, well nourished patient in no acute distress.  They are alert and oriented and cooperative to examination.     Examination of the left shoulder, skin is dry and intact, no erythema or ecchymosis, no signs symptoms of infection.  Patient limits active range of motion secondary to pain.  Passive range of motion I can forward flex her to about 160°.  I can externally rotate her to 80°.  Internally rotate her to the posterior left hip.      Pertinent New Results:          XRAY Report / Interpretation:   AP and lateral views of the left shoulder taken today in the office demonstrate postoperative changes of prior shoulder arthroscopy with subacromial decompression as well as rotator cuff repair.  There does appear to be some changes suggestive of rotator cuff arthropathy with superior migration of the humeral head.          Assessment:       1. Secondary osteoarthritis of  left shoulder due to rotator cuff arthropathy    2. History of repair of left rotator cuff    3. Cervical spondylosis with radiculopathy    4. DDD (degenerative disc disease), cervical      Plan:     Secondary osteoarthritis of left shoulder due to rotator cuff arthropathy  -     X-Ray Shoulder 2 or More Views Left    History of repair of left rotator cuff    Cervical spondylosis with radiculopathy  -     MRI Cervical Spine Without Contrast; Future; Expected date: 09/16/2024    DDD (degenerative disc disease), cervical        Follow up 2 week Cervical  MRI Results & Left Shoulder injec 9.16.24.    Patient returns to clinic today for new complaint of left shoulder pain.  We just saw her a few weeks ago, treated her for some cervical spondylosis with radiculopathy.  No significant improvement in her neck or left upper extremity pain.  We are going to order an MRI of the cervical spine.  We will see her back to review those results.  The left shoulder, she was not having left shoulder pain we saw her 2 weeks ago.  She has a history of rotator cuff tear and rotator cuff repair I think she has has some early changes of rotator cuff arthropathy.  We are going to get an MRI of her left shoulder as well.  She has a right reverse total shoulder replacement already.  Ultimately she may need a left shoulder replacement.  We injected the left shoulder today posterior approach lidocaine and triamcinolone subacromial space, sterile technique she tolerated well.      @RM@  LUIZ Romano PA-C        This note was created using [employer] voice recognition software that occasionally misinterprets words or phrases.

## 2024-09-16 NOTE — PROCEDURES
Large Joint Aspiration/Injection: L subacromial bursa    Date/Time: 9/16/2024 8:30 AM    Performed by: Jadon Flores PA  Authorized by: Jadon Flores PA    Consent Done?:  Yes (Verbal)  Indications:  Arthritis and pain  Site marked: the procedure site was marked    Timeout: prior to procedure the correct patient, procedure, and site was verified    Prep: patient was prepped and draped in usual sterile fashion      Local anesthesia used?: Yes    Local anesthetic:  Lidocaine 1% without epinephrine  Ultrasonic Guidance for needle placement?: No    Approach:  Posterior  Location:  Shoulder  Site:  L subacromial bursa  Medications:  40 mg triamcinolone acetonide 40 mg/mL  Patient tolerance:  Patient tolerated the procedure well with no immediate complications

## 2024-09-20 ENCOUNTER — HOSPITAL ENCOUNTER (OUTPATIENT)
Dept: RADIOLOGY | Facility: HOSPITAL | Age: 74
Discharge: HOME | End: 2024-09-20
Attending: PHYSICIAN ASSISTANT
Payer: MEDICARE

## 2024-09-20 DIAGNOSIS — M47.22 CERVICAL SPONDYLOSIS WITH RADICULOPATHY: ICD-10-CM

## 2024-09-20 PROCEDURE — 72141 MRI NECK SPINE W/O DYE: CPT | Mod: 26,,, | Performed by: RADIOLOGY

## 2024-09-20 PROCEDURE — 72141 MRI NECK SPINE W/O DYE: CPT | Mod: TC

## 2024-09-26 ENCOUNTER — HOSPITAL ENCOUNTER (OUTPATIENT)
Dept: PULMONOLOGY | Facility: HOSPITAL | Age: 74
Discharge: HOME | End: 2024-09-26
Attending: NURSE PRACTITIONER
Payer: MEDICARE

## 2024-09-26 DIAGNOSIS — R06.02 SOB (SHORTNESS OF BREATH): ICD-10-CM

## 2024-09-26 LAB
DLCO SINGLE BREATH LLN: 11.39
DLCO SINGLE BREATH PRE REF: 86.1 %
DLCO SINGLE BREATH REF: 17.12
DLCOC SBVA LLN: 2.53
DLCOC SBVA REF: 4.35
DLCOC SINGLE BREATH LLN: 11.39
DLCOC SINGLE BREATH REF: 17.12
DLCOVA LLN: 2.53
DLCOVA PRE REF: 116.2 %
DLCOVA PRE: 5.06 ML/(MIN*MMHG*L) (ref 2.53–6.18)
DLCOVA REF: 4.35
ERVN2 LLN: -16449.46
ERVN2 PRE REF: 0 %
ERVN2 PRE: 0 L (ref -16449.46–16450.54)
ERVN2 REF: 0.54
FEF 25 75 LLN: 0.88
FEF 25 75 PRE REF: 102.9 %
FEF 25 75 REF: 2.28
FEV1 FVC LLN: 65
FEV1 FVC PRE REF: 114.7 %
FEV1 FVC REF: 79
FEV1 LLN: 1.23
FEV1 PRE REF: 88.2 %
FEV1 REF: 1.71
FRCN2 LLN: 1.55
FRCN2 PRE REF: 53.5 %
FRCN2 REF: 2.37
FVC LLN: 1.58
FVC PRE REF: 76.5 %
FVC REF: 2.19
IVC PRE: 1.67 L (ref 1.58–2.84)
IVC SINGLE BREATH LLN: 1.58
IVC SINGLE BREATH PRE REF: 76.2 %
IVC SINGLE BREATH REF: 2.19
PEF LLN: 3.13
PEF PRE REF: 73.9 %
PEF REF: 4.53
PRE DLCO: 14.75 ML/(MIN*MMHG) (ref 11.39–22.85)
PRE FEF 25 75: 2.35 L/S (ref 0.88–3.68)
PRE FET 100: 5.87 SEC
PRE FEV1 FVC: 90.06 % (ref 64.65–90.53)
PRE FEV1: 1.51 L (ref 1.23–2.17)
PRE FRC N2: 1.27 L (ref 1.55–3.2)
PRE FVC: 1.68 L (ref 1.58–2.84)
PRE PEF: 3.35 L/S (ref 3.13–5.94)
RVN2 LLN: 1.26
RVN2 PRE REF: 69.3 %
RVN2 PRE: 1.27 L (ref 1.26–2.41)
RVN2 REF: 1.83
RVN2TLCN2 LLN: 34.19
RVN2TLCN2 PRE REF: 98.7 %
RVN2TLCN2 PRE: 43.21 % (ref 34.19–53.37)
RVN2TLCN2 REF: 43.78
TLCN2 LLN: 2.95
TLCN2 PRE REF: 74.8 %
TLCN2 PRE: 2.94 L (ref 2.95–4.92)
TLCN2 REF: 3.93
VA PRE: 2.92 L (ref 3.78–3.78)
VA SINGLE BREATH LLN: 3.78
VA SINGLE BREATH PRE REF: 77.1 %
VA SINGLE BREATH REF: 3.78
VCMAXN2 LLN: 1.58
VCMAXN2 PRE REF: 76.2 %
VCMAXN2 PRE: 1.67 L (ref 1.58–2.84)
VCMAXN2 REF: 2.19

## 2024-09-26 PROCEDURE — 94729 DIFFUSING CAPACITY: CPT

## 2024-09-26 PROCEDURE — 94727 GAS DIL/WSHOT DETER LNG VOL: CPT

## 2024-09-26 PROCEDURE — 94010 BREATHING CAPACITY TEST: CPT

## 2024-10-10 ENCOUNTER — OFFICE VISIT (OUTPATIENT)
Dept: ORTHOPEDICS | Facility: CLINIC | Age: 74
End: 2024-10-10
Payer: MEDICARE

## 2024-10-10 VITALS — BODY MASS INDEX: 34.61 KG/M2 | WEIGHT: 164.88 LBS | HEIGHT: 58 IN

## 2024-10-10 DIAGNOSIS — M47.812 ARTHROPATHY OF CERVICAL FACET JOINT: ICD-10-CM

## 2024-10-10 DIAGNOSIS — M50.30 DDD (DEGENERATIVE DISC DISEASE), CERVICAL: ICD-10-CM

## 2024-10-10 DIAGNOSIS — M54.12 CERVICAL RADICULOPATHY: ICD-10-CM

## 2024-10-10 DIAGNOSIS — M19.012 OSTEOARTHRITIS OF GLENOHUMERAL JOINT, LEFT: Primary | ICD-10-CM

## 2024-10-10 PROCEDURE — 99999 PR PBB SHADOW E&M-EST. PATIENT-LVL II: CPT | Mod: PBBFAC,,, | Performed by: ORTHOPAEDIC SURGERY

## 2024-10-10 PROCEDURE — 99212 OFFICE O/P EST SF 10 MIN: CPT | Mod: PBBFAC,PN | Performed by: ORTHOPAEDIC SURGERY

## 2024-10-10 NOTE — PROGRESS NOTES
[facility]    Subjective:     Chief Complaint:   Chief Complaint   Patient presents with    Neck - Pain     Cervical MRI Results & Left Shoulder inj 24, states her pain is a little better, still has pain radiating down her left arm, worse at night    Left Shoulder - Pain       Past Medical History:   Diagnosis Date    Arthritis     Breast cancer     right    Pain in finger     quentin long finger pain    Wears glasses     WEARS CONTACS    Wears partial dentures     UPPER AND LOWER       Past Surgical History:   Procedure Laterality Date    BREAST RECONSTRUCTION Left 2007    CARPAL TUNNEL RELEASE       SECTION  1970, 1972, 1976    CHOLECYSTECTOMY  2019        COLONOSCOPY  2017    JOINT REPLACEMENT Right     KNEE    KNEE ARTHROPLASTY Right 06/10/2019    Procedure: ARTHROPLASTY, KNEE;  Surgeon: Jony Marmolejo MD;  Location: [facility] OR;  Service: Orthopedics;  Laterality: Right;    KNEE ARTHROPLASTY Left 2021    Procedure: ARTHROPLASTY, KNEE;  Surgeon: Jony Marmolejo MD;  Location: [facility] OR;  Service: Orthopedics;  Laterality: Left;    KNEE ARTHROSCOPY Bilateral 2008    MASTECTOMY Right 2007    PORTACATH PLACEMENT  2007    PORTACATH REMOVAL      REVERSE TOTAL SHOULDER ARTHROPLASTY Right 04/10/2023    Procedure: ARTHROPLASTY, SHOULDER, TOTAL, REVERSE, RIGHT;  Surgeon: Jony Marmolejo MD;  Location: [facility] OR;  Service: Orthopedics;  Laterality: Right;    SURGICAL REMOVAL OF BONE SPUR Bilateral     TUBAL LIGATION         Current Outpatient Medications   Medication Sig    clobetasoL (TEMOVATE) 0.05 % external solution Apply to scalp daily for scaling     No current facility-administered medications for this visit.     Facility-Administered Medications Ordered in Other Visits   Medication    triamcinolone acetonide injection 40 mg       Review of patient's allergies indicates:  No Known Allergies    Family History   Problem Relation Name Age of Onset    Arthritis Mother       Hyperlipidemia Mother      Stroke Mother      Dementia Mother      Eczema Daughter      Eczema Grandchild      Lupus Neg Hx      Psoriasis Neg Hx      Melanoma Neg Hx         Social History     Socioeconomic History    Marital status:    Tobacco Use    Smoking status: Former     Current packs/day: 0.00     Average packs/day: 0.3 packs/day for 1 year (0.3 ttl pk-yrs)     Types: Cigarettes     Start date:      Quit date:      Years since quittin.8    Smokeless tobacco: Never    Tobacco comments:     social smoker - on weekends only - quit 20 yrs ago   Substance and Sexual Activity    Alcohol use: Yes     Alcohol/week: 0.0 standard drinks of alcohol     Comment: occ    Drug use: No    Sexual activity: Not Currently     Partners: Male     Social Drivers of Health     Stress: No Stress Concern Present (7/10/2019)    [employer]ational [employer] - Occupational Stress Questionnaire     Feeling of Stress : Not at all       History of present illness:  Ms. Sotero singh comes in today for follow-up for her cervical spine MRI results.  Also to assess her left shoulder.  She was last seen and injected in the left shoulder about 3 weeks ago.  It did provide relief for her.  She does have a previous history of a left rotator cuff repair as well and known glenohumeral arthrosis.  She denies any neck pain.  She does state she has pain going down the left arm to just past the elbow.    Review of Systems:    Constitution: Negative for chills, fever, and sweats.  Negative for unexplained weight loss.    HENT:  Negative for headaches and blurry vision.    Cardiovascular:Negative for chest pain or irregular heart beat. Negative for hypertension.    Respiratory:  Negative for cough and shortness of breath.    Gastrointestinal: Negative for abdominal pain, heartburn, melena, nausea, and vomitting.    Genitourinary:  Negative bladder incontinence and dysuria.    Musculoskeletal:  See HPI for details.  "    Neurological: Negative for numbness.    Psychiatric/Behavioral: Negative for depression.  The patient is not nervous/anxious.      Endocrine: Negative for polyuria    Hematologic/Lymphatic: Negative for bleeding problem.  Does not bruise/bleed easily.    Skin: Negative for poor would healing and rash    Objective:      Physical Examination:    Vital Signs:  There were no vitals filed for this visit.    Body mass index is 34.47 kg/m².    This a well-developed, well nourished patient in no acute distress.  They are alert and oriented and cooperative to examination.        Patient does have well-preserved range of motion of the left shoulder.  It was not particularly painful for her at all.  Negative Spurling's.    Pertinent New Results:    XRAY Report / Interpretation:   No new radiographs taken today.  Did review images and report of her cervical spine MRI.  She does have multilevel degenerative disc disease and facet arthrosis.  She is worse at C5-6 where she does have moderate bilateral foraminal stenosis.    Assessment/Plan:      1. Left shoulder glenohumeral osteoarthritis.    2. Cervical spine degenerative disc disease.    3. Cervical spine facet arthrosis.  4. Cervical radiculopathy.      She has 2 different problems.  Both her somewhat symptomatic for her.  I think the shoulder is the primarily  of her discomfort, yet she is just not that symptomatic and has well-preserved range of motion.  I would like to let this injection continue to play out and see how much relief she gets.  I am not sure she has bad enough for any particular surgical intervention for her neck or her shoulder.  Regardless, I think it is her shoulder that is the primary issue.  We will check her back in the next 4-6 weeks to see how she continues to do with the injection she received 3 weeks ago.    Jeronimo Boss, Physician Assistant, served in the capacity as a "scribe" for this patient encounter.  A "face-to-face" " encounter occurred with Dr. Jony Marmolejo on this date.  The treatment plan and medical decision-making is outlined above. Patient was seen and examined with a chaperone.       This note was created using Dragon voice recognition software that occasionally misinterpreted phrases or words.

## 2024-10-31 ENCOUNTER — TELEPHONE (OUTPATIENT)
Dept: ORTHOPEDICS | Facility: CLINIC | Age: 74
End: 2024-10-31
Payer: MEDICARE

## 2024-11-04 ENCOUNTER — OFFICE VISIT (OUTPATIENT)
Dept: ORTHOPEDICS | Facility: CLINIC | Age: 74
End: 2024-11-04
Payer: MEDICARE

## 2024-11-04 VITALS — BODY MASS INDEX: 35.05 KG/M2 | WEIGHT: 167 LBS | HEIGHT: 58 IN

## 2024-11-04 DIAGNOSIS — M48.02 FORAMINAL STENOSIS OF CERVICAL REGION: ICD-10-CM

## 2024-11-04 DIAGNOSIS — M50.30 DDD (DEGENERATIVE DISC DISEASE), CERVICAL: ICD-10-CM

## 2024-11-04 DIAGNOSIS — M54.12 CERVICAL RADICULOPATHY: Primary | ICD-10-CM

## 2024-11-04 PROCEDURE — 99212 OFFICE O/P EST SF 10 MIN: CPT | Mod: PBBFAC,PN | Performed by: ORTHOPAEDIC SURGERY

## 2024-11-04 PROCEDURE — 99999 PR PBB SHADOW E&M-EST. PATIENT-LVL II: CPT | Mod: PBBFAC,,, | Performed by: ORTHOPAEDIC SURGERY

## 2024-11-04 PROCEDURE — 99213 OFFICE O/P EST LOW 20 MIN: CPT | Mod: S$PBB,,, | Performed by: ORTHOPAEDIC SURGERY

## 2024-11-04 RX ORDER — GABAPENTIN 100 MG/1
100 CAPSULE ORAL NIGHTLY
Qty: 30 CAPSULE | Refills: 1 | Status: SHIPPED | OUTPATIENT
Start: 2024-11-04

## 2024-11-04 NOTE — PROGRESS NOTES
Subjective:       Patient ID: Camila Au is a 74 y.o. female.    Chief Complaint: Pain of the Neck (Cervical f/u, did PT for the neck, pain little better, has burning sensation to left side of neck, pain down left arm, numbness to left fingers, )      History of Present Illness    Prior to meeting with the patient I reviewed the medical chart in [facility]. This included reviewing the previous progress notes from our office, review of the patient's last appointment with their primary care provider, review of any visits to the emergency room, and review of any pain management appointments or procedures.   Patient comes in complaining of severe left-sided neck pain with radiation into her left arm the history is that she was having some neck pain and had an MRI in September but the symptoms had resolved until she went to physical therapy last week and some type of manipulation therapy reportedly worsened her symptoms and made the neck pain left side recur and also the radiation of it into her hand.  It is slowly improving with using extra-strength Tylenol she prefers not to return to physical therapy.    Current Medications  Current Outpatient Medications   Medication Sig Dispense Refill    clobetasoL (TEMOVATE) 0.05 % external solution Apply to scalp daily for scaling 50 mL 11     No current facility-administered medications for this visit.     Facility-Administered Medications Ordered in Other Visits   Medication Dose Route Frequency Provider Last Rate Last Admin    triamcinolone acetonide injection 40 mg  40 mg INTRABURSAL  Jadon Flores PA   40 mg at 09/16/24 0830       Allergies  Review of patient's allergies indicates:  No Known Allergies    Past Medical History  Past Medical History:   Diagnosis Date    Arthritis     Breast cancer 2007    right    Pain in finger     quentin long finger pain    Wears glasses     WEARS CONTACS    Wears partial dentures     UPPER AND LOWER       Surgical History  Past  Surgical History:   Procedure Laterality Date    BREAST RECONSTRUCTION Left 2007    CARPAL TUNNEL RELEASE       SECTION  1970, 1972, 1976    CHOLECYSTECTOMY  2019        COLONOSCOPY  2017    JOINT REPLACEMENT Right     KNEE    KNEE ARTHROPLASTY Right 06/10/2019    Procedure: ARTHROPLASTY, KNEE;  Surgeon: Jony Marmolejo MD;  Location: [facility] OR;  Service: Orthopedics;  Laterality: Right;    KNEE ARTHROPLASTY Left 2021    Procedure: ARTHROPLASTY, KNEE;  Surgeon: Jony Marmolejo MD;  Location: [facility] OR;  Service: Orthopedics;  Laterality: Left;    KNEE ARTHROSCOPY Bilateral 2008    MASTECTOMY Right 2007    PORTACATH PLACEMENT  2007    PORTACATH REMOVAL      REVERSE TOTAL SHOULDER ARTHROPLASTY Right 04/10/2023    Procedure: ARTHROPLASTY, SHOULDER, TOTAL, REVERSE, RIGHT;  Surgeon: Jony Marmolejo MD;  Location: [facility] OR;  Service: Orthopedics;  Laterality: Right;    SURGICAL REMOVAL OF BONE SPUR Bilateral     TUBAL LIGATION         Family History:   Family History   Problem Relation Name Age of Onset    Arthritis Mother      Hyperlipidemia Mother      Stroke Mother      Dementia Mother      Eczema Daughter      Eczema Grandchild      Lupus Neg Hx      Psoriasis Neg Hx      Melanoma Neg Hx         Social History:   Social History     Socioeconomic History    Marital status:    Tobacco Use    Smoking status: Former     Current packs/day: 0.00     Average packs/day: 0.3 packs/day for 1 year (0.3 ttl pk-yrs)     Types: Cigarettes     Start date:      Quit date:      Years since quittin.8    Smokeless tobacco: Never    Tobacco comments:     social smoker - on weekends only - quit 20 yrs ago   Substance and Sexual Activity    Alcohol use: Yes     Alcohol/week: 0.0 standard drinks of alcohol     Comment: occ    Drug use: No    Sexual activity: Not Currently     Partners: Male     Social Drivers of Health     Stress: No Stress Concern Present (7/10/2019)    [place]  [place] [employer] - Occupational Stress Questionnaire     Feeling of Stress : Not at all       Hospitalization/Major Diagnostic Procedure:     Review of Systems     General/Constitutional:  Chills denies. Fatigue denies. Fever denies. Weight gain denies. Weight loss denies.    Respiratory:  Shortness of breath denies.    Cardiovascular:  Chest pain denies.    Gastrointestinal:  Constipation denies. Diarrhea denies. Nausea denies. Vomiting denies.     Hematology:  Easy bruising denies. Prolonged bleeding denies.     Genitourinary:  Frequent urination denies. Pain in lower back denies. Painful urination denies.     Musculoskeletal:  See HPI for details    Skin:  Rash denies.    Neurologic:  Dizziness denies. Gait abnormalities denies. Seizures denies. Tingling/Numbess denies.    Psychiatric:  Anxiety denies. Depressed mood denies.     Objective:   Vital Signs: There were no vitals filed for this visit.     Physical Exam      General Examination:     Constitutional: The patient is alert and oriented to lace person and time. Mood is pleasant.     Head/Face: Normal facial features normal eyebrows    Eyes: Normal extraocular motion bilaterally    Lungs: Respirations are equal and unlabored    Gait is coordinated.    Cardiovascular: There are no swelling or varicosities present.    Lymphatic: Negative for adenopathy    Skin: Normal    Neurological: Level of consciousness normal. Oriented to place person and time and situation    Psychiatric: Oriented to time place person and situation    Tender left trapezius and left rhomboid muscle mild restriction of motion pain with left rotation and extension.  Spurling's maneuver weakly positive on the left side motor exam normal Tinel's test negative at the elbow Phalen's test negative at the wrist on the left motor exam grossly intact  XRAY Report/ Interpretation:  Prior cervical x-rays and cervical MRI were personally reviewed of note the studies were done prior to  this patient's alleged increase in symptoms caused by physical therapy cervical x-rays showed moderate narrowing C5-6 and C6-7 consistent with longstanding spondylosis    I reviewed cervical MRI images and agree with the radiologist's interpretation    Jadon Flores PA on 9/20/2024 10:15     MRI Cervical Spine Without Contrast  Order: [id number]  Status: Final result       Visible to patient: Yes (seen)       Next appt: 11/21/2024 at 10:15 AM in Orthopedics (Jony Marmolejo MD)       Dx: Cervical spondylosis with radiculopathy    0 Result Notes  Details    Reading Physician Reading Date Result Priority   Young Campos MD  [phone] 9/20/2024 Routine     Narrative & Impression  EXAMINATION:  MRI CERVICAL SPINE WITHOUT CONTRAST     CLINICAL HISTORY:  Cervical Radiculopathy; Other spondylosis with radiculopathy, cervical region     TECHNIQUE:  Routine MRI cervical spine without contrast.     COMPARISON:  Multiple prior exams.     FINDINGS:  The cervical spine has normal curvature and normal vertebral alignment, without acute fractures, destructive osseous lesions, or evidence of a diffuse marrow replacement process.  Degenerative loss of disc height and signal involves all levels, most pronounced at C5-C6, with vertebral osteophytes and multilevel facet hypertrophy.  The visualized posterior fossa, cervicomedullary junction and cervical cord are normal.     At C2-C3, there is no significant abnormality.     At C3-C4 and C4-C5, there is no significant disc bulging or spinal canal stenosis, with no neural foraminal narrowing.  There is bilateral facet hypertrophy.     At C5-C6, there is broad-based disc bulging and osteophytic ridging, which flattens the ventral thecal sac and produces mild spinal canal stenosis.  There is bilateral facet hypertrophy and moderate bilateral neural foraminal narrowing.     At C6-C7, there is broad-based disc bulging, without spinal canal stenosis.  No significant foraminal  narrowing.     At C7-T1, there is no significant abnormality.     There are no signal abnormalities of the paraspinal ligaments or paraspinal musculature.  No enlarged cervical chain lymph nodes.     Impression:     Multilevel cervical degenerative disc disease and facet osteoarthritis, with mild spinal canal stenosis and moderate bilateral foraminal narrowing at C5-C6.        Electronically signed by:Young Campos  Date:                                            09/20/2024  Time:                                           09:52        Exam Ended: 09/2     Assessment:       No diagnosis found.    Plan:       There are no diagnoses linked to this encounter.     No follow-ups on file.    Patient claims to have a recurrence of left-sided neck pain and dysesthesias in the left arm after a physical therapy session I have advised that she discontinue physical therapy and start a trial of gabapentin once nightly.  I have warned about potential side sedation caused by gabapentin.  She prefers to defer a new MRI.  Return 3-4 weeks if still symptomatic consider updated MRI.      Treatment options were discussed with regards to the nature of the medical condition. Conservative pain intervention and surgical options were discussed in detail. The probability of success of each separate treatment option was discussed. The patient expressed a clear understanding of the treatment options. With regards to surgery, the procedure risk, benefits, complications, and outcomes were discussed. No guarantees were given with regards to surgical outcome.   The risk of complications, morbidity, and mortality of patient management decisions have been made at the time of this visit. These are associated with the patient's problems, diagnostic procedures and treatment options. This includes the possible management options selected and those considered but not selected by the patient after shared medical decision making we discussed with the  patient.     This note was created using Dragon voice recognition software that occasionally misinterpreted phrases or words.

## 2024-11-22 ENCOUNTER — OFFICE VISIT (OUTPATIENT)
Dept: PULMONOLOGY | Facility: CLINIC | Age: 74
End: 2024-11-22
Payer: MEDICARE

## 2024-11-22 VITALS
SYSTOLIC BLOOD PRESSURE: 153 MMHG | HEART RATE: 77 BPM | BODY MASS INDEX: 35.51 KG/M2 | DIASTOLIC BLOOD PRESSURE: 86 MMHG | OXYGEN SATURATION: 97 % | HEIGHT: 58 IN | WEIGHT: 169.19 LBS

## 2024-11-22 DIAGNOSIS — R91.1 LUNG NODULE: Primary | ICD-10-CM

## 2024-11-22 DIAGNOSIS — J98.11 ATELECTASIS OF LEFT LUNG: ICD-10-CM

## 2024-11-22 DIAGNOSIS — Z87.891 PERSONAL HISTORY OF NICOTINE DEPENDENCE: ICD-10-CM

## 2024-11-22 DIAGNOSIS — E66.01 SEVERE OBESITY (BMI 35.0-39.9) WITH COMORBIDITY: ICD-10-CM

## 2024-11-22 DIAGNOSIS — Z85.3 HISTORY OF BREAST CANCER: ICD-10-CM

## 2024-11-22 DIAGNOSIS — I70.0 AORTIC ATHEROSCLEROSIS: ICD-10-CM

## 2024-11-22 DIAGNOSIS — G47.33 OBSTRUCTIVE SLEEP APNEA SYNDROME: ICD-10-CM

## 2024-11-22 DIAGNOSIS — J98.4 RESTRICTIVE LUNG DISEASE: ICD-10-CM

## 2024-11-22 PROCEDURE — 99213 OFFICE O/P EST LOW 20 MIN: CPT | Mod: PBBFAC,PO | Performed by: NURSE PRACTITIONER

## 2024-11-22 PROCEDURE — 99999 PR PBB SHADOW E&M-EST. PATIENT-LVL III: CPT | Mod: PBBFAC,,, | Performed by: NURSE PRACTITIONER

## 2024-11-22 NOTE — PROGRESS NOTES
11/22/2024    Camlia Au  In office visit     Chief Complaint   Patient presents with    Sleep Apnea       HPI:  11/22/2024: Hx: Breast Cancer status post mastectomy and chemo/radiation, Former LIGHT smoker.   Since prior office visit states did not get CPAP machine - does not wish to try at this time, states she is not having any issues.   Underwent PFT revealing no obstruction, very minimal restriction. Denies current shortness of breath, wheezing, chest tightness. Cough is intermittent, mild in nature - Denies mucous production. States overall she is able to tolerate life from a breathing perspective however she does notice SOB with ascending stairs - by the time she gets to the third steps she will get short winded.  Denies current inhaler use.   Denies recent abx or steroid use.       08/27/2024: Hx: Breast Cancer status post mastectomy and chemo/radiation, Former LIGHT smoker.   Since prior office visit underwent at home sleep study through [facility] which revealed an AHI 4% of 3.3 in an AHI 3% of 6.1.  Test revealed desaturation throughout the night to 85% on room air.  A CPAP machine was ordered in February 20, 2024 however patient has not received machine as of yet.  Insurance denial?  Patient still endorses difficulty with sleep at times.  Endorses frequent nocturnal arousals, difficulty staying asleep, daytime fatigue.  Recent development of a cough approximately 1 month ago.  Cough is described as nonproductive and intermittent.  No time correlation identified.  Cough is not associated with wheezing, chest tightness.  Cough is associated with shortness of breath.  Shortness of breath occurs with exertion but also can occur at rest with basic conversation.  Shortness of breath does affect ADLs.  Underwent stress test in January 20, 2024 with results unrevealing.  Has since been evaluated by electrophysiology at [facility] in which no acute intervention was recommended.   Electrophysiology note reviewed from February 20, 2024.  Dr. Paiz's note reviewed from January 20, 2024.  Not currently on medication therapy - states she was prescribed a medication to help lower cholesterol but does not trust the medication so doesn't take.   Denies recent antibiotic or steroid use.  Denies recent ER or urgent care visit for respiratory complaints.  Patient Instructions   Overall your sleep study from January 20, 2024 reveals mild obstructive sleep apnea based on the AHI 3% with desaturation to 85% on room air.  I ordered you a CPAP machine back in February 20, 2024.  Recommend getting set up with CPAP therapy if your insurance approves at this time.  Recommend you use the CPAP machine nightly, remember you must use the machine for at least 4 hours nightly to avoid insurance denial.  If insurance denies CPAP - will attempt to order another home sleep study through Counts include [facility] beds at the [facility].   Your CT chest from January 20, 2024 was reviewed.  Show stability overall of prior noted right lower lobe nodular appearance.  Does show atelectasis to the lower lung bases which is collapsed lung tissue/scarring.  Etiology of shortness of breath/cough is unknown at this time.  You have a family history of asthma.  I ordered a Lung Function Test to evaluate lung strength. Please complete prior to next appointment.   Continue current prescription medication regiment. Keep follow up appointment as scheduled. Please call the office if you have any questions or concerns.         12/27/2023:  In office today alone.  Denies being seen by prior Pulmonologist. Denies history of lung disease. Denies current use of inhaler therapy, supplemental oxygen, CPAP use. Denies personal history of PE, current anticoagulation use.  Diagnosed with breast cancer in 2007, underwent right mastectomy with subsequent chemo and radiation.  Has been in remission ever since.  Denies cough, Mucus production, wheezing, chest tightness.  Recently  developed heart palpitations with associated shortness of breath.  Was seen by Dr. Paiz on 12/04/2023 and which is stress test was ordered and a referral was placed to electrophysiology.  Patient states she had a stress test scheduled however the only time they could do it was at 6:00 a.m..  It appears as if stress test has been canceled, however patient is interested in rescheduling at this time.  Patient endorses daytime fatigue, nocturnal arousals, mild depression.  Denies morning headaches.  [place] Sleepiness Scale from 12/27/2023 REVIEWED: 18  Reviewed Dr. aPiz's note from 12/4.   Patient Instructions   I have ordered an at home sleep study to evaluate for sleep apnea. If test is positive, I will order a CPAP machine. Please notify my clinic when you receive the machine to set up a 31 day compliance appointment. You must use the machine for a least 4 hours every night to avoid insurance denial.   You had a CT scan chest done in October 2020 to which revealed concern for a possible lung nodule versus atelectasis.  Recommend repeat CT at this time for further monitoring of suspicious area.  Will reach out to cardiology in regards to getting your stress test scheduled.  Continue current prescription medication regiment. Keep follow up appointment as scheduled. Please call the office if you have any questions or concerns.         Social Hx: Lives alone - one dog in the home. Works as . No Asbestosis exposure, Smoking Hx: Former smoker - socially in her 20's.  Family Hx: No Lung Cancer, No COPD, Daughter with Asthma  Medical Hx: Previous pneumonia approx 8 years ago; Previous R shoulder surgery in 2022 - R mastectomy in 2007.            The Chief Complaint varies with instability at times.       PFSH:  Past Medical History:   Diagnosis Date    Arthritis     Breast cancer 2007    right    Pain in finger     quentin long finger pain    Wears glasses     WEARS CONTACS    Wears partial dentures     UPPER AND  LOWER         Past Surgical History:   Procedure Laterality Date    BREAST RECONSTRUCTION Left 2007    CARPAL TUNNEL RELEASE       SECTION  1970, 1972, 1976    CHOLECYSTECTOMY  2019        COLONOSCOPY  2017    JOINT REPLACEMENT Right     KNEE    KNEE ARTHROPLASTY Right 06/10/2019    Procedure: ARTHROPLASTY, KNEE;  Surgeon: Jony Marmolejo MD;  Location: [facility] OR;  Service: Orthopedics;  Laterality: Right;    KNEE ARTHROPLASTY Left 2021    Procedure: ARTHROPLASTY, KNEE;  Surgeon: Jony Marmolejo MD;  Location: [facility] OR;  Service: Orthopedics;  Laterality: Left;    KNEE ARTHROSCOPY Bilateral 2008    MASTECTOMY Right 2007    PORTACATH PLACEMENT  2007    PORTACATH REMOVAL      REVERSE TOTAL SHOULDER ARTHROPLASTY Right 04/10/2023    Procedure: ARTHROPLASTY, SHOULDER, TOTAL, REVERSE, RIGHT;  Surgeon: Jony Marmolejo MD;  Location: [facility] OR;  Service: Orthopedics;  Laterality: Right;    SURGICAL REMOVAL OF BONE SPUR Bilateral     TUBAL LIGATION       Social History     Tobacco Use    Smoking status: Former     Current packs/day: 0.00     Average packs/day: 0.3 packs/day for 1 year (0.3 ttl pk-yrs)     Types: Cigarettes     Start date:      Quit date:      Years since quittin.9    Smokeless tobacco: Never    Tobacco comments:     social smoker - on weekends only - quit 20 yrs ago   Substance Use Topics    Alcohol use: Yes     Alcohol/week: 0.0 standard drinks of alcohol     Comment: occ    Drug use: No     Family History   Problem Relation Name Age of Onset    Arthritis Mother      Hyperlipidemia Mother      Stroke Mother      Dementia Mother      Eczema Daughter      Eczema Grandchild      Lupus Neg Hx      Psoriasis Neg Hx      Melanoma Neg Hx       Review of patient's allergies indicates:  No Known Allergies      I have reviewed past medical, family, and social history.     Performance Status:The patient's activity level is functions out of house.      Review of Systems:  a  "review of eleven systems covering constitutional, Eye, HEENT, Psych, Respiratory, Cardiac, GI, , Musculoskeletal, Endocrine, Dermatologic was negative except for pertinent findings as listed ABOVE and below: pertinent positive as above, rest is good        Exam:Comprehensive exam done. BP (!) 153/86 (BP Location: Left arm, Patient Position: Sitting)   Pulse 77   Ht 4' 10" (1.473 m)   Wt 76.8 kg (169 lb 3.3 oz)   SpO2 97% Comment: ON ROOM AIR AT REST  BMI 35.36 kg/m²   Exam included Vitals as listed  Constitutional: She is oriented to person, place, and time. She appears well-developed. No distress.   Nose: Nose normal.   Mouth/Throat: Uvula is midline, oropharynx is clear and moist and mucous membranes are normal. No dental caries. No oropharyngeal exudate, posterior oropharyngeal edema, posterior oropharyngeal erythema or tonsillar abscesses.    Eyes: Pupils are equal, round, and reactive to light.   Neck: No JVD present. No thyromegaly present.   Cardiovascular: Normal rate, regular rhythm and normal heart sounds. Exam reveals no gallop and no friction rub.   Murmur heard.  Pulmonary/Chest: Effort normal and breath sounds normal. No accessory muscle usage or stridor. No apnea and no tachypnea. No respiratory distress, decreased breath sounds, wheezes, rhonchi, rales, or tenderness. Clear breath sounds throughout, on room air, in no acute distress.   Abdominal: Soft. She exhibits no mass. There is no tenderness. No hepatosplenomegaly, hernias and normoactive bowel sounds  Musculoskeletal: Normal range of motion. exhibits no edema.   Neurological:  alert and oriented to person, place, and time. not disoriented.   Skin: Skin is warm and dry. Capillary refill takes less 2 sec. No cyanosis or erythema. No pallor. Nails show no clubbing.   Psychiatric: normal mood and affect. behavior is normal. Judgment and thought content normal.           Radiographs (ct chest and cxr) reviewed: was done by direct " view  Patient imaging studies were reviewed and interpreted independently. My personal interpretation of most recent images include:  CT Chest Without Contrast 12/27/2023 - Small bands of atelectasis throughout - 1 cm nodular appearance to RLL, unchanged.  CT Chest - 10/31/2022 - atelectasis right lower lung      Labs Patient's labs were reviewed including CBC and CMP  Lab Results   Component Value Date    WBC 7.07 12/04/2023    RBC 4.99 12/04/2023    HGB 16.4 (H) 12/04/2023    HCT 48.4 12/04/2023    MCV 97 12/04/2023    MCH 32.9 (H) 12/04/2023    MCHC 33.9 12/04/2023    RDW 13.5 12/04/2023     12/04/2023    MPV 11.3 12/04/2023    GRAN 3.9 12/04/2023    GRAN 55.3 12/04/2023    LYMPH 2.4 12/04/2023    LYMPH 33.8 12/04/2023    MONO 0.6 12/04/2023    MONO 7.9 12/04/2023    EOS 0.1 12/04/2023    BASO 0.08 12/04/2023    EOSINOPHIL 1.6 12/04/2023    BASOPHIL 1.1 12/04/2023   CMP  Sodium   Date Value Ref Range Status   12/04/2023 140 136 - 145 mmol/L Final   03/26/2019 137 134 - 144 mmol/L      Potassium   Date Value Ref Range Status   12/04/2023 3.6 3.5 - 5.1 mmol/L Final     Chloride   Date Value Ref Range Status   12/04/2023 103 95 - 110 mmol/L Final   03/26/2019 101 98 - 110 mmol/L      CO2   Date Value Ref Range Status   12/04/2023 31 (H) 23 - 29 mmol/L Final     Glucose   Date Value Ref Range Status   12/04/2023 88 70 - 110 mg/dL Final   03/26/2019 160 (H) 70 - 99 mg/dL      BUN   Date Value Ref Range Status   12/04/2023 14 8 - 23 mg/dL Final     Creatinine   Date Value Ref Range Status   12/04/2023 0.5 0.5 - 1.4 mg/dL Final   03/26/2019 0.50 (L) 0.60 - 1.40 mg/dL    08/08/2012 0.6 0.2 - 1.4 mg/dl Final     Calcium   Date Value Ref Range Status   12/04/2023 9.5 8.7 - 10.5 mg/dL Final   08/08/2012 9.5 8.6 - 10.2 mg/dl Final     Total Protein   Date Value Ref Range Status   12/04/2023 7.6 6.0 - 8.4 g/dL Final     Albumin   Date Value Ref Range Status   12/04/2023 4.2 3.5 - 5.2 g/dL Final   03/26/2019 4.0 3.1 -  4.7 g/dL      Total Bilirubin   Date Value Ref Range Status   12/04/2023 1.0 0.1 - 1.0 mg/dL Final     Comment:     For infants and newborns, interpretation of results should be based  on gestational age, weight and in agreement with clinical  observations.    Premature Infant recommended reference ranges:  Up to 24 hours.............<8.0 mg/dL  Up to 48 hours............<12.0 mg/dL  3-5 days..................<15.0 mg/dL  6-29 days.................<15.0 mg/dL       Alkaline Phosphatase   Date Value Ref Range Status   12/04/2023 95 55 - 135 U/L Final   08/08/2012 100 23 - 119 UNIT/L Final     AST   Date Value Ref Range Status   12/04/2023 63 (H) 10 - 40 U/L Final   08/08/2012 35 (H) 10 - 30 UNIT/L Final     ALT   Date Value Ref Range Status   12/04/2023 41 10 - 44 U/L Final     Anion Gap   Date Value Ref Range Status   12/04/2023 6 (L) 8 - 16 mmol/L Final   08/08/2012 9 5 - 15 meq/L Final     eGFR   Date Value Ref Range Status   12/04/2023 >60.0 >60 mL/min/1.73 m^2 Final         PFT reviewed   Pulmonary Functions Testing Results:        Plan:  Clinical impression is resonably certain and repeated evaluation prn +/- follow up will be needed as below.    Camila was seen today for sleep apnea.    Diagnoses and all orders for this visit:    Lung nodule    Atelectasis of left lung    Aortic atherosclerosis    History of breast cancer    Obstructive sleep apnea syndrome    Severe obesity (BMI 35.0-39.9) with comorbidity    Restrictive lung disease    Personal history of nicotine dependence    Body mass index is 35.36 kg/m². Morbid obesity complicates all aspects of disease management from diagnostic modalities to treatment. Weight loss encouraged and health benefits explained to patient. Nutritional counseling and physical activity encouraged.       Follow up in about 6 months (around 5/22/2025), or if symptoms worsen or fail to improve.    Discussed with patient above for education the following:      Patient Instructions    Overall there is no identifiable lung tissue disease noted on review of your recent CT chest from December 20, 2023.    There is a 10 mm nodule to the right lower lobe that has been present since at least 2020 and unchanged.  Consider repeat CT chest without contrast to ensure 5 year stability.    Lung function test was overall nonrevealing, shows no obstructive disease.  Did show very minimal restrictive process which could be due to body habitus.  There is still concern that there may be an asthmatic or reactive airway component present, there was no bronchodilator response tested on your PFT.  We will correlate clinically.    SOB with ascending stairs could be secondary to deconditioning vs cardiac disease. You were seen per Cardiology in JAN 2024 - if you wish to have further work up can schedule appt with Dr. Paiz.     Continue current prescription medication regiment. Keep follow up appointment as scheduled. Please call the office if you have any questions or concerns.

## 2024-11-22 NOTE — PATIENT INSTRUCTIONS
Overall there is no identifiable lung tissue disease noted on review of your recent CT chest from December 20, 2023.    There is a 10 mm nodule to the right lower lobe that has been present since at least 2020 and unchanged.  Consider repeat CT chest without contrast to ensure 5 year stability.    Lung function test was overall nonrevealing, shows no obstructive disease.  Did show very minimal restrictive process which could be due to body habitus.  There is still concern that there may be an asthmatic or reactive airway component present, there was no bronchodilator response tested on your PFT.  We will correlate clinically.    SOB with ascending stairs could be secondary to deconditioning vs cardiac disease. You were seen per Cardiology in JAN 2024 - if you wish to have further work up can schedule appt with Dr. Paiz.     Continue current prescription medication regiment. Keep follow up appointment as scheduled. Please call the office if you have any questions or concerns.

## 2025-03-25 ENCOUNTER — OFFICE VISIT (OUTPATIENT)
Dept: PULMONOLOGY | Facility: CLINIC | Age: 75
End: 2025-03-25
Payer: MEDICARE

## 2025-03-25 VITALS
BODY MASS INDEX: 35.12 KG/M2 | DIASTOLIC BLOOD PRESSURE: 78 MMHG | SYSTOLIC BLOOD PRESSURE: 134 MMHG | OXYGEN SATURATION: 95 % | HEART RATE: 84 BPM | WEIGHT: 167.31 LBS | HEIGHT: 58 IN

## 2025-03-25 DIAGNOSIS — J45.909 ASTHMA WITH BRONCHITIS: ICD-10-CM

## 2025-03-25 DIAGNOSIS — E66.01 SEVERE OBESITY (BMI 35.0-39.9) WITH COMORBIDITY: Primary | ICD-10-CM

## 2025-03-25 DIAGNOSIS — R05.1 ACUTE COUGH: ICD-10-CM

## 2025-03-25 PROCEDURE — 99999PBSHW PR PBB SHADOW TECHNICAL ONLY FILED TO HB: Mod: PBBFAC,,,

## 2025-03-25 PROCEDURE — 99214 OFFICE O/P EST MOD 30 MIN: CPT | Mod: S$PBB,,, | Performed by: NURSE PRACTITIONER

## 2025-03-25 PROCEDURE — 99213 OFFICE O/P EST LOW 20 MIN: CPT | Mod: PBBFAC,PO | Performed by: NURSE PRACTITIONER

## 2025-03-25 PROCEDURE — 96372 THER/PROPH/DIAG INJ SC/IM: CPT | Mod: PBBFAC,PO

## 2025-03-25 PROCEDURE — 94640 AIRWAY INHALATION TREATMENT: CPT | Mod: PBBFAC,PO

## 2025-03-25 PROCEDURE — 99999 PR PBB SHADOW E&M-EST. PATIENT-LVL III: CPT | Mod: PBBFAC,,, | Performed by: NURSE PRACTITIONER

## 2025-03-25 RX ORDER — CODEINE PHOSPHATE AND GUAIFENESIN 10; 100 MG/5ML; MG/5ML
5 SOLUTION ORAL EVERY 4 HOURS PRN
Qty: 210 ML | Refills: 0 | Status: SHIPPED | OUTPATIENT
Start: 2025-03-25 | End: 2025-04-01

## 2025-03-25 RX ORDER — PREDNISONE 20 MG/1
20 TABLET ORAL
COMMUNITY
Start: 2025-03-21

## 2025-03-25 RX ORDER — DOXYCYCLINE 100 MG/1
100 CAPSULE ORAL 2 TIMES DAILY
COMMUNITY
Start: 2025-03-21

## 2025-03-25 RX ORDER — CHLOPHEDIANOL HCL AND PYRILAMINE MALEATE 12.5; 12.5 MG/5ML; MG/5ML
SOLUTION ORAL
COMMUNITY
Start: 2025-03-21

## 2025-03-25 RX ORDER — ALBUTEROL SULFATE 0.83 MG/ML
2.5 SOLUTION RESPIRATORY (INHALATION)
Status: COMPLETED | OUTPATIENT
Start: 2025-03-25 | End: 2025-03-25

## 2025-03-25 RX ORDER — BETAMETHASONE SODIUM PHOSPHATE AND BETAMETHASONE ACETATE 3; 3 MG/ML; MG/ML
6 INJECTION, SUSPENSION INTRA-ARTICULAR; INTRALESIONAL; INTRAMUSCULAR; SOFT TISSUE ONCE
Status: COMPLETED | OUTPATIENT
Start: 2025-03-25 | End: 2025-03-25

## 2025-03-25 RX ORDER — PREDNISONE 20 MG/1
TABLET ORAL
Qty: 24 TABLET | Refills: 0 | Status: SHIPPED | OUTPATIENT
Start: 2025-03-25

## 2025-03-25 RX ORDER — ALBUTEROL SULFATE 0.83 MG/ML
2.5 SOLUTION RESPIRATORY (INHALATION) EVERY 6 HOURS PRN
Qty: 120 ML | Refills: 1 | Status: SHIPPED | OUTPATIENT
Start: 2025-03-25 | End: 2026-03-25

## 2025-03-25 RX ORDER — ALBUTEROL SULFATE 90 UG/1
2 INHALANT RESPIRATORY (INHALATION) EVERY 4 HOURS PRN
Qty: 18 G | Refills: 11 | Status: SHIPPED | OUTPATIENT
Start: 2025-03-25

## 2025-03-25 RX ORDER — BUDESONIDE AND FORMOTEROL FUMARATE DIHYDRATE 160; 4.5 UG/1; UG/1
2 AEROSOL RESPIRATORY (INHALATION) EVERY 12 HOURS
Qty: 10.2 G | Refills: 11 | Status: SHIPPED | OUTPATIENT
Start: 2025-03-25 | End: 2026-03-25

## 2025-03-25 RX ADMIN — BETAMETHASONE ACETATE AND BETAMETHASONE SODIUM PHOSPHATE 6 MG: 3; 3 INJECTION, SUSPENSION INTRA-ARTICULAR; INTRALESIONAL; INTRAMUSCULAR; SOFT TISSUE at 10:03

## 2025-03-25 RX ADMIN — ALBUTEROL SULFATE 2.5 MG: 2.5 SOLUTION RESPIRATORY (INHALATION) at 10:03

## 2025-03-25 NOTE — PROGRESS NOTES
3/25/2025    Camila Au  In office visit     Chief Complaint   Patient presents with    Pneumonia       HPI:  3/25/2025- complaint of recurrent cough bronchitis, reports yearly occurrence. States onset 8 weeks following coworker getting sick. Coughing gradually worsening with time. Treated at urgent care with antibiotics, cough syrup, and prednisone 20 mg with minimal benefit.   Denies fever or chills. Has occasional coughing fits at night. Productive small amount yellow/green mucous.     Has tried losing weight on her with limited success. Has cut down on portion size and cut out fried foods. Dx Sleep apnea, did not tolerate CPAP therapy due to mask.     11/22/2024: Hx: Breast Cancer status post mastectomy and chemo/radiation, Former LIGHT smoker.   Since prior office visit states did not get CPAP machine - does not wish to try at this time, states she is not having any issues.   Underwent PFT revealing no obstruction, very minimal restriction. Denies current shortness of breath, wheezing, chest tightness. Cough is intermittent, mild in nature - Denies mucous production. States overall she is able to tolerate life from a breathing perspective however she does notice SOB with ascending stairs - by the time she gets to the third steps she will get short winded.  Denies current inhaler use.   Denies recent abx or steroid use.       08/27/2024: Hx: Breast Cancer status post mastectomy and chemo/radiation, Former LIGHT smoker.   Since prior office visit underwent at home sleep study through [facility] which revealed an AHI 4% of 3.3 in an AHI 3% of 6.1.  Test revealed desaturation throughout the night to 85% on room air.  A CPAP machine was ordered in February 20, 2024 however patient has not received machine as of yet.  Insurance denial?  Patient still endorses difficulty with sleep at times.  Endorses frequent nocturnal arousals, difficulty staying asleep, daytime fatigue.  Recent development of a  cough approximately 1 month ago.  Cough is described as nonproductive and intermittent.  No time correlation identified.  Cough is not associated with wheezing, chest tightness.  Cough is associated with shortness of breath.  Shortness of breath occurs with exertion but also can occur at rest with basic conversation.  Shortness of breath does affect ADLs.  Underwent stress test in January 20, 2024 with results unrevealing.  Has since been evaluated by electrophysiology at [facility] in which no acute intervention was recommended.  Electrophysiology note reviewed from February 20, 2024.  Dr. Paiz's note reviewed from January 20, 2024.  Not currently on medication therapy - states she was prescribed a medication to help lower cholesterol but does not trust the medication so doesn't take.   Denies recent antibiotic or steroid use.  Denies recent ER or urgent care visit for respiratory complaints.  Patient Instructions   Overall your sleep study from January 20, 2024 reveals mild obstructive sleep apnea based on the AHI 3% with desaturation to 85% on room air.  I ordered you a CPAP machine back in February 20, 2024.  Recommend getting set up with CPAP therapy if your insurance approves at this time.  Recommend you use the CPAP machine nightly, remember you must use the machine for at least 4 hours nightly to avoid insurance denial.  If insurance denies CPAP - will attempt to order another home sleep study through [facility].   Your CT chest from January 20, 2024 was reviewed.  Show stability overall of prior noted right lower lobe nodular appearance.  Does show atelectasis to the lower lung bases which is collapsed lung tissue/scarring.  Etiology of shortness of breath/cough is unknown at this time.  You have a family history of asthma.  I ordered a Lung Function Test to evaluate lung strength. Please complete prior to next appointment.   Continue current prescription medication regiment. Keep follow up  appointment as scheduled. Please call the office if you have any questions or concerns.         12/27/2023:  In office today alone.  Denies being seen by prior Pulmonologist. Denies history of lung disease. Denies current use of inhaler therapy, supplemental oxygen, CPAP use. Denies personal history of PE, current anticoagulation use.  Diagnosed with breast cancer in 2007, underwent right mastectomy with subsequent chemo and radiation.  Has been in remission ever since.  Denies cough, Mucus production, wheezing, chest tightness.  Recently developed heart palpitations with associated shortness of breath.  Was seen by Dr. Paiz on 12/04/2023 and which is stress test was ordered and a referral was placed to electrophysiology.  Patient states she had a stress test scheduled however the only time they could do it was at 6:00 a.m..  It appears as if stress test has been canceled, however patient is interested in rescheduling at this time.  Patient endorses daytime fatigue, nocturnal arousals, mild depression.  Denies morning headaches.  Eva Sleepiness Scale from 12/27/2023 REVIEWED: 18  Reviewed Dr. Paiz's note from 12/4.   Patient Instructions   I have ordered an at home sleep study to evaluate for sleep apnea. If test is positive, I will order a CPAP machine. Please notify my clinic when you receive the machine to set up a 31 day compliance appointment. You must use the machine for a least 4 hours every night to avoid insurance denial.   You had a CT scan chest done in October 2020 to which revealed concern for a possible lung nodule versus atelectasis.  Recommend repeat CT at this time for further monitoring of suspicious area.  Will reach out to cardiology in regards to getting your stress test scheduled.  Continue current prescription medication regiment. Keep follow up appointment as scheduled. Please call the office if you have any questions or concerns.         Social Hx: Lives alone - one dog in the home.  Works as . No Asbestosis exposure, Smoking Hx: Former smoker - socially in her 20's.  Family Hx: No Lung Cancer, No COPD, Daughter with Asthma  Medical Hx: Previous pneumonia approx 8 years ago; Previous R shoulder surgery in  - R mastectomy in .            The Chief Complaint varies with instablilty at time, established patient NP Raymond, patient is new to me.         PFSH:  Past Medical History:   Diagnosis Date    Arthritis     Breast cancer     right    Pain in finger     quentin long finger pain    Wears glasses     WEARS CONTACS    Wears partial dentures     UPPER AND LOWER         Past Surgical History:   Procedure Laterality Date    BREAST RECONSTRUCTION Left 2007    CARPAL TUNNEL RELEASE       SECTION  , ,     CHOLECYSTECTOMY  2019        COLONOSCOPY  2017    JOINT REPLACEMENT Right     KNEE    KNEE ARTHROPLASTY Right 06/10/2019    Procedure: ARTHROPLASTY, KNEE;  Surgeon: Jony Marmolejo MD;  Location: [facility] OR;  Service: Orthopedics;  Laterality: Right;    KNEE ARTHROPLASTY Left 2021    Procedure: ARTHROPLASTY, KNEE;  Surgeon: Jony Marmolejo MD;  Location: [facility] OR;  Service: Orthopedics;  Laterality: Left;    KNEE ARTHROSCOPY Bilateral 2008    MASTECTOMY Right 2007    PORTACATH PLACEMENT  2007    PORTACATH REMOVAL      REVERSE TOTAL SHOULDER ARTHROPLASTY Right 04/10/2023    Procedure: ARTHROPLASTY, SHOULDER, TOTAL, REVERSE, RIGHT;  Surgeon: Jony Marmolejo MD;  Location: [facility] OR;  Service: Orthopedics;  Laterality: Right;    SURGICAL REMOVAL OF BONE SPUR Bilateral     TUBAL LIGATION       Social History     Tobacco Use    Smoking status: Former     Current packs/day: 0.00     Average packs/day: 0.3 packs/day for 1 year (0.3 ttl pk-yrs)     Types: Cigarettes     Start date:      Quit date:      Years since quittin.2    Smokeless tobacco: Never    Tobacco comments:     social smoker - on weekends only - quit 20 yrs ago   Substance  "Use Topics    Alcohol use: Yes     Alcohol/week: 0.0 standard drinks of alcohol     Comment: occ    Drug use: No     Family History   Problem Relation Name Age of Onset    Arthritis Mother      Hyperlipidemia Mother      Stroke Mother      Dementia Mother      Eczema Daughter      Eczema Grandchild      Lupus Neg Hx      Psoriasis Neg Hx      Melanoma Neg Hx       Review of patient's allergies indicates:  No Known Allergies      I have reviewed past medical, family, and social history.     Performance Status:The patient's activity level is functions out of house.      Review of Systems:  a review of eleven systems covering constitutional, Eye, HEENT, Psych, Respiratory, Cardiac, GI, , Musculoskeletal, Endocrine, Dermatologic was negative except for pertinent findings as listed ABOVE and below: pertinent positive as above, rest is good        Exam:Comprehensive exam done. /78 (BP Location: Right arm, Patient Position: Sitting)   Pulse 84   Ht 4' 10" (1.473 m)   Wt 75.9 kg (167 lb 5.3 oz)   SpO2 95% Comment: on room air at rest  BMI 34.97 kg/m²   Exam included Vitals as listed  Constitutional: She is oriented to person, place, and time. She appears well-developed. No distress.   Nose: Nose normal.   Mouth/Throat: Uvula is midline, oropharynx is clear and moist and mucous membranes are normal. No dental caries. No oropharyngeal exudate, posterior oropharyngeal edema, posterior oropharyngeal erythema or tonsillar abscesses.    Eyes: Pupils are equal, round, and reactive to light.   Neck: No JVD present. No thyromegaly present.   Cardiovascular: Normal rate, regular rhythm and normal heart sounds. Exam reveals no gallop and no friction rub.   Murmur heard.  Pulmonary/Chest: Effort normal and breath sounds normal. No accessory muscle usage or stridor. No apnea and no tachypnea. No respiratory distress, decreased breath sounds, wheezes, rhonchi, rales, or tenderness. Clear breath sounds throughout, on room " air, in no acute distress.   Abdominal: Soft. She exhibits no mass. There is no tenderness. No hepatosplenomegaly, hernias and normoactive bowel sounds  Musculoskeletal: Normal range of motion. exhibits no edema.   Neurological:  alert and oriented to person, place, and time. not disoriented.   Skin: Skin is warm and dry. Capillary refill takes less 2 sec. No cyanosis or erythema. No pallor. Nails show no clubbing.   Psychiatric: normal mood and affect. behavior is normal. Judgment and thought content normal.           Radiographs (ct chest and cxr) reviewed: was done by direct view  Patient imaging studies were reviewed and interpreted independently. My personal interpretation of most recent images include:  CT Chest Without Contrast 12/27/2023 - Small bands of atelectasis throughout - 1 cm nodular appearance to RLL, unchanged.  CT Chest - 10/31/2022 - atelectasis right lower lung      Labs Patient's labs were reviewed including CBC and CMP  Lab Results   Component Value Date    WBC 7.07 12/04/2023    RBC 4.99 12/04/2023    HGB 16.4 (H) 12/04/2023    HCT 48.4 12/04/2023    MCV 97 12/04/2023    MCH 32.9 (H) 12/04/2023    MCHC 33.9 12/04/2023    RDW 13.5 12/04/2023     12/04/2023    MPV 11.3 12/04/2023    GRAN 3.9 12/04/2023    GRAN 55.3 12/04/2023    LYMPH 2.4 12/04/2023    LYMPH 33.8 12/04/2023    MONO 0.6 12/04/2023    MONO 7.9 12/04/2023    EOS 0.1 12/04/2023    BASO 0.08 12/04/2023    EOSINOPHIL 1.6 12/04/2023    BASOPHIL 1.1 12/04/2023   CMP  Sodium   Date Value Ref Range Status   12/04/2023 140 136 - 145 mmol/L Final   03/26/2019 137 134 - 144 mmol/L      Potassium   Date Value Ref Range Status   12/04/2023 3.6 3.5 - 5.1 mmol/L Final     Chloride   Date Value Ref Range Status   12/04/2023 103 95 - 110 mmol/L Final   03/26/2019 101 98 - 110 mmol/L      CO2   Date Value Ref Range Status   12/04/2023 31 (H) 23 - 29 mmol/L Final     Glucose   Date Value Ref Range Status   12/04/2023 88 70 - 110 mg/dL Final    03/26/2019 160 (H) 70 - 99 mg/dL      BUN   Date Value Ref Range Status   12/04/2023 14 8 - 23 mg/dL Final     Creatinine   Date Value Ref Range Status   12/04/2023 0.5 0.5 - 1.4 mg/dL Final   03/26/2019 0.50 (L) 0.60 - 1.40 mg/dL    08/08/2012 0.6 0.2 - 1.4 mg/dl Final     Calcium   Date Value Ref Range Status   12/04/2023 9.5 8.7 - 10.5 mg/dL Final   08/08/2012 9.5 8.6 - 10.2 mg/dl Final     Total Protein   Date Value Ref Range Status   12/04/2023 7.6 6.0 - 8.4 g/dL Final     Albumin   Date Value Ref Range Status   12/04/2023 4.2 3.5 - 5.2 g/dL Final   03/26/2019 4.0 3.1 - 4.7 g/dL      Total Bilirubin   Date Value Ref Range Status   12/04/2023 1.0 0.1 - 1.0 mg/dL Final     Comment:     For infants and newborns, interpretation of results should be based  on gestational age, weight and in agreement with clinical  observations.    Premature Infant recommended reference ranges:  Up to 24 hours.............<8.0 mg/dL  Up to 48 hours............<12.0 mg/dL  3-5 days..................<15.0 mg/dL  6-29 days.................<15.0 mg/dL       Alkaline Phosphatase   Date Value Ref Range Status   12/04/2023 95 55 - 135 U/L Final   08/08/2012 100 23 - 119 UNIT/L Final     AST   Date Value Ref Range Status   12/04/2023 63 (H) 10 - 40 U/L Final   08/08/2012 35 (H) 10 - 30 UNIT/L Final     ALT   Date Value Ref Range Status   12/04/2023 41 10 - 44 U/L Final     Anion Gap   Date Value Ref Range Status   12/04/2023 6 (L) 8 - 16 mmol/L Final   08/08/2012 9 5 - 15 meq/L Final     eGFR   Date Value Ref Range Status   12/04/2023 >60.0 >60 mL/min/1.73 m^2 Final         PFT reviewed   Pulmonary Functions Testing Results:        Plan:  Clinical impression is resonably certain and repeated evaluation prn +/- follow up will be needed as below.    Camila was seen today for pneumonia.    Diagnoses and all orders for this visit:    Severe obesity (BMI 35.0-39.9) with comorbidity  -     Ambulatory referral/consult to Bariatric/Obesity  Medicine; Future    Acute cough  -     guaiFENesin-codeine 100-10 mg/5 ml (TUSSI-ORGANIDIN NR)  mg/5 mL syrup; Take 5 mLs by mouth every 4 (four) hours as needed for Cough.    Asthma with bronchitis  -     guaiFENesin-codeine 100-10 mg/5 ml (TUSSI-ORGANIDIN NR)  mg/5 mL syrup; Take 5 mLs by mouth every 4 (four) hours as needed for Cough.  -     betamethasone acetate-betamethasone sodium phosphate injection 6 mg  -     predniSONE (DELTASONE) 20 MG tablet; 2 pills for 4 days, then 1 for 4 days; repeat for cough  -     albuterol (VENTOLIN HFA) 90 mcg/actuation inhaler; Inhale 2 puffs into the lungs every 4 (four) hours as needed (cough). Rescue  -     budesonide-formoterol 160-4.5 mcg (SYMBICORT) 160-4.5 mcg/actuation HFAA; Inhale 2 puffs into the lungs every 12 (twelve) hours. Controller  -     NEBULIZER FOR HOME USE  -     albuterol (PROVENTIL) 2.5 mg /3 mL (0.083 %) nebulizer solution; Take 3 mLs (2.5 mg total) by nebulization every 6 (six) hours as needed for Wheezing. Rescue  -     albuterol nebulizer solution 2.5 mg  -     Culture, Respiratory with Gram Stain; Future  -     Culture, Respiratory with Gram Stain    Body mass index is 34.97 kg/m². Morbid obesity complicates all aspects of disease management from diagnostic modalities to treatment. Weight loss encouraged and health benefits explained to patient. Nutritional counseling and physical activity encouraged.       Follow up if symptoms worsen or fail to improve.    Discussed with patient above for education the following:      Patient Instructions   Bring sputum sample to any [facility] lab with in 4 hours of collecting

## 2025-03-26 ENCOUNTER — APPOINTMENT (OUTPATIENT)
Dept: LAB | Facility: HOSPITAL | Age: 75
End: 2025-03-26
Attending: NURSE PRACTITIONER
Payer: MEDICARE

## 2025-03-26 DIAGNOSIS — J45.909 ASTHMATIC BRONCHITIS: Primary | ICD-10-CM

## 2025-03-26 PROCEDURE — 87070 CULTURE OTHR SPECIMN AEROBIC: CPT

## 2025-03-28 LAB
BACTERIA SPEC CULT: NORMAL
GRAM STAIN (RESPIRATORY) (SMH): NORMAL

## 2025-03-31 ENCOUNTER — TELEPHONE (OUTPATIENT)
Dept: BARIATRICS | Facility: CLINIC | Age: 75
End: 2025-03-31
Payer: MEDICARE

## 2025-03-31 ENCOUNTER — RESULTS FOLLOW-UP (OUTPATIENT)
Dept: PULMONOLOGY | Facility: CLINIC | Age: 75
End: 2025-03-31
Payer: MEDICARE

## 2025-04-04 ENCOUNTER — TELEPHONE (OUTPATIENT)
Dept: BARIATRICS | Facility: CLINIC | Age: 75
End: 2025-04-04
Payer: MEDICARE

## 2025-04-04 NOTE — TELEPHONE ENCOUNTER
----- Message from Pako Pillai sent at 4/4/2025  9:28 AM CDT -----  Pt returning you call for an appt  ----- Message -----  From: Trae Cheek  Sent: 4/4/2025   8:39 AM CDT  To: Jay Burton Staff    Type:  Patient Returning CallWho Called:ptWho Left Message for Patient:Fay in weight lossDoes the patient know what this is regarding?:yesWould the patient rather a call back or a response via [facility]? Call [facility] Call Back Number:[phone] Information: Returning a msg to schedule with Fay.   Please call back to advise. Thanks!

## 2025-04-07 ENCOUNTER — TELEPHONE (OUTPATIENT)
Dept: PULMONOLOGY | Facility: CLINIC | Age: 75
End: 2025-04-07
Payer: MEDICARE

## 2025-04-17 ENCOUNTER — OFFICE VISIT (OUTPATIENT)
Dept: PULMONOLOGY | Facility: CLINIC | Age: 75
End: 2025-04-17
Payer: MEDICARE

## 2025-04-17 VITALS
WEIGHT: 163 LBS | OXYGEN SATURATION: 92 % | HEART RATE: 77 BPM | DIASTOLIC BLOOD PRESSURE: 91 MMHG | HEIGHT: 58 IN | SYSTOLIC BLOOD PRESSURE: 144 MMHG | BODY MASS INDEX: 34.22 KG/M2

## 2025-04-17 DIAGNOSIS — Z85.3 HISTORY OF BREAST CANCER: ICD-10-CM

## 2025-04-17 DIAGNOSIS — G47.33 OBSTRUCTIVE SLEEP APNEA SYNDROME: ICD-10-CM

## 2025-04-17 DIAGNOSIS — Z87.891 PERSONAL HISTORY OF NICOTINE DEPENDENCE: ICD-10-CM

## 2025-04-17 DIAGNOSIS — R91.1 LUNG NODULE: ICD-10-CM

## 2025-04-17 DIAGNOSIS — J45.909 ASTHMA WITH BRONCHITIS: Primary | ICD-10-CM

## 2025-04-17 DIAGNOSIS — J98.4 RESTRICTIVE LUNG DISEASE: ICD-10-CM

## 2025-04-17 DIAGNOSIS — R05.1 ACUTE COUGH: ICD-10-CM

## 2025-04-17 DIAGNOSIS — J98.11 ATELECTASIS OF LEFT LUNG: ICD-10-CM

## 2025-04-17 PROCEDURE — 99213 OFFICE O/P EST LOW 20 MIN: CPT | Mod: PBBFAC,PO | Performed by: NURSE PRACTITIONER

## 2025-04-17 PROCEDURE — 99999 PR PBB SHADOW E&M-EST. PATIENT-LVL III: CPT | Mod: PBBFAC,,, | Performed by: NURSE PRACTITIONER

## 2025-04-17 RX ORDER — PROMETHAZINE HYDROCHLORIDE AND DEXTROMETHORPHAN HYDROBROMIDE 6.25; 15 MG/5ML; MG/5ML
5 SYRUP ORAL NIGHTLY PRN
Qty: 240 ML | Refills: 0 | Status: SHIPPED | OUTPATIENT
Start: 2025-04-17 | End: 2025-04-27

## 2025-04-17 RX ORDER — PREDNISONE 20 MG/1
TABLET ORAL
Qty: 12 TABLET | Refills: 0 | Status: SHIPPED | OUTPATIENT
Start: 2025-04-17

## 2025-04-17 RX ORDER — AMOXICILLIN AND CLAVULANATE POTASSIUM 500; 125 MG/1; MG/1
1 TABLET, FILM COATED ORAL 2 TIMES DAILY
Qty: 20 TABLET | Refills: 0 | Status: SHIPPED | OUTPATIENT
Start: 2025-04-17 | End: 2025-04-27

## 2025-04-17 NOTE — PROGRESS NOTES
4/17/2025    Camila Au  In office visit     Chief Complaint   Patient presents with    Shortness of Breath       HPI:  04/17/2025:  Developed acute illness in March - seen per Josephine Thompson in which steroid IM was administered, and Rx for Prednisone, Symbicort, Albuterol, and Codeine cough syrup ordered.  Reports completion of prednisone regimen with reported improvement but she is not back to baseline.  Currently using Symbicort inhaler two puffs twice per day and Albuterol PRN, approx twice per day with reported benefit.  Cough is still persistent - however improved in severity since onset - worse at night time - intermittent mucous production noted, reports mucous is yellow/green in color. States did submit sputum sample however mucous was white in color the morning of test. Cough is associated with wheezing and chest tightness in the evenings. Cough makes it difficult to fall asleep, associated with frequent nocturnal arousals. Currently using Codeine cough syrup nightly PRN with benefit.        3/25/2025 - complaint of recurrent cough bronchitis, reports yearly occurrence. States onset 8 weeks following coworker getting sick. Coughing gradually worsening with time. Treated at urgent care with antibiotics, cough syrup, and prednisone 20 mg with minimal benefit.   Denies fever or chills. Has occasional coughing fits at night. Productive small amount yellow/green mucous.   Has tried losing weight on her with limited success. Has cut down on portion size and cut out fried foods. Dx Sleep apnea, did not tolerate CPAP therapy due to mask.   Patient Instructions   Bring sputum sample to any [facility] lab with in 4 hours of collecting            11/22/2024: Hx: Breast Cancer status post mastectomy and chemo/radiation, Former LIGHT smoker.   Since prior office visit states did not get CPAP machine - does not wish to try at this time, states she is not having any issues.   Underwent PFT revealing no  obstruction, very minimal restriction. Denies current shortness of breath, wheezing, chest tightness. Cough is intermittent, mild in nature - Denies mucous production. States overall she is able to tolerate life from a breathing perspective however she does notice SOB with ascending stairs - by the time she gets to the third steps she will get short winded.  Denies current inhaler use.   Denies recent abx or steroid use.   Patient Instructions   Overall there is no identifiable lung tissue disease noted on review of your recent CT chest from December 20, 2023.   There is a 10 mm nodule to the right lower lobe that has been present since at least 2020 and unchanged.  Consider repeat CT chest without contrast to ensure 5 year stability.   Lung function test was overall nonrevealing, shows no obstructive disease.  Did show very minimal restrictive process which could be due to body habitus.  There is still concern that there may be an asthmatic or reactive airway component present, there was no bronchodilator response tested on your PFT.  We will correlate clinically.   SOB with ascending stairs could be secondary to deconditioning vs cardiac disease. You were seen per Cardiology in JAN 2024 - if you wish to have further work up can schedule appt with Dr. Paiz.    Continue current prescription medication regiment. Keep follow up appointment as scheduled. Please call the office if you have any questions or concerns.          08/27/2024: Hx: Breast Cancer status post mastectomy and chemo/radiation, Former LIGHT smoker.   Since prior office visit underwent at home sleep study through [facility] which revealed an AHI 4% of 3.3 in an AHI 3% of 6.1.  Test revealed desaturation throughout the night to 85% on room air.  A CPAP machine was ordered in February 20, 2024 however patient has not received machine as of yet.  Insurance denial?  Patient still endorses difficulty with sleep at times.  Endorses frequent nocturnal  arousals, difficulty staying asleep, daytime fatigue.  Recent development of a cough approximately 1 month ago.  Cough is described as nonproductive and intermittent.  No time correlation identified.  Cough is not associated with wheezing, chest tightness.  Cough is associated with shortness of breath.  Shortness of breath occurs with exertion but also can occur at rest with basic conversation.  Shortness of breath does affect ADLs.  Underwent stress test in January 20, 2024 with results unrevealing.  Has since been evaluated by electrophysiology at [facility] in which no acute intervention was recommended.  Electrophysiology note reviewed from February 20, 2024.  Dr. Paiz's note reviewed from January 20, 2024.  Not currently on medication therapy - states she was prescribed a medication to help lower cholesterol but does not trust the medication so doesn't take.   Denies recent antibiotic or steroid use.  Denies recent ER or urgent care visit for respiratory complaints.  Patient Instructions   Overall your sleep study from January 20, 2024 reveals mild obstructive sleep apnea based on the AHI 3% with desaturation to 85% on room air.  I ordered you a CPAP machine back in February 20, 2024.  Recommend getting set up with CPAP therapy if your insurance approves at this time.  Recommend you use the CPAP machine nightly, remember you must use the machine for at least 4 hours nightly to avoid insurance denial.  If insurance denies CPAP - will attempt to order another home sleep study through [facility].   Your CT chest from January 20, 2024 was reviewed.  Show stability overall of prior noted right lower lobe nodular appearance.  Does show atelectasis to the lower lung bases which is collapsed lung tissue/scarring.  Etiology of shortness of breath/cough is unknown at this time.  You have a family history of asthma.  I ordered a Lung Function Test to evaluate lung strength. Please complete prior to next  appointment.   Continue current prescription medication regiment. Keep follow up appointment as scheduled. Please call the office if you have any questions or concerns.         12/27/2023:  In office today alone.  Denies being seen by prior Pulmonologist. Denies history of lung disease. Denies current use of inhaler therapy, supplemental oxygen, CPAP use. Denies personal history of PE, current anticoagulation use.  Diagnosed with breast cancer in 2007, underwent right mastectomy with subsequent chemo and radiation.  Has been in remission ever since.  Denies cough, Mucus production, wheezing, chest tightness.  Recently developed heart palpitations with associated shortness of breath.  Was seen by Dr. Paiz on 12/04/2023 and which is stress test was ordered and a referral was placed to electrophysiology.  Patient states she had a stress test scheduled however the only time they could do it was at 6:00 a.m..  It appears as if stress test has been canceled, however patient is interested in rescheduling at this time.  Patient endorses daytime fatigue, nocturnal arousals, mild depression.  Denies morning headaches.  [place] Sleepiness Scale from 12/27/2023 REVIEWED: 18  Reviewed Dr. Paiz's note from 12/4.   Patient Instructions   I have ordered an at home sleep study to evaluate for sleep apnea. If test is positive, I will order a CPAP machine. Please notify my clinic when you receive the machine to set up a 31 day compliance appointment. You must use the machine for a least 4 hours every night to avoid insurance denial.   You had a CT scan chest done in October 2020 to which revealed concern for a possible lung nodule versus atelectasis.  Recommend repeat CT at this time for further monitoring of suspicious area.  Will reach out to cardiology in regards to getting your stress test scheduled.  Continue current prescription medication regiment. Keep follow up appointment as scheduled. Please call the office if you have any  questions or concerns.         Social Hx: Lives alone - one dog in the home. Works as . No Asbestosis exposure, Smoking Hx: Former smoker - socially in her 20's.  Family Hx: No Lung Cancer, No COPD, Daughter with Asthma  Medical Hx: Previous pneumonia approx 8 years ago; Previous R shoulder surgery in  - R mastectomy in .            The Chief Complaint varies with instablilty at time.        PFSH:  Past Medical History:   Diagnosis Date    Arthritis     Breast cancer     right    Pain in finger     quentin long finger pain    Wears glasses     WEARS CONTACS    Wears partial dentures     UPPER AND LOWER         Past Surgical History:   Procedure Laterality Date    BREAST RECONSTRUCTION Left 2007    CARPAL TUNNEL RELEASE       SECTION  1970, ,     CHOLECYSTECTOMY  2019        COLONOSCOPY  2017    JOINT REPLACEMENT Right     KNEE    KNEE ARTHROPLASTY Right 06/10/2019    Procedure: ARTHROPLASTY, KNEE;  Surgeon: Jony Marmolejo MD;  Location: [facility] OR;  Service: Orthopedics;  Laterality: Right;    KNEE ARTHROPLASTY Left 2021    Procedure: ARTHROPLASTY, KNEE;  Surgeon: Jony Marmolejo MD;  Location: [facility] OR;  Service: Orthopedics;  Laterality: Left;    KNEE ARTHROSCOPY Bilateral 2008    MASTECTOMY Right 2007    PORTACATH PLACEMENT  2007    PORTACATH REMOVAL      REVERSE TOTAL SHOULDER ARTHROPLASTY Right 04/10/2023    Procedure: ARTHROPLASTY, SHOULDER, TOTAL, REVERSE, RIGHT;  Surgeon: Jony Marmolejo MD;  Location: [facility] OR;  Service: Orthopedics;  Laterality: Right;    SURGICAL REMOVAL OF BONE SPUR Bilateral     TUBAL LIGATION       Social History     Tobacco Use    Smoking status: Former     Current packs/day: 0.00     Average packs/day: 0.3 packs/day for 1 year (0.3 ttl pk-yrs)     Types: Cigarettes     Start date:      Quit date:      Years since quittin.3    Smokeless tobacco: Never    Tobacco comments:     social smoker - on weekends only -  "quit 20 yrs ago   Substance Use Topics    Alcohol use: Yes     Alcohol/week: 0.0 standard drinks of alcohol     Comment: occ    Drug use: No     Family History   Problem Relation Name Age of Onset    Arthritis Mother      Hyperlipidemia Mother      Stroke Mother      Dementia Mother      Eczema Daughter      Eczema Grandchild      Lupus Neg Hx      Psoriasis Neg Hx      Melanoma Neg Hx       Review of patient's allergies indicates:  No Known Allergies      I have reviewed past medical, family, and social history.     Performance Status:The patient's activity level is functions out of house.      Review of Systems:  a review of eleven systems covering constitutional, Eye, HEENT, Psych, Respiratory, Cardiac, GI, , Musculoskeletal, Endocrine, Dermatologic was negative except for pertinent findings as listed ABOVE and below: pertinent positive as above, rest is good        Exam:Comprehensive exam done. BP (!) 144/91 (BP Location: Right arm, Patient Position: Sitting)   Pulse 77   Ht 4' 10" (1.473 m)   Wt 73.9 kg (163 lb 0.5 oz)   SpO2 (!) 92% Comment: on room air at rest  BMI 34.07 kg/m²   Exam included Vitals as listed  Constitutional: She is oriented to person, place, and time. She appears well-developed. No distress.   Nose: Nose normal.   Mouth/Throat: Uvula is midline, oropharynx is clear and moist and mucous membranes are normal. No dental caries. No oropharyngeal exudate, posterior oropharyngeal edema, posterior oropharyngeal erythema or tonsillar abscesses.    Eyes: Pupils are equal, round, and reactive to light.   Neck: No JVD present. No thyromegaly present.   Cardiovascular: Normal rate, regular rhythm and normal heart sounds. Exam reveals no gallop and no friction rub.   Murmur heard.  Pulmonary/Chest: Effort normal and breath sounds normal. No accessory muscle usage or stridor. No apnea and no tachypnea. No respiratory distress, decreased breath sounds, wheezes, rhonchi, rales, or tenderness. Clear " breath sounds throughout, on room air, in no acute distress.   Abdominal: Soft. She exhibits no mass. There is no tenderness. No hepatosplenomegaly, hernias and normoactive bowel sounds  Musculoskeletal: Normal range of motion. exhibits no edema.   Neurological:  alert and oriented to person, place, and time. not disoriented.   Skin: Skin is warm and dry. Capillary refill takes less 2 sec. No cyanosis or erythema. No pallor. Nails show no clubbing.   Psychiatric: normal mood and affect. behavior is normal. Judgment and thought content normal.           Radiographs (ct chest and cxr) reviewed: was done by direct view  Patient imaging studies were reviewed and interpreted independently. My personal interpretation of most recent images include:  CT Chest Without Contrast 12/27/2023 - Small bands of atelectasis throughout - 1 cm nodular appearance to RLL, unchanged.  CT Chest - 10/31/2022 - atelectasis right lower lung      Labs Patient's labs were reviewed including CBC and CMP  Lab Results   Component Value Date    WBC 7.07 12/04/2023    RBC 4.99 12/04/2023    HGB 16.4 (H) 12/04/2023    HCT 48.4 12/04/2023    MCV 97 12/04/2023    MCH 32.9 (H) 12/04/2023    MCHC 33.9 12/04/2023    RDW 13.5 12/04/2023     12/04/2023    MPV 11.3 12/04/2023    GRAN 3.9 12/04/2023    GRAN 55.3 12/04/2023    LYMPH 2.4 12/04/2023    LYMPH 33.8 12/04/2023    MONO 0.6 12/04/2023    MONO 7.9 12/04/2023    EOS 0.1 12/04/2023    BASO 0.08 12/04/2023    EOSINOPHIL 1.6 12/04/2023    BASOPHIL 1.1 12/04/2023   CMP  Sodium   Date Value Ref Range Status   12/04/2023 140 136 - 145 mmol/L Final   03/26/2019 137 134 - 144 mmol/L      Potassium   Date Value Ref Range Status   12/04/2023 3.6 3.5 - 5.1 mmol/L Final     Chloride   Date Value Ref Range Status   12/04/2023 103 95 - 110 mmol/L Final   03/26/2019 101 98 - 110 mmol/L      CO2   Date Value Ref Range Status   12/04/2023 31 (H) 23 - 29 mmol/L Final     Glucose   Date Value Ref Range Status    12/04/2023 88 70 - 110 mg/dL Final   03/26/2019 160 (H) 70 - 99 mg/dL      BUN   Date Value Ref Range Status   12/04/2023 14 8 - 23 mg/dL Final     Creatinine   Date Value Ref Range Status   12/04/2023 0.5 0.5 - 1.4 mg/dL Final   03/26/2019 0.50 (L) 0.60 - 1.40 mg/dL    08/08/2012 0.6 0.2 - 1.4 mg/dl Final     Calcium   Date Value Ref Range Status   12/04/2023 9.5 8.7 - 10.5 mg/dL Final   08/08/2012 9.5 8.6 - 10.2 mg/dl Final     Total Protein   Date Value Ref Range Status   12/04/2023 7.6 6.0 - 8.4 g/dL Final     Albumin   Date Value Ref Range Status   12/04/2023 4.2 3.5 - 5.2 g/dL Final   03/26/2019 4.0 3.1 - 4.7 g/dL      Total Bilirubin   Date Value Ref Range Status   12/04/2023 1.0 0.1 - 1.0 mg/dL Final     Comment:     For infants and newborns, interpretation of results should be based  on gestational age, weight and in agreement with clinical  observations.    Premature Infant recommended reference ranges:  Up to 24 hours.............<8.0 mg/dL  Up to 48 hours............<12.0 mg/dL  3-5 days..................<15.0 mg/dL  6-29 days.................<15.0 mg/dL       Alkaline Phosphatase   Date Value Ref Range Status   12/04/2023 95 55 - 135 U/L Final   08/08/2012 100 23 - 119 UNIT/L Final     AST   Date Value Ref Range Status   12/04/2023 63 (H) 10 - 40 U/L Final   08/08/2012 35 (H) 10 - 30 UNIT/L Final     ALT   Date Value Ref Range Status   12/04/2023 41 10 - 44 U/L Final     Anion Gap   Date Value Ref Range Status   12/04/2023 6 (L) 8 - 16 mmol/L Final   08/08/2012 9 5 - 15 meq/L Final     eGFR   Date Value Ref Range Status   12/04/2023 >60.0 >60 mL/min/1.73 m^2 Final         PFT reviewed   Pulmonary Functions Testing Results:        Plan:  Clinical impression is resonably certain and repeated evaluation prn +/- follow up will be needed as below.    Camila was seen today for shortness of breath.    Diagnoses and all orders for this visit:    Asthma with bronchitis  -     amoxicillin-clavulanate 500-125mg  (AUGMENTIN) 500-125 mg Tab; Take 1 tablet (500 mg total) by mouth 2 (two) times daily. for 10 days  -     predniSONE (DELTASONE) 20 MG tablet; Take one pill per day for three days as needed for shortness of breath, cough, wheezing. Can repeat as needed.  -     promethazine-dextromethorphan (PROMETHAZINE-DM) 6.25-15 mg/5 mL Syrp; Take 5 mLs by mouth nightly as needed (Cough).    Lung nodule    Atelectasis of left lung    History of breast cancer    Obstructive sleep apnea syndrome    Restrictive lung disease    Personal history of nicotine dependence    Acute cough  -     promethazine-dextromethorphan (PROMETHAZINE-DM) 6.25-15 mg/5 mL Syrp; Take 5 mLs by mouth nightly as needed (Cough).      Body mass index is 34.07 kg/m².     Follow up in about 6 months (around 10/17/2025), or if symptoms worsen or fail to improve.    Discussed with patient above for education the following:      Patient Instructions   Continue Symbicort two puffs twice per day. This inhaler contains an inhaled steroid component. Rinse mouth after each use due to risk for thrush development. If mouth or tongue develops white sores please contact the clinic and I will order a prescription mouth wash.     Continue to use Albuterol as needed for shortness of breath, wheezing, cough.    Augmentin - take as prescribed. Eat prior to use.    Prednisone - keep on hand. ONLY USE AS NEEDED FOR PERSISTENT COUGH, WHEEZING, SHORTNESS OF BREATH. Use sparingly.    Promethazine cough syrup prescribed - to be taken only as needed for cough. This will make you drowsy, do not drive or operative heavy machinery after use. Do not take with other sedating medications. Do not mix with alcohol. Utilize fall precautions with use.     Continue current prescription medication regiment. Keep follow up appointment as scheduled. Please call the office if you have any questions or concerns.

## 2025-04-17 NOTE — PATIENT INSTRUCTIONS
Continue Symbicort two puffs twice per day. This inhaler contains an inhaled steroid component. Rinse mouth after each use due to risk for thrush development. If mouth or tongue develops white sores please contact the clinic and I will order a prescription mouth wash.     Continue to use Albuterol as needed for shortness of breath, wheezing, cough.    Augmentin - take as prescribed. Eat prior to use.    Prednisone - keep on hand. ONLY USE AS NEEDED FOR PERSISTENT COUGH, WHEEZING, SHORTNESS OF BREATH. Use sparingly.    Promethazine cough syrup prescribed - to be taken only as needed for cough. This will make you drowsy, do not drive or operative heavy machinery after use. Do not take with other sedating medications. Do not mix with alcohol. Utilize fall precautions with use.     Continue current prescription medication regiment. Keep follow up appointment as scheduled. Please call the office if you have any questions or concerns.

## 2025-05-01 DIAGNOSIS — Z13.820 ENCOUNTER FOR OSTEOPOROSIS SCREENING IN ASYMPTOMATIC POSTMENOPAUSAL PATIENT: ICD-10-CM

## 2025-05-01 DIAGNOSIS — Z78.0 ENCOUNTER FOR OSTEOPOROSIS SCREENING IN ASYMPTOMATIC POSTMENOPAUSAL PATIENT: ICD-10-CM

## 2025-05-01 DIAGNOSIS — Z78.0 MENOPAUSE: Primary | ICD-10-CM

## 2025-05-02 ENCOUNTER — TELEPHONE (OUTPATIENT)
Facility: CLINIC | Age: 75
End: 2025-05-02
Payer: MEDICARE

## 2025-05-08 ENCOUNTER — OFFICE VISIT (OUTPATIENT)
Facility: CLINIC | Age: 75
End: 2025-05-08
Payer: MEDICARE

## 2025-05-08 ENCOUNTER — HOSPITAL ENCOUNTER (OUTPATIENT)
Dept: RADIOLOGY | Facility: HOSPITAL | Age: 75
Discharge: HOME | End: 2025-05-08
Attending: INTERNAL MEDICINE
Payer: MEDICARE

## 2025-05-08 VITALS
DIASTOLIC BLOOD PRESSURE: 58 MMHG | RESPIRATION RATE: 16 BRPM | SYSTOLIC BLOOD PRESSURE: 129 MMHG | WEIGHT: 167.31 LBS | TEMPERATURE: 98 F | BODY MASS INDEX: 34.97 KG/M2 | HEART RATE: 88 BPM

## 2025-05-08 DIAGNOSIS — C50.111 MALIGNANT NEOPLASM OF CENTRAL PORTION OF RIGHT BREAST IN FEMALE, ESTROGEN RECEPTOR POSITIVE: ICD-10-CM

## 2025-05-08 DIAGNOSIS — Z78.0 ENCOUNTER FOR OSTEOPOROSIS SCREENING IN ASYMPTOMATIC POSTMENOPAUSAL PATIENT: ICD-10-CM

## 2025-05-08 DIAGNOSIS — Z78.0 MENOPAUSE: ICD-10-CM

## 2025-05-08 DIAGNOSIS — Z13.820 ENCOUNTER FOR OSTEOPOROSIS SCREENING IN ASYMPTOMATIC POSTMENOPAUSAL PATIENT: ICD-10-CM

## 2025-05-08 DIAGNOSIS — Z12.31 ENCOUNTER FOR SCREENING MAMMOGRAM FOR MALIGNANT NEOPLASM OF BREAST: ICD-10-CM

## 2025-05-08 DIAGNOSIS — Z17.0 MALIGNANT NEOPLASM OF CENTRAL PORTION OF RIGHT BREAST IN FEMALE, ESTROGEN RECEPTOR POSITIVE: ICD-10-CM

## 2025-05-08 PROCEDURE — 77080 DXA BONE DENSITY AXIAL: CPT | Mod: TC,PO

## 2025-05-08 PROCEDURE — 99213 OFFICE O/P EST LOW 20 MIN: CPT | Mod: PBBFAC,PN | Performed by: INTERNAL MEDICINE

## 2025-05-08 PROCEDURE — 77080 DXA BONE DENSITY AXIAL: CPT | Mod: 26,,, | Performed by: RADIOLOGY

## 2025-05-08 PROCEDURE — 99999 PR PBB SHADOW E&M-EST. PATIENT-LVL III: CPT | Mod: PBBFAC,,, | Performed by: INTERNAL MEDICINE

## 2025-05-08 NOTE — PROGRESS NOTES
PROGRESS NOTE    Subjective:       Patient ID: Camila Au is a 75 y.o. female.    Chief Complaint:  No chief complaint on file.    Right mastectomy 5/24/2007  [id number], 2cm tumor  Lobular carcinoma  Completed AC 10/2007  Completed XRT for close margins  Began AI 9/2007-11/2018      History of Present Illness:   Camila Au is a 75 y.o. female who presents with a history of breast cancer here for yearly  follow up.      No new complaints this visit. She is feeling well.      Mammogram negative May,2021           Current Outpatient Medications:     albuterol (PROVENTIL) 2.5 mg /3 mL (0.083 %) nebulizer solution, Take 3 mLs (2.5 mg total) by nebulization every 6 (six) hours as needed for Wheezing. Rescue, Disp: 120 mL, Rfl: 1    albuterol (VENTOLIN HFA) 90 mcg/actuation inhaler, Inhale 2 puffs into the lungs every 4 (four) hours as needed (cough). Rescue, Disp: 18 g, Rfl: 11    budesonide-formoterol 160-4.5 mcg (SYMBICORT) 160-4.5 mcg/actuation HFAA, Inhale 2 puffs into the lungs every 12 (twelve) hours. Controller, Disp: 10.2 g, Rfl: 11    clobetasoL (TEMOVATE) 0.05 % external solution, Apply to scalp daily for scaling, Disp: 50 mL, Rfl: 11    doxycycline (VIBRAMYCIN) 100 MG Cap, Take 100 mg by mouth 2 (two) times daily., Disp: , Rfl:     minoxidiL (LONITEN) 2.5 MG tablet, 1/4 po qd x 7d then 1/2 po qd, Disp: 45 tablet, Rfl: 3    NINJACOF 12.5-12.5 mg/5 mL Liqd, SMARTSIG:10 Milliliter(s) By Mouth Every 6-8 Hours PRN, Disp: , Rfl:     predniSONE (DELTASONE) 20 MG tablet, Take one pill per day for three days as needed for shortness of breath, cough, wheezing. Can repeat as needed., Disp: 12 tablet, Rfl: 0    gabapentin (NEURONTIN) 100 MG capsule, Take 1 capsule (100 mg total) by mouth every evening. (Patient not taking: Reported on 12/31/2024), Disp: 30 capsule, Rfl: 1  No current facility-administered medications for this  visit.    Facility-Administered Medications Ordered in Other Visits:     triamcinolone acetonide injection 40 mg, 40 mg, INTRABURSAL, , Jadon Flores PA, 40 mg at 09/16/24 0830        Objective:       Physical Examination:     BP (!) 129/58   Pulse 88   Temp 97.9 °F (36.6 °C)   Resp 16   Wt 75.9 kg (167 lb 4.8 oz)   BMI 34.97 kg/m²     Physical Exam  Constitutional:       Appearance: Normal appearance.   HENT:      Head: Normocephalic and atraumatic.   Eyes:      General: No scleral icterus.     Conjunctiva/sclera: Conjunctivae normal.   Cardiovascular:      Rate and Rhythm: Normal rate.   Pulmonary:      Effort: Pulmonary effort is normal.   Chest:       Abdominal:      General: Abdomen is flat.   Neurological:      General: No focal deficit present.      Mental Status: She is alert and oriented to person, place, and time.   Psychiatric:         Mood and Affect: Mood normal.         Behavior: Behavior normal.         Labs:   No results found for this or any previous visit (from the past 2 weeks).  CMP  Sodium   Date Value Ref Range Status   12/04/2023 140 136 - 145 mmol/L Final   03/26/2019 137 134 - 144 mmol/L      Potassium   Date Value Ref Range Status   12/04/2023 3.6 3.5 - 5.1 mmol/L Final     Chloride   Date Value Ref Range Status   12/04/2023 103 95 - 110 mmol/L Final   03/26/2019 101 98 - 110 mmol/L      CO2   Date Value Ref Range Status   12/04/2023 31 (H) 23 - 29 mmol/L Final     Glucose   Date Value Ref Range Status   12/04/2023 88 70 - 110 mg/dL Final   03/26/2019 160 (H) 70 - 99 mg/dL      BUN   Date Value Ref Range Status   12/04/2023 14 8 - 23 mg/dL Final     Creatinine   Date Value Ref Range Status   12/04/2023 0.5 0.5 - 1.4 mg/dL Final   03/26/2019 0.50 (L) 0.60 - 1.40 mg/dL    08/08/2012 0.6 0.2 - 1.4 mg/dl Final     Calcium   Date Value Ref Range Status   12/04/2023 9.5 8.7 - 10.5 mg/dL Final   08/08/2012 9.5 8.6 - 10.2 mg/dl Final     Total Protein   Date Value Ref Range Status  "  12/04/2023 7.6 6.0 - 8.4 g/dL Final     Albumin   Date Value Ref Range Status   12/04/2023 4.2 3.5 - 5.2 g/dL Final   03/26/2019 4.0 3.1 - 4.7 g/dL      Total Bilirubin   Date Value Ref Range Status   12/04/2023 1.0 0.1 - 1.0 mg/dL Final     Comment:     For infants and newborns, interpretation of results should be based  on gestational age, weight and in agreement with clinical  observations.    Premature Infant recommended reference ranges:  Up to 24 hours.............<8.0 mg/dL  Up to 48 hours............<12.0 mg/dL  3-5 days..................<15.0 mg/dL  6-29 days.................<15.0 mg/dL       Alkaline Phosphatase   Date Value Ref Range Status   12/04/2023 95 55 - 135 U/L Final   08/08/2012 100 23 - 119 UNIT/L Final     AST   Date Value Ref Range Status   12/04/2023 63 (H) 10 - 40 U/L Final   08/08/2012 35 (H) 10 - 30 UNIT/L Final     ALT   Date Value Ref Range Status   12/04/2023 41 10 - 44 U/L Final     Anion Gap   Date Value Ref Range Status   12/04/2023 6 (L) 8 - 16 mmol/L Final   08/08/2012 9 5 - 15 meq/L Final     eGFR if    Date Value Ref Range Status   06/13/2022 >60.0 >60 mL/min/1.73 m^2 Final   06/13/2022 >60.0 >60 mL/min/1.73 m^2 Final     eGFR if non    Date Value Ref Range Status   06/13/2022 >60.0 >60 mL/min/1.73 m^2 Final     Comment:     Calculation used to obtain the estimated glomerular filtration  rate (eGFR) is the CKD-EPI equation.      06/13/2022 >60.0 >60 mL/min/1.73 m^2 Final     Comment:     Calculation used to obtain the estimated glomerular filtration  rate (eGFR) is the CKD-EPI equation.        No results found for: "CEA"  No results found for: "PSA"        Assessment/Plan:     Problem List Items Addressed This Visit       Malignant neoplasm of central portion of right breast in female, estrogen receptor positive    Patient is doing well and appears TARAS.  Exam and mammogram are negative.  Will continue to see her yearly with yearly mammograms.  " Discussed this today.          Relevant Orders    Mammo Digital Screening Left with Brian (XPD)     Other Visit Diagnoses         Encounter for screening mammogram for malignant neoplasm of breast        Relevant Orders    Mammo Digital Screening Left with Brian (XPD)              Discussion:     Follow up in about 1 year (around 5/8/2026).      Electronically signed by Jeffrey Ramos

## 2025-05-08 NOTE — ASSESSMENT & PLAN NOTE
Patient is doing well and appears TARAS.  Exam and mammogram are negative.  Will continue to see her yearly with yearly mammograms.  Discussed this today.

## 2025-05-21 ENCOUNTER — TELEPHONE (OUTPATIENT)
Dept: BARIATRICS | Facility: CLINIC | Age: 75
End: 2025-05-21
Payer: MEDICARE

## 2025-05-21 NOTE — TELEPHONE ENCOUNTER
----- Message from Nicolette sent at 5/21/2025  2:19 PM CDT -----  Regarding: reschedule Appt  Contact: pt  Patient would like to Reschedule AppointmentPlease callPhone  [phone] you

## 2025-05-28 DIAGNOSIS — R79.89 ELEVATED LIVER FUNCTION TESTS: Primary | ICD-10-CM

## 2025-05-30 ENCOUNTER — OCCUPATIONAL HEALTH (OUTPATIENT)
Dept: URGENT CARE | Facility: CLINIC | Age: 75
End: 2025-05-30

## 2025-05-30 DIAGNOSIS — Z00.00 ROUTINE GENERAL MEDICAL EXAMINATION AT A HEALTH CARE FACILITY: Primary | ICD-10-CM

## 2025-05-31 ENCOUNTER — HOSPITAL ENCOUNTER (OUTPATIENT)
Dept: RADIOLOGY | Facility: HOSPITAL | Age: 75
Discharge: HOME | End: 2025-05-31
Attending: INTERNAL MEDICINE
Payer: MEDICARE

## 2025-05-31 DIAGNOSIS — R79.89 ELEVATED LIVER FUNCTION TESTS: ICD-10-CM

## 2025-05-31 PROCEDURE — 76705 ECHO EXAM OF ABDOMEN: CPT | Mod: 26,,, | Performed by: RADIOLOGY

## 2025-05-31 PROCEDURE — 76705 ECHO EXAM OF ABDOMEN: CPT | Mod: TC

## 2025-06-09 ENCOUNTER — HOSPITAL ENCOUNTER (OUTPATIENT)
Dept: RADIOLOGY | Facility: HOSPITAL | Age: 75
Discharge: HOME | End: 2025-06-09
Attending: INTERNAL MEDICINE
Payer: MEDICARE

## 2025-06-09 VITALS — BODY MASS INDEX: 35.05 KG/M2 | HEIGHT: 58 IN | WEIGHT: 167 LBS

## 2025-06-09 DIAGNOSIS — Z12.31 ENCOUNTER FOR SCREENING MAMMOGRAM FOR MALIGNANT NEOPLASM OF BREAST: ICD-10-CM

## 2025-06-09 DIAGNOSIS — Z17.0 MALIGNANT NEOPLASM OF CENTRAL PORTION OF RIGHT BREAST IN FEMALE, ESTROGEN RECEPTOR POSITIVE: ICD-10-CM

## 2025-06-09 DIAGNOSIS — C50.111 MALIGNANT NEOPLASM OF CENTRAL PORTION OF RIGHT BREAST IN FEMALE, ESTROGEN RECEPTOR POSITIVE: ICD-10-CM

## 2025-06-09 PROCEDURE — 77063 BREAST TOMOSYNTHESIS BI: CPT | Mod: 26,52,, | Performed by: RADIOLOGY

## 2025-06-09 PROCEDURE — 77067 SCR MAMMO BI INCL CAD: CPT | Mod: TC,52,PO

## 2025-06-09 PROCEDURE — 77067 SCR MAMMO BI INCL CAD: CPT | Mod: 26,52,, | Performed by: RADIOLOGY

## 2025-07-01 ENCOUNTER — LAB VISIT (OUTPATIENT)
Dept: LAB | Facility: HOSPITAL | Age: 75
End: 2025-07-01
Attending: INTERNAL MEDICINE
Payer: MEDICARE

## 2025-07-01 DIAGNOSIS — Z86.0100 HX OF COLONIC POLYPS: ICD-10-CM

## 2025-07-01 DIAGNOSIS — E66.3 SEVERELY OVERWEIGHT: Primary | ICD-10-CM

## 2025-07-01 DIAGNOSIS — K75.81 NONALCOHOLIC STEATOHEPATITIS: ICD-10-CM

## 2025-07-01 LAB
FERRITIN SERPL-MCNC: 98.7 NG/ML (ref 20–300)
IRON SATN MFR SERPL: 53 % (ref 20–50)
IRON SERPL-MCNC: 188 UG/DL (ref 30–160)
TIBC SERPL-MCNC: 353 UG/DL (ref 250–450)
TRANSFERRIN SERPL-MCNC: 252 MG/DL (ref 200–375)

## 2025-07-01 PROCEDURE — 36415 COLL VENOUS BLD VENIPUNCTURE: CPT | Mod: PO

## 2025-07-01 PROCEDURE — 86235 NUCLEAR ANTIGEN ANTIBODY: CPT

## 2025-07-01 PROCEDURE — 80074 ACUTE HEPATITIS PANEL: CPT

## 2025-07-01 PROCEDURE — 82104 ALPHA-1-ANTITRYPSIN PHENO: CPT

## 2025-07-01 PROCEDURE — 86015 ACTIN ANTIBODY EACH: CPT

## 2025-07-01 PROCEDURE — 82728 ASSAY OF FERRITIN: CPT

## 2025-07-01 PROCEDURE — 84466 ASSAY OF TRANSFERRIN: CPT

## 2025-07-01 PROCEDURE — 86038 ANTINUCLEAR ANTIBODIES: CPT

## 2025-07-01 PROCEDURE — 86376 MICROSOMAL ANTIBODY EACH: CPT

## 2025-07-01 PROCEDURE — 86381 MITOCHONDRIAL ANTIBODY EACH: CPT

## 2025-07-01 PROCEDURE — 82105 ALPHA-FETOPROTEIN SERUM: CPT | Mod: GA

## 2025-07-01 PROCEDURE — 82390 ASSAY OF CERULOPLASMIN: CPT

## 2025-07-02 LAB
AFP-TM SERPL-MCNC: 3.2 NG/ML (ref 0–9.2)
ANA SER QL: POSITIVE
CERULOPLASMIN SERPL-MCNC: 27.8 MG/DL (ref 19–39)
DSDNA AB SER-ACNC: 3 IU/ML (ref 0–9)
ENA RNP AB SER-ACNC: 0.3 AI (ref 0–0.9)
ENA SM AB SER-ACNC: <0.2 AI (ref 0–0.9)
ENA SS-A AB SER-ACNC: 1.3 AI (ref 0–0.9)
ENA SS-B AB SER-ACNC: >8 AI (ref 0–0.9)
HAV IGM SERPL QL IA: NEGATIVE
HBV CORE IGM SERPL QL IA: NEGATIVE
HBV SURFACE AG SERPL QL IA: NEGATIVE
HCV AB SERPL QL IA: NON REACTIVE
HCV AB SERPL QL IA: NORMAL
Lab: ABNORMAL

## 2025-07-03 LAB
MITOCHONDRIA M2 IGG SER-ACNC: <20 UNITS (ref 0–20)
SMA IGG SER-ACNC: 24 UNITS (ref 0–19)

## 2025-07-06 LAB
A1AT PHENOTYP SERPL IFE: NORMAL
A1AT SERPL-MCNC: 176 MG/DL (ref 101–187)

## 2025-07-15 ENCOUNTER — OFFICE VISIT (OUTPATIENT)
Dept: BARIATRICS | Facility: CLINIC | Age: 75
End: 2025-07-15
Payer: MEDICARE

## 2025-07-15 VITALS
HEART RATE: 75 BPM | BODY MASS INDEX: 35.49 KG/M2 | DIASTOLIC BLOOD PRESSURE: 73 MMHG | TEMPERATURE: 99 F | HEIGHT: 58 IN | RESPIRATION RATE: 16 BRPM | SYSTOLIC BLOOD PRESSURE: 124 MMHG | WEIGHT: 169.06 LBS

## 2025-07-15 DIAGNOSIS — E66.01 MORBID OBESITY: ICD-10-CM

## 2025-07-15 DIAGNOSIS — K76.0 FATTY LIVER DISEASE, NONALCOHOLIC: ICD-10-CM

## 2025-07-15 DIAGNOSIS — I47.19 ATRIAL TACHYCARDIA: ICD-10-CM

## 2025-07-15 DIAGNOSIS — I70.0 AORTIC ATHEROSCLEROSIS: Primary | ICD-10-CM

## 2025-07-15 PROCEDURE — 99213 OFFICE O/P EST LOW 20 MIN: CPT | Mod: PBBFAC,PN | Performed by: NURSE PRACTITIONER

## 2025-07-15 PROCEDURE — 99999 PR PBB SHADOW E&M-EST. PATIENT-LVL III: CPT | Mod: PBBFAC,,, | Performed by: NURSE PRACTITIONER

## 2025-07-15 RX ORDER — SEMAGLUTIDE 0.25 MG/.5ML
0.25 INJECTION, SOLUTION SUBCUTANEOUS WEEKLY
Qty: 2 ML | Refills: 0 | Status: SHIPPED | OUTPATIENT
Start: 2025-07-15 | End: 2025-08-12

## 2025-07-15 RX ORDER — ALENDRONATE SODIUM 70 MG/1
70 TABLET ORAL
COMMUNITY

## 2025-07-15 RX ORDER — SEMAGLUTIDE 0.5 MG/.5ML
0.5 INJECTION, SOLUTION SUBCUTANEOUS WEEKLY
Qty: 2 ML | Refills: 1 | Status: SHIPPED | OUTPATIENT
Start: 2025-08-13 | End: 2025-09-10

## 2025-07-15 NOTE — PROGRESS NOTES
Initial Consult    Chief Complaint   Patient presents with    Consult    Obesity    Nutrition Counseling    Weight Check       History of Present Illness:  Patient is a 75 y.o. female who is referred for evaluation of surgical or medical treatment of morbid obesity. Her Body mass index is 35.34 kg/m². She has known comorbidities of dyslipidemia, obstructive sleep apnea, osteoarthritis, and Breast Cancer. She has attended the bariatric seminar and is most interested in Medical Weight Loss.      Past attempts at weight loss include:   Unsupervised: none   Supervised:  none  Diet pills: none  Exercise attempts: walk, treadmill, tennis/pickleball    Weight history:   At current weight:  2 years  Obese for 20 years.  More than 35 pounds overweight for 20 years.  More than 100 pounds overweight for n/a years.  Started dieting at 51 years old.  Maximum weight reached: 169 lbs  Most weight lost was 20 lbs through sickness and diet for 2 years.  She describes Her eating habits as skipping and low volume.    TEVIN screening: reported snoring and apnea, no sleep study preformed     Reflux screening: n/a      Past Medical History:   Diagnosis Date    Arthritis     Breast cancer     right    Pain in finger     quentin long finger pain    Wears glasses     WEARS CONTACS    Wears partial dentures     UPPER AND LOWER     Past Surgical History:   Procedure Laterality Date    BREAST RECONSTRUCTION Left     CARPAL TUNNEL RELEASE       SECTION  1970, 1972, 1976    CHOLECYSTECTOMY  2019        COLONOSCOPY  2017    JOINT REPLACEMENT Right     KNEE    KNEE ARTHROPLASTY Right 06/10/2019    Procedure: ARTHROPLASTY, KNEE;  Surgeon: Jony Marmolejo MD;  Location: [facility] OR;  Service: Orthopedics;  Laterality: Right;    KNEE ARTHROPLASTY Left 2021    Procedure: ARTHROPLASTY, KNEE;  Surgeon: Jony Marmolejo MD;  Location: [facility] OR;  Service: Orthopedics;  Laterality: Left;    KNEE ARTHROSCOPY Bilateral      MASTECTOMY Right 2007    PORTACATH PLACEMENT  2007    PORTACATH REMOVAL      REVERSE TOTAL SHOULDER ARTHROPLASTY Right 04/10/2023    Procedure: ARTHROPLASTY, SHOULDER, TOTAL, REVERSE, RIGHT;  Surgeon: Jony Marmolejo MD;  Location: [facility] OR;  Service: Orthopedics;  Laterality: Right;    SURGICAL REMOVAL OF BONE SPUR Bilateral 2002    TUBAL LIGATION  1976     Family History   Problem Relation Name Age of Onset    Arthritis Mother      Hyperlipidemia Mother      Stroke Mother      Dementia Mother      Eczema Daughter      Eczema Grandchild      Lupus Neg Hx      Psoriasis Neg Hx      Melanoma Neg Hx       Social History[1]     Review of patient's allergies indicates:  No Known Allergies    Current Medications[2]      Chart review:  Primary Care Physician: Kasie Arthur      Lab review:  Most Recent Data:  CBC:   Lab Results   Component Value Date    WBC 8 05/23/2025    HGB 16.4 (H) 05/23/2025    HCT 48.1 (H) 05/23/2025     05/23/2025    MCV 97 12/04/2023    RDW 13.4 05/23/2025     BMP:   Lab Results   Component Value Date     12/04/2023    K 3.6 12/04/2023     12/04/2023    CO2 31 (H) 12/04/2023    BUN 14 12/04/2023    CREATININE 0.5 12/04/2023    GLU 88 12/04/2023    CALCIUM 9.5 12/04/2023     LFTs:   Lab Results   Component Value Date    PROT 7.6 12/04/2023    ALBUMIN 4.2 12/04/2023    BILITOT 1.0 12/04/2023    AST 63 (H) 12/04/2023    ALKPHOS 95 12/04/2023    ALT 41 12/04/2023     Coags:   Lab Results   Component Value Date    INR 1.0 08/14/2012     FLP:   Lab Results   Component Value Date    CHOL 221 (H) 05/23/2025    HDL 69 05/23/2025    LDLCALC 134 (H) 05/23/2025    TRIG 100 05/23/2025    CHOLHDL 26.0 06/13/2022     DM:   Lab Results   Component Value Date    HGBA1C 5.6 12/04/2023    LDLCALC 134 (H) 05/23/2025    CREATININE 0.5 12/04/2023     Thyroid:   Lab Results   Component Value Date    TSH 2.94 05/23/2025     Anemia:   Lab Results   Component Value Date    IRON 188 (H) 07/01/2025    TIBC  353 07/01/2025    FERRITIN 98.7 07/01/2025     Cardiac:   Lab Results   Component Value Date    TROPONINI 0.011 08/14/2012    BNP 23 12/22/2022     Urinalysis:   Lab Results   Component Value Date    LABURIN NO SIGNIFICANT GROWTH 08/14/2012    COLORU Yellow 05/30/2019    CLARITYU CLEAR 08/08/2012    SPECGRAV 1.015 05/30/2019    NITRITE Negative 05/30/2019    GLUCOSEU NEG 08/08/2012    KETONESU Negative 05/30/2019    UROBILINOGEN Negative 05/30/2019    BLOODU NEG 08/08/2012    WBCUA 0-1 08/14/2012         Radiology review:    No valid procedures specified.      Other Results:  EKG (my interpretation): normal sinus rhythm.    Most Recent EKG Results  Results for orders placed or performed in visit on 12/04/23   IN OFFICE EKG 12-LEAD (to [place])    Collection Time: 12/04/23  9:48 AM    Narrative    Test Reason : R00.2,    Vent. Rate : 079 BPM     Atrial Rate : 079 BPM     P-R Int : 194 ms          QRS Dur : 088 ms      QT Int : 400 ms       P-R-T Axes : 057 -12 025 degrees     QTc Int : 458 ms    Normal sinus rhythm  Possible Anterior infarct (cited on or before 03-APR-2023)  Abnormal ECG  When compared with ECG of 03-APR-2023 10:47,  No significant change was found  Confirmed by Shagufta CAMARGO, Jeronimo JIMENEZ ([id number]) on 12/17/2023 1:34:57 PM    Referred By: IGNACIO MENESES           Confirmed By:Jeronimo Eagle MD       Most Recent Echocardiogram Results  Results for orders placed during the hospital encounter of 10/21/22    Echo Saline Bubble? No    Interpretation Summary  · The left ventricle is normal in size with concentric remodeling and normal systolic function.  · The estimated ejection fraction is 57%.  · Normal left ventricular diastolic function.  · Atrial fibrillation not observed.  · Normal right ventricular size with normal right ventricular systolic function.  · Mild tricuspid regurgitation.  · Normal central venous pressure (3 mmHg).      Most Recent Nuclear Stress Test Results  Results for orders placed during the  hospital encounter of 01/05/24    Nuclear Stress - Cardiology Interpreted    Interpretation Summary    Normal myocardial perfusion scan. There is no evidence of myocardial ischemia or infarction.    The gated perfusion images showed an ejection fraction of 70% at rest. The gated perfusion images showed an ejection fraction of 73% post stress. Normal ejection fraction is greater than 53%.    The ECG portion of the study is negative for ischemia.    The patient reported no chest pain during the stress test.    There were no arrhythmias during stress.    The patient exercised for 4 minutes 5 seconds on a Pedrito protocol, corresponding to a functional capacity of 6 METS, achieving a peak heart rate of 142 bpm, which is 100 % of the age predicted maximum heart rate.      Review of Systems:  Review of Systems   Constitutional:  Positive for diaphoresis.   Respiratory:  Positive for cough and shortness of breath.    Gastrointestinal:  Positive for abdominal pain and diarrhea.   Psychiatric/Behavioral:  Positive for decreased concentration and sleep disturbance.    All other systems reviewed and are negative.        Diet Education Discussed: Recommend high protein, low carb meals- mainly meats and vegetables.    Wake 630-7a  Breakfast 8-830:  skipping, coffee+italian creamer  Snack 11am: fruit- strawberry, blueberry, watermelon, grapes, apples  Lunch 2pm: cheese and fruit, occasionally salad or eat dinner at 2pm  Dinner 8p:  popcorn  Bed 830  Meat 3x/wk  Water: minimal, possibly 8oz  1 can Coke daily  Different schedule once school starts as , up at 430    MVI:   none    Exercise: Recommend cardiovascular exercise, get HR over 100 for 20 minutes 3 times per week  Walk started 2 weeks ago, not done in few years  Started with mile, daily for past 2 weeks- typically 7-745p, then take shower      Physical:     Vital Signs (Most Recent)  Temp: 99.3 °F (37.4 °C) (07/15/25 0914)  Pulse: 75 (07/15/25 0914)  Resp: 16  "(07/15/25 0914)  BP: 124/73 (07/15/25 0914)  4' 10" (1.473 m)  76.7 kg (169 lb 1.5 oz)     Body comp:  Fat Percent:  39.1 %  Fat Mass:  65 lb  FFM:  101.4 lb  TBW: 70.4 lb  TBW %:  42.3 %  BMR: 1384 kcal    Wt Readings from Last 5 Encounters:   07/15/25 76.7 kg (169 lb 1.5 oz)   06/09/25 75.8 kg (167 lb)   05/08/25 75.9 kg (167 lb 4.8 oz)   04/17/25 73.9 kg (163 lb 0.5 oz)   03/25/25 75.9 kg (167 lb 5.3 oz)         Physical Exam:  Physical Exam  Vitals and nursing note reviewed.   Constitutional:       General: She is not in acute distress.     Appearance: Normal appearance. She is obese. She is not ill-appearing or toxic-appearing.   HENT:      Head: Normocephalic and atraumatic.      Right Ear: External ear normal.      Left Ear: External ear normal.      Nose: Nose normal. No congestion or rhinorrhea.      Mouth/Throat:      Mouth: Mucous membranes are moist.      Pharynx: Oropharynx is clear.   Eyes:      General:         Right eye: No discharge.         Left eye: No discharge.      Conjunctiva/sclera: Conjunctivae normal.   Cardiovascular:      Rate and Rhythm: Normal rate.      Pulses: Normal pulses.   Pulmonary:      Effort: Pulmonary effort is normal. No respiratory distress.   Musculoskeletal:         General: No swelling, tenderness, deformity or signs of injury. Normal range of motion.      Right lower leg: No edema.      Left lower leg: No edema.   Skin:     General: Skin is warm and dry.      Capillary Refill: Capillary refill takes less than 2 seconds.      Coloration: Skin is not jaundiced or pale.      Findings: No bruising, erythema or rash.   Neurological:      General: No focal deficit present.      Mental Status: She is alert and oriented to person, place, and time.      Motor: No weakness.      Gait: Gait normal.   Psychiatric:         Mood and Affect: Mood normal.         Behavior: Behavior normal.         Thought Content: Thought content normal.         Judgment: Judgment normal. "       ASSESSMENT/PLAN:        1. Aortic atherosclerosis  semaglutide, weight loss, (WEGOVY) 0.25 mg/0.5 mL PnIj    semaglutide, weight loss, (WEGOVY) 0.5 mg/0.5 mL PnIj      2. Atrial tachycardia  semaglutide, weight loss, (WEGOVY) 0.25 mg/0.5 mL PnIj    semaglutide, weight loss, (WEGOVY) 0.5 mg/0.5 mL PnIj      3. Morbid obesity  semaglutide, weight loss, (WEGOVY) 0.25 mg/0.5 mL PnIj    semaglutide, weight loss, (WEGOVY) 0.5 mg/0.5 mL PnIj      4. Fatty liver disease, nonalcoholic            Plan:  Camila Au has morbid obesity as their Body mass index is 35.34 kg/m². She would benefit from weight loss and has chosen Medical Weight Loss as the preferred method. She understands that this is a tool and lifestyle change will be necessary to keep weight off.     Behavorial/Counseling Plan for patient:  Falls reviewed with patient  Continue prescribed home medications  Tanita Body Composition Scale results reviewed with patient  Discussed muscle vs fat loss  Do not exceed 1300 calories/day  I recommend the following therapeutic lifestyle changes:  Diet:   Transition to a low salt, primarily plant-based diet which emphasizes:  Increase fiber with vegetables, whole grains, legumes   Increase lean protein by eating beans, legumes, fish, poultry, eggs, bison  Good dairy choices for lean protein include greek yogurt (low sugar options) and cottage cheese  Replacing butter with healthy fats, such as olive oil and nuts  Using herbs and spices instead of salt to flavor foods   Limiting red meat to no more than a few times a month   Limit added sugars (sodas, fruit juices, fancy coffee drinks)  Limit processed foods   Do not skip meals  Ensure 60 gm/protein per day at minimum  Focus on small, frequesnt meals with eating high protein, low carb  Clear, protein powder added to water, typically 20 gm protein- brands like Isopure Fusion, Oath, Seeq  Unflavored protein, Isopure, can add 25g to foods   2. Exercise:    Initiation of a graded aerobic exercise plan (goal 30-45 minutes per day 4-5 times per week)  Patient encouraged to participate in low intensity strength exercising and if unfamiliar with strength and conditioning training, I recommend joining a gym with access to a  to further instruct the patient. Increase activity  Remain consistent with exercise, including cardio and weights  3. Vitamins:   Start MVI      Medical Weight Loss  PO vs Injectables  PO  Topamax (Topiramate)-   Not drug of choice in older adults d/t mental impairment and balance/gait disturbances  IR vs ER Amphetamines- Phentermine, Phendimetrazine, Diethylpropion   Not recommended with Hx of atrial tachycardia  Contrave (Bupropion/Naltrexone)   HX of NAFLD with transaminates on 5/23/25  Metformin (Glucophage)  Hx of diarrhea post cholecystomy- cautious with starting new medication with common side effect of diarrhea with patient being  and no access to bathroom  Injectables  Discussed Wegovy vs Zepbound  Discussed muscle vs fat loss on GLP1/GIP  Will attempt Medical Weight loss with Wegovy with ASCVD risk with Aortic Atherosclerosis, Atrial Tachycardia, Fatty Liver Disease with Obesity  ASCVD Risk Calculator-  2x higher than optimal risk  Elevated intensity risk-20 % ASCVD Risk 10-year Risk, optimal risk 10.8 %  Weight loss medications selection: Semaglutide (Wegovy) for ASCVD risk with Aortic Atherosclerosis, Atrial Tachycardia, Fatty Liver Disease with Obesity  Aware that will need prior authorization for medication, which can take some time  Aware medication not formulary at primary pharmacy and might have to change pharmacy if they do not carry it   Denies history or family history of Medullary Thyroid Cancer or Multiple endocrine neoplasia syndrome  Denies history of pancreatitis  Will start dosage at 0.25 mg subcutaneous for 4 weeks (weekly injections) Can continue to increase every 4 weeks with max dose of 2.4  mg  Nutritional Packet and education provided    Consultation visit, 7/15/2025-   Medication chosen: Wegovy  Goal 130 lbs, 31 yrs old was the last time at 130s            [1]   Social History  Tobacco Use    Smoking status: Former     Current packs/day: 0.00     Average packs/day: 0.3 packs/day for 1 year (0.3 ttl pk-yrs)     Types: Cigarettes     Start date:      Quit date:      Years since quittin.5    Smokeless tobacco: Never    Tobacco comments:     social smoker - on weekends only - quit 20 yrs ago   Substance Use Topics    Alcohol use: Yes     Alcohol/week: 0.0 standard drinks of alcohol     Comment: occ    Drug use: No   [2]   Current Outpatient Medications   Medication Sig Dispense Refill    albuterol (PROVENTIL) 2.5 mg /3 mL (0.083 %) nebulizer solution Take 3 mLs (2.5 mg total) by nebulization every 6 (six) hours as needed for Wheezing. Rescue 120 mL 1    albuterol (VENTOLIN HFA) 90 mcg/actuation inhaler Inhale 2 puffs into the lungs every 4 (four) hours as needed (cough). Rescue 18 g 11    alendronate (FOSAMAX) 70 MG tablet Take 70 mg by mouth every 7 days.      budesonide-formoterol 160-4.5 mcg (SYMBICORT) 160-4.5 mcg/actuation HFAA Inhale 2 puffs into the lungs every 12 (twelve) hours. Controller (Patient not taking: Reported on 7/15/2025) 10.2 g 11    gabapentin (NEURONTIN) 100 MG capsule Take 1 capsule (100 mg total) by mouth every evening. (Patient not taking: Reported on 7/15/2025) 30 capsule 1    predniSONE (DELTASONE) 20 MG tablet Take one pill per day for three days as needed for shortness of breath, cough, wheezing. Can repeat as needed. (Patient not taking: Reported on 7/15/2025) 12 tablet 0    semaglutide, weight loss, (WEGOVY) 0.25 mg/0.5 mL PnIj Inject 0.25 mg into the skin once a week. 2 mL 0    [START ON 2025] semaglutide, weight loss, (WEGOVY) 0.5 mg/0.5 mL PnIj Inject 0.5 mg into the skin once a week. 2 mL 1     No current facility-administered medications for this  visit.     Facility-Administered Medications Ordered in Other Visits   Medication Dose Route Frequency Provider Last Rate Last Admin    triamcinolone acetonide injection 40 mg  40 mg INTRABURSAL  Jadon Flores PA   40 mg at 09/16/24 0830

## 2025-07-18 ENCOUNTER — TELEPHONE (OUTPATIENT)
Dept: BARIATRICS | Facility: CLINIC | Age: 75
End: 2025-07-18
Payer: MEDICARE

## 2025-07-18 ENCOUNTER — PATIENT MESSAGE (OUTPATIENT)
Dept: BARIATRICS | Facility: CLINIC | Age: 75
End: 2025-07-18
Payer: MEDICARE

## 2025-07-18 NOTE — TELEPHONE ENCOUNTER
Called patient and advised that the insurance cards that we currently have on file shows that the prescription drug coverage is inactive. Advised patient to upload recent pictures of front and back of all medical and drug coverage insurance cards to the portal. Patient verbalized understanding.

## 2025-07-18 NOTE — TELEPHONE ENCOUNTER
Pt  stated she woke up nauseous and  would  like  some  nausea pills         ----- Message from Hazel Sanchez sent at 4/12/2023 10:18 AM CDT -----  Phone #: [phone]  Patient is requesting a refill of                 Pharmacy:   [facility] #[id number] [address] & [address] [address]  Phone: [phone] Fax: [phone]          Pt  stated she woke up nauseous and  would  like  some  nausea pills     normal...

## 2025-07-22 ENCOUNTER — HOSPITAL ENCOUNTER (OUTPATIENT)
Dept: RADIOLOGY | Facility: HOSPITAL | Age: 75
Discharge: HOME | End: 2025-07-22
Attending: ORTHOPAEDIC SURGERY
Payer: MEDICARE

## 2025-07-22 ENCOUNTER — OFFICE VISIT (OUTPATIENT)
Dept: ORTHOPEDICS | Facility: CLINIC | Age: 75
End: 2025-07-22
Payer: MEDICARE

## 2025-07-22 VITALS — BODY MASS INDEX: 35.49 KG/M2 | WEIGHT: 169.06 LBS | HEIGHT: 58 IN

## 2025-07-22 DIAGNOSIS — Z96.651 HISTORY OF TOTAL KNEE ARTHROPLASTY, RIGHT: Primary | ICD-10-CM

## 2025-07-22 DIAGNOSIS — M19.012 OSTEOARTHRITIS OF GLENOHUMERAL JOINT, LEFT: ICD-10-CM

## 2025-07-22 PROCEDURE — 99213 OFFICE O/P EST LOW 20 MIN: CPT | Mod: S$PBB,25,, | Performed by: ORTHOPAEDIC SURGERY

## 2025-07-22 PROCEDURE — 99999PBSHW PR PBB SHADOW TECHNICAL ONLY FILED TO HB: Mod: PBBFAC,,,

## 2025-07-22 PROCEDURE — 99213 OFFICE O/P EST LOW 20 MIN: CPT | Mod: PBBFAC,25,PN | Performed by: ORTHOPAEDIC SURGERY

## 2025-07-22 PROCEDURE — 20610 DRAIN/INJ JOINT/BURSA W/O US: CPT | Mod: PBBFAC,PN,LT | Performed by: ORTHOPAEDIC SURGERY

## 2025-07-22 PROCEDURE — 99999 PR PBB SHADOW E&M-EST. PATIENT-LVL III: CPT | Mod: PBBFAC,,, | Performed by: ORTHOPAEDIC SURGERY

## 2025-07-22 PROCEDURE — 73562 X-RAY EXAM OF KNEE 3: CPT | Mod: 26,RT,, | Performed by: RADIOLOGY

## 2025-07-22 PROCEDURE — 73562 X-RAY EXAM OF KNEE 3: CPT | Mod: TC,PN,RT

## 2025-07-22 PROCEDURE — 73030 X-RAY EXAM OF SHOULDER: CPT | Mod: TC,PN,LT

## 2025-07-22 PROCEDURE — 73030 X-RAY EXAM OF SHOULDER: CPT | Mod: 26,LT,, | Performed by: RADIOLOGY

## 2025-07-22 RX ORDER — TRIAMCINOLONE ACETONIDE 40 MG/ML
40 INJECTION, SUSPENSION INTRA-ARTICULAR; INTRAMUSCULAR
Status: DISCONTINUED | OUTPATIENT
Start: 2025-07-22 | End: 2025-07-22 | Stop reason: HOSPADM

## 2025-07-22 RX ADMIN — TRIAMCINOLONE ACETONIDE 40 MG: 40 INJECTION, SUSPENSION INTRA-ARTICULAR; INTRAMUSCULAR at 11:07

## 2025-07-22 NOTE — PROCEDURES
Large Joint Aspiration/Injection: L glenohumeral    Date/Time: 7/22/2025 11:30 AM    Performed by: Jony Marmolejo MD  Authorized by: Jony Marmolejo MD    Consent Done?:  Yes (Verbal)  Indications:  Pain  Site marked: the procedure site was marked    Timeout: prior to procedure the correct patient, procedure, and site was verified    Prep: patient was prepped and draped in usual sterile fashion      Local anesthesia used?: Yes    Local anesthetic:  Lidocaine 1% without epinephrine    Details:  Needle Size:  25 G  Ultrasonic Guidance for needle placement?: No    Location:  Shoulder  Site:  L glenohumeral  Medications:  40 mg triamcinolone acetonide 40 mg/mL  Patient tolerance:  Patient tolerated the procedure well with no immediate complications

## 2025-07-22 NOTE — PROGRESS NOTES
"[facility]    Subjective:     Chief Complaint:   Chief Complaint   Patient presents with    Right Knee - Pain     Right knee pain, states she has feeling of " something in her knee" and just feels different. Has an aching pain while bending. History of right TKA 6.10.2019    Left Shoulder - Pain     Left shoulder pain, states pain has gotten worse since last visit. Painful and limited ROM as well as popping and snapping along with stiffness that makes it difficult to move at times        Past Medical History:   Diagnosis Date    Arthritis     Breast cancer     right    Pain in finger     quentin long finger pain    Wears glasses     WEARS CONTACS    Wears partial dentures     UPPER AND LOWER       Past Surgical History:   Procedure Laterality Date    BREAST RECONSTRUCTION Left 2007    CARPAL TUNNEL RELEASE       SECTION  1970, ,     CHOLECYSTECTOMY  2019        COLONOSCOPY  2017    JOINT REPLACEMENT Right     KNEE    KNEE ARTHROPLASTY Right 06/10/2019    Procedure: ARTHROPLASTY, KNEE;  Surgeon: Jony Marmolejo MD;  Location: [facility] OR;  Service: Orthopedics;  Laterality: Right;    KNEE ARTHROPLASTY Left 2021    Procedure: ARTHROPLASTY, KNEE;  Surgeon: Jony Marmolejo MD;  Location: [facility] OR;  Service: Orthopedics;  Laterality: Left;    KNEE ARTHROSCOPY Bilateral 2008    MASTECTOMY Right 2007    PORTACATH PLACEMENT  2007    PORTACATH REMOVAL      REVERSE TOTAL SHOULDER ARTHROPLASTY Right 04/10/2023    Procedure: ARTHROPLASTY, SHOULDER, TOTAL, REVERSE, RIGHT;  Surgeon: Jony Marmolejo MD;  Location: [facility] OR;  Service: Orthopedics;  Laterality: Right;    SURGICAL REMOVAL OF BONE SPUR Bilateral     TUBAL LIGATION         Current Outpatient Medications   Medication Sig    albuterol (PROVENTIL) 2.5 mg /3 mL (0.083 %) nebulizer solution Take 3 mLs (2.5 mg total) by nebulization every 6 (six) hours as needed for Wheezing. Rescue    albuterol (VENTOLIN HFA) 90 " mcg/actuation inhaler Inhale 2 puffs into the lungs every 4 (four) hours as needed (cough). Rescue    alendronate (FOSAMAX) 70 MG tablet Take 70 mg by mouth every 7 days.    budesonide-formoterol 160-4.5 mcg (SYMBICORT) 160-4.5 mcg/actuation HFAA Inhale 2 puffs into the lungs every 12 (twelve) hours. Controller (Patient not taking: Reported on 7/22/2025)    gabapentin (NEURONTIN) 100 MG capsule Take 1 capsule (100 mg total) by mouth every evening. (Patient not taking: Reported on 12/31/2024)    predniSONE (DELTASONE) 20 MG tablet Take one pill per day for three days as needed for shortness of breath, cough, wheezing. Can repeat as needed. (Patient not taking: Reported on 7/22/2025)    semaglutide, weight loss, (WEGOVY) 0.25 mg/0.5 mL PnIj Inject 0.25 mg into the skin once a week.    [START ON 8/13/2025] semaglutide, weight loss, (WEGOVY) 0.5 mg/0.5 mL PnIj Inject 0.5 mg into the skin once a week.     No current facility-administered medications for this visit.     Facility-Administered Medications Ordered in Other Visits   Medication    triamcinolone acetonide injection 40 mg       Review of patient's allergies indicates:  No Known Allergies    Family History   Problem Relation Name Age of Onset    Arthritis Mother      Hyperlipidemia Mother      Stroke Mother      Dementia Mother      Eczema Daughter      Eczema Grandchild      Lupus Neg Hx      Psoriasis Neg Hx      Melanoma Neg Hx         Social History[1]    History of present illness: Ms. Jensen comes in today for follow-up for her left shoulder and right knee.  She has a history of right total knee arthroplasty right at 6 years ago.  It has generally done well for her.  She does not complain of pain more of discomfort.  For her left shoulder, she has previous history, many years ago of a left rotator cuff repair.  She has known arthrosis in his shoulder.  She was last injected in this shoulder just short of 1 year ago.  She denies any new injury or trauma.  She  does have a previous history of a right reverse total shoulder arthroplasty.  She has had her bilateral knees replaced as well.    Review of Systems:    Constitution: Negative for chills, fever, and sweats.  Negative for unexplained weight loss.    HENT:  Negative for headaches and blurry vision.    Cardiovascular:Negative for chest pain or irregular heart beat. Negative for hypertension.    Respiratory:  Negative for cough and shortness of breath.    Gastrointestinal: Negative for abdominal pain, heartburn, melena, nausea, and vomitting.    Genitourinary:  Negative bladder incontinence and dysuria.    Musculoskeletal:  See HPI for details.     Neurological: Negative for numbness.    Psychiatric/Behavioral: Negative for depression.  The patient is not nervous/anxious.      Endocrine: Negative for polyuria    Hematologic/Lymphatic: Negative for bleeding problem.  Does not bruise/bleed easily.    Skin: Negative for poor would healing and rash    Objective:      Physical Examination:    Vital Signs:  There were no vitals filed for this visit.    Body mass index is 35.34 kg/m².    This a well-developed, well nourished patient in no acute distress.  They are alert and oriented and cooperative to examination.         Right knee exam: Skin to right knee clean dry and intact.  No erythema or ecchymosis.  No signs or symptoms of infection.  Neurovascularly intact throughout the right lower extremity.  Right knee anterior midline incision well healed without wound dehiscence or drainage.  Negative Homans on the right.  She can actively extend to 0 and easily flex to 120.  The knee is stable to varus and valgus stresses.  There is no effusion.      Left shoulder exam:  No real change in her left shoulder exam from her last visit with us for this about a year ago. Skin is dry and intact, no erythema or ecchymosis, no signs symptoms of infection. Patient limits active range of motion secondary to pain. Passive range of motion I  "can forward flex her to about 160°. I can externally rotate her to 80°. Internally rotate her to the posterior left hip.     Pertinent New Results:    XRAY Report / Interpretation:     Two views taken of the left shoulder today: AP and Y-view.  No acute fractures or dislocations seen.  She has retained metallic anchor in the left humeral head consistent with a previous rotator cuff tendon repair.  She is bone on bone deformity and glenohumeral joint and high-riding of the humeral head.      Three views taken of the right knee today:  AP, lateral and sunrise views.  No acute fractures or dislocations seen.  She has an anatomically placed right total knee arthroplasty system in place without evidence of hardware failure or subsidence.    Assessment/Plan:        1. Left shoulder rotator cuff arthropathy.  2. Right knee history of total knee arthroplasty.      Reassurance was provided to her in regards to the right knee today.  It looks well on radiographs in his stable on physical exam.  For the left shoulder, she ultimately we will need a total shoulder arthroplasty, likely reverse total shoulder.  Today, Dr Marmolejo did inject her left shoulder at her request to try to give her some symptomatic relief.  She is not ready to proceed with surgical intervention just yet.    Jeronimo Boss, Physician Assistant, served in the capacity as a "scribe" for this patient encounter.  A "face-to-face" encounter occurred with Dr. Jony Marmolejo on this date.  The treatment plan and medical decision-making is outlined above. Patient was seen and examined with a chaperone.       This note was created using Dragon voice recognition software that occasionally misinterpreted phrases or words.               [1]   Social History  Socioeconomic History    Marital status:    Tobacco Use    Smoking status: Former     Current packs/day: 0.00     Average packs/day: 0.3 packs/day for 1 year (0.3 ttl pk-yrs)     Types: Cigarettes     " Start date:      Quit date:      Years since quittin.5    Smokeless tobacco: Never    Tobacco comments:     social smoker - on weekends only - quit 20 yrs ago   Substance and Sexual Activity    Alcohol use: Yes     Alcohol/week: 0.0 standard drinks of alcohol     Comment: occ    Drug use: No    Sexual activity: Not Currently     Partners: Male     Social Drivers of Health     Stress: No Stress Concern Present (7/10/2019)    [employer] - Occupational Stress Questionnaire     Feeling of Stress : Not at all

## 2025-07-24 ENCOUNTER — TELEPHONE (OUTPATIENT)
Dept: BARIATRICS | Facility: CLINIC | Age: 75
End: 2025-07-24
Payer: MEDICARE

## 2025-07-24 NOTE — TELEPHONE ENCOUNTER
Advised patient that the pharmacy started a prior auth and she did not need to bring her prescription drug coverage information to the clinic tomorrow. Patient verbalized understanding.

## 2025-07-24 NOTE — TELEPHONE ENCOUNTER
Advised patient that her prescription coverage isn't showing in covermymeds to do a prior authorization for her prescription. Patient will bring a copy of her card to the clinic tomorrow.

## 2025-07-25 ENCOUNTER — PATIENT MESSAGE (OUTPATIENT)
Dept: BARIATRICS | Facility: CLINIC | Age: 75
End: 2025-07-25
Payer: MEDICARE

## 2025-07-25 DIAGNOSIS — I47.19 ATRIAL TACHYCARDIA: Primary | ICD-10-CM

## 2025-08-11 ENCOUNTER — PATIENT MESSAGE (OUTPATIENT)
Dept: PULMONOLOGY | Facility: CLINIC | Age: 75
End: 2025-08-11
Payer: MEDICARE

## (undated) DEVICE — DRAPE STERI U-SHAPED 47X51IN

## (undated) DEVICE — CLOSURE SKIN STERI STRIP 1/2X4

## (undated) DEVICE — SOLUTION IRRI NS BOTTLE 1000ML R5200-01

## (undated) DEVICE — SUT STRATAFIX SPIRAL VIOLET

## (undated) DEVICE — STAPLER SKIN PROXIMATE WIDE

## (undated) DEVICE — MANIFOLD 4 PORT

## (undated) DEVICE — Device

## (undated) DEVICE — DRAPE STERI INSTRUMENT 1018

## (undated) DEVICE — SYR 50CC LL

## (undated) DEVICE — SEE MEDLINE ITEM 152622

## (undated) DEVICE — CUBE COLD THERAPY POLAR CARE

## (undated) DEVICE — LINER SUCTION 3000CC

## (undated) DEVICE — APPLICATOR CHLORAPREP ORN 26ML

## (undated) DEVICE — TOWEL OR DISP STRL BLUE 4/PK

## (undated) DEVICE — BANDAGE ACE STERILE 3 REB3113

## (undated) DEVICE — SUT 4/0 18IN ETHILON BL P3

## (undated) DEVICE — SOL 9P NACL IRR PIC IL

## (undated) DEVICE — DRESSING XEROFORM FOIL PK 1X8

## (undated) DEVICE — INTERPULSE SET

## (undated) DEVICE — GLOVE SURG ULTRA TOUCH 8.5

## (undated) DEVICE — TOURNIQUET SB QC DP 34X4IN

## (undated) DEVICE — SPONGE BULKEE II ABSRB 6X6.75

## (undated) DEVICE — SUT FIBERWIRE 2 38 IN TAPER

## (undated) DEVICE — COVER MAYO STAND 89601

## (undated) DEVICE — TUBE SUCTION YANKAUER HI CAP

## (undated) DEVICE — UNDERGLOVE BIOGEL PI SZ 6.5 LF

## (undated) DEVICE — SOL IRR NACL .9% 3000ML

## (undated) DEVICE — TAPE SILK 3IN

## (undated) DEVICE — ALCOHOL 70% ISOP RUBBING 4OZ

## (undated) DEVICE — DRAPE C-ARM MINI DISP

## (undated) DEVICE — TOGA FLYTE PEEL AWAY XLARGE

## (undated) DEVICE — SPLINT PLASTER FAST SET 5X30IN

## (undated) DEVICE — ELECTRODE REM PLYHSV RETURN 9

## (undated) DEVICE — GLOVE BIOGEL SKINSENSE PI 6.5

## (undated) DEVICE — GOWN TOGA SYS PEELWY ZIP 2 XL

## (undated) DEVICE — PADDING CAST 3 STERILE 30-320

## (undated) DEVICE — MIXER BONE CEMENT

## (undated) DEVICE — SYS CLSR DERMABOND PRINEO 22CM

## (undated) DEVICE — SUTURE ETHILON 3-0 PS-1 18 1663H

## (undated) DEVICE — DRESSING XEROFORM 1X8IN

## (undated) DEVICE — SYR B-D DISP CONTROL 10CC100/C

## (undated) DEVICE — 6.5 BONE TAP

## (undated) DEVICE — PACK CUSTOM UNIV BASIN SLI

## (undated) DEVICE — SET DECANTER MEDICHOICE

## (undated) DEVICE — DRAPE MINI C ARM STERILE

## (undated) DEVICE — COLLECTOR SPECIMEN ANAEROBIC

## (undated) DEVICE — STRAP OR TABLE 5IN X 72IN

## (undated) DEVICE — TRAY UPPER EXTREMITY

## (undated) DEVICE — BLADE SURG STAINLESS STEEL #15

## (undated) DEVICE — BLADE SCALPEL #15 371115

## (undated) DEVICE — BUCKET PLASTER DISPOSABLE

## (undated) DEVICE — GLOVE BIOGEL SKINSENSE PI 7.0

## (undated) DEVICE — GAUZE SPONGE 4'X4 12 PLY

## (undated) DEVICE — PAD ABDOMINAL STERILE 8X10IN

## (undated) DEVICE — PACK LOWER EXTREMITY

## (undated) DEVICE — SUT VICRYL 3-0 27 FS-2

## (undated) DEVICE — ADHESIVE MASTISOL VIAL 48/BX

## (undated) DEVICE — PACK SIRUS BASIC V SURG STRL

## (undated) DEVICE — BANDAGE ESMARK 6X12

## (undated) DEVICE — SUT MONOCRYL PLUS 4-0 P3

## (undated) DEVICE — BLADE SAW SGTL 21.2X85.5X1.2

## (undated) DEVICE — BLADE SAW SGTL DL STR 25X1.27X

## (undated) DEVICE — SUT CTD VICRYL CT-1 27

## (undated) DEVICE — NDL SPINAL 18GX3.5 SPINOCAN

## (undated) DEVICE — WRAP SHLDR HIP ACCU THRM PACK

## (undated) DEVICE — DRAPE INCISE IOBAN 2 23X17IN

## (undated) DEVICE — SYR LUER LOCK STERILE 10ML

## (undated) DEVICE — SUT MONOCRYL 3-0 PS-1

## (undated) DEVICE — GLOVE BIOGEL SKINSENSE PI 7.5

## (undated) DEVICE — DRAPE U SPLIT SHEET 54X76IN

## (undated) DEVICE — DRAPE ORTHOMAX BAR

## (undated) DEVICE — UNDERGLOVES BIOGEL PI SIZE 8.5

## (undated) DEVICE — SEE MEDLINE ITEM 157131

## (undated) DEVICE — PAD CAST SPECIALIST STRL 4

## (undated) DEVICE — SUT FIBERWIRE

## (undated) DEVICE — GAUZE SPONGE 4X4 12PLY

## (undated) DEVICE — BLADE SURG CARBON STEEL #10

## (undated) DEVICE — SEE MEDLINE ITEM 152530

## (undated) DEVICE — UNDERGLOVE BIOGEL MICRO BLUE SZ 8.5

## (undated) DEVICE — REAMER

## (undated) DEVICE — PACK BASIC

## (undated) DEVICE — UNDERGLOVES BIOGEL PI SIZE 8

## (undated) DEVICE — 3.2 BONE PIN

## (undated) DEVICE — PILLOW FACE ADLT FOAM W/VELCRO

## (undated) DEVICE — BANDAGE COBAN COLOR ASSORT 3X5

## (undated) DEVICE — CORD FOR BIPOLAR FORCEPS 12

## (undated) DEVICE — SEE MEDLINE ITEM 146271

## (undated) DEVICE — SEE MEDLINE ITEM 146231

## (undated) DEVICE — SEE MEDLINE ITEM 107746

## (undated) DEVICE — PAD CAST SPECIALIST STRL 6

## (undated) DEVICE — BANDAGE ESMARK 4X9 55514

## (undated) DEVICE — DRAPE STERI-DRAPE 1000 17X11IN

## (undated) DEVICE — DRESSING N ADH OIL EMUL 3X8

## (undated) DEVICE — SLEEVE SCD EXPRESS CALF MEDIUM

## (undated) DEVICE — PAD UNDERPAD 30X30

## (undated) DEVICE — DRAPE CLEAR SMALL 1000

## (undated) DEVICE — 4.0 DRILL BIT

## (undated) DEVICE — SLEEVE SCD EXPRESS KNEE MEDIUM

## (undated) DEVICE — NDL HYPO REG 25G X 1 1/2

## (undated) DEVICE — SUT ETHILON 5-0 P3 18IN BLK

## (undated) DEVICE — PACK UPPER EXTREMITY BAPTIST

## (undated) DEVICE — DRAPE C-ARM (FITS NEW C-ARM) 07-CA108

## (undated) DEVICE — SPONGE SUPER KERLIX 6X6.75IN

## (undated) DEVICE — BNDG COFLEX FOAM LF2 ST 6X5YD

## (undated) DEVICE — PREP CHLORA 26ML

## (undated) DEVICE — WIRE GUIDE .035X6 MINI-ACUTRAK
Type: IMPLANTABLE DEVICE | Site: THUMB | Status: NON-FUNCTIONAL
Removed: 2019-03-01

## (undated) DEVICE — ALCOHOL 70% ISOP W/GREEN 16OZ

## (undated) DEVICE — SEE MEDLINE ITEM 157166

## (undated) DEVICE — DRILL BIT

## (undated) DEVICE — SUT 4.0 ETHILON

## (undated) DEVICE — DRESSING GAUZE 6PLY 4X4

## (undated) DEVICE — SUT STRATAFIX PDS 1 CTX 18IN

## (undated) DEVICE — PAD BOVIE ADULT

## (undated) DEVICE — NDL SAFETY 21G X 1 1/2 ECLPSE

## (undated) DEVICE — GLOVE BIOGEL PI GOLD SZ  8.5

## (undated) DEVICE — TOURNIQUET SB QC DP 18X4IN

## (undated) DEVICE — YANKAUER OPEN TIP W/O VENT

## (undated) DEVICE — SEE MEDLINE ITEM 146308

## (undated) DEVICE — SEE MEDLINE ITEM 157216

## (undated) DEVICE — SUT VICRYL 3-0 27 SH

## (undated) DEVICE — DRAPE ORTH SPLIT 77X108IN

## (undated) DEVICE — STRIP MEDI WND CLSR 1/2X4IN

## (undated) DEVICE — TOURNIQUET SB QC SP 18X4IN

## (undated) DEVICE — SUT MONOCRYL 3-0 PS-2 UND

## (undated) DEVICE — DRAPE THREE-QTR REINF 53X77IN

## (undated) DEVICE — COVER TABLE 44X90 STERILE

## (undated) DEVICE — SLING SHLDR IMMOBILIZER LG